# Patient Record
Sex: FEMALE | Race: WHITE | NOT HISPANIC OR LATINO | Employment: OTHER | URBAN - METROPOLITAN AREA
[De-identification: names, ages, dates, MRNs, and addresses within clinical notes are randomized per-mention and may not be internally consistent; named-entity substitution may affect disease eponyms.]

---

## 2017-02-07 ENCOUNTER — TRANSCRIBE ORDERS (OUTPATIENT)
Dept: GENERAL RADIOLOGY | Facility: HOSPITAL | Age: 82
End: 2017-02-07

## 2017-02-07 ENCOUNTER — HOSPITAL ENCOUNTER (OUTPATIENT)
Dept: GENERAL RADIOLOGY | Facility: HOSPITAL | Age: 82
Discharge: HOME | End: 2017-02-07
Admitting: FAMILY MEDICINE

## 2017-02-07 DIAGNOSIS — M25.561 RIGHT KNEE PAIN, UNSPECIFIED CHRONICITY: Primary | ICD-10-CM

## 2017-02-07 PROCEDURE — 73562 X-RAY EXAM OF KNEE 3: CPT

## 2017-02-14 ENCOUNTER — OFFICE VISIT (OUTPATIENT)
Dept: ORTHOPEDIC SURGERY | Facility: CLINIC | Age: 82
End: 2017-02-14

## 2017-02-14 VITALS — WEIGHT: 142.9 LBS | RESPIRATION RATE: 20 BRPM | HEIGHT: 63 IN | BODY MASS INDEX: 25.32 KG/M2

## 2017-02-14 DIAGNOSIS — M25.561 RIGHT KNEE PAIN, UNSPECIFIED CHRONICITY: Primary | ICD-10-CM

## 2017-02-14 DIAGNOSIS — M17.11 PRIMARY OSTEOARTHRITIS OF RIGHT KNEE: ICD-10-CM

## 2017-02-14 DIAGNOSIS — M17.11 PATELLOFEMORAL ARTHRITIS OF RIGHT KNEE: ICD-10-CM

## 2017-02-14 PROCEDURE — 99203 OFFICE O/P NEW LOW 30 MIN: CPT | Performed by: PHYSICIAN ASSISTANT

## 2017-02-14 PROCEDURE — 20610 DRAIN/INJ JOINT/BURSA W/O US: CPT | Performed by: PHYSICIAN ASSISTANT

## 2017-02-14 RX ORDER — LIDOCAINE HYDROCHLORIDE 10 MG/ML
2 INJECTION, SOLUTION INFILTRATION; PERINEURAL
Status: COMPLETED | OUTPATIENT
Start: 2017-02-14 | End: 2017-02-14

## 2017-02-14 RX ORDER — METOPROLOL SUCCINATE 25 MG/1
TABLET, EXTENDED RELEASE ORAL
Status: ON HOLD | COMMUNITY
Start: 2017-02-09 | End: 2018-02-15

## 2017-02-14 RX ORDER — METHYLPREDNISOLONE ACETATE 40 MG/ML
40 INJECTION, SUSPENSION INTRA-ARTICULAR; INTRALESIONAL; INTRAMUSCULAR; SOFT TISSUE
Status: COMPLETED | OUTPATIENT
Start: 2017-02-14 | End: 2017-02-14

## 2017-02-14 RX ADMIN — METHYLPREDNISOLONE ACETATE 40 MG: 40 INJECTION, SUSPENSION INTRA-ARTICULAR; INTRALESIONAL; INTRAMUSCULAR; SOFT TISSUE at 09:24

## 2017-02-14 RX ADMIN — LIDOCAINE HYDROCHLORIDE 2 ML: 10 INJECTION, SOLUTION INFILTRATION; PERINEURAL at 09:24

## 2017-02-14 NOTE — PROGRESS NOTES
Subjective   Patient ID: Silva Ruano is a 89 y.o.  female is here today for a treatment planning discussion.  Pain of the Right Knee         History of Present Illness  Patient presents as a new patient with her daughter for complaints of right knee pain.  Both the patient and her daughter assures me there has been no injury or trauma to the knee.  She states the pain has been ongoing for a couple of months.  She denies numbness or tingling.  She states the pain is worse going from a sitting to a standing position.  Her daughter states she has had a thorough workup for possible blood clots and/or peripheral vascular disease which all were reported negative.  He now report to further investigate why she is having knee pain patient denies low back pain.  She denies hip pain.   She cannot recall any swelling of note  Pain Score: worst possible pain  Pain Location: Knee  Pain Orientation: Right     Pain Descriptors: Aching, Stabbing  Pain Frequency: Constant/continuous     Date Pain First Started:  (two months)     Aggravating Factors: Walking, Standing, Squatting, Kneeling, Stairs, Bending, Exercise, Stretching, Straightening           Result of Injury: No  Work-Related Injury: No    Past Medical History   Diagnosis Date   • Arthritis    • Diverticulosis    • Hypertension    • Skin cancer      lymphoma   • Thyroid disease         Past Surgical History   Procedure Laterality Date   • Ovarian cyst removal     • Hysterectomy     • Cholecystectomy         Family History   Problem Relation Age of Onset   • Heart disease Other    • Hypertension Other    • Cancer Other         Social History     Social History   • Marital status:      Spouse name: N/A   • Number of children: N/A   • Years of education: N/A     Occupational History   • Not on file.     Social History Main Topics   • Smoking status: Never Smoker   • Smokeless tobacco: Not on file   • Alcohol use No   • Drug use: No   • Sexual activity: Defer  "    Other Topics Concern   • Not on file     Social History Narrative       No Known Allergies    Review of Systems   Constitutional: Negative for diaphoresis, fever and unexpected weight change.   HENT: Negative for dental problem and sore throat.    Eyes: Negative for visual disturbance.   Respiratory: Negative for shortness of breath.    Cardiovascular: Negative for chest pain.   Gastrointestinal: Negative for abdominal pain, constipation, diarrhea, nausea and vomiting.   Genitourinary: Negative for difficulty urinating and frequency.   Musculoskeletal: Positive for arthralgias.   Neurological: Negative for headaches.   Hematological: Does not bruise/bleed easily.   All other systems reviewed and are negative.      Objective   Visit Vitals   • Resp 20   • Ht 63\" (160 cm)   • Wt 142 lb 14.4 oz (64.8 kg)   • BMI 25.31 kg/m2      Physical Exam   Constitutional: She is oriented to person, place, and time. She appears well-developed.   Eyes: Conjunctivae are normal.   Neck: No tracheal deviation present.   Pulmonary/Chest: Effort normal.   Musculoskeletal:        Right knee: She exhibits normal range of motion, no swelling, no effusion, no ecchymosis and no erythema. Tenderness found. Medial joint line and lateral joint line tenderness noted.        Right ankle: No tenderness. Achilles tendon exhibits no pain.   Neurological: She is alert and oriented to person, place, and time.   Skin: No rash noted.   Psychiatric: She has a normal mood and affect. Her behavior is normal.   Vitals reviewed.    Right Knee Exam     Tenderness   The patient is experiencing tenderness in the medial joint line, patellar tendon and pes anserinus.    Range of Motion   Right knee extension: 1.   Right knee flexion: 115.     Tests   Drawer:       Anterior - negative    Posterior - negative    Other   Erythema: absent  Sensation: normal  Pulse: present  Other tests: no effusion present          Extremity DVT signs are Negative on physical exam " with negative Linh sign, with no calf pain, with no palpable cords, with no increased pain with passive stretch/extension and with no skin tone change   Neurologic Exam     Mental Status   Oriented to person, place, and time.      Right Knee Exam     Tenderness   The patient is experiencing tenderness in the medial joint line, lateral joint line.        Patient ambulates without difficulty.    Assessment/Plan   Independent Review of Radiographic Studies:    Indication to evaluate joint condition, no comparison views available, shows evident chronic advanced osteoarthritis.  Laboratory and Other Studies:  No new results reviewed today.       I discussed with the patient and her daughter at bedside the possible risks and complications associated with intra-articular cortisone injection.  However, a both elected to proceed.  Large Joint Arthrocentesis  Date/Time: 2/14/2017 9:24 AM  Consent given by: patient  Site marked: site marked  Timeout: Immediately prior to procedure a time out was called to verify the correct patient, procedure, equipment, support staff and site/side marked as required   Supporting Documentation  Indications: pain   Procedure Details  Location: knee - R knee  Preparation: Patient was prepped and draped in the usual sterile fashion  Needle size: 22 G  Approach: anterolateral  Medications administered: 2 mL lidocaine 1 %; 40 mg methylPREDNISolone acetate 40 MG/ML  Patient tolerance: patient tolerated the procedure well with no immediate complications          Phoebe was seen today for pain.    Diagnoses and all orders for this visit:    Right knee pain, unspecified chronicity  -     XR Knee 1 or 2 View Right  -     Large Joint Arthrocentesis  -     Ambulatory Referral to Physical Therapy Evaluate and treat, Ortho    Patellofemoral arthritis of right knee  -     Ambulatory Referral to Physical Therapy Evaluate and treat, Ortho    Primary osteoarthritis of right knee       Orthopedic activities  reviewed and patient expressed appreciation  Discussion of orthopedic goals  Risk, benefits, and merits of treatment alternatives reviewed with the patient and questions answered  Physical therapy referral given  Call or notify for any adverse effect from injection therapy  Ice, heat, and/or modalities as beneficial    I will write for the compound topical anti-inflammatory to be applied 3-4 times daily.  Patient is also advised to take over-the-counter Tylenol as needed.  May apply ice packs to the skin with a protective skin barrier 15 minutes onto hours off    Recommendations/Plan:  Exercise, medications, injections, other patient advice, and return appointment as noted.  Patient is encouraged to call or return for any issues or concerns.    FU in 3 months or as needed  Patient agreeable to call or return sooner for any concerns.

## 2017-03-07 ENCOUNTER — APPOINTMENT (OUTPATIENT)
Dept: LAB | Facility: HOSPITAL | Age: 82
End: 2017-03-07

## 2017-03-07 ENCOUNTER — OFFICE VISIT (OUTPATIENT)
Dept: ONCOLOGY | Facility: CLINIC | Age: 82
End: 2017-03-07

## 2017-03-07 VITALS
RESPIRATION RATE: 14 BRPM | BODY MASS INDEX: 24.45 KG/M2 | TEMPERATURE: 97 F | HEART RATE: 65 BPM | DIASTOLIC BLOOD PRESSURE: 60 MMHG | SYSTOLIC BLOOD PRESSURE: 141 MMHG | WEIGHT: 138 LBS

## 2017-03-07 DIAGNOSIS — C82.90 FOLLICULAR LYMPHOMA, UNSPECIFIED BODY REGION, UNSPECIFIED GRADE (HCC): Primary | ICD-10-CM

## 2017-03-07 LAB
ALBUMIN SERPL-MCNC: 3.8 G/DL (ref 3.5–5)
ALBUMIN/GLOB SERPL: 1.2 G/DL (ref 1–2)
ALP SERPL-CCNC: 90 U/L (ref 38–126)
ALT SERPL W P-5'-P-CCNC: 25 U/L (ref 13–69)
ANION GAP SERPL CALCULATED.3IONS-SCNC: 13.5 MMOL/L
AST SERPL-CCNC: 28 U/L (ref 15–46)
BASOPHILS # BLD AUTO: 0.04 10*3/MM3 (ref 0–0.2)
BASOPHILS NFR BLD AUTO: 0.6 % (ref 0–2.5)
BILIRUB SERPL-MCNC: 0.4 MG/DL (ref 0.2–1.3)
BUN BLD-MCNC: 14 MG/DL (ref 7–20)
BUN/CREAT SERPL: 12.7 (ref 7.1–23.5)
CALCIUM SPEC-SCNC: 9.7 MG/DL (ref 8.4–10.2)
CHLORIDE SERPL-SCNC: 100 MMOL/L (ref 98–107)
CO2 SERPL-SCNC: 31 MMOL/L (ref 26–30)
CREAT BLD-MCNC: 1.1 MG/DL (ref 0.6–1.3)
DEPRECATED RDW RBC AUTO: 41.2 FL (ref 37–54)
EOSINOPHIL # BLD AUTO: 0.4 10*3/MM3 (ref 0–0.7)
EOSINOPHIL NFR BLD AUTO: 6.4 % (ref 0–7)
ERYTHROCYTE [DISTWIDTH] IN BLOOD BY AUTOMATED COUNT: 12.6 % (ref 11.5–14.5)
GFR SERPL CREATININE-BSD FRML MDRD: 47 ML/MIN/1.73
GLOBULIN UR ELPH-MCNC: 3.2 GM/DL
GLUCOSE BLD-MCNC: 100 MG/DL (ref 74–98)
HCT VFR BLD AUTO: 35.5 % (ref 37–47)
HGB BLD-MCNC: 11.7 G/DL (ref 12–16)
IMM GRANULOCYTES # BLD: 0.03 10*3/MM3 (ref 0–0.06)
IMM GRANULOCYTES NFR BLD: 0.5 % (ref 0–0.6)
LDH SERPL-CCNC: 327 U/L (ref 313–618)
LYMPHOCYTES # BLD AUTO: 2.63 10*3/MM3 (ref 0.6–3.4)
LYMPHOCYTES NFR BLD AUTO: 41.8 % (ref 10–50)
MCH RBC QN AUTO: 29.5 PG (ref 27–31)
MCHC RBC AUTO-ENTMCNC: 33 G/DL (ref 30–37)
MCV RBC AUTO: 89.6 FL (ref 81–99)
MONOCYTES # BLD AUTO: 0.48 10*3/MM3 (ref 0–0.9)
MONOCYTES NFR BLD AUTO: 7.6 % (ref 0–12)
NEUTROPHILS # BLD AUTO: 2.71 10*3/MM3 (ref 2–6.9)
NEUTROPHILS NFR BLD AUTO: 43.1 % (ref 37–80)
NRBC BLD MANUAL-RTO: 0 /100 WBC (ref 0–0)
PLATELET # BLD AUTO: 204 10*3/MM3 (ref 130–400)
PMV BLD AUTO: 9.1 FL (ref 6–12)
POTASSIUM BLD-SCNC: 3.5 MMOL/L (ref 3.5–5.1)
PROT SERPL-MCNC: 7 G/DL (ref 6.3–8.2)
RBC # BLD AUTO: 3.96 10*6/MM3 (ref 4.2–5.4)
SODIUM BLD-SCNC: 141 MMOL/L (ref 137–145)
WBC NRBC COR # BLD: 6.29 10*3/MM3 (ref 4.8–10.8)

## 2017-03-07 PROCEDURE — 99213 OFFICE O/P EST LOW 20 MIN: CPT | Performed by: NURSE PRACTITIONER

## 2017-03-07 PROCEDURE — 36415 COLL VENOUS BLD VENIPUNCTURE: CPT | Performed by: NURSE PRACTITIONER

## 2017-03-07 PROCEDURE — 83615 LACTATE (LD) (LDH) ENZYME: CPT | Performed by: NURSE PRACTITIONER

## 2017-03-07 PROCEDURE — 80053 COMPREHEN METABOLIC PANEL: CPT | Performed by: NURSE PRACTITIONER

## 2017-03-07 PROCEDURE — 85025 COMPLETE CBC W/AUTO DIFF WBC: CPT | Performed by: NURSE PRACTITIONER

## 2017-03-07 RX ORDER — LOSARTAN POTASSIUM 50 MG/1
TABLET ORAL
Status: ON HOLD | COMMUNITY
Start: 2017-02-28 | End: 2018-02-15

## 2017-03-07 RX ORDER — OMEPRAZOLE 20 MG/1
20 CAPSULE, DELAYED RELEASE ORAL DAILY
COMMUNITY
Start: 2017-02-17 | End: 2020-01-01 | Stop reason: HOSPADM

## 2017-03-07 NOTE — PROGRESS NOTES
PROBLEM LIST:  1. 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   b) Stage III disease at presentation in 2008.   c) Responded to Rituxan as a single agent followed by maintenance Rituxan.      CHIEF COMPLAINT: Followup about lymphoma.      Subjective     HISTORY OF PRESENT ILLNESS:   Mrs. Ruano is here for follow up of lymphoma. She is doing well since her last visit. She keeps up with her usual activities. She required a steroid injection in her right knee for pain and got great pain relief. She also required dilatation for esophageal stricture and was treated for H pylori.  She denies any excessive fatigue, recurring fevers, drenching night sweats, lymphadenopathy or rashes.        Past Medical History, Past Surgical History, Social History, Family History have been reviewed and are without significant changes except as mentioned.    Review of Systems   A comprehensive 14 point review of systems was performed and was negative except as mentioned.    Medications:  The current medication list was reviewed in the EMR    ALLERGIES:  No Known Allergies    Objective      Visit Vitals   • /60   • Pulse 65   • Temp 97 °F (36.1 °C) (Temporal Artery )   • Resp 14   • Wt 138 lb (62.6 kg)   • BMI 24.45 kg/m2            General: well appearing, in no acute distress   HEENT: sclera anicteric, oropharynx clear  Lymphatics: no cervical, supraclavicular, or axillary adenopathy  Cardiovascular: regular rate and rhythm, no murmurs  Lungs: clear to auscultation bilaterally  Abdomen: soft, nontender, nondistended.  No palpable masses or organomegaly  Extremeties: no lower extremity edema, cords or calf tenderness  Skin: no rashes, lesions, bruising, or petechiae. Raised fleshy nodule on the left lower extremity close to the ankle that is  approximately 2 cm.     RECENT LABS:  Hematology WBC   Date Value Ref Range Status   09/06/2016 5.67 3.50 - 10.80 10*3/mm3 Final    03/08/2016 5.42 3.50 - 10.80 K/mcL Final     HEMOGLOBIN   Date Value Ref Range Status   09/06/2016 11.5 11.5 - 15.5 g/dL Final   03/08/2016 12.5 11.5 - 15.5 g/dL Final     HEMATOCRIT   Date Value Ref Range Status   09/06/2016 34.4 (L) 34.5 - 44.0 % Final   03/08/2016 37.5 34.5 - 44.0 % Final     MCV   Date Value Ref Range Status   09/06/2016 89.6 80.0 - 99.0 fL Final   03/08/2016 89.7 80.0 - 99.0 fL Final     RDW   Date Value Ref Range Status   09/06/2016 13.1 11.3 - 14.5 % Final     MPV   Date Value Ref Range Status   09/06/2016 9.0 6.0 - 12.0 fL Final     PLATELETS   Date Value Ref Range Status   09/06/2016 203 150 - 450 10*3/mm3 Final   03/08/2016 193 150 - 450 K/mcL Final     IMMATURE GRANS %   Date Value Ref Range Status   09/06/2016 0.4 0.0 - 0.6 % Final     NEUTROPHILS, ABSOLUTE   Date Value Ref Range Status   09/06/2016 3.10 1.50 - 8.30 10*3/mm3 Final     NEUTROPHILS ABSOLUTE   Date Value Ref Range Status   03/08/2016 2.02 1.50 - 8.30 K/mcL Final     LYMPHOCYTES, ABSOLUTE   Date Value Ref Range Status   09/06/2016 1.95 0.60 - 4.80 10*3/mm3 Final     LYMPHOCYTES ABSOLUTE   Date Value Ref Range Status   03/08/2016 2.58 0.60 - 4.80 K/mcL Final     MONOCYTES, ABSOLUTE   Date Value Ref Range Status   09/06/2016 0.23 0.00 - 1.00 10*3/mm3 Final     MONOCYTES ABSOLUTE   Date Value Ref Range Status   03/08/2016 0.51 0.00 - 1.00 K/mcL Final     EOSINOPHILS, ABSOLUTE   Date Value Ref Range Status   09/06/2016 0.35 (H) 0.10 - 0.30 10*3/mm3 Final     EOSINOPHILS ABSOLUTE   Date Value Ref Range Status   03/08/2016 0.27 0.10 - 0.30 K/mcL Final     BASOPHILS, ABSOLUTE   Date Value Ref Range Status   09/06/2016 0.02 0.00 - 0.20 10*3/mm3 Final     BASOPHILS ABSOLUTE   Date Value Ref Range Status   03/08/2016 0.02 0.00 - 0.20 K/mcL Final     IMMATURE GRANS, ABSOLUTE   Date Value Ref Range Status   09/06/2016 0.02 0.00 - 0.03 10*3/mm3 Final       GLUCOSE   Date Value Ref Range Status   09/06/2016 105 (H) 70 - 100 mg/dL Final    03/08/2016 93 70 - 100 mg/dL Final     SODIUM   Date Value Ref Range Status   12/13/2016 140 137 - 145 mmol/L Final   09/06/2016 136 132 - 146 mmol/L Final     POTASSIUM   Date Value Ref Range Status   12/13/2016 3.7 3.5 - 5.1 mmol/L Final   09/06/2016 3.6 3.5 - 5.5 mmol/L Final     CO2   Date Value Ref Range Status   12/13/2016 31 (H) 26 - 30 mmol/L Final   09/06/2016 31.0 20.0 - 31.0 mmol/L Final     CHLORIDE   Date Value Ref Range Status   12/13/2016 102 98 - 107 mmol/L Final   09/06/2016 99 99 - 109 mmol/L Final     ANION GAP   Date Value Ref Range Status   12/13/2016 11 10 - 20 mmol/L Final   09/06/2016 6.0 3.0 - 11.0 mmol/L Final     CREATININE   Date Value Ref Range Status   12/13/2016 0.9 0.6 - 1.3 mg/dL Final   09/06/2016 0.80 0.60 - 1.30 mg/dL Final     BUN   Date Value Ref Range Status   12/13/2016 14 7 - 20 mg/dL Final   09/06/2016 14 9 - 23 mg/dL Final     BUN/CREATININE RATIO   Date Value Ref Range Status   09/06/2016 17.5 7.0 - 25.0 Final   05/14/2014 23.3 7.1 - 23.5 Final     CALCIUM   Date Value Ref Range Status   12/13/2016 9.5 8.4 - 10.2 mg/dL Final   09/06/2016 9.2 8.7 - 10.4 mg/dL Final     EGFR NON  AMER   Date Value Ref Range Status   09/06/2016 67 >60 mL/min/1.73 Final     ALKALINE PHOSPHATASE   Date Value Ref Range Status   09/06/2016 78 25 - 100 U/L Final   03/08/2016 91 25 - 100 Units/L Final     TOTAL PROTEIN   Date Value Ref Range Status   09/06/2016 6.2 5.7 - 8.2 g/dL Final   03/08/2016 6.7 5.7 - 8.2 g/dL Final     ALT (SGPT)   Date Value Ref Range Status   09/06/2016 18 7 - 40 U/L Final   03/08/2016 27 7 - 40 Units/L Final     AST (SGOT)   Date Value Ref Range Status   09/06/2016 24 0 - 33 U/L Final   03/08/2016 34 (H) 0 - 33 Units/L Final     TOTAL BILIRUBIN   Date Value Ref Range Status   09/06/2016 0.3 0.3 - 1.2 mg/dL Final   03/08/2016 0.4 0.3 - 1.2 mg/dL Final     ALBUMIN   Date Value Ref Range Status   09/06/2016 3.80 3.20 - 4.80 g/dL Final   03/08/2016 4.0 3.2 - 4.8  g/dL Final     GLOBULIN   Date Value Ref Range Status   09/06/2016 2.4 gm/dL Final     A/G RATIO   Date Value Ref Range Status   09/06/2016 1.6 g/dL Final   05/14/2014 1.2 1.0 - 2.0 Final       LDH   Date Value Ref Range Status   09/06/2016 129 120 - 246 U/L Final   03/08/2016 127 120 - 246 Units/L Final          Assessment/Plan   IMPRESSION:   1. Low-grade lymphoma. The patient continues to do well with no evidence of recurrence.   2. Nodules on the left lower extremity of uncertain significance, unchanged.      PLAN:  1. We will obtain labs today including CBC, CMP, and LDH. We will contact her if there are adverse changes.   2. We will plan on seeing her back in 6 months for follow up evaluation. We have asked her to contact us in the interim, if any new symptoms arise.               ERINN Keen  [facility] Hematology and On[facility]    3/7/2017

## 2017-05-01 ENCOUNTER — DOCUMENTATION (OUTPATIENT)
Dept: NEUROLOGY | Facility: CLINIC | Age: 82
End: 2017-05-01

## 2017-05-01 ENCOUNTER — APPOINTMENT (OUTPATIENT)
Dept: LAB | Facility: HOSPITAL | Age: 82
End: 2017-05-01

## 2017-05-01 ENCOUNTER — OFFICE VISIT (OUTPATIENT)
Dept: NEUROLOGY | Facility: CLINIC | Age: 82
End: 2017-05-01

## 2017-05-01 VITALS
WEIGHT: 140 LBS | DIASTOLIC BLOOD PRESSURE: 60 MMHG | BODY MASS INDEX: 24.8 KG/M2 | SYSTOLIC BLOOD PRESSURE: 115 MMHG | HEIGHT: 63 IN

## 2017-05-01 DIAGNOSIS — G30.1 LATE ONSET ALZHEIMER'S DISEASE WITH BEHAVIORAL DISTURBANCE (HCC): Primary | ICD-10-CM

## 2017-05-01 DIAGNOSIS — F02.818 LATE ONSET ALZHEIMER'S DISEASE WITH BEHAVIORAL DISTURBANCE (HCC): Primary | ICD-10-CM

## 2017-05-01 LAB
FOLATE SERPL-MCNC: 13.97 NG/ML (ref 3.2–20)
TSH SERPL DL<=0.05 MIU/L-ACNC: 1.99 MIU/ML (ref 0.35–5.35)
VIT B12 BLD-MCNC: 743 PG/ML (ref 211–911)

## 2017-05-01 PROCEDURE — 36415 COLL VENOUS BLD VENIPUNCTURE: CPT | Performed by: PSYCHIATRY & NEUROLOGY

## 2017-05-01 PROCEDURE — 84443 ASSAY THYROID STIM HORMONE: CPT | Performed by: PSYCHIATRY & NEUROLOGY

## 2017-05-01 PROCEDURE — 82746 ASSAY OF FOLIC ACID SERUM: CPT | Performed by: PSYCHIATRY & NEUROLOGY

## 2017-05-01 PROCEDURE — 99205 OFFICE O/P NEW HI 60 MIN: CPT | Performed by: PSYCHIATRY & NEUROLOGY

## 2017-05-01 PROCEDURE — 82607 VITAMIN B-12: CPT | Performed by: PSYCHIATRY & NEUROLOGY

## 2017-05-01 RX ORDER — MEMANTINE HYDROCHLORIDE 5 MG-10 MG
KIT ORAL
Qty: 1 PACKAGE | Refills: 0 | Status: SHIPPED | OUTPATIENT
Start: 2017-05-01 | End: 2017-06-09

## 2017-05-01 RX ORDER — DONEPEZIL HYDROCHLORIDE 5 MG/1
5 TABLET, FILM COATED ORAL DAILY
Qty: 30 TABLET | Refills: 11 | Status: SHIPPED | OUTPATIENT
Start: 2017-05-01 | End: 2017-05-01

## 2017-05-05 ENCOUNTER — HOSPITAL ENCOUNTER (OUTPATIENT)
Dept: CT IMAGING | Facility: HOSPITAL | Age: 82
Discharge: HOME | End: 2017-05-05
Attending: PSYCHIATRY & NEUROLOGY | Admitting: PSYCHIATRY & NEUROLOGY

## 2017-05-05 PROCEDURE — 70450 CT HEAD/BRAIN W/O DYE: CPT

## 2017-05-11 ENCOUNTER — APPOINTMENT (OUTPATIENT)
Dept: CT IMAGING | Facility: HOSPITAL | Age: 82
End: 2017-05-11
Attending: PSYCHIATRY & NEUROLOGY

## 2017-05-15 ENCOUNTER — OFFICE VISIT (OUTPATIENT)
Dept: ORTHOPEDIC SURGERY | Facility: CLINIC | Age: 82
End: 2017-05-15

## 2017-05-15 VITALS — HEIGHT: 63 IN | WEIGHT: 143 LBS | RESPIRATION RATE: 18 BRPM | BODY MASS INDEX: 25.34 KG/M2

## 2017-05-15 DIAGNOSIS — M25.561 RIGHT KNEE PAIN, UNSPECIFIED CHRONICITY: Primary | ICD-10-CM

## 2017-05-15 DIAGNOSIS — M17.11 PRIMARY OSTEOARTHRITIS OF RIGHT KNEE: ICD-10-CM

## 2017-05-15 PROCEDURE — 20610 DRAIN/INJ JOINT/BURSA W/O US: CPT | Performed by: PHYSICIAN ASSISTANT

## 2017-05-15 PROCEDURE — 99213 OFFICE O/P EST LOW 20 MIN: CPT | Performed by: PHYSICIAN ASSISTANT

## 2017-05-15 RX ORDER — METHYLPREDNISOLONE ACETATE 40 MG/ML
40 INJECTION, SUSPENSION INTRA-ARTICULAR; INTRALESIONAL; INTRAMUSCULAR; SOFT TISSUE
Status: COMPLETED | OUTPATIENT
Start: 2017-05-15 | End: 2017-05-15

## 2017-05-15 RX ORDER — AMOXICILLIN 500 MG/1
500 CAPSULE ORAL 2 TIMES DAILY
COMMUNITY
Start: 2017-02-23 | End: 2017-08-18

## 2017-05-15 RX ORDER — LIDOCAINE AND PRILOCAINE 25; 25 MG/G; MG/G
CREAM TOPICAL
COMMUNITY
Start: 2017-02-21 | End: 2017-09-19 | Stop reason: HOSPADM

## 2017-05-15 RX ORDER — LIDOCAINE HYDROCHLORIDE 10 MG/ML
2 INJECTION, SOLUTION INFILTRATION; PERINEURAL
Status: COMPLETED | OUTPATIENT
Start: 2017-05-15 | End: 2017-05-15

## 2017-05-15 RX ORDER — CLARITHROMYCIN 500 MG/1
TABLET, COATED ORAL
COMMUNITY
Start: 2017-02-23 | End: 2017-08-18

## 2017-05-15 RX ADMIN — METHYLPREDNISOLONE ACETATE 40 MG: 40 INJECTION, SUSPENSION INTRA-ARTICULAR; INTRALESIONAL; INTRAMUSCULAR; SOFT TISSUE at 10:31

## 2017-05-15 RX ADMIN — LIDOCAINE HYDROCHLORIDE 2 ML: 10 INJECTION, SOLUTION INFILTRATION; PERINEURAL at 10:31

## 2017-05-17 ENCOUNTER — APPOINTMENT (OUTPATIENT)
Dept: GENERAL RADIOLOGY | Facility: HOSPITAL | Age: 82
End: 2017-05-17

## 2017-05-17 ENCOUNTER — HOSPITAL ENCOUNTER (EMERGENCY)
Facility: HOSPITAL | Age: 82
Discharge: HOME | End: 2017-05-18
Attending: EMERGENCY MEDICINE | Admitting: EMERGENCY MEDICINE

## 2017-05-17 ENCOUNTER — TELEPHONE (OUTPATIENT)
Dept: NEUROLOGY | Facility: CLINIC | Age: 82
End: 2017-05-17

## 2017-05-17 ENCOUNTER — APPOINTMENT (OUTPATIENT)
Dept: CT IMAGING | Facility: HOSPITAL | Age: 82
End: 2017-05-17

## 2017-05-17 DIAGNOSIS — S02.2XXB OPEN FRACTURE OF NASAL BONE, INITIAL ENCOUNTER: ICD-10-CM

## 2017-05-17 DIAGNOSIS — S80.00XA CONTUSION OF KNEE, UNSPECIFIED LATERALITY, INITIAL ENCOUNTER: ICD-10-CM

## 2017-05-17 DIAGNOSIS — S60.229A CONTUSION OF HAND, UNSPECIFIED LATERALITY, INITIAL ENCOUNTER: ICD-10-CM

## 2017-05-17 DIAGNOSIS — S01.81XA FACIAL LACERATION, INITIAL ENCOUNTER: Primary | ICD-10-CM

## 2017-05-17 PROCEDURE — 70486 CT MAXILLOFACIAL W/O DYE: CPT

## 2017-05-17 PROCEDURE — 73130 X-RAY EXAM OF HAND: CPT

## 2017-05-17 PROCEDURE — 73560 X-RAY EXAM OF KNEE 1 OR 2: CPT

## 2017-05-17 PROCEDURE — 99284 EMERGENCY DEPT VISIT MOD MDM: CPT

## 2017-05-17 PROCEDURE — 70450 CT HEAD/BRAIN W/O DYE: CPT

## 2017-05-17 RX ORDER — TRAMADOL HYDROCHLORIDE 50 MG/1
50 TABLET ORAL ONCE
Status: COMPLETED | OUTPATIENT
Start: 2017-05-17 | End: 2017-05-17

## 2017-05-17 RX ORDER — LIDOCAINE HYDROCHLORIDE AND EPINEPHRINE 10; 10 MG/ML; UG/ML
10 INJECTION, SOLUTION INFILTRATION; PERINEURAL ONCE
Status: COMPLETED | OUTPATIENT
Start: 2017-05-17 | End: 2017-05-17

## 2017-05-17 RX ADMIN — Medication 3 ML: at 22:46

## 2017-05-17 RX ADMIN — TRAMADOL HYDROCHLORIDE 50 MG: 50 TABLET, COATED ORAL at 22:47

## 2017-05-17 RX ADMIN — LIDOCAINE HYDROCHLORIDE,EPINEPHRINE BITARTRATE 10 ML: 10; .01 INJECTION, SOLUTION INFILTRATION; PERINEURAL at 22:47

## 2017-05-18 VITALS
HEART RATE: 91 BPM | HEIGHT: 67 IN | TEMPERATURE: 98.2 F | OXYGEN SATURATION: 96 % | RESPIRATION RATE: 20 BRPM | SYSTOLIC BLOOD PRESSURE: 152 MMHG | WEIGHT: 145 LBS | BODY MASS INDEX: 22.76 KG/M2 | DIASTOLIC BLOOD PRESSURE: 75 MMHG

## 2017-05-18 RX ORDER — TRAMADOL HYDROCHLORIDE 50 MG/1
50 TABLET ORAL EVERY 6 HOURS PRN
Qty: 20 TABLET | Refills: 0 | Status: SHIPPED | OUTPATIENT
Start: 2017-05-18 | End: 2017-09-19 | Stop reason: HOSPADM

## 2017-05-22 ENCOUNTER — HOSPITAL ENCOUNTER (EMERGENCY)
Facility: HOSPITAL | Age: 82
Discharge: HOME | End: 2017-05-22

## 2017-05-22 VITALS
SYSTOLIC BLOOD PRESSURE: 143 MMHG | BODY MASS INDEX: 24.8 KG/M2 | HEIGHT: 63 IN | RESPIRATION RATE: 18 BRPM | WEIGHT: 140 LBS | HEART RATE: 63 BPM | OXYGEN SATURATION: 98 % | DIASTOLIC BLOOD PRESSURE: 63 MMHG | TEMPERATURE: 97.6 F

## 2017-05-22 PROCEDURE — 99201: CPT

## 2017-06-09 ENCOUNTER — OFFICE VISIT (OUTPATIENT)
Dept: NEUROLOGY | Facility: CLINIC | Age: 82
End: 2017-06-09

## 2017-06-09 VITALS
BODY MASS INDEX: 23.04 KG/M2 | HEIGHT: 63 IN | WEIGHT: 130 LBS | DIASTOLIC BLOOD PRESSURE: 78 MMHG | SYSTOLIC BLOOD PRESSURE: 132 MMHG

## 2017-06-09 DIAGNOSIS — F02.818 LATE ONSET ALZHEIMER'S DISEASE WITH BEHAVIORAL DISTURBANCE (HCC): Primary | ICD-10-CM

## 2017-06-09 DIAGNOSIS — G30.1 LATE ONSET ALZHEIMER'S DISEASE WITH BEHAVIORAL DISTURBANCE (HCC): Primary | ICD-10-CM

## 2017-06-09 PROCEDURE — 99214 OFFICE O/P EST MOD 30 MIN: CPT | Performed by: PHYSICIAN ASSISTANT

## 2017-06-09 NOTE — PROGRESS NOTES
"Subjective     History of Present Illness   Silva Ruano is a 89 y.o. female who returns to clinic today for evaluation of cognitive impairment. Her family  has noted symptoms since at least 2015 marked initially by forgetfulness. This has gradually worsened  over time. Additional associated symptoms have included impairments in essentially all spheres of cognition. She has had associated symptoms of delusions and hallucinations. Her family manages her medications and finances. She is currently residing at her sister's home in [place]. No further associated symptoms or modifying factors have been noted.    Of note she is followed by Dr. Morocho for lymphoma.     Prior evaluation has included screening blood work  and a head CT  which were unremarkable . She previously took donepezil and Namenda, though these were discontinued due to side effects and lack of perceived benefit.     Since her last visit in 5/17, she feels essentially unchanged. Her family has noted a cognitive decline.       The following portions of the patient's history were reviewed and updated as appropriate: allergies, current medications, past family history, past medical history, past social history, past surgical history and problem list.    Review of Systems   Constitutional: Negative.    HENT: Negative.    Eyes: Negative.    Respiratory: Negative.    Cardiovascular: Negative.    Gastrointestinal: Negative.    Endocrine: Negative.    Genitourinary: Negative.    Musculoskeletal: Negative.    Skin: Negative.    Allergic/Immunologic: Negative.    Neurological:        As noted in HPI   Hematological: Negative.    Psychiatric/Behavioral:        As noted in HPI       Objective     /78  Ht 63\" (160 cm)  Wt 130 lb (59 kg)  BMI 23.03 kg/m2    General appearance today is normal.       Physical Exam   Neurological: She has normal strength. She has a normal Finger-Nose-Finger Test.   Psychiatric: Her speech is normal.        Neurologic Exam "     Mental Status   Oriented to person.   Disoriented to place.   Disoriented to time.   Registration: recalls 3 of 3 objects. Recall of objects at 5 minutes: 0/3 recall. Follows 3 step commands.   Attention: 0/5 sequencing.   Speech: speech is normal   Level of consciousness: alert  Able to name object. Able to read. Able to repeat. Able to write. Normal comprehension.     Cranial Nerves   Cranial nerves II through XII intact.     Motor Exam   Muscle bulk: normal  Overall muscle tone: normal    Strength   Strength 5/5 throughout.     Sensory Exam   Light touch normal.     Gait, Coordination, and Reflexes     Coordination   Finger to nose coordination: normal    Tremor   Resting tremor: absent        Results  MMSE=14 (18 in 4/17)       Assessment/Plan   Silva was seen today for alzheimer's disease.    Diagnoses and all orders for this visit:    Late onset Alzheimer's disease with behavioral disturbance          Discussion/Summary   Silva Ruano returns to clinic today for evaluation of Alzheimer's Disease . I again reviewed her current status and treatment options. We discussed potential treatment options including adding Exelon. I also discussed potentially adding Seroquel and mirtazapine for her agitation and hallucinations. However, it was ultimately elected to simply follow her course conservatively for now. I also recommended PT for balance impairment, though this was ultimately declined. She will then follow up in 3 months, or sooner if needed.       I spent 20 minutes out of 25 minutes face to face with the patient and family and discussing diagnosis, prognosis, diagnostic testing, evaluation, current status, treatment options and management.    As part of this visit I reviewed prior lab results, reviewed radiology results and obtained additional history from the family which is incorporated in the HPI.      Yasmine Barone PA-C

## 2017-08-18 ENCOUNTER — OFFICE VISIT (OUTPATIENT)
Dept: ORTHOPEDIC SURGERY | Facility: CLINIC | Age: 82
End: 2017-08-18

## 2017-08-18 VITALS — HEIGHT: 63 IN | RESPIRATION RATE: 18 BRPM | WEIGHT: 133.6 LBS | BODY MASS INDEX: 23.67 KG/M2

## 2017-08-18 DIAGNOSIS — M17.11 PRIMARY OSTEOARTHRITIS OF RIGHT KNEE: Primary | ICD-10-CM

## 2017-08-18 PROCEDURE — 20610 DRAIN/INJ JOINT/BURSA W/O US: CPT | Performed by: PHYSICIAN ASSISTANT

## 2017-08-18 PROCEDURE — 99213 OFFICE O/P EST LOW 20 MIN: CPT | Performed by: PHYSICIAN ASSISTANT

## 2017-08-18 RX ORDER — AMOXICILLIN AND CLAVULANATE POTASSIUM 875; 125 MG/1; MG/1
TABLET, FILM COATED ORAL
COMMUNITY
Start: 2017-05-23 | End: 2017-08-18

## 2017-08-18 NOTE — PROGRESS NOTES
"Maricarmen Ruano is a 89 y.o. female is here today for injection therapy.  Injections and Pain of the Right Knee      History of Present Illness         Patient presents for continued right knee pain.  She states the first cortisone injection provided relief for 3 months however her repeat cortisone injection lasted only a few weeks.  She has taken over-the-counter Tylenol very little relief.  Patient denies injury or trauma.  Denies redness or warmth to the knee.  Denies recent fever or illness.    Past Medical History:   Diagnosis Date   • Arthritis    • Diverticulosis    • Hypertension    • Skin cancer     lymphoma   • Thyroid disease         Past Surgical History:   Procedure Laterality Date   • CHOLECYSTECTOMY     • HYSTERECTOMY     • OVARIAN CYST REMOVAL         No Known Allergies    Review of Systems   Musculoskeletal: Positive for arthralgias (right knee pain).   All other systems reviewed and are negative.       Objective   Resp 18  Ht 63\" (160 cm)  Wt 133 lb 9.6 oz (60.6 kg)  BMI 23.67 kg/m2   Physical Exam   Constitutional: She is oriented to person, place, and time.   HENT:   Head: Normocephalic.   Pulmonary/Chest: Effort normal.   Musculoskeletal:        Right knee: She exhibits no swelling, no effusion, no deformity and no erythema. Tenderness found. Medial joint line tenderness noted.        Right ankle: She exhibits no swelling and no ecchymosis. No tenderness. Achilles tendon exhibits no pain.   Neurological: She is alert and oriented to person, place, and time.   Skin: No rash noted.   Psychiatric: She has a normal mood and affect.   Vitals reviewed.    Skin exam stable with no erythema, ecchymosis or rash.  No new swelling.  No motor or sensory changes.  Distal pulse intact.  Right Knee Exam     Range of Motion   Right knee extension: 4.   Flexion: 110     Other   Erythema: absent  Sensation: normal  Pulse: present  Swelling: none  Other tests: no effusion " present          Neurologic Exam     Mental Status   Oriented to person, place, and time.     Right Knee Exam     Tenderness   The patient is experiencing tenderness in the medial joint line.            Large Joint Arthrocentesis  Date/Time: 8/18/2017 12:59 PM  Consent given by: patient  Site marked: site marked  Timeout: Immediately prior to procedure a time out was called to verify the correct patient, procedure, equipment, support staff and site/side marked as required   Supporting Documentation  Indications: pain   Procedure Details  Location: knee - R knee  Preparation: Patient was prepped and draped in the usual sterile fashion  Needle size: 22 G  Approach: anterolateral  Medications administered: 25 mg sodium hyaluronate 25 MG/2.5ML  Patient tolerance: patient tolerated the procedure well with no immediate complications          Assessment/Plan   Independent Review of Radiographic Studies:    No new imaging done today.  Laboratory and Other Studies:  No new results reviewed today.       Silva was seen today for injections and pain.    Diagnoses and all orders for this visit:    Primary osteoarthritis of right knee  -     Large Joint Arthrocentesis    Other orders  -     Cancel: Splint Application    Orthopedic activities reviewed and patient expressed appreciation  Discussion of orthopedic goals  Risk, benefits, and merits of treatment alternatives reviewed with the patient and questions answered  Call or notify for any adverse effect from injection therapy  Ice, heat, and/or modalities as beneficial    Recommendations/Plan:  Exercise, medications, injections, other patient advice, and return appointment as noted.  Patient is encouraged to call or return for any issues or concerns.  I did discuss with patient possible Visco supplementation injection as she does have sufficient joint space to the medial lateral compartment.  The patient elects to proceed.    FU in 1 week for 2nd Supartz  Patient agreeable to  call or return sooner for any concerns.

## 2017-08-23 ENCOUNTER — OFFICE VISIT (OUTPATIENT)
Dept: ORTHOPEDIC SURGERY | Facility: CLINIC | Age: 82
End: 2017-08-23

## 2017-08-23 VITALS — BODY MASS INDEX: 23.57 KG/M2 | RESPIRATION RATE: 16 BRPM | WEIGHT: 133 LBS | HEIGHT: 63 IN

## 2017-08-23 DIAGNOSIS — M17.11 PRIMARY OSTEOARTHRITIS OF RIGHT KNEE: ICD-10-CM

## 2017-08-23 PROCEDURE — 20610 DRAIN/INJ JOINT/BURSA W/O US: CPT | Performed by: PHYSICIAN ASSISTANT

## 2017-08-23 NOTE — PROGRESS NOTES
"Maricarmen Ruano is a 89 y.o.  female is here today for injection therapy.  Pain of the Right Knee      History of Present Illness   Here for 2nd Supartz injection.   Past Medical History:   Diagnosis Date   • Arthritis    • Diverticulosis    • Hypertension    • Skin cancer     lymphoma   • Thyroid disease         Past Surgical History:   Procedure Laterality Date   • CHOLECYSTECTOMY     • HYSTERECTOMY     • OVARIAN CYST REMOVAL         No Known Allergies    Review of Systems   Constitutional: Negative for diaphoresis, fever and unexpected weight change.   HENT: Negative for dental problem and sore throat.    Eyes: Negative for visual disturbance.   Respiratory: Negative for shortness of breath.    Cardiovascular: Negative for chest pain.   Gastrointestinal: Negative for abdominal pain, constipation, diarrhea, nausea and vomiting.   Genitourinary: Negative for difficulty urinating and frequency.   Musculoskeletal: Positive for arthralgias.   Neurological: Negative for headaches.   Hematological: Does not bruise/bleed easily.   All other systems reviewed and are negative.      Objective   Resp 16  Ht 63\" (160 cm)  Wt 133 lb (60.3 kg)  BMI 23.56 kg/m2   Physical Exam  Since last injection, patient notes no relief of symptoms.  No adverse effects of prior injection.  Skin exam stable with no erythema, ecchymosis or rash.  No new swelling.  No motor or sensory changes.  Distal pulse intact.    Large Joint Arthrocentesis  Date/Time: 8/23/2017 3:26 PM  Consent given by: patient  Site marked: site marked  Timeout: Immediately prior to procedure a time out was called to verify the correct patient, procedure, equipment, support staff and site/side marked as required   Supporting Documentation  Indications: pain   Procedure Details  Location: knee - R knee  Preparation: Patient was prepped and draped in the usual sterile fashion  Needle size: 22 G  Medications administered: 25 mg sodium hyaluronate 25 " MG/2.5ML  Patient tolerance: patient tolerated the procedure well with no immediate complications          Assessment/Plan   Independent Review of Radiographic Studies:    Reviewed at a prior visit.  Laboratory and Other Studies:  No new results reviewed today.     Silva was seen today for pain.    Diagnoses and all orders for this visit:    Primary osteoarthritis of right knee  -     Large Joint Arthrocentesis    Orthopedic activities reviewed and patient expressed appreciation  Discussion of orthopedic goals  Risk, benefits, and merits of treatment alternatives reviewed with the patient and questions answered  Call or notify for any adverse effect from injection therapy    Recommendations/Plan:  Exercise, medications, injections, other patient advice, and return appointment as noted.  Patient is encouraged to call or return for any issues or concerns.    FU in 1 week  Patient agreeable to call or return sooner for any concerns.

## 2017-09-01 ENCOUNTER — OFFICE VISIT (OUTPATIENT)
Dept: ORTHOPEDIC SURGERY | Facility: CLINIC | Age: 82
End: 2017-09-01

## 2017-09-01 VITALS — BODY MASS INDEX: 23.57 KG/M2 | RESPIRATION RATE: 18 BRPM | HEIGHT: 63 IN | WEIGHT: 133 LBS

## 2017-09-01 DIAGNOSIS — M17.11 PRIMARY OSTEOARTHRITIS OF RIGHT KNEE: ICD-10-CM

## 2017-09-01 PROCEDURE — 20610 DRAIN/INJ JOINT/BURSA W/O US: CPT | Performed by: PHYSICIAN ASSISTANT

## 2017-09-01 NOTE — PROGRESS NOTES
"Subjective   Phoebedelmira Ruano is a 89 y.o.  female is here today for injection therapy.  Injections of the Right Knee (Supartz #3)      History of Present Illness   Patient is here for her third Supartz injection into the right knee secondary to OA pain.  She denies any unwanted side effects thus far  Past Medical History:   Diagnosis Date   • Arthritis    • Diverticulosis    • Hypertension    • Skin cancer     lymphoma   • Thyroid disease         Past Surgical History:   Procedure Laterality Date   • CHOLECYSTECTOMY     • HYSTERECTOMY     • OVARIAN CYST REMOVAL         No Known Allergies    Review of Systems    Objective   Resp 18  Ht 63\" (160 cm)  Wt 133 lb (60.3 kg)  BMI 23.56 kg/m2   Physical Exam  Since last injection, patient notes mild relief of symptoms.  No adverse effects of prior injection.  Skin exam stable with no erythema, ecchymosis or rash.  No new swelling.  No motor or sensory changes.  Distal pulse intact.    Large Joint Arthrocentesis  Date/Time: 9/1/2017 1:24 PM  Consent given by: patient  Site marked: site marked  Timeout: Immediately prior to procedure a time out was called to verify the correct patient, procedure, equipment, support staff and site/side marked as required   Supporting Documentation  Indications: pain   Procedure Details  Location: knee - R knee  Preparation: Patient was prepped and draped in the usual sterile fashion  Needle size: 22 G  Approach: anterolateral  Medications administered: 25 mg sodium hyaluronate 25 MG/2.5ML  Patient tolerance: patient tolerated the procedure well with no immediate complications          Assessment/Plan       Silva was seen today for injections.    Diagnoses and all orders for this visit:    Primary osteoarthritis of right knee  -     Large Joint Arthrocentesis    Orthopedic activities reviewed and patient expressed appreciation  Discussion of orthopedic goals  Risk, benefits, and merits of treatment alternatives reviewed with the patient and " questions answered  Take prescribed medications as instructed only as tolerated    Recommendations/Plan:  Exercise, medications, injections, other patient advice, and return appointment as noted.  Patient is encouraged to call or return for any issues or concerns.    FU in 1 week  Patient agreeable to call or return sooner for any concerns.

## 2017-09-08 ENCOUNTER — OFFICE VISIT (OUTPATIENT)
Dept: ORTHOPEDIC SURGERY | Facility: CLINIC | Age: 82
End: 2017-09-08

## 2017-09-08 VITALS — WEIGHT: 132 LBS | BODY MASS INDEX: 23.39 KG/M2 | RESPIRATION RATE: 18 BRPM | HEIGHT: 63 IN

## 2017-09-08 DIAGNOSIS — M17.11 PRIMARY OSTEOARTHRITIS OF RIGHT KNEE: Primary | ICD-10-CM

## 2017-09-08 PROCEDURE — 20610 DRAIN/INJ JOINT/BURSA W/O US: CPT | Performed by: PHYSICIAN ASSISTANT

## 2017-09-08 RX ORDER — NITROFURANTOIN MACROCRYSTALS 50 MG/1
CAPSULE ORAL
COMMUNITY
Start: 2017-09-02 | End: 2018-02-15 | Stop reason: HOSPADM

## 2017-09-08 NOTE — PROGRESS NOTES
"Maricarmen Ruano is a 89 y.o.  female is here today for injection therapy.  Follow-up, Pain, and Injections of the Right Knee      History of Present Illness     Patient is here for her fourth Visco supplementation injection.  Past Medical History:   Diagnosis Date   • Arthritis    • Diverticulosis    • Hypertension    • Skin cancer     lymphoma   • Thyroid disease         Past Surgical History:   Procedure Laterality Date   • CHOLECYSTECTOMY     • HYSTERECTOMY     • OVARIAN CYST REMOVAL         No Known Allergies    Review of Systems   Constitutional: Negative for fever.   HENT: Negative for voice change.    Eyes: Negative for visual disturbance.   Respiratory: Negative for shortness of breath.    Cardiovascular: Negative for chest pain.   Gastrointestinal: Negative for abdominal distention and abdominal pain.   Genitourinary: Negative for dysuria.   Musculoskeletal: Positive for arthralgias. Negative for gait problem and joint swelling.   Skin: Negative for rash.   Neurological: Negative for speech difficulty.   Hematological: Does not bruise/bleed easily.   Psychiatric/Behavioral: Negative for confusion.       Objective   Resp 18  Ht 63\" (160 cm)  Wt 132 lb (59.9 kg)  BMI 23.38 kg/m2   Physical Exam  Since last injection, patient notes mild relief of symptoms.  No adverse effects of prior injection.  Skin exam stable with no erythema, ecchymosis or rash.  No new swelling.  No motor or sensory changes.  Distal pulse intact.    Large Joint Arthrocentesis  Date/Time: 9/8/2017 1:49 PM  Consent given by: patient  Site marked: site marked  Timeout: Immediately prior to procedure a time out was called to verify the correct patient, procedure, equipment, support staff and site/side marked as required   Supporting Documentation  Indications: pain   Procedure Details  Location: knee - R knee  Preparation: Patient was prepped and draped in the usual sterile fashion  Needle size: 22 G  Approach: " anterolateral  Medications administered: 25 mg sodium hyaluronate 25 MG/2.5ML  Patient tolerance: patient tolerated the procedure well with no immediate complications          Assessment/Plan   Independent Review of Radiographic Studies:    Reviewed at a prior visit.  Laboratory and Other Studies:  No new results reviewed today.       Silva was seen today for follow-up, pain and injections.    Diagnoses and all orders for this visit:    Primary osteoarthritis of right knee    Other orders  -     Large Joint Arthrocentesis    Orthopedic activities reviewed and patient expressed appreciation  Discussion of orthopedic goals  Risk, benefits, and merits of treatment alternatives reviewed with the patient and questions answered  Call or notify for any adverse effect from injection therapy    Recommendations/Plan:  Exercise, medications, injections, other patient advice, and return appointment as noted.  Patient is encouraged to call or return for any issues or concerns.    FU in 1 week  Patient agreeable to call or return sooner for any concerns.

## 2017-09-15 ENCOUNTER — OFFICE VISIT (OUTPATIENT)
Dept: ORTHOPEDIC SURGERY | Facility: CLINIC | Age: 82
End: 2017-09-15

## 2017-09-15 VITALS — BODY MASS INDEX: 23.92 KG/M2 | WEIGHT: 135 LBS | HEIGHT: 63 IN | RESPIRATION RATE: 18 BRPM

## 2017-09-15 DIAGNOSIS — M17.11 PRIMARY OSTEOARTHRITIS OF RIGHT KNEE: Primary | ICD-10-CM

## 2017-09-15 PROCEDURE — 20610 DRAIN/INJ JOINT/BURSA W/O US: CPT | Performed by: PHYSICIAN ASSISTANT

## 2017-09-15 NOTE — PROGRESS NOTES
"Maricarmen Ruano is a 89 y.o.  female is here today for injection therapy.  Follow-up, Pain, and Injections of the Right Knee      History of Present Illness     Past Medical History:   Diagnosis Date   • Arthritis    • Diverticulosis    • Hypertension    • Skin cancer     lymphoma   • Thyroid disease         Past Surgical History:   Procedure Laterality Date   • CHOLECYSTECTOMY     • HYSTERECTOMY     • OVARIAN CYST REMOVAL         No Known Allergies    Review of Systems   Constitutional: Negative for fever.   HENT: Negative for voice change.    Eyes: Negative for visual disturbance.   Respiratory: Negative for shortness of breath.    Cardiovascular: Negative for chest pain.   Gastrointestinal: Negative for abdominal distention and abdominal pain.   Genitourinary: Negative for dysuria.   Musculoskeletal: Positive for arthralgias. Negative for gait problem and joint swelling.   Skin: Negative for rash.   Neurological: Negative for speech difficulty.   Hematological: Does not bruise/bleed easily.   Psychiatric/Behavioral: Negative for confusion.       Objective   Resp 18  Ht 63\" (160 cm)  Wt 135 lb (61.2 kg)  BMI 23.91 kg/m2   Physical Exam  Since last injection, patient notes substantial relief of symptoms.  No adverse effects of prior injection.  Skin exam stable with no erythema, ecchymosis or rash.  No new swelling.  No motor or sensory changes.  Distal pulse intact.    Large Joint Arthrocentesis  Date/Time: 9/15/2017 1:28 PM  Consent given by: patient  Site marked: site marked  Timeout: Immediately prior to procedure a time out was called to verify the correct patient, procedure, equipment, support staff and site/side marked as required   Supporting Documentation  Indications: pain   Procedure Details  Location: knee - R knee  Preparation: Patient was prepped and draped in the usual sterile fashion  Needle size: 22 G  Approach: anterolateral  Medications administered: 25 mg sodium hyaluronate 25 " MG/2.5ML  Patient tolerance: patient tolerated the procedure well with no immediate complications          Assessment/Plan   Independent Review of Radiographic Studies:    No new imaging done today.  Laboratory and Other Studies:  No new results reviewed today.       Silva was seen today for follow-up, pain and injections.    Diagnoses and all orders for this visit:    Primary osteoarthritis of right knee  -     Large Joint Arthrocentesis    Orthopedic activities reviewed and patient expressed appreciation  Discussion of orthopedic goals  Risk, benefits, and merits of treatment alternatives reviewed with the patient and questions answered  Call or notify for any adverse effect from injection therapy    Recommendations/Plan:  Exercise, medications, injections, other patient advice, and return appointment as noted.  Patient is encouraged to call or return for any issues or concerns.    FU in 6 months or as needed  Patient agreeable to call or return sooner for any concerns.

## 2017-09-19 ENCOUNTER — APPOINTMENT (OUTPATIENT)
Dept: LAB | Facility: HOSPITAL | Age: 82
End: 2017-09-19

## 2017-09-19 ENCOUNTER — OFFICE VISIT (OUTPATIENT)
Dept: ONCOLOGY | Facility: CLINIC | Age: 82
End: 2017-09-19

## 2017-09-19 VITALS
BODY MASS INDEX: 23.91 KG/M2 | TEMPERATURE: 97.5 F | SYSTOLIC BLOOD PRESSURE: 159 MMHG | RESPIRATION RATE: 16 BRPM | DIASTOLIC BLOOD PRESSURE: 68 MMHG | HEART RATE: 65 BPM | WEIGHT: 135 LBS

## 2017-09-19 DIAGNOSIS — C82.80 OTHER TYPE OF FOLLICULAR LYMPHOMA: Primary | ICD-10-CM

## 2017-09-19 LAB
ALBUMIN SERPL-MCNC: 4 G/DL (ref 3.5–5)
ALBUMIN/GLOB SERPL: 1.4 G/DL (ref 1–2)
ALP SERPL-CCNC: 91 U/L (ref 38–126)
ALT SERPL W P-5'-P-CCNC: 33 U/L (ref 13–69)
ANION GAP SERPL CALCULATED.3IONS-SCNC: 12.4 MMOL/L
AST SERPL-CCNC: 27 U/L (ref 15–46)
BASOPHILS # BLD AUTO: 0.04 10*3/MM3 (ref 0–0.2)
BASOPHILS NFR BLD AUTO: 0.5 % (ref 0–2.5)
BILIRUB SERPL-MCNC: 0.3 MG/DL (ref 0.2–1.3)
BUN BLD-MCNC: 14 MG/DL (ref 7–20)
BUN/CREAT SERPL: 15.6 (ref 7.1–23.5)
CALCIUM SPEC-SCNC: 9.8 MG/DL (ref 8.4–10.2)
CHLORIDE SERPL-SCNC: 95 MMOL/L (ref 98–107)
CO2 SERPL-SCNC: 30 MMOL/L (ref 26–30)
CREAT BLD-MCNC: 0.9 MG/DL (ref 0.6–1.3)
DEPRECATED RDW RBC AUTO: 41.7 FL (ref 37–54)
EOSINOPHIL # BLD AUTO: 0.58 10*3/MM3 (ref 0–0.7)
EOSINOPHIL NFR BLD AUTO: 7.5 % (ref 0–7)
ERYTHROCYTE [DISTWIDTH] IN BLOOD BY AUTOMATED COUNT: 12.9 % (ref 11.5–14.5)
GFR SERPL CREATININE-BSD FRML MDRD: 59 ML/MIN/1.73
GLOBULIN UR ELPH-MCNC: 2.9 GM/DL
GLUCOSE BLD-MCNC: 94 MG/DL (ref 74–98)
HCT VFR BLD AUTO: 34.7 % (ref 37–47)
HGB BLD-MCNC: 11.4 G/DL (ref 12–16)
IMM GRANULOCYTES # BLD: 0.04 10*3/MM3 (ref 0–0.06)
IMM GRANULOCYTES NFR BLD: 0.5 % (ref 0–0.6)
LDH SERPL-CCNC: 275 U/L (ref 313–618)
LYMPHOCYTES # BLD AUTO: 2.43 10*3/MM3 (ref 0.6–3.4)
LYMPHOCYTES NFR BLD AUTO: 31.5 % (ref 10–50)
MCH RBC QN AUTO: 28.9 PG (ref 27–31)
MCHC RBC AUTO-ENTMCNC: 32.9 G/DL (ref 30–37)
MCV RBC AUTO: 87.8 FL (ref 81–99)
MONOCYTES # BLD AUTO: 0.45 10*3/MM3 (ref 0–0.9)
MONOCYTES NFR BLD AUTO: 5.8 % (ref 0–12)
NEUTROPHILS # BLD AUTO: 4.18 10*3/MM3 (ref 2–6.9)
NEUTROPHILS NFR BLD AUTO: 54.2 % (ref 37–80)
NRBC BLD MANUAL-RTO: 0 /100 WBC (ref 0–0)
PLATELET # BLD AUTO: 208 10*3/MM3 (ref 130–400)
PMV BLD AUTO: 9 FL (ref 6–12)
POTASSIUM BLD-SCNC: 4.4 MMOL/L (ref 3.5–5.1)
PROT SERPL-MCNC: 6.9 G/DL (ref 6.3–8.2)
RBC # BLD AUTO: 3.95 10*6/MM3 (ref 4.2–5.4)
SODIUM BLD-SCNC: 133 MMOL/L (ref 137–145)
WBC NRBC COR # BLD: 7.72 10*3/MM3 (ref 4.8–10.8)

## 2017-09-19 PROCEDURE — 83615 LACTATE (LD) (LDH) ENZYME: CPT | Performed by: NURSE PRACTITIONER

## 2017-09-19 PROCEDURE — 36415 COLL VENOUS BLD VENIPUNCTURE: CPT | Performed by: NURSE PRACTITIONER

## 2017-09-19 PROCEDURE — 80053 COMPREHEN METABOLIC PANEL: CPT | Performed by: NURSE PRACTITIONER

## 2017-09-19 PROCEDURE — 85025 COMPLETE CBC W/AUTO DIFF WBC: CPT | Performed by: NURSE PRACTITIONER

## 2017-09-19 PROCEDURE — 99213 OFFICE O/P EST LOW 20 MIN: CPT | Performed by: NURSE PRACTITIONER

## 2017-09-19 NOTE — PROGRESS NOTES
PROBLEM LIST:  1. 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   b) Stage III disease at presentation in 2008.   c) Responded to Rituxan as a single agent followed by maintenance Rituxan.      CHIEF COMPLAINT: Followup about lymphoma    Subjective     HISTORY OF PRESENT ILLNESS:   Mrs. Ruano is here for follow up evaluation of lymphoma. She has had a slight functional decline since her last visit. She is now living with her sister. She denies any recurring fevers or infections, no drenching night sweats, rashes itching or lymphadenopathy.     Past Medical History, Past Surgical History, Social History, Family History have been reviewed and are without significant changes except as mentioned.    Review of Systems   A comprehensive 14 point review of systems was performed and was negative except as mentioned.    Medications:  The current medication list was reviewed in the EMR    ALLERGIES:  No Known Allergies    Objective      /68  Pulse 65  Temp 97.5 °F (36.4 °C) (Temporal Artery )   Resp 16  Wt 135 lb (61.2 kg)  BMI 23.91 kg/m2         General: well appearing, in no acute distress   HEENT: sclera anicteric, oropharynx clear  Lymphatics: no cervical, supraclavicular, or axillary adenopathy  Cardiovascular: regular rate and rhythm, no murmurs  Lungs: clear to auscultation bilaterally  Abdomen: soft, nontender, nondistended.  No palpable masses or organomegaly  Extremeties: no lower extremity edema, cords or calf tenderness  Skin: no rashes, lesions, bruising, or petechiae    RECENT LABS:  Hematology WBC   Date Value Ref Range Status   03/07/2017 6.29 4.80 - 10.80 10*3/mm3 Final     Hemoglobin   Date Value Ref Range Status   03/07/2017 11.7 (L) 12.0 - 16.0 g/dL Final     Hematocrit   Date Value Ref Range Status   03/07/2017 35.5 (L) 37.0 - 47.0 % Final     MCV   Date Value Ref Range Status   03/07/2017 89.6 81.0 - 99.0 fL Final     RDW   Date  Value Ref Range Status   03/07/2017 12.6 11.5 - 14.5 % Final     MPV   Date Value Ref Range Status   03/07/2017 9.1 6.0 - 12.0 fL Final     Platelets   Date Value Ref Range Status   03/07/2017 204 130 - 400 10*3/mm3 Final     Immature Grans %   Date Value Ref Range Status   03/07/2017 0.5 0.0 - 0.6 % Final     Neutrophils, Absolute   Date Value Ref Range Status   03/07/2017 2.71 2.00 - 6.90 10*3/mm3 Final     Lymphocytes, Absolute   Date Value Ref Range Status   03/07/2017 2.63 0.60 - 3.40 10*3/mm3 Final     Monocytes, Absolute   Date Value Ref Range Status   03/07/2017 0.48 0.00 - 0.90 10*3/mm3 Final     Eosinophils, Absolute   Date Value Ref Range Status   03/07/2017 0.40 0.00 - 0.70 10*3/mm3 Final     Basophils, Absolute   Date Value Ref Range Status   03/07/2017 0.04 0.00 - 0.20 10*3/mm3 Final     Immature Grans, Absolute   Date Value Ref Range Status   03/07/2017 0.03 0.00 - 0.06 10*3/mm3 Final     nRBC   Date Value Ref Range Status   03/07/2017 0.0 0.0 - 0.0 /100 WBC Final       Glucose   Date Value Ref Range Status   03/07/2017 100 (H) 74 - 98 mg/dL Final     Sodium   Date Value Ref Range Status   03/07/2017 141 137 - 145 mmol/L Final     Potassium   Date Value Ref Range Status   03/07/2017 3.5 3.5 - 5.1 mmol/L Final     CO2   Date Value Ref Range Status   03/07/2017 31.0 (H) 26.0 - 30.0 mmol/L Final     Chloride   Date Value Ref Range Status   03/07/2017 100 98 - 107 mmol/L Final     Anion Gap   Date Value Ref Range Status   03/07/2017 13.5 mmol/L Final     Creatinine   Date Value Ref Range Status   03/07/2017 1.10 0.60 - 1.30 mg/dL Final     BUN   Date Value Ref Range Status   03/07/2017 14 7 - 20 mg/dL Final     BUN/Creatinine Ratio   Date Value Ref Range Status   03/07/2017 12.7 7.1 - 23.5 Final     Calcium   Date Value Ref Range Status   03/07/2017 9.7 8.4 - 10.2 mg/dL Final     eGFR Non  Amer   Date Value Ref Range Status   03/07/2017 47 (L) >60 mL/min/1.73 Final     Alkaline Phosphatase   Date  Value Ref Range Status   03/07/2017 90 38 - 126 U/L Final     Total Protein   Date Value Ref Range Status   03/07/2017 7.0 6.3 - 8.2 g/dL Final     ALT (SGPT)   Date Value Ref Range Status   03/07/2017 25 13 - 69 U/L Final     AST (SGOT)   Date Value Ref Range Status   03/07/2017 28 15 - 46 U/L Final     Total Bilirubin   Date Value Ref Range Status   03/07/2017 0.4 0.2 - 1.3 mg/dL Final     Albumin   Date Value Ref Range Status   03/07/2017 3.80 3.50 - 5.00 g/dL Final     Globulin   Date Value Ref Range Status   03/07/2017 3.2 gm/dL Final     A/G Ratio   Date Value Ref Range Status   03/07/2017 1.2 1.0 - 2.0 g/dL Final       LDH   Date Value Ref Range Status   03/07/2017 327 313 - 618 U/L Final          Assessment/Plan   IMPRESSION:   1. Low-grade lymphoma. The patient continues to do well with no evidence of recurrence.   2. Nodules on the left lower extremity of uncertain significance, unchanged.       PLAN:  1. We will obtain labs today including CBC, CMP, and LDH. We will contact her if there are adverse changes.   2. We will plan on seeing her back in 6 months for follow up evaluation. We have asked her to contact us in the interim, if any new symptoms arise.               April Tellez APRKANDI  [facility]    9/19/2017          CC:

## 2017-10-19 ENCOUNTER — APPOINTMENT (OUTPATIENT)
Dept: GENERAL RADIOLOGY | Facility: HOSPITAL | Age: 82
End: 2017-10-19

## 2017-10-19 ENCOUNTER — APPOINTMENT (OUTPATIENT)
Dept: CT IMAGING | Facility: HOSPITAL | Age: 82
End: 2017-10-19

## 2017-10-19 ENCOUNTER — HOSPITAL ENCOUNTER (EMERGENCY)
Facility: HOSPITAL | Age: 82
Discharge: HOME | End: 2017-10-19
Attending: EMERGENCY MEDICINE | Admitting: EMERGENCY MEDICINE

## 2017-10-19 VITALS
WEIGHT: 150 LBS | OXYGEN SATURATION: 97 % | SYSTOLIC BLOOD PRESSURE: 172 MMHG | HEIGHT: 62 IN | TEMPERATURE: 97.6 F | RESPIRATION RATE: 16 BRPM | BODY MASS INDEX: 27.6 KG/M2 | DIASTOLIC BLOOD PRESSURE: 83 MMHG | HEART RATE: 88 BPM

## 2017-10-19 DIAGNOSIS — S42.032A TRAUMATIC CLOSED FRACTURE OF DISTAL CLAVICLE WITH MINIMAL DISPLACEMENT, LEFT, INITIAL ENCOUNTER: Primary | ICD-10-CM

## 2017-10-19 PROCEDURE — 73030 X-RAY EXAM OF SHOULDER: CPT

## 2017-10-19 PROCEDURE — 70450 CT HEAD/BRAIN W/O DYE: CPT

## 2017-10-19 PROCEDURE — 73562 X-RAY EXAM OF KNEE 3: CPT

## 2017-10-19 PROCEDURE — 99283 EMERGENCY DEPT VISIT LOW MDM: CPT

## 2017-10-19 NOTE — ED PROVIDER NOTES
Subjective   History of Present Illness   89-year-old female with Alzheimer's disease and difficulty ambulating, but does not use her prescribed walker, presenting after unwitnessed fall.  Per family she was found after calling out after fall.  No loss of consciousness.  She has had complaints of left shoulder pain and seemed more confused since this occurred.  Family denies any preceding symptoms or other concerns.    Review of Systems   Musculoskeletal: Positive for arthralgias.   Psychiatric/Behavioral: Positive for confusion.   All other systems reviewed and are negative.      Past Medical History:   Diagnosis Date   • Alzheimer disease    • Arthritis    • Diverticulosis    • Hypertension    • Skin cancer     lymphoma   • Thyroid disease        No Known Allergies    Past Surgical History:   Procedure Laterality Date   • CHOLECYSTECTOMY     • HYSTERECTOMY     • OVARIAN CYST REMOVAL         Family History   Problem Relation Age of Onset   • Heart disease Other    • Hypertension Other    • Cancer Other        Social History     Social History   • Marital status:      Spouse name: N/A   • Number of children: N/A   • Years of education: N/A     Social History Main Topics   • Smoking status: Never Smoker   • Smokeless tobacco: None   • Alcohol use No   • Drug use: No   • Sexual activity: Defer     Other Topics Concern   • None     Social History Narrative   • None           Objective   Physical Exam   Constitutional: She appears well-developed and well-nourished. No distress.   HENT:   Head: Normocephalic and atraumatic.   Mouth/Throat: Oropharynx is clear and moist. No oropharyngeal exudate.   Eyes: EOM are normal. No scleral icterus.   Neck: Neck supple. No tracheal deviation present.   No ttp over spinous processes.    Cardiovascular: Normal rate, regular rhythm, normal heart sounds and intact distal pulses.  Exam reveals no gallop and no friction rub.    No murmur heard.  Pulmonary/Chest: Effort normal and  breath sounds normal. No stridor. No respiratory distress. She has no wheezes. She has no rales.   Abdominal: Soft. She exhibits no distension and no mass. There is no tenderness. There is no rebound and no guarding.   Musculoskeletal: She exhibits tenderness (TTP L anterior shoulder, L patella). She exhibits no edema or deformity.   Neurological: She is alert.   Oriented to person. Strength and sensation intact to BUE / BLE   Skin: Skin is warm and dry. She is not diaphoretic. No erythema. No pallor.   Psychiatric: She has a normal mood and affect. Her behavior is normal.   Nursing note and vitals reviewed.      Procedures         ED Course  ED Course                  MDM   89F here after fall w/ slight confusion and pain in L knee and shoulder. Per family, pt w/ difficulty ambulating and refuses to use walker. They feel this was most likely a mechanical fall and deferred on offered lab assessment. Will send for xrays of shoulder and knee and CT brain for further evaluation of confusion.     4:04 PM Xrays show fracture of distal clavicle. Will place in sling and await CT brain.   4:39 PM CT brain shows no acute intracranial abnormality. Will d/c home w/ sling and f/u information for Dr. Ruano. Discussed return to care precautions.     Final diagnoses:   Traumatic closed fracture of distal clavicle with minimal displacement, left, initial encounter            Radames Kim MD  10/19/17 [id number]

## 2017-10-20 ENCOUNTER — OFFICE VISIT (OUTPATIENT)
Dept: ORTHOPEDIC SURGERY | Facility: CLINIC | Age: 82
End: 2017-10-20

## 2017-10-20 VITALS — HEIGHT: 62 IN | RESPIRATION RATE: 16 BRPM | WEIGHT: 150 LBS | BODY MASS INDEX: 27.6 KG/M2

## 2017-10-20 DIAGNOSIS — S42.035A CLOSED NONDISPLACED FRACTURE OF ACROMIAL END OF LEFT CLAVICLE, INITIAL ENCOUNTER: Primary | ICD-10-CM

## 2017-10-20 PROCEDURE — 99213 OFFICE O/P EST LOW 20 MIN: CPT | Performed by: PHYSICIAN ASSISTANT

## 2017-10-20 NOTE — PROGRESS NOTES
Subjective   Patient ID: Silva Ruano is a 89 y.o.  female   Pain of the Left Shoulder and Shoulder Pain (Pt had x rays at [facility])         History of Present Illness     Patient presents with complaints of left shoulder pain after falling her bedroom at home on 10/19/2017.  Patient was seen in the emergency room after her fall was diagnosed with a left clavicle fracture.  She was placed in a shoulder sling which her family states she has tried to take off.  Patient denies numbness or tingling.  Denies chest pain.       Pain Location: Shoulder  Pain Orientation: Left     Pain Descriptors: Aching  Pain Frequency: Constant/continuous     Date Pain First Started: 10/19/17  Clinical Progression: Not changed              Result of Injury: Yes (patient fell in bathroom)  Work-Related Injury: No    Past Medical History:   Diagnosis Date   • Alzheimer disease    • Arthritis    • Diverticulosis    • Hypertension    • Pathologic fracture of clavicle, left, initial encounter    • Skin cancer     lymphoma   • Thyroid disease         Past Surgical History:   Procedure Laterality Date   • CHOLECYSTECTOMY     • HYSTERECTOMY     • OVARIAN CYST REMOVAL         Family History   Problem Relation Age of Onset   • Heart disease Other    • Hypertension Other    • Cancer Other         Social History     Social History   • Marital status:      Spouse name: N/A   • Number of children: N/A   • Years of education: N/A     Occupational History   • Not on file.     Social History Main Topics   • Smoking status: Never Smoker   • Smokeless tobacco: Not on file   • Alcohol use No   • Drug use: No   • Sexual activity: Defer     Other Topics Concern   • Not on file     Social History Narrative       No Known Allergies    Review of Systems   Constitutional: Positive for activity change. Negative for appetite change, chills, diaphoresis, fatigue, fever and unexpected weight change.   HENT: Negative.    Eyes: Negative.   "  Respiratory: Negative.    Cardiovascular: Negative.    Gastrointestinal: Negative.    Endocrine: Negative.    Genitourinary: Negative.    Musculoskeletal: Positive for arthralgias. Negative for back pain, gait problem, joint swelling, myalgias, neck pain and neck stiffness.   Skin: Negative.    Allergic/Immunologic: Negative.    Neurological: Negative.    Hematological: Negative.    Psychiatric/Behavioral: Negative.        Objective   Resp 16  Ht 62\" (157.5 cm)  Wt 150 lb (68 kg)  BMI 27.44 kg/m2   Physical Exam   Constitutional: She is oriented to person, place, and time. She appears well-nourished.   Eyes: Conjunctivae are normal.   Neck: No tracheal deviation present.   Pulmonary/Chest: Effort normal. No respiratory distress. She exhibits no tenderness.   Musculoskeletal:        Left shoulder: She exhibits tenderness, bony tenderness and pain. She exhibits no swelling, no effusion, no crepitus, no deformity, no spasm, normal pulse and normal strength.        Left elbow: She exhibits normal range of motion. No tenderness found.        Left wrist: She exhibits normal range of motion and no tenderness.        Left hand: She exhibits normal range of motion, no tenderness, normal capillary refill and no deformity. Normal sensation noted. Decreased sensation is not present in the radial distribution. Normal strength noted. She exhibits no finger abduction, no thumb/finger opposition and no wrist extension trouble.   Neurological: She is alert and oriented to person, place, and time.   Skin: No rash noted.   Psychiatric: She has a normal mood and affect.   Vitals reviewed.    Ortho Exam    Neurologic Exam     Mental Status   Oriented to person, place, and time.      Ortho Exam  Patient does have some bruising to the anterior shoulder and superior aspect of the shoulder.   No crepitus.    Assessment/Plan   Independent Review of Radiographic Studies:    No new imaging done today.  Laboratory and Other Studies:  I did " review x-ray imaging of the left shoulder at [facility].  There is a nondisplaced distal clavicle fracture.    Procedures  [x] No procedures were performed in office today.    Silva was seen today for shoulder pain and pain.    Diagnoses and all orders for this visit:    Closed nondisplaced fracture of acromial end of left clavicle, initial encounter      Discussion of orthopedic goals  Risk, benefits, and merits of treatment alternatives reviewed with the patient and questions answered  Use brace as instructed  Reduced physical activity as appropriate  Weight bearing parameters reviewed  Avoid offending activity    Recommendations/Plan:  Patient is encouraged to call or return for any issues or concerns.  Patient advised to stay in shoulder sling    Fu in 3 weeks XOA    Patient agreeable to call or return sooner for any concerns.

## 2017-10-23 DIAGNOSIS — M79.671 RIGHT FOOT PAIN: Primary | ICD-10-CM

## 2017-10-24 ENCOUNTER — OFFICE VISIT (OUTPATIENT)
Dept: ORTHOPEDIC SURGERY | Facility: CLINIC | Age: 82
End: 2017-10-24

## 2017-10-24 VITALS — BODY MASS INDEX: 23.55 KG/M2 | RESPIRATION RATE: 18 BRPM | HEIGHT: 62 IN | WEIGHT: 128 LBS

## 2017-10-24 DIAGNOSIS — M20.11 HALLUX VALGUS OF RIGHT FOOT: Primary | ICD-10-CM

## 2017-10-24 PROCEDURE — 99204 OFFICE O/P NEW MOD 45 MIN: CPT | Performed by: PODIATRIST

## 2017-10-24 NOTE — PROGRESS NOTES
Subjective   Patient ID: Silva Ruano is a 89 y.o. female   Pain of the Right Foot  She comes in today with a chief complaint of right great toe pain.  She is with her daughter and she states that she constantly complains of pain of the right big toe joint.  She says she's tried multiple compound medications as well as oral medications and pads.  She's tried various shoes.  She's even had her shoes stretched and nothing seems to alleviate her pain.  She states even when she is off the pain is constant.  She says it's worse when she's walking.    History of Present Illness    Pain Score: 8  Pain Location: Foot  Pain Orientation: Right        Pain Frequency: Constant/continuous                    Pain Intervention(s): Medication (See MAR), Rest  Result of Injury: No       Review of Systems   Constitutional: Negative for diaphoresis, fever and unexpected weight change.   HENT: Negative for dental problem and sore throat.    Eyes: Negative for visual disturbance.   Respiratory: Negative for shortness of breath.    Cardiovascular: Negative for chest pain.   Gastrointestinal: Negative for abdominal pain, constipation, diarrhea, nausea and vomiting.   Genitourinary: Negative for difficulty urinating and frequency.   Neurological: Negative for headaches.   Hematological: Does not bruise/bleed easily.   All other systems reviewed and are negative.      Past Medical History:   Diagnosis Date   • Alzheimer disease    • Arthritis    • Diverticulosis    • Hypertension    • Pathologic fracture of clavicle, left, initial encounter    • Skin cancer     lymphoma   • Thyroid disease         Past Surgical History:   Procedure Laterality Date   • CHOLECYSTECTOMY     • HYSTERECTOMY     • OVARIAN CYST REMOVAL         No Known Allergies    Family History   Problem Relation Age of Onset   • Heart disease Other    • Hypertension Other    • Cancer Other        Social History     Social History   • Marital status:      Spouse name:  N/A   • Number of children: N/A   • Years of education: N/A     Occupational History   • Not on file.     Social History Main Topics   • Smoking status: Never Smoker   • Smokeless tobacco: Not on file   • Alcohol use No   • Drug use: No   • Sexual activity: Defer     Other Topics Concern   • Not on file     Social History Narrative       Counseling given: Not Answered       All the above social hx, family hx, surgical history,medications, allergies, ros & HPI reviewed.  Objective   Physical Exam   Constitutional: She is oriented to person, place, and time. She appears well-developed and well-nourished.   HENT:   Head: Normocephalic and atraumatic.   Nose: Nose normal.   Eyes: Conjunctivae and EOM are normal. Pupils are equal, round, and reactive to light.   Neck: Normal range of motion.   Cardiovascular: Normal rate.    Pulmonary/Chest: Effort normal.   Abdominal: Soft.   Musculoskeletal: Normal range of motion.   Neurological: She is alert and oriented to person, place, and time. She has normal reflexes.   Skin: Skin is warm.   Psychiatric: She has a normal mood and affect. Her behavior is normal. Judgment and thought content normal.   Nursing note and vitals reviewed.    Right Ankle Exam   Right ankle exam is normal.    Range of Motion   The patient has normal right ankle ROM.    Muscle Strength   The patient has normal right ankle strength.  Other   Erythema: present  Sensation: normal  Pulse: present     Comments:  Rt LE is NVIS  PULSES 2/4  There is lateral deviation of the hallux and the 1st mpj is prominent medially.  There is ttp at the medial mpj.  Mild pain with rom of the 1st mpj  The hallux tracks laterally but is not track bound.          Right Ankle Exam     Range of Motion   Normal right ankle ROM    Muscle Strength   Normal right ankle strengthComments  Rt LE is NVIS  PULSES 2/4  There is lateral deviation of the hallux and the 1st mpj is prominent medially.  There is ttp at the medial mpj.  Mild  pain with rom of the 1st mpj  The hallux tracks laterally but is not track bound.            Assessment/Plan hallux valgus right foot  Independent Review of Radiographic Studies:      Laboratory and Other Studies:     Medical Decision Making:        Procedures  [] No procedures were performed in office today.    Silva was seen today for pain.    Diagnoses and all orders for this visit:    Hallux valgus of right foot        Recommendations/Plan:I reviewed the x-rays with the patient and discussed conservative as well as surgical treatment options.  At this point they seem as if they have exhausted conservative options consisting of activity modifications, padding, anti-inflammatory medications, and shoe gear changes.  They're requesting and elected to proceed with surgical intervention.  We discussed all the risks first benefits and potential complications of the procedure including bleeding, nerve damage, infection, need for further surgery, malunion, nonunion, delayed union, DVT, and even death.  All there questions were answered, consent was obtained,. we'll plan on proceeding with bunionectomy.  Other than her age she's healthy for the most part and after long discussion with her daughter in regards to her fractured left collarbone I recommend they allow this to heal and her get out of her sling prior to surgery.  I can see her back in 3 or 4 weeks and we can monitor that and then consider scheduling surgery.      Return in about 4 weeks (around 11/21/2017).  Patient agreeable to call or return sooner for any concerns.

## 2017-11-03 ENCOUNTER — OFFICE VISIT (OUTPATIENT)
Dept: NEUROLOGY | Facility: CLINIC | Age: 82
End: 2017-11-03

## 2017-11-03 VITALS
SYSTOLIC BLOOD PRESSURE: 135 MMHG | HEIGHT: 62 IN | WEIGHT: 145 LBS | BODY MASS INDEX: 26.68 KG/M2 | DIASTOLIC BLOOD PRESSURE: 74 MMHG

## 2017-11-03 DIAGNOSIS — G30.1 LATE ONSET ALZHEIMER'S DISEASE WITH BEHAVIORAL DISTURBANCE (HCC): Primary | ICD-10-CM

## 2017-11-03 DIAGNOSIS — F02.818 LATE ONSET ALZHEIMER'S DISEASE WITH BEHAVIORAL DISTURBANCE (HCC): Primary | ICD-10-CM

## 2017-11-03 PROCEDURE — 99214 OFFICE O/P EST MOD 30 MIN: CPT | Performed by: PSYCHIATRY & NEUROLOGY

## 2017-11-03 NOTE — PROGRESS NOTES
"Subjective      CC: dementia    History of Present Illness   Silva Ruano is a 89 y.o. female who returns to clinic today for evaluation of cognitive impairment. Her family  has noted symptoms since at least 2015 marked initially by forgetfulness. This has gradually worsened  over time. Additional associated symptoms have included impairments in essentially all spheres of cognition. She has had associated symptoms of delusions and hallucinations. Her family manages her medications and finances. She is currently residing at her sister's home in [place]. No further associated symptoms or modifying factors have been noted.    Of note she is followed by Dr. Morocho for lymphoma.     Prior evaluation has included screening blood work  and a head CT  which were unremarkable . She previously took donepezil and Namenda, though these were discontinued due to side effects and lack of perceived benefit.     Since her last visit on 6/9/17, her family has noted a continued cognitive decline. She continues to hallucinate, but this has not been problematic. She has had multiple falls, most recently causing a clavicular fracture.      The following portions of the patient's history were reviewed and updated as appropriate: allergies, current medications, past family history, past medical history, past social history, past surgical history and problem list.    Review of Systems   Constitutional: Negative.        Objective     /74  Ht 62\" (157.5 cm)  Wt 145 lb (65.8 kg)  BMI 26.52 kg/m2    General appearance today is normal.       Physical Exam   Neurological: She has normal strength.   Psychiatric: Her speech is normal.        Neurologic Exam     Mental Status   Oriented to person.   Disoriented to place.   Disoriented to time.   Registration: recalls 3 of 3 objects. Recall of objects at 5 minutes: 0/3 recall. Follows 2 step commands.   Attention: normal.   Speech: speech is normal   Level of consciousness: " alert  Unable to name object. Abnormal comprehension.     Cranial Nerves   Cranial nerves II through XII intact.     Motor Exam   Muscle bulk: normal  Overall muscle tone: normal    Strength   Strength 5/5 throughout.     Gait, Coordination, and Reflexes     Gait  Gait: (unsteady)        Results  MMSE=9      Assessment/Plan   Silva was seen today for alzheimer's disease.    Diagnoses and all orders for this visit:    Late onset Alzheimer's disease with behavioral disturbance        Discussion/Summary   Silva Ruano returns to clinic today with a history of Alzheimer's Disease . I again reviewed her current status and treatment options. Ultimately it was elected to make no further changes in medication, instead continuing to follow a conservative course. I also recommended PT for balance impairment (through Home Health), which she will consider this as she recovers from her fractured clavicle. She will then follow up in 3 months, or sooner if needed.       I spent 15 minutes out of 25 minutes face to face with the patient and family and discussing diagnosis, evaluation, current status, treatment options and management.    As part of this visit I obtained additional history from the family which is incorporated in the HPI.      Edmond Martinez MD

## 2017-11-10 DIAGNOSIS — Z09 FOLLOW UP: Primary | ICD-10-CM

## 2017-11-13 ENCOUNTER — OFFICE VISIT (OUTPATIENT)
Dept: ORTHOPEDIC SURGERY | Facility: CLINIC | Age: 82
End: 2017-11-13

## 2017-11-13 VITALS — BODY MASS INDEX: 26.68 KG/M2 | HEIGHT: 62 IN | RESPIRATION RATE: 18 BRPM | WEIGHT: 145 LBS

## 2017-11-13 DIAGNOSIS — S42.035D CLOSED NONDISPLACED FRACTURE OF ACROMIAL END OF LEFT CLAVICLE WITH ROUTINE HEALING, SUBSEQUENT ENCOUNTER: Primary | ICD-10-CM

## 2017-11-13 PROCEDURE — 99213 OFFICE O/P EST LOW 20 MIN: CPT | Performed by: PHYSICIAN ASSISTANT

## 2017-11-13 NOTE — PROGRESS NOTES
Subjective   Patient ID: Silva Ruano is a 89 y.o. right hand dominant female is here today for follow-up for left clavicle fx Follow-up of the Left Clavicle         History of Present Illness    Patient is following up for routine follow-up visit.  Fell in her bedroom at home on 10/19/2017.  Patient has not had any pain per family members.  She has been wearing her sling but according to her family member she has tried to push up and get herself up from a sitting position using the left arm.      Pain Score: no pain      Past Medical History:   Diagnosis Date   • Alzheimer disease    • Arthritis    • Diverticulosis    • Hypertension    • Pathologic fracture of clavicle, left, initial encounter    • Skin cancer     lymphoma   • Thyroid disease         Past Surgical History:   Procedure Laterality Date   • CHOLECYSTECTOMY     • HYSTERECTOMY     • OVARIAN CYST REMOVAL         Family History   Problem Relation Age of Onset   • Heart disease Other    • Hypertension Other    • Cancer Other        Social History     Social History   • Marital status:      Spouse name: N/A   • Number of children: N/A   • Years of education: N/A     Occupational History   • Not on file.     Social History Main Topics   • Smoking status: Never Smoker   • Smokeless tobacco: Never Used   • Alcohol use No   • Drug use: No   • Sexual activity: Defer     Other Topics Concern   • Not on file     Social History Narrative       No Known Allergies    Review of Systems   Constitutional: Negative for fever.   HENT: Negative for voice change.    Eyes: Negative for visual disturbance.   Respiratory: Negative for shortness of breath.    Cardiovascular: Negative for chest pain.   Gastrointestinal: Negative for abdominal distention and abdominal pain.   Genitourinary: Negative for dysuria.   Musculoskeletal: Negative for arthralgias, gait problem and joint swelling.   Skin: Negative for rash.   Neurological: Negative for speech difficulty.  "  Hematological: Does not bruise/bleed easily.   Psychiatric/Behavioral: Negative for confusion.       Objective   Resp 18  Ht 62\" (157.5 cm)  Wt 145 lb (65.8 kg)  BMI 26.52 kg/m2   Physical Exam   Constitutional: She is oriented to person, place, and time. She appears well-nourished.   Pulmonary/Chest: Effort normal.   Musculoskeletal:        Left shoulder: She exhibits decreased range of motion. She exhibits no tenderness, no bony tenderness, no deformity, no laceration, no pain and no spasm.        Left hand: She exhibits no tenderness, normal capillary refill, no deformity and no swelling. Normal sensation noted. Normal strength noted. She exhibits no thumb/finger opposition.   Neurological: She is alert and oriented to person, place, and time.   Skin: No rash noted.   Psychiatric: She has a normal mood and affect.   Vitals reviewed.    Ortho Exam   Extremity DVT signs are Negative by clinical screen.   Neurologic Exam     Mental Status   Oriented to person, place, and time.      Ortho Exam         Assessment/Plan   Independent Review of Radiographic Studies:    Indication to evaluate fracture healing, and compared with prior imaging, shows interm fracture healing, callus formation and or periostitis in continued good position and alignment.  Laboratory and Other Studies:  No new results reviewed today.       Procedures  [x] No procedures were performed in office today.     Silva was seen today for follow-up.    Diagnoses and all orders for this visit:    Closed nondisplaced fracture of acromial end of left clavicle with routine healing, subsequent encounter       Discussion of orthopedic goals  Risk, benefits, and merits of treatment alternatives reviewed with the patient and questions answered  Weight bearing parameters reviewed    Recommendations/Plan:  Patient is encouraged to call or return for any issues or concerns.      FU in 2 weeks XOA  May DC the use of the sling in 6 more days.     Patient " agreeable to call or return sooner for any concerns.

## 2017-11-27 DIAGNOSIS — S42.035A CLOSED NONDISPLACED FRACTURE OF ACROMIAL END OF LEFT CLAVICLE, INITIAL ENCOUNTER: Primary | ICD-10-CM

## 2017-11-28 ENCOUNTER — OFFICE VISIT (OUTPATIENT)
Dept: ORTHOPEDIC SURGERY | Facility: CLINIC | Age: 82
End: 2017-11-28

## 2017-11-28 VITALS — BODY MASS INDEX: 26.68 KG/M2 | WEIGHT: 145 LBS | HEIGHT: 62 IN | RESPIRATION RATE: 18 BRPM

## 2017-11-28 DIAGNOSIS — S42.035D CLOSED NONDISPLACED FRACTURE OF ACROMIAL END OF LEFT CLAVICLE WITH ROUTINE HEALING, SUBSEQUENT ENCOUNTER: Primary | ICD-10-CM

## 2017-11-28 PROCEDURE — 99213 OFFICE O/P EST LOW 20 MIN: CPT | Performed by: PHYSICIAN ASSISTANT

## 2017-11-28 NOTE — PROGRESS NOTES
"Subjective   Patient ID: Silva Ruano is a 89 y.o.female is here today for follow-up for left clavicle fracture Follow-up of the Left Clavicle         History of Present Illness    Patient is following up for routine follow-up visit.  Fell in her bedroom at home on 10/19/2017.    Patient denies pain. Family states she is doing well      Pain Score: no pain       Past Medical History:   Diagnosis Date   • Alzheimer disease    • Arthritis    • Diverticulosis    • Hypertension    • Pathologic fracture of clavicle, left, initial encounter    • Skin cancer     lymphoma   • Thyroid disease         Past Surgical History:   Procedure Laterality Date   • CHOLECYSTECTOMY     • HYSTERECTOMY     • OVARIAN CYST REMOVAL         Family History   Problem Relation Age of Onset   • Heart disease Other    • Hypertension Other    • Cancer Other        Social History     Social History   • Marital status:      Spouse name: N/A   • Number of children: N/A   • Years of education: N/A     Occupational History   • Not on file.     Social History Main Topics   • Smoking status: Never Smoker   • Smokeless tobacco: Never Used   • Alcohol use No   • Drug use: No   • Sexual activity: Defer     Other Topics Concern   • Not on file     Social History Narrative       No Known Allergies    Review of Systems   Constitutional: Negative for fever.   HENT: Negative for voice change.    Eyes: Negative for visual disturbance.   Respiratory: Negative for shortness of breath.    Cardiovascular: Negative for chest pain.   Gastrointestinal: Negative for abdominal distention and abdominal pain.   Genitourinary: Negative for dysuria.   Musculoskeletal: Positive for arthralgias. Negative for gait problem and joint swelling.   Skin: Negative for rash.   Neurological: Negative for speech difficulty.   Hematological: Does not bruise/bleed easily.   Psychiatric/Behavioral: Negative for confusion.       Objective   Resp 18  Ht 62\" (157.5 cm)  Wt 145 lb " (65.8 kg)  BMI 26.52 kg/m2   Physical Exam   Constitutional: She is oriented to person, place, and time. She appears well-nourished.   Eyes: Conjunctivae are normal.   Neck: No tracheal deviation present.   Pulmonary/Chest: Effort normal.   Musculoskeletal:        Left shoulder: She exhibits normal range of motion, no tenderness, no bony tenderness, no swelling, no effusion, no crepitus, no deformity, no pain, no spasm and normal pulse.        Left hand: She exhibits normal range of motion, no tenderness, no bony tenderness, normal capillary refill and no deformity. Normal sensation noted. Normal strength noted. She exhibits no thumb/finger opposition.   Neurological: She is alert and oriented to person, place, and time.   Skin: No rash noted.   Psychiatric: She has a normal mood and affect.   Vitals reviewed.    Left Shoulder Exam     Range of Motion   Left shoulder active abduction: 108.   Left shoulder forward flexion: 125.     Muscle Strength   The patient has normal left shoulder strength.    Other   Erythema: absent              Neurologic Exam     Mental Status   Oriented to person, place, and time.      Left Shoulder Exam     Muscle Strength   Normal left shoulder strength               Assessment/Plan   Independent Review of Radiographic Studies:    Indication to evaluate fracture healing, and compared with prior imaging, shows interm fracture healing, callus formation and or periostitis in continued good position and alignment.      Procedures       Silva was seen today for follow-up.    Diagnoses and all orders for this visit:    Closed nondisplaced fracture of acromial end of left clavicle with routine healing, subsequent encounter     Discussion of orthopedic goals  Risk, benefits, and merits of treatment alternatives reviewed with the patient and questions answered  Ice, heat, and/or modalities as beneficial    Recommendations/Plan:  Exercise, medications, injections, other patient advice, and return  appointment as noted.  Patient is encouraged to call or return for any issues or concerns.  Because the patient has no pain and no painful range of motion I do not feel she requires home health physical therapy.  I did instruct her and her family on proper home exercises to strengthen the shoulder.  May perform these exercises twice per week for the next several weeks.  Return if symptoms worsen or fail to improve.  Patient agreeable to call or return sooner for any concerns.

## 2018-02-11 ENCOUNTER — APPOINTMENT (OUTPATIENT)
Dept: GENERAL RADIOLOGY | Facility: HOSPITAL | Age: 83
End: 2018-02-11

## 2018-02-11 ENCOUNTER — HOSPITAL ENCOUNTER (INPATIENT)
Facility: HOSPITAL | Age: 83
LOS: 3 days | Discharge: NURSING, REHAB OR LONG-TERM CARE | End: 2018-02-15
Attending: EMERGENCY MEDICINE | Admitting: INTERNAL MEDICINE

## 2018-02-11 DIAGNOSIS — Z74.09 IMPAIRED MOBILITY AND ADLS: ICD-10-CM

## 2018-02-11 DIAGNOSIS — Z78.9 IMPAIRED MOBILITY AND ADLS: ICD-10-CM

## 2018-02-11 DIAGNOSIS — I50.21 HEART FAILURE, ACUTE SYSTOLIC, FIRST EPISODE (HCC): ICD-10-CM

## 2018-02-11 DIAGNOSIS — S72.001A CLOSED FRACTURE OF NECK OF RIGHT FEMUR, INITIAL ENCOUNTER (HCC): Primary | ICD-10-CM

## 2018-02-11 DIAGNOSIS — Z74.09 IMPAIRED FUNCTIONAL MOBILITY, BALANCE, GAIT, AND ENDURANCE: ICD-10-CM

## 2018-02-11 LAB
ALBUMIN SERPL-MCNC: 3.6 G/DL (ref 3.2–4.8)
ALBUMIN/GLOB SERPL: 1.3 G/DL (ref 1.5–2.5)
ALP SERPL-CCNC: 76 U/L (ref 25–100)
ALT SERPL W P-5'-P-CCNC: 19 U/L (ref 7–40)
ANION GAP SERPL CALCULATED.3IONS-SCNC: 7 MMOL/L (ref 3–11)
AST SERPL-CCNC: 21 U/L (ref 0–33)
BASOPHILS # BLD AUTO: 0.01 10*3/MM3 (ref 0–0.2)
BASOPHILS NFR BLD AUTO: 0.2 % (ref 0–1)
BILIRUB SERPL-MCNC: 0.2 MG/DL (ref 0.3–1.2)
BUN BLD-MCNC: 14 MG/DL (ref 9–23)
BUN/CREAT SERPL: 20 (ref 7–25)
CALCIUM SPEC-SCNC: 9.2 MG/DL (ref 8.7–10.4)
CHLORIDE SERPL-SCNC: 96 MMOL/L (ref 99–109)
CO2 SERPL-SCNC: 28 MMOL/L (ref 20–31)
CREAT BLD-MCNC: 0.7 MG/DL (ref 0.6–1.3)
DEPRECATED RDW RBC AUTO: 40.5 FL (ref 37–54)
EOSINOPHIL # BLD AUTO: 0.42 10*3/MM3 (ref 0–0.3)
EOSINOPHIL NFR BLD AUTO: 6.8 % (ref 0–3)
ERYTHROCYTE [DISTWIDTH] IN BLOOD BY AUTOMATED COUNT: 12.6 % (ref 11.3–14.5)
GFR SERPL CREATININE-BSD FRML MDRD: 78 ML/MIN/1.73
GLOBULIN UR ELPH-MCNC: 2.7 GM/DL
GLUCOSE BLD-MCNC: 117 MG/DL (ref 70–100)
HCT VFR BLD AUTO: 33.3 % (ref 34.5–44)
HGB BLD-MCNC: 11.5 G/DL (ref 11.5–15.5)
IMM GRANULOCYTES # BLD: 0.04 10*3/MM3 (ref 0–0.03)
IMM GRANULOCYTES NFR BLD: 0.7 % (ref 0–0.6)
LYMPHOCYTES # BLD AUTO: 2.47 10*3/MM3 (ref 0.6–4.8)
LYMPHOCYTES NFR BLD AUTO: 40.2 % (ref 24–44)
MCH RBC QN AUTO: 30.1 PG (ref 27–31)
MCHC RBC AUTO-ENTMCNC: 34.5 G/DL (ref 32–36)
MCV RBC AUTO: 87.2 FL (ref 80–99)
MONOCYTES # BLD AUTO: 0.36 10*3/MM3 (ref 0–1)
MONOCYTES NFR BLD AUTO: 5.9 % (ref 0–12)
NEUTROPHILS # BLD AUTO: 2.85 10*3/MM3 (ref 1.5–8.3)
NEUTROPHILS NFR BLD AUTO: 46.2 % (ref 41–71)
PLATELET # BLD AUTO: 191 10*3/MM3 (ref 150–450)
PMV BLD AUTO: 8.3 FL (ref 6–12)
POTASSIUM BLD-SCNC: 3.6 MMOL/L (ref 3.5–5.5)
PROT SERPL-MCNC: 6.3 G/DL (ref 5.7–8.2)
RBC # BLD AUTO: 3.82 10*6/MM3 (ref 3.89–5.14)
SODIUM BLD-SCNC: 131 MMOL/L (ref 132–146)
TROPONIN I SERPL-MCNC: 0 NG/ML (ref 0–0.07)
WBC NRBC COR # BLD: 6.15 10*3/MM3 (ref 3.5–10.8)

## 2018-02-11 PROCEDURE — 84484 ASSAY OF TROPONIN QUANT: CPT

## 2018-02-11 PROCEDURE — 99284 EMERGENCY DEPT VISIT MOD MDM: CPT

## 2018-02-11 PROCEDURE — 71045 X-RAY EXAM CHEST 1 VIEW: CPT

## 2018-02-11 PROCEDURE — 85025 COMPLETE CBC W/AUTO DIFF WBC: CPT | Performed by: EMERGENCY MEDICINE

## 2018-02-11 PROCEDURE — 80053 COMPREHEN METABOLIC PANEL: CPT | Performed by: EMERGENCY MEDICINE

## 2018-02-11 PROCEDURE — 73502 X-RAY EXAM HIP UNI 2-3 VIEWS: CPT

## 2018-02-11 PROCEDURE — 36415 COLL VENOUS BLD VENIPUNCTURE: CPT

## 2018-02-11 PROCEDURE — 25010000002 FENTANYL CITRATE (PF) 100 MCG/2ML SOLUTION: Performed by: EMERGENCY MEDICINE

## 2018-02-11 PROCEDURE — 93005 ELECTROCARDIOGRAM TRACING: CPT | Performed by: EMERGENCY MEDICINE

## 2018-02-11 RX ORDER — FENTANYL CITRATE 50 UG/ML
50 INJECTION, SOLUTION INTRAMUSCULAR; INTRAVENOUS ONCE
Status: COMPLETED | OUTPATIENT
Start: 2018-02-11 | End: 2018-02-11

## 2018-02-11 RX ADMIN — FENTANYL CITRATE 50 MCG: 50 INJECTION INTRAMUSCULAR; INTRAVENOUS at 23:26

## 2018-02-12 ENCOUNTER — APPOINTMENT (OUTPATIENT)
Dept: GENERAL RADIOLOGY | Facility: HOSPITAL | Age: 83
End: 2018-02-12

## 2018-02-12 ENCOUNTER — ANESTHESIA (OUTPATIENT)
Dept: PERIOP | Facility: HOSPITAL | Age: 83
End: 2018-02-12

## 2018-02-12 ENCOUNTER — ANESTHESIA EVENT (OUTPATIENT)
Dept: PERIOP | Facility: HOSPITAL | Age: 83
End: 2018-02-12

## 2018-02-12 PROBLEM — I10 HYPERTENSION: Status: ACTIVE | Noted: 2018-02-12

## 2018-02-12 PROBLEM — R53.1 GENERALIZED WEAKNESS: Status: ACTIVE | Noted: 2018-02-12

## 2018-02-12 PROBLEM — S72.001A CLOSED FRACTURE OF NECK OF RIGHT FEMUR (HCC): Status: ACTIVE | Noted: 2018-02-12

## 2018-02-12 LAB
ABO GROUP BLD: NORMAL
ABO GROUP BLD: NORMAL
ANION GAP SERPL CALCULATED.3IONS-SCNC: 13 MMOL/L (ref 3–11)
BACTERIA UR QL AUTO: ABNORMAL /HPF
BILIRUB UR QL STRIP: NEGATIVE
BLD GP AB SCN SERPL QL: NEGATIVE
BUN BLD-MCNC: 12 MG/DL (ref 9–23)
BUN/CREAT SERPL: 17.1 (ref 7–25)
CALCIUM SPEC-SCNC: 9.5 MG/DL (ref 8.7–10.4)
CHLORIDE SERPL-SCNC: 92 MMOL/L (ref 99–109)
CLARITY UR: ABNORMAL
CO2 SERPL-SCNC: 24 MMOL/L (ref 20–31)
COLOR UR: YELLOW
CREAT BLD-MCNC: 0.7 MG/DL (ref 0.6–1.3)
DEPRECATED RDW RBC AUTO: 39.9 FL (ref 37–54)
ERYTHROCYTE [DISTWIDTH] IN BLOOD BY AUTOMATED COUNT: 12.5 % (ref 11.3–14.5)
GFR SERPL CREATININE-BSD FRML MDRD: 78 ML/MIN/1.73
GLUCOSE BLD-MCNC: 123 MG/DL (ref 70–100)
GLUCOSE UR STRIP-MCNC: NEGATIVE MG/DL
HCT VFR BLD AUTO: 34.4 % (ref 34.5–44)
HGB BLD-MCNC: 11.8 G/DL (ref 11.5–15.5)
HGB UR QL STRIP.AUTO: NEGATIVE
HYALINE CASTS UR QL AUTO: ABNORMAL /LPF
KETONES UR QL STRIP: NEGATIVE
LEUKOCYTE ESTERASE UR QL STRIP.AUTO: ABNORMAL
MCH RBC QN AUTO: 29.6 PG (ref 27–31)
MCHC RBC AUTO-ENTMCNC: 34.3 G/DL (ref 32–36)
MCV RBC AUTO: 86.4 FL (ref 80–99)
NITRITE UR QL STRIP: NEGATIVE
PH UR STRIP.AUTO: 7.5 [PH] (ref 5–8)
PLATELET # BLD AUTO: 208 10*3/MM3 (ref 150–450)
PMV BLD AUTO: 8.8 FL (ref 6–12)
POTASSIUM BLD-SCNC: 3.7 MMOL/L (ref 3.5–5.5)
PROCALCITONIN SERPL-MCNC: <0.05 NG/ML
PROT UR QL STRIP: NEGATIVE
RBC # BLD AUTO: 3.98 10*6/MM3 (ref 3.89–5.14)
RBC # UR: ABNORMAL /HPF
REF LAB TEST METHOD: ABNORMAL
RH BLD: POSITIVE
RH BLD: POSITIVE
SODIUM BLD-SCNC: 129 MMOL/L (ref 132–146)
SP GR UR STRIP: 1.01 (ref 1–1.03)
SQUAMOUS #/AREA URNS HPF: ABNORMAL /HPF
UROBILINOGEN UR QL STRIP: ABNORMAL
WBC NRBC COR # BLD: 12.93 10*3/MM3 (ref 3.5–10.8)
WBC UR QL AUTO: ABNORMAL /HPF

## 2018-02-12 PROCEDURE — 25010000002 NEOSTIGMINE 10 MG/10ML SOLUTION: Performed by: NURSE ANESTHETIST, CERTIFIED REGISTERED

## 2018-02-12 PROCEDURE — 86901 BLOOD TYPING SEROLOGIC RH(D): CPT

## 2018-02-12 PROCEDURE — 25010000003 CEFAZOLIN IN DEXTROSE 2-4 GM/100ML-% SOLUTION: Performed by: ORTHOPAEDIC SURGERY

## 2018-02-12 PROCEDURE — 80048 BASIC METABOLIC PNL TOTAL CA: CPT | Performed by: PHYSICIAN ASSISTANT

## 2018-02-12 PROCEDURE — 25010000002 HYDROMORPHONE PER 4 MG: Performed by: INTERNAL MEDICINE

## 2018-02-12 PROCEDURE — 73502 X-RAY EXAM HIP UNI 2-3 VIEWS: CPT

## 2018-02-12 PROCEDURE — 0SRR0JZ REPLACEMENT OF RIGHT HIP JOINT, FEMORAL SURFACE WITH SYNTHETIC SUBSTITUTE, OPEN APPROACH: ICD-10-PCS | Performed by: ORTHOPAEDIC SURGERY

## 2018-02-12 PROCEDURE — 86901 BLOOD TYPING SEROLOGIC RH(D): CPT | Performed by: ORTHOPAEDIC SURGERY

## 2018-02-12 PROCEDURE — 84145 PROCALCITONIN (PCT): CPT | Performed by: INTERNAL MEDICINE

## 2018-02-12 PROCEDURE — 25010000002 ROPIVACAINE PER 1 MG: Performed by: ANESTHESIOLOGY

## 2018-02-12 PROCEDURE — 25010000002 FENTANYL CITRATE (PF) 100 MCG/2ML SOLUTION: Performed by: NURSE ANESTHETIST, CERTIFIED REGISTERED

## 2018-02-12 PROCEDURE — 25010000002 PROPOFOL 10 MG/ML EMULSION: Performed by: NURSE ANESTHETIST, CERTIFIED REGISTERED

## 2018-02-12 PROCEDURE — 86900 BLOOD TYPING SEROLOGIC ABO: CPT | Performed by: ORTHOPAEDIC SURGERY

## 2018-02-12 PROCEDURE — C1776 JOINT DEVICE (IMPLANTABLE): HCPCS | Performed by: ORTHOPAEDIC SURGERY

## 2018-02-12 PROCEDURE — 81001 URINALYSIS AUTO W/SCOPE: CPT | Performed by: PHYSICIAN ASSISTANT

## 2018-02-12 PROCEDURE — 99223 1ST HOSP IP/OBS HIGH 75: CPT | Performed by: INTERNAL MEDICINE

## 2018-02-12 PROCEDURE — 25010000002 ONDANSETRON PER 1 MG: Performed by: NURSE ANESTHETIST, CERTIFIED REGISTERED

## 2018-02-12 PROCEDURE — 25010000002 HYDROMORPHONE PER 4 MG: Performed by: EMERGENCY MEDICINE

## 2018-02-12 PROCEDURE — 25010000002 DEXAMETHASONE PER 1 MG: Performed by: NURSE ANESTHETIST, CERTIFIED REGISTERED

## 2018-02-12 PROCEDURE — C1713 ANCHOR/SCREW BN/BN,TIS/BN: HCPCS | Performed by: ORTHOPAEDIC SURGERY

## 2018-02-12 PROCEDURE — 86850 RBC ANTIBODY SCREEN: CPT | Performed by: ORTHOPAEDIC SURGERY

## 2018-02-12 PROCEDURE — 85027 COMPLETE CBC AUTOMATED: CPT | Performed by: PHYSICIAN ASSISTANT

## 2018-02-12 PROCEDURE — 86900 BLOOD TYPING SEROLOGIC ABO: CPT

## 2018-02-12 DEVICE — PRT HIP BIPOL PRIM DEPUY 9525109/9525107: Type: IMPLANTABLE DEVICE | Site: HIP | Status: FUNCTIONAL

## 2018-02-12 DEVICE — SUMMIT FEMORAL STEM 12/14 TAPER TAPER ED W/POROCOAT SIZE 3 STD 135MM
Type: IMPLANTABLE DEVICE | Site: HIP | Status: FUNCTIONAL
Brand: SUMMIT POROCOAT

## 2018-02-12 DEVICE — SELF CENTERING BI-POLAR HEAD 28MM ID 44MM OD
Type: IMPLANTABLE DEVICE | Site: HIP | Status: FUNCTIONAL
Brand: SELF CENTERING

## 2018-02-12 DEVICE — ARTICUL/EZE FEMORAL HEAD DIAMETER 28MM +1.5 12/14 TAPER
Type: IMPLANTABLE DEVICE | Site: HIP | Status: FUNCTIONAL
Brand: ARTICUL/EZE

## 2018-02-12 RX ORDER — BISACODYL 5 MG/1
10 TABLET, DELAYED RELEASE ORAL DAILY PRN
Status: DISCONTINUED | OUTPATIENT
Start: 2018-02-12 | End: 2018-02-15 | Stop reason: HOSPADM

## 2018-02-12 RX ORDER — HYDROMORPHONE HYDROCHLORIDE 1 MG/ML
0.5 INJECTION, SOLUTION INTRAMUSCULAR; INTRAVENOUS; SUBCUTANEOUS EVERY 4 HOURS PRN
Status: DISCONTINUED | OUTPATIENT
Start: 2018-02-12 | End: 2018-02-13

## 2018-02-12 RX ORDER — FENTANYL CITRATE 50 UG/ML
50 INJECTION, SOLUTION INTRAMUSCULAR; INTRAVENOUS
Status: DISCONTINUED | OUTPATIENT
Start: 2018-02-12 | End: 2018-02-12 | Stop reason: HOSPADM

## 2018-02-12 RX ORDER — SODIUM CHLORIDE 9 MG/ML
50 INJECTION, SOLUTION INTRAVENOUS CONTINUOUS
Status: ACTIVE | OUTPATIENT
Start: 2018-02-12 | End: 2018-02-12

## 2018-02-12 RX ORDER — HYDROCODONE BITARTRATE AND ACETAMINOPHEN 5; 325 MG/1; MG/1
1 TABLET ORAL EVERY 4 HOURS PRN
Status: DISCONTINUED | OUTPATIENT
Start: 2018-02-12 | End: 2018-02-15 | Stop reason: HOSPADM

## 2018-02-12 RX ORDER — SODIUM CHLORIDE 0.9 % (FLUSH) 0.9 %
1-10 SYRINGE (ML) INJECTION AS NEEDED
Status: DISCONTINUED | OUTPATIENT
Start: 2018-02-12 | End: 2018-02-12 | Stop reason: HOSPADM

## 2018-02-12 RX ORDER — SODIUM CHLORIDE 9 MG/ML
100 INJECTION, SOLUTION INTRAVENOUS CONTINUOUS
Status: DISCONTINUED | OUTPATIENT
Start: 2018-02-12 | End: 2018-02-13

## 2018-02-12 RX ORDER — BISACODYL 10 MG
10 SUPPOSITORY, RECTAL RECTAL DAILY PRN
Status: DISCONTINUED | OUTPATIENT
Start: 2018-02-12 | End: 2018-02-15 | Stop reason: HOSPADM

## 2018-02-12 RX ORDER — LIDOCAINE HYDROCHLORIDE 10 MG/ML
INJECTION, SOLUTION EPIDURAL; INFILTRATION; INTRACAUDAL; PERINEURAL AS NEEDED
Status: DISCONTINUED | OUTPATIENT
Start: 2018-02-12 | End: 2018-02-12 | Stop reason: SURG

## 2018-02-12 RX ORDER — MAGNESIUM HYDROXIDE 1200 MG/15ML
LIQUID ORAL AS NEEDED
Status: DISCONTINUED | OUTPATIENT
Start: 2018-02-12 | End: 2018-02-12 | Stop reason: HOSPADM

## 2018-02-12 RX ORDER — ONDANSETRON 2 MG/ML
4 INJECTION INTRAMUSCULAR; INTRAVENOUS EVERY 6 HOURS PRN
Status: DISCONTINUED | OUTPATIENT
Start: 2018-02-12 | End: 2018-02-15 | Stop reason: HOSPADM

## 2018-02-12 RX ORDER — SODIUM CHLORIDE, SODIUM LACTATE, POTASSIUM CHLORIDE, CALCIUM CHLORIDE 600; 310; 30; 20 MG/100ML; MG/100ML; MG/100ML; MG/100ML
9 INJECTION, SOLUTION INTRAVENOUS CONTINUOUS
Status: DISCONTINUED | OUTPATIENT
Start: 2018-02-12 | End: 2018-02-13

## 2018-02-12 RX ORDER — ACETAMINOPHEN 325 MG/1
650 TABLET ORAL EVERY 4 HOURS PRN
Status: DISCONTINUED | OUTPATIENT
Start: 2018-02-12 | End: 2018-02-13

## 2018-02-12 RX ORDER — ONDANSETRON 4 MG/1
4 TABLET, FILM COATED ORAL EVERY 6 HOURS PRN
Status: DISCONTINUED | OUTPATIENT
Start: 2018-02-12 | End: 2018-02-15 | Stop reason: HOSPADM

## 2018-02-12 RX ORDER — SODIUM CHLORIDE 0.9 % (FLUSH) 0.9 %
1-10 SYRINGE (ML) INJECTION AS NEEDED
Status: DISCONTINUED | OUTPATIENT
Start: 2018-02-12 | End: 2018-02-13

## 2018-02-12 RX ORDER — FAMOTIDINE 20 MG/1
20 TABLET, FILM COATED ORAL ONCE
Status: DISCONTINUED | OUTPATIENT
Start: 2018-02-12 | End: 2018-02-12

## 2018-02-12 RX ORDER — FAMOTIDINE 10 MG/ML
20 INJECTION, SOLUTION INTRAVENOUS ONCE
Status: COMPLETED | OUTPATIENT
Start: 2018-02-12 | End: 2018-02-12

## 2018-02-12 RX ORDER — PROPOFOL 10 MG/ML
VIAL (ML) INTRAVENOUS AS NEEDED
Status: DISCONTINUED | OUTPATIENT
Start: 2018-02-12 | End: 2018-02-12 | Stop reason: SURG

## 2018-02-12 RX ORDER — HYDROMORPHONE HYDROCHLORIDE 1 MG/ML
0.5 INJECTION, SOLUTION INTRAMUSCULAR; INTRAVENOUS; SUBCUTANEOUS ONCE
Status: COMPLETED | OUTPATIENT
Start: 2018-02-12 | End: 2018-02-12

## 2018-02-12 RX ORDER — ROPIVACAINE HYDROCHLORIDE 2 MG/ML
INJECTION, SOLUTION EPIDURAL; INFILTRATION; PERINEURAL AS NEEDED
Status: DISCONTINUED | OUTPATIENT
Start: 2018-02-12 | End: 2018-02-12 | Stop reason: SURG

## 2018-02-12 RX ORDER — DIPHENHYDRAMINE HCL 25 MG
25 CAPSULE ORAL EVERY 6 HOURS PRN
Status: DISCONTINUED | OUTPATIENT
Start: 2018-02-12 | End: 2018-02-15 | Stop reason: HOSPADM

## 2018-02-12 RX ORDER — ACETAMINOPHEN 650 MG/1
650 SUPPOSITORY RECTAL EVERY 4 HOURS PRN
Status: DISCONTINUED | OUTPATIENT
Start: 2018-02-12 | End: 2018-02-15 | Stop reason: HOSPADM

## 2018-02-12 RX ORDER — SENNA AND DOCUSATE SODIUM 50; 8.6 MG/1; MG/1
2 TABLET, FILM COATED ORAL NIGHTLY
Status: DISCONTINUED | OUTPATIENT
Start: 2018-02-12 | End: 2018-02-15 | Stop reason: HOSPADM

## 2018-02-12 RX ORDER — ACETAMINOPHEN 325 MG/1
650 TABLET ORAL EVERY 4 HOURS PRN
Status: DISCONTINUED | OUTPATIENT
Start: 2018-02-12 | End: 2018-02-15 | Stop reason: HOSPADM

## 2018-02-12 RX ORDER — NEOSTIGMINE METHYLSULFATE 1 MG/ML
INJECTION, SOLUTION INTRAVENOUS AS NEEDED
Status: DISCONTINUED | OUTPATIENT
Start: 2018-02-12 | End: 2018-02-12 | Stop reason: SURG

## 2018-02-12 RX ORDER — LIDOCAINE HYDROCHLORIDE 10 MG/ML
0.5 INJECTION, SOLUTION EPIDURAL; INFILTRATION; INTRACAUDAL; PERINEURAL ONCE AS NEEDED
Status: DISCONTINUED | OUTPATIENT
Start: 2018-02-12 | End: 2018-02-12

## 2018-02-12 RX ORDER — CEFAZOLIN SODIUM 2 G/100ML
2 INJECTION, SOLUTION INTRAVENOUS EVERY 8 HOURS
Status: COMPLETED | OUTPATIENT
Start: 2018-02-13 | End: 2018-02-13

## 2018-02-12 RX ORDER — ONDANSETRON 2 MG/ML
INJECTION INTRAMUSCULAR; INTRAVENOUS AS NEEDED
Status: DISCONTINUED | OUTPATIENT
Start: 2018-02-12 | End: 2018-02-12 | Stop reason: SURG

## 2018-02-12 RX ORDER — HYDROCODONE BITARTRATE AND ACETAMINOPHEN 5; 325 MG/1; MG/1
2 TABLET ORAL EVERY 4 HOURS PRN
Status: DISCONTINUED | OUTPATIENT
Start: 2018-02-12 | End: 2018-02-15 | Stop reason: HOSPADM

## 2018-02-12 RX ORDER — DEXAMETHASONE SODIUM PHOSPHATE 10 MG/ML
INJECTION INTRAMUSCULAR; INTRAVENOUS AS NEEDED
Status: DISCONTINUED | OUTPATIENT
Start: 2018-02-12 | End: 2018-02-12 | Stop reason: SURG

## 2018-02-12 RX ORDER — HYDROMORPHONE HYDROCHLORIDE 1 MG/ML
0.5 INJECTION, SOLUTION INTRAMUSCULAR; INTRAVENOUS; SUBCUTANEOUS
Status: DISCONTINUED | OUTPATIENT
Start: 2018-02-12 | End: 2018-02-13

## 2018-02-12 RX ORDER — ROPIVACAINE HYDROCHLORIDE 2 MG/ML
8 INJECTION, SOLUTION EPIDURAL; INFILTRATION CONTINUOUS
Status: DISCONTINUED | OUTPATIENT
Start: 2018-02-12 | End: 2018-02-15 | Stop reason: HOSPADM

## 2018-02-12 RX ORDER — GLYCOPYRROLATE 0.2 MG/ML
INJECTION INTRAMUSCULAR; INTRAVENOUS AS NEEDED
Status: DISCONTINUED | OUTPATIENT
Start: 2018-02-12 | End: 2018-02-12 | Stop reason: SURG

## 2018-02-12 RX ORDER — ONDANSETRON 2 MG/ML
4 INJECTION INTRAMUSCULAR; INTRAVENOUS ONCE AS NEEDED
Status: DISCONTINUED | OUTPATIENT
Start: 2018-02-12 | End: 2018-02-12 | Stop reason: HOSPADM

## 2018-02-12 RX ORDER — CEFAZOLIN SODIUM 2 G/100ML
2 INJECTION, SOLUTION INTRAVENOUS ONCE
Status: COMPLETED | OUTPATIENT
Start: 2018-02-12 | End: 2018-02-12

## 2018-02-12 RX ORDER — SODIUM CHLORIDE, SODIUM LACTATE, POTASSIUM CHLORIDE, CALCIUM CHLORIDE 600; 310; 30; 20 MG/100ML; MG/100ML; MG/100ML; MG/100ML
INJECTION, SOLUTION INTRAVENOUS CONTINUOUS PRN
Status: DISCONTINUED | OUTPATIENT
Start: 2018-02-12 | End: 2018-02-12 | Stop reason: SURG

## 2018-02-12 RX ORDER — HYDROMORPHONE HYDROCHLORIDE 1 MG/ML
0.5 INJECTION, SOLUTION INTRAMUSCULAR; INTRAVENOUS; SUBCUTANEOUS
Status: DISCONTINUED | OUTPATIENT
Start: 2018-02-12 | End: 2018-02-14

## 2018-02-12 RX ORDER — NALOXONE HCL 0.4 MG/ML
0.1 VIAL (ML) INJECTION
Status: DISCONTINUED | OUTPATIENT
Start: 2018-02-12 | End: 2018-02-14

## 2018-02-12 RX ORDER — METOPROLOL SUCCINATE 25 MG/1
25 TABLET, EXTENDED RELEASE ORAL ONCE
Status: COMPLETED | OUTPATIENT
Start: 2018-02-12 | End: 2018-02-12

## 2018-02-12 RX ORDER — DOCUSATE SODIUM 100 MG/1
100 CAPSULE, LIQUID FILLED ORAL 2 TIMES DAILY PRN
Status: DISCONTINUED | OUTPATIENT
Start: 2018-02-12 | End: 2018-02-15 | Stop reason: HOSPADM

## 2018-02-12 RX ORDER — ATRACURIUM BESYLATE 10 MG/ML
INJECTION, SOLUTION INTRAVENOUS AS NEEDED
Status: DISCONTINUED | OUTPATIENT
Start: 2018-02-12 | End: 2018-02-12 | Stop reason: SURG

## 2018-02-12 RX ORDER — ACETAMINOPHEN 160 MG/5ML
650 SOLUTION ORAL EVERY 4 HOURS PRN
Status: DISCONTINUED | OUTPATIENT
Start: 2018-02-12 | End: 2018-02-15 | Stop reason: HOSPADM

## 2018-02-12 RX ORDER — DIPHENHYDRAMINE HYDROCHLORIDE 50 MG/ML
25 INJECTION INTRAMUSCULAR; INTRAVENOUS EVERY 6 HOURS PRN
Status: DISCONTINUED | OUTPATIENT
Start: 2018-02-12 | End: 2018-02-15 | Stop reason: HOSPADM

## 2018-02-12 RX ADMIN — FAMOTIDINE 20 MG: 10 INJECTION, SOLUTION INTRAVENOUS at 15:29

## 2018-02-12 RX ADMIN — TRANEXAMIC ACID 1000 MG: 100 INJECTION, SOLUTION INTRAVENOUS at 16:39

## 2018-02-12 RX ADMIN — HYDROMORPHONE HYDROCHLORIDE 0.5 MG: 10 INJECTION, SOLUTION INTRAMUSCULAR; INTRAVENOUS; SUBCUTANEOUS at 01:18

## 2018-02-12 RX ADMIN — DEXAMETHASONE SODIUM PHOSPHATE 4 MG: 10 INJECTION INTRAMUSCULAR; INTRAVENOUS at 16:44

## 2018-02-12 RX ADMIN — PROPOFOL 100 MG: 10 INJECTION, EMULSION INTRAVENOUS at 16:32

## 2018-02-12 RX ADMIN — SODIUM CHLORIDE 50 ML/HR: 9 INJECTION, SOLUTION INTRAVENOUS at 05:45

## 2018-02-12 RX ADMIN — ONDANSETRON 4 MG: 2 INJECTION INTRAMUSCULAR; INTRAVENOUS at 17:20

## 2018-02-12 RX ADMIN — ROPIVACAINE HYDROCHLORIDE 40 ML: 2 INJECTION, SOLUTION EPIDURAL; INFILTRATION at 15:14

## 2018-02-12 RX ADMIN — SODIUM CHLORIDE, POTASSIUM CHLORIDE, SODIUM LACTATE AND CALCIUM CHLORIDE 9 ML/HR: 600; 310; 30; 20 INJECTION, SOLUTION INTRAVENOUS at 15:30

## 2018-02-12 RX ADMIN — GLYCOPYRROLATE 0.4 MG: 0.2 INJECTION INTRAMUSCULAR; INTRAVENOUS at 17:20

## 2018-02-12 RX ADMIN — CEFAZOLIN SODIUM 2 G: 2 INJECTION, SOLUTION INTRAVENOUS at 16:25

## 2018-02-12 RX ADMIN — NEOSTIGMINE METHYLSULFATE 3 MG: 1 INJECTION, SOLUTION INTRAVENOUS at 17:20

## 2018-02-12 RX ADMIN — HYDROMORPHONE HYDROCHLORIDE 0.5 MG: 10 INJECTION, SOLUTION INTRAMUSCULAR; INTRAVENOUS; SUBCUTANEOUS at 08:44

## 2018-02-12 RX ADMIN — METOPROLOL SUCCINATE 25 MG: 25 TABLET, FILM COATED, EXTENDED RELEASE ORAL at 15:31

## 2018-02-12 RX ADMIN — HYDROMORPHONE HYDROCHLORIDE 0.5 MG: 10 INJECTION, SOLUTION INTRAMUSCULAR; INTRAVENOUS; SUBCUTANEOUS at 05:27

## 2018-02-12 RX ADMIN — LIDOCAINE HYDROCHLORIDE 5 ML: 10 INJECTION, SOLUTION EPIDURAL; INFILTRATION; INTRACAUDAL; PERINEURAL at 16:32

## 2018-02-12 RX ADMIN — SODIUM CHLORIDE 100 ML/HR: 9 INJECTION, SOLUTION INTRAVENOUS at 20:28

## 2018-02-12 RX ADMIN — TRANEXAMIC ACID 1000 MG: 100 INJECTION, SOLUTION INTRAVENOUS at 17:22

## 2018-02-12 RX ADMIN — ATRACURIUM BESYLATE 50 MG: 10 INJECTION, SOLUTION INTRAVENOUS at 16:32

## 2018-02-12 RX ADMIN — FENTANYL CITRATE 50 MCG: 50 INJECTION, SOLUTION INTRAMUSCULAR; INTRAVENOUS at 18:45

## 2018-02-12 RX ADMIN — SODIUM CHLORIDE, POTASSIUM CHLORIDE, SODIUM LACTATE AND CALCIUM CHLORIDE: 600; 310; 30; 20 INJECTION, SOLUTION INTRAVENOUS at 16:22

## 2018-02-12 RX ADMIN — ROPIVACAINE HYDROCHLORIDE 8 ML/HR: 2 INJECTION, SOLUTION EPIDURAL; INFILTRATION at 17:55

## 2018-02-12 RX ADMIN — Medication 2 TABLET: at 20:28

## 2018-02-12 NOTE — BRIEF OP NOTE
HIP HEMIARTHROPLASTY  Progress Note    Silva Ruano  2/11/2018 - 2/12/2018    Pre-op Diagnosis:   Right femoral neck fracture       Post-Op Diagnosis Codes:     * Fracture of femoral neck, right, closed [S72.001A]    Procedure/CPT® Codes:  RI FEMORAL FX, OPEN TX [09153]    Procedure(s):  Right HIP HEMIARTHROPLASTY    Surgeon(s):  Rohith Andersen MD     Assistant: SUZAN Tyson    Anesthesia: General and ilio fascial catheter    Staff:   Circulator: Ayaan Levin RN  Scrub Person: Teri Gillespie  Vendor Representative: Jonathan Núñez  Assistant: SUZAN Tyson    Estimated Blood Loss: 100ml    Urine Voided: * No values recorded between 2/12/2018  4:22 PM and 2/12/2018  5:43 PM *    Specimens:                None      Drains:   Urethral Catheter 02/12/18 1128 (Active)   Daily Indications Acute retention 2/12/2018 11:20 AM   Securement secured to upper leg with adhesive device 2/12/2018 11:20 AM   Irrigation Return clear;yellow 2/12/2018 11:20 AM   Irrigation Ease no resistance met 2/12/2018 11:20 AM   Catheter care done Yes 2/12/2018 11:20 AM   Tolerance no signs/symptoms of discomfort 2/12/2018  2:00 PM           Findings: Right hip displaced femoral neck fracture    Complications: None     Implants: Depuy Edwards bipolar hemiarthroplasty with a size 3 stem and a 44 head with standard taper          Rohith Andersen MD     Date: 2/12/2018  Time: 5:54 PM

## 2018-02-12 NOTE — ANESTHESIA POSTPROCEDURE EVALUATION
Patient: Silva Rosalesland    Procedure Summary     Date Anesthesia Start Anesthesia Stop Room / Location    02/12/18 1622  [facility] / [facility]       Procedure Diagnosis Surgeon Provider    HIP HEMIARTHROPLASTY (Right Hip) No diagnosis on file. MD Jack Zheng MD          Anesthesia Type: general  Last vitals  BP   178/78 (02/12/18 1507)   Temp   97.6 °F (36.4 °C) (02/12/18 1507)   Pulse   83 (02/12/18 1507)   Resp   22 (02/12/18 1507)     SpO2   94 % (02/12/18 1507)     Post Anesthesia Care and Evaluation    Patient location during evaluation: PACU  Patient participation: complete - patient participated  Level of consciousness: awake and alert  Pain score: 0  Pain management: adequate  Airway patency: patent  Anesthetic complications: No anesthetic complications  PONV Status: none  Cardiovascular status: hemodynamically stable and acceptable  Respiratory status: nonlabored ventilation, acceptable and nasal cannula  Hydration status: acceptable

## 2018-02-12 NOTE — CONSULTS
Patient: Silva Ruano    Date of Admission: 2/11/2018 10:55 PM    YOB: 1925    Medical Record Number: [id number]    Attending Physician: Marques Basilio MD     Primary Care: Yasmine Cortes MD    Consulting Physician: Rohith Andersen MD      Chief Complaints: Closed fracture of neck of right femur, initial encounter [S72.001A]      History of Present Illness: 89 y.o. female admitted to [facility] with Closed fracture of neck of right femur, initial encounter [S72.001A]. I was consulted for further evaluation and treatment.   She is a pleasant 89-year-old female with an isolated complaint of right hip pain.  She is a poor historian due to Alzheimer's dementia but her sister and daughter who she lives with are present at the bedside.  She fell last night and was unable to ambulate after this fall.  She normally is a minimal ambulator without assist.     No Known Allergies     Home Medications:  Prescriptions Prior to Admission   Medication Sig Dispense Refill Last Dose   • hydrochlorothiazide (HYDRODIURIL) 25 MG tablet    2/11/2018 at 0900   • levothyroxine (SYNTHROID, LEVOTHROID) 50 MCG tablet    2/11/2018 at 0900   • losartan (COZAAR) 50 MG tablet    2/11/2018 at 2100   • metoprolol succinate XL (TOPROL-XL) 25 MG 24 hr tablet    2/11/2018 at 2100   • nitrofurantoin (MACRODANTIN) 50 MG capsule    2/11/2018 at 2100   • omeprazole (priLOSEC) 20 MG capsule    2/11/2018 at 1200   • PREMARIN 0.625 MG tablet    2/11/2018 at 2100   • tolterodine LA (DETROL LA) 4 MG 24 hr capsule    2/11/2018 at 2100   • Diclofenac Sodium 1.5 % solution    Taking       Current Medications:  Scheduled Meds:   Continuous Infusions:  sodium chloride 50 mL/hr Last Rate: 50 mL/hr (02/12/18 0545)     PRN Meds:.•  acetaminophen  •  HYDROmorphone  •  HYDROmorphone  •  sodium chloride    Past Medical History:   Diagnosis Date   • Alzheimer disease    • Arthritis    • Closed fracture of neck of right femur, Acute  2/12/2018   • Diverticulosis    • Hypertension    • Pathologic fracture of clavicle, left, initial encounter    • Skin cancer     lymphoma   • Thyroid disease         Past Surgical History:   Procedure Laterality Date   • CHOLECYSTECTOMY     • HYSTERECTOMY     • OVARIAN CYST REMOVAL          Social History     Occupational History   • Not on file.     Social History Main Topics   • Smoking status: Never Smoker   • Smokeless tobacco: Never Used   • Alcohol use No   • Drug use: No   • Sexual activity: Defer    Social History     Social History Narrative        Family History   Problem Relation Age of Onset   • Heart disease Other    • Hypertension Other    • Cancer Other          Review of Systems:   R hip pain, unable to ambulate    Physical Exam: 89 y.o. female  General Appearance:    Alert, cooperative, in no acute distress                 Vitals:    02/12/18 0100 02/12/18 0107 02/12/18 0201 02/12/18 0429   BP: 157/74 157/74 176/81 142/88   BP Location:  Right arm Right arm Right arm   Patient Position:  Lying Lying Lying   Pulse: 97 98 101 101   Resp:  16 16 16   Temp:  98 °F (36.7 °C) 97.6 °F (36.4 °C) 96.1 °F (35.6 °C)   TempSrc:  Oral Oral Axillary   SpO2: 94% 94% 92% 96%   Weight:   71.7 kg (158 lb)    Height:            Extremities:  Right hip skin is intact with no significant swelling, ecchymosis or erythema  Right lower extremity shortened and externally rotated  Thigh and calf soft and nontender  Neurovascular intact distally      Diagnostic Tests:    Admission on 02/11/2018   Component Date Value Ref Range Status   • Glucose 02/11/2018 117* 70 - 100 mg/dL Final   • BUN 02/11/2018 14  9 - 23 mg/dL Final   • Creatinine 02/11/2018 0.70  0.60 - 1.30 mg/dL Final   • Sodium 02/11/2018 131* 132 - 146 mmol/L Final   • Potassium 02/11/2018 3.6  3.5 - 5.5 mmol/L Final   • Chloride 02/11/2018 96* 99 - 109 mmol/L Final   • CO2 02/11/2018 28.0  20.0 - 31.0 mmol/L Final   • Calcium 02/11/2018 9.2  8.7 - 10.4 mg/dL  Final   • Total Protein 02/11/2018 6.3  5.7 - 8.2 g/dL Final   • Albumin 02/11/2018 3.60  3.20 - 4.80 g/dL Final   • ALT (SGPT) 02/11/2018 19  7 - 40 U/L Final   • AST (SGOT) 02/11/2018 21  0 - 33 U/L Final   • Alkaline Phosphatase 02/11/2018 76  25 - 100 U/L Final   • Total Bilirubin 02/11/2018 0.2* 0.3 - 1.2 mg/dL Final   • eGFR Non African Amer 02/11/2018 78  >60 mL/min/1.73 Final   • Globulin 02/11/2018 2.7  gm/dL Final   • A/G Ratio 02/11/2018 1.3* 1.5 - 2.5 g/dL Final   • BUN/Creatinine Ratio 02/11/2018 20.0  7.0 - 25.0 Final   • Anion Gap 02/11/2018 7.0  3.0 - 11.0 mmol/L Final   • WBC 02/11/2018 6.15  3.50 - 10.80 10*3/mm3 Final   • RBC 02/11/2018 3.82* 3.89 - 5.14 10*6/mm3 Final   • Hemoglobin 02/11/2018 11.5  11.5 - 15.5 g/dL Final   • Hematocrit 02/11/2018 33.3* 34.5 - 44.0 % Final   • MCV 02/11/2018 87.2  80.0 - 99.0 fL Final   • MCH 02/11/2018 30.1  27.0 - 31.0 pg Final   • MCHC 02/11/2018 34.5  32.0 - 36.0 g/dL Final   • RDW 02/11/2018 12.6  11.3 - 14.5 % Final   • RDW-SD 02/11/2018 40.5  37.0 - 54.0 fl Final   • MPV 02/11/2018 8.3  6.0 - 12.0 fL Final   • Platelets 02/11/2018 191  150 - 450 10*3/mm3 Final   • Neutrophil % 02/11/2018 46.2  41.0 - 71.0 % Final   • Lymphocyte % 02/11/2018 40.2  24.0 - 44.0 % Final   • Monocyte % 02/11/2018 5.9  0.0 - 12.0 % Final   • Eosinophil % 02/11/2018 6.8* 0.0 - 3.0 % Final   • Basophil % 02/11/2018 0.2  0.0 - 1.0 % Final   • Immature Grans % 02/11/2018 0.7* 0.0 - 0.6 % Final   • Neutrophils, Absolute 02/11/2018 2.85  1.50 - 8.30 10*3/mm3 Final   • Lymphocytes, Absolute 02/11/2018 2.47  0.60 - 4.80 10*3/mm3 Final   • Monocytes, Absolute 02/11/2018 0.36  0.00 - 1.00 10*3/mm3 Final   • Eosinophils, Absolute 02/11/2018 0.42* 0.00 - 0.30 10*3/mm3 Final   • Basophils, Absolute 02/11/2018 0.01  0.00 - 0.20 10*3/mm3 Final   • Immature Grans, Absolute 02/11/2018 0.04* 0.00 - 0.03 10*3/mm3 Final   • Troponin I 02/11/2018 0.00  0.00 - 0.07 ng/mL Final    Serial Number:  [id number]:  [id number]       Xr Chest 1 View    Result Date: 2/12/2018  Narrative: EXAM: XR Chest, 1 View CLINICAL HISTORY: 89 years old, female; Screening exam; Pre-operative exam; Other: Rt hip; Additional info: Fall/right hip pain TECHNIQUE: Frontal view of the chest. COMPARISON: CT - RC CHEST W CONTRAST 2014-11-13 10:46 FINDINGS: Lungs:  Biapical pleural-parenchymal scarring. Pleural space:  Normal.  No pneumothorax. Heart:  Normal.  No cardiomegaly. Mediastinum:  Normal. Bones/joints:  Old distal left clavicle fracture. Vasculature:  Atherosclerotic ossification of the thoracic aorta.     Impression: 1. No acute findings. 2. Non-acute findings are described above. THIS DOCUMENT HAS BEEN ELECTRONICALLY SIGNED BY STEPHANIE ARREOLA MD    Xr Hip With Or Without Pelvis 2 - 3 View Right    Result Date: 2/12/2018  Narrative: EXAM: XR Right Hip With Pelvis When Performed, 2 or 3 Views CLINICAL HISTORY: 89 years old, female; Pain; Hip pain; Right hip; Additional info: Fall/right hip pain TECHNIQUE: Two or three views of the right hip, with pelvis when performed. COMPARISON: CT - RC ABD PELVIS W CONTRAST 2014-11-13 10:46 FINDINGS: Bones/joints:  Acute, displaced right mid cervical femoral neck fracture.  Lower lumbar spine degenerative changes.  Mild degenerative changes of the hips, manifest by joint space narrowing and minimal osteophyte formation.  No dislocation. Soft tissues:  Normal. Vasculature:  Phlebolith within the right pelvis. Other findings:  2.6 x 1.87 m calcification projecting over the right ilium.     Impression: 1.  Acute, displaced right mid cervical femoral neck fracture. 2.  Incidental/non-acute findings are described above. THIS DOCUMENT HAS BEEN ELECTRONICALLY SIGNED BY STEPHANIE ARREOLA MD        Assessment: 89-year-old female with right hip displaced femoral neck fracture    Patient Active Problem List   Diagnosis   • Non Hodgkin's lymphoma   • Subcutaneous nodules   • Late onset Alzheimer's disease  with behavioral disturbance   • Hypertension   • Closed fracture of neck of right femur, Acute   • Generalized weakness           Plan:  The patient voiced understanding of the risks, benefits, and alternative forms of treatment that were discussed and the patient consents to proceed with Right hip hemiarthroplasty.   -I reviewed the x-ray findings with the patient and her family.  She has a displaced femoral neck fracture for which I would recommend right hip hemiarthroplasty.  All of the risks, benefits and alternatives to surgery were discussed in detail and they agreed to proceed.  They understand risks to include but not limited to infection, pain, stiffness, functional decline, leg length inequality, periprosthetic fracture, neurovascular injury and medical risks associated with surgery.  They understand the long rehabilitation course involved.  They understand the significant morbidity and mortality risks associated with hip fractures.  -We will proceed with right hip hemiarthroplasty later today pending OR availability.  -Keep her on bedrest, nothing by mouth and plan on Ancef for prophylactic antibiotics.  - for rehabilitation planning.  -I answered all questions to their satisfaction and they're comfortable with the plan.  -Thank you very much for this consult, this patient was a pleasure to evaluate and treat.      Discharge Plan: to rehab in 3-4 days      Date: 2/12/2018    Rohith Andersen MD

## 2018-02-12 NOTE — OP NOTE
PREOPERATIVE DIAGNOSIS: Right hip displaced femoral neck fracture.     POSTOPERATIVE DIAGNOSIS: Right hip displaced femoral neck fracture.     PROCEDURE PERFORMED: Right hip hemiarthroplasty.     SURGEON: Rohith Andersen MD    ASSISTANT: SUZAN Tyson, necessary during the case for positioning the patient, exposure, implantation of components, and closure.     ANESTHESIA: General with iliofascial catheter.     ESTIMATED BLOOD LOSS: 100 mL.     COMPLICATIONS: None.    SPECIMENS: None.    IMPLANTS: DePuy [place] bipolar hemiarthroplasty with a size 3 fully porous-coated femoral stem and a 44 bipolar head with standard taper.     INDICATIONS FOR PROCEDURE: The patient is a very pleasant 89-year-old female who fell at home yesterday evening getting up from a chair. Normally is a minimal home ambulator without assist. She was unable to ambulate after this fall. She lives at home with her daughter. EMS was called and she was brought to [facility] ER where evaluation revealed the displaced right hip femoral neck fracture. X-rays revealed right hip displaced femoral neck fracture with minimal degenerative changes. Upon my evaluation of the patient this morning she had an isolated complaint of right hip pain. She is neurovascular intact distally. I discussed all the risks, benefits, and alternatives to right hip hemiarthroplasty, they agreed to proceed, and surgical consent form was signed.     DESCRIPTION OF PROCEDURE: She was seen by Anesthesia. They had performed an iliofascial catheter for perioperative pain control without difficulty. She received Ancef 2 g IV prophylactic antibiotics within 1 hour of incision time. She was brought back to the operating room. General anesthesia was induced without difficulty. She received her 1st dose of tranexamic acid just prior to the incision and the last dose within 5-10 minutes of closing to help minimize bleeding. Upon induction of general anesthesia a Parker catheter  had been placed. She was placed in the lateral decubitus position. An axillary roll was placed. All of her bony prominences were well-padded. The right lower extremity was then prepped and draped in the usual sterile fashion. A timeout was performed and I confirmed right hip hemiarthroplasty on the above-mentioned patient. I made a posterolateral hip incision with a #10 blade scalpel. This was carried down through subcutaneous tissue. Adequate hemostasis was maintained with Bovie electrocautery. I incised the fascia with Bovie electrocautery. A Charnley retractor was placed. The bursa was swept off the posterolateral aspect of the hip. The hip was flexed and internally rotated. Short external rotators were identified. I dissected the piriformis and the short external rotators off the piriformis fossa with the Bovie electrocautery. These were tagged with two #2 FiberWire sutures for later repair. The hip joint capsule was incised. Hematoma was evacuated. The femoral neck cut was freshened up with an oscillating saw at a 45° neck angle 1 cm proximal to the lesser trochanter. Cut bone was removed. We then removed the femoral head. This was sized to a 44 on the back table. A 44 trial fit the acetabulum nicely. The acetabulum was cleared out of soft tissue and debris. We then prepared the femoral canal. The canal finder was placed down the canal and then the lateralizer and then we hand-reamed starting with a 0-1 reamer up to 4-5 reamer which did not get very far down the canal. We then broached starting with a #1 broach up to a size #3 broach. We had excellent fit and purchase. This was placed in 15° of anteversion, taking care to avoid varus placement of the broach. We then trialed with the standard taper and the 44 bipolar head. The hip was reduced and taken through a full range of motion and seen to be stable throughout. We achieved full extension in external rotation and flexion at 90° and past 60° of internal  rotation prior to dislocating the hip. Leg lengths appeared equal. Hip joint was then reduced. The trial components were removed. The hip was copiously irrigated with Pulsavac lavage and final components were opened on the back table. The acetabulum and canal were thoroughly dried. We then placed the final DePuy [place] size 3 femoral stem down the femoral canal, impacted it in place, and it was seen to have excellent purchase. We then placed the 44 bipolar head. This was impacted in place onto the neck and seen to be fully engaged. The hip was then reduced and at the acetabulum again taken through a full range of motion and seen to be stable throughout. The hip was then copiously irrigated with Pulsavac lavage again. We then proceeded with closure. The short external rotators and the piriformis were repaired back to the greater trochanter using drill holes and the #2 FiberWire suture. The fascia was closed with #1 Vicryl, the skin was closed with 2-0 Vicryl, the dermis was closed with 2-0 Vicryl, and the skin was closed with running 2-0 subcuticular barbed Stratafix suture. We then applied Prineo mesh dressing and Dermabond. Once this had thoroughly dried the drapes were removed. She was placed in a hip abduction pillow, placed back in the supine position, and extubated without difficulty. All sponge and needle counts were correct x2. She was transferred to the recovery room in stable condition.     POSTOPERATIVE PLAN: She will be readmitted to the hospitalist service for medical management. We will order physical therapy and occupational therapy starting tomorrow to mobilize as tolerated. Start Lovenox tomorrow for DVT prophylaxis and have the  work on rehabilitation placement.

## 2018-02-12 NOTE — ANESTHESIA PROCEDURE NOTES
Airway  Urgency: elective    Airway not difficult    General Information and Staff    Patient location during procedure: OR  CRNA: FARRAH STANTON    Indications and Patient Condition  Indications for airway management: airway protection    Preoxygenated: yes  MILS not maintained throughout  Mask difficulty assessment: 1 - vent by mask    Final Airway Details  Final airway type: endotracheal airway      Successful airway: ETT  Cuffed: yes   Successful intubation technique: direct laryngoscopy  Endotracheal tube insertion site: oral  Blade: Rosario  Blade size: #2  ETT size: 7.0 mm  Cormack-Lehane Classification: grade I - full view of glottis  Placement verified by: chest auscultation and capnometry   Measured from: lips  ETT to lips (cm): 20  Number of attempts at approach: 1    Additional Comments  Negative epigastric sounds, Breath sound equal bilaterally with symmetric chest rise and fall.  No teeth, atraumatic

## 2018-02-12 NOTE — ANESTHESIA PREPROCEDURE EVALUATION
Anesthesia Evaluation     Patient summary reviewed and Nursing notes reviewed   NPO Solid Status: > 8 hours  NPO Liquid Status: > 8 hours           Airway   Mallampati: I  TM distance: >3 FB  Neck ROM: full  no difficulty expected  Dental      Pulmonary     breath sounds clear to auscultation  Cardiovascular     ECG reviewed  Rhythm: regular  Rate: normal    (+) hypertension,       Neuro/Psych  (+) dementia,     GI/Hepatic/Renal/Endo      Musculoskeletal     Abdominal    Substance History      OB/GYN          Other   (+) arthritis                     Anesthesia Plan    ASA 2     general   (+ FICB)  intravenous induction   Anesthetic plan and risks discussed with patient and child.    Plan discussed with CRNA.

## 2018-02-12 NOTE — ANESTHESIA PROCEDURE NOTES
Peripheral Block    Patient location during procedure: pre-op  Reason for block: procedure for pain and at surgeon's request  Preanesthetic Checklist  Completed: patient identified, site marked, surgical consent, pre-op evaluation, timeout performed, IV checked, risks and benefits discussed and monitors and equipment checked  Prep:  Pt Position: supine  Sterile barriers:cap, gloves and mask  Prep: ChloraPrep  Patient monitoring: blood pressure monitoring, continuous pulse oximetry and EKG  Procedure  Sedation:no  Performed under: local infiltration  Guidance:ultrasound guided  Images:still images obtained    Laterality:right  Block Type:fascia iliaca catheter  Injection Technique:catheter  Needle Type:echogenic  Needle Gauge:18 G    Catheter Size:20 G (20g)  Medications  Preservative Free Saline:10ml  Local Injected:ropivacaine 0.2% Local Amount Injected:40mL  Post Assessment  Injection Assessment: negative aspiration for heme, no paresthesia on injection and incremental injection  Patient Tolerance:comfortable throughout block  Complications:no  Additional Notes  Procedure:                 Pt placed in supine position.   The insertion site was prepped in sterile fashion with Chlorapreop and clear plastic drapes.  Analgesia was provided by skin infiltration at insertion site with Lidocaine 1% 3mls.  A B-Schmidt 18 g , 4 inch echogenic Touhy needle was advance In-plane under ultrasound guidance. The   Anterior superior Iliac crest was initially visualized and the probe was directed slightly medially and slightly towards the umbilicus.  The course of the needle was tracked over the sartorius muscle through the fascia Iliacus and into the anterior portion of the Iliacus muscle.  Major vessels where identified and avoided as where structures of the peritoneal cavity.  LA injection was made incrementally in 1-5ml amounts spread was visualized superiorly below fascia iliacus.  Injection was completed with negative  aspiration of blood and negative intravascular injection.  Injection pressures where normal or minimal resistance.  A 20 g B-Schmidt wire styleted catheter was then advance thru the needle and very easily placed in a superior or cephalad direction.  The catheter was secured at insertion site with skin afix , mastisol, steristreps.  A CHG tegaderm dressing was placed over the insertion site and the nerve catheter labeled and capped.  Thank You.

## 2018-02-12 NOTE — PLAN OF CARE
Problem: Pain, Acute (Adult)  Intervention: Support/Optimize Psychosocial Response to Acute Pain   02/12/18 0151   Coping Strategies   Trust Relationship/Rapport care explained   Supportive Measures active listening utilized

## 2018-02-12 NOTE — H&P
[facility] Medicine Services  HISTORY AND PHYSICAL    Patient Name: Silva Ruano  : 1925  MRN: [id number]  Primary Care Physician: Yasmine Cortes MD    Subjective   Subjective     Chief Complaint:  Right hip pain    HPI:  Silva Ruano is a 89 y.o. female with a past medical history significant for Alzheimer's dementia, hypertension, hypothyroidism, and Non-Hodgkin's lymphoma who presents with right hip pain s/p fall. HPI limited secondary to patient's disposition. Dementia is advanced. Sister at bedside states she was sitting in rocking chair and complained that she couldn't get up. This is not unusual for the patient, however when she tried to get up she lost her balance and fell, striking her head and right hip. Has complained of right hip pain and difficulty with ambulation since then. No other complaints of fever, cough, congestion, N/V/D, SOB, or chest pain. Records reviewed indicate patient recently sustained a pathologic fracture of left clavicle (). Was seen by ortho service here at [facility].    Emergency Department Evaluation: vital stable. Chemistry and hematology favorable. Troponin, CXR negative. EKG stable. X-ray right hip demonstrates acute fracture of right femoral neck.    Review of Systems (limited due to dementia)      Otherwise 10-system ROS reviewed and is negative except as mentioned in the HPI.    Personal History     Past Medical History:   Diagnosis Date   • Alzheimer disease    • Arthritis    • Closed fracture of neck of right femur, Acute 2018   • Diverticulosis    • Hypertension    • Pathologic fracture of clavicle, left, initial encounter    • Skin cancer     lymphoma   • Thyroid disease        Past Surgical History:   Procedure Laterality Date   • CHOLECYSTECTOMY     • HYSTERECTOMY     • OVARIAN CYST REMOVAL         Family History: family history includes Cancer in her other; Heart disease in her other; Hypertension in her other.      Social History:  reports that she has never smoked. She has never used smokeless tobacco. She reports that she does not drink alcohol or use illicit drugs.  Social History     Social History Narrative       Medications:  Prescriptions Prior to Admission   Medication Sig Dispense Refill Last Dose   • Diclofenac Sodium 1.5 % solution    Taking   • hydrochlorothiazide (HYDRODIURIL) 25 MG tablet    Taking   • levothyroxine (SYNTHROID, LEVOTHROID) 50 MCG tablet    Taking   • losartan (COZAAR) 50 MG tablet    Taking   • metoprolol succinate XL (TOPROL-XL) 25 MG 24 hr tablet    Taking   • nitrofurantoin (MACRODANTIN) 50 MG capsule    Taking   • omeprazole (priLOSEC) 20 MG capsule    Taking   • PREMARIN 0.625 MG tablet    Taking   • tolterodine LA (DETROL LA) 4 MG 24 hr capsule    Taking       No Known Allergies    Objective   Objective     Vital Signs:   Temp:  [96.1 °F (35.6 °C)-98 °F (36.7 °C)] 96.1 °F (35.6 °C)  Heart Rate:  [] 101  Resp:  [16-18] 16  BP: (142-176)/(74-88) 142/88        Physical Exam   Constitutional: No acute distress, awake, alert  Eyes: PERRLA, sclerae anicteric, no conjunctival injection  HENT: NCAT, mucous membranes dry  Neck: Supple, no thyromegaly, no lymphadenopathy, trachea midline  Respiratory: Clear to auscultation bilaterally, nonlabored respirations   Cardiovascular: RRR, no murmurs, rubs, or gallops, palpable pedal pulses bilaterally  Gastrointestinal: Positive bowel sounds, soft, nontender, nondistended  Musculoskeletal: No bilateral ankle edema, no clubbing or cyanosis to extremities  Right leg with decreased ROM secondary to pain. She is N/V intact distal to injury  Psychiatric: flat affect, deferred  Neurologic: disoriented, strength symmetric in all extremities, Cranial Nerves grossly intact to confrontation, speech clear  Skin: No rashes      Results Reviewed:  I have personally reviewed current lab, radiology, and data and agree.      Results from last 7 days  Lab Units  02/11/18  2324   WBC 10*3/mm3 6.15   HEMOGLOBIN g/dL 11.5   HEMATOCRIT % 33.3*   PLATELETS 10*3/mm3 191       Results from last 7 days  Lab Units 02/11/18  2324   SODIUM mmol/L 131*   POTASSIUM mmol/L 3.6   CHLORIDE mmol/L 96*   CO2 mmol/L 28.0   BUN mg/dL 14   CREATININE mg/dL 0.70   GLUCOSE mg/dL 117*   CALCIUM mg/dL 9.2   ALT (SGPT) U/L 19   AST (SGOT) U/L 21     Estimated Creatinine Clearance: 41.6 mL/min (by C-G formula based on Cr of 0.7).  Brief Urine Lab Results     None        No results found for: BNP  No results found for: PHART  Imaging Results (last 24 hours)     Procedure Component Value Units Date/Time    XR Chest 1 View [[id number]] Collected:  02/11/18 2303     Updated:  02/12/18 0031    Narrative:       EXAM:  XR Chest, 1 View    CLINICAL HISTORY:  89 years old, female; Screening exam; Pre-operative exam; Other: Rt hip;   Additional info: Fall/right hip pain    TECHNIQUE:  Frontal view of the chest.    COMPARISON:  CT - RC CHEST W CONTRAST 2014-11-13 10:46    FINDINGS:  Lungs:  Biapical pleural-parenchymal scarring.  Pleural space:  Normal.  No pneumothorax.  Heart:  Normal.  No cardiomegaly.  Mediastinum:  Normal.  Bones/joints:  Old distal left clavicle fracture.  Vasculature:  Atherosclerotic ossification of the thoracic aorta.      Impression:         1. No acute findings.    2. Non-acute findings are described above.    THIS DOCUMENT HAS BEEN ELECTRONICALLY SIGNED BY STEPHANIE ARREOLA MD    XR Hip With or Without Pelvis 2 - 3 View Right [[id number]] Collected:  02/11/18 2303     Updated:  02/12/18 0032    Narrative:       EXAM:  XR Right Hip With Pelvis When Performed, 2 or 3 Views    CLINICAL HISTORY:  89 years old, female; Pain; Hip pain; Right hip; Additional info: Fall/right   hip pain    TECHNIQUE:  Two or three views of the right hip, with pelvis when performed.    COMPARISON:  CT - RC ABD PELVIS W CONTRAST 2014-11-13 10:46    FINDINGS:  Bones/joints:  Acute, displaced right mid cervical  femoral neck fracture.    Lower lumbar spine degenerative changes.  Mild degenerative changes of the   hips, manifest by joint space narrowing and minimal osteophyte formation.  No   dislocation.  Soft tissues:  Normal.  Vasculature:  Phlebolith within the right pelvis.  Other findings:  2.6 x 1.87 m calcification projecting over the right ilium.      Impression:         1.  Acute, displaced right mid cervical femoral neck fracture.    2.  Incidental/non-acute findings are described above.      THIS DOCUMENT HAS BEEN ELECTRONICALLY SIGNED BY STEPHANIE ARREOLA MD             Assessment/Plan   Assessment / Plan     Hospital Problem List     * (Principal)Closed fracture of neck of right femur, Acute    Hypertension    Generalized weakness    Non Hodgkin's lymphoma    Overview Signed 8/30/2016 12:47 PM by Kyle Garcia              Late onset Alzheimer's disease with behavioral disturbance            Assessment & Plan:  1. Closed fracture of neck of right femur;  - secondary to mechanical fall. Vitals stable.  - patient discussed with Dr. Andersen per ortho in ED. Will see in am  - PT/OT treat and eval. Will likely need placement for rehab  - pain control as needed  - NPO, saline 50 cc/hr x 8 hours  - am labs    2. Hypertension:  - stable and controlled. Continue home meds:    3. Non-hodgkin's lymphoma:  - stable. In remission. Responded well to chemo.  - followed by April Tellez, [facility]    4. Dementia:  - continue home meds  - scan head pending acute change in mental status. Did sustain minor head trauma.   - neuro checks    DVT prophylaxis: mechanical    CODE STATUS:  Conditional, DNR    Admission Status:  I believe this patient meets INPATIENT status due to the need for care which can only be reasonably provided in an hospital setting such as aggressive/expedited ancillary services and/or consultation services, the necessity for IV medications, close physician monitoring and/or the possible need for procedures.  In such, I  feel patient’s risk for adverse outcomes and need for care warrant INPATIENT evaluation and predict the patient’s care encounter to likely last beyond 2 midnights.      Ema Bradford PA-C  02/12/18   5:07 AM      PHYSICIAN NOTE(ADDENDUM)           HPI         Vitals:     02/12/18 0429   BP: 142/88   Pulse: 101   Resp: 16   Temp: 96.1 °F (35.6 °C)   SpO2: 96%      89-year-old female presented to ER with the chief complaint of fall while trying to sit in a chair.  Fell sideways on her right side, started complaining of pain on the right side of the head and he since then.  X-ray revealed, which shows acute displaced right mid cervical femoral neck fracture.      On exam-clear to auscultation anteriorly, S1-S2 regular, no pedal edema, shortened right leg with external rotation.     Old records reviewed and summarized in PM hx.     PM hx  Alzheimer's dementia-follows up with Dr. Martinez.  History of non-Hodgkin's lymphoma-low-grade  Multiple falls-with left.  clavicular fracture last year.   Hypothyroid  Hypertension-on  HCTZ 25 daily, Cozaar 50 daily, Lopressor 25 daily,  Bladder dysfunction  GERD.   A/P  EKG review-sinus rhythm and 86 bpm with T-wave inversion in lateral and inferior leads.  No old EKG.  Troponin is negative     Hip fracture-consult Dr. zimmerman in a.m. IV Dilaudid for pain.     Patient hyponatremic-questionable secondary to HCTZ.    AND status discussed with the family currently she is conditional code     I have independently seen and examined the patient with ARNP and the note above reflects my changes and contributions. I have discussed with findings, diagnosis and plans with the patient and family.      Marques Basilio MD 02/12/18 4:57 AM

## 2018-02-12 NOTE — PROGRESS NOTES
Pt seen and evaluated earlier in the am, family at bedside sleeping.  Patient with significant issue with urinary retention overnight with greater than 1000 returned with catheterization.  Plan is to go to surgery today, upon evaluation patient seems mildly confused.  Doesn't remember falling.  Patient is seen and evaluated by orthopedics and plan for surgery today.  We'll monitor sodium and decreased overnight to 129 elevated CBC to 12 chest x-ray was found to be negative.  We'll continue Parker catheter through surgery to be pulled by surgery.    Sole Heath, DO  2:30 PM  02/12/18

## 2018-02-12 NOTE — ED PROVIDER NOTES
Subjective   HPI Comments: Ms. Silva Ruano is a 89 y.o. female who presents to the ED by EMS post fall at approximately 2220. Family reports pt was sitting in her rocking chair when she said she is having a hard time getting up and when attempting to stand she fell sideways to the right and hit her head on nearby wall. Pt's main point of impact was R side of head and R hip. Family adds it is baseline for pt to have loss of balance and for her to say she cannot get up. At onset she was not in distress. EMS gave pt 50 mcg Fentanyl PTA and reports no LOC. Hx is limited by pt's Alzheimer's Disease, however she denies CP, SoA, N/V/D, and fever.       Patient is a 89 y.o. female presenting with fall.   History provided by:  Relative and patient  History limited by:  Dementia  Fall   Mechanism of injury: fall    Injury location:  Head/neck and leg  Head/neck injury location:  Head  Leg injury location:  R hip  Incident location:  Home  Arrived directly from scene: yes    Fall:     Fall occurred:  Standing    Impact surface:  Wall (Also fell to floor)    Point of impact:  Head (R hip)    Entrapped after fall: no    Protective equipment: none    Suspicion of alcohol use: no    Suspicion of drug use: no    Prior to arrival data:     Patient ambulatory at scene: no      Responsiveness at scene:  Alert    Loss of consciousness: no      Medications administered:  Fentanyl (50 mcg)  Associated symptoms: no loss of consciousness, no nausea and no vomiting        Review of Systems   Unable to perform ROS: Dementia   Constitutional: Negative for fever.   HENT:        Head injury localized to R side   Respiratory: Negative for shortness of breath.    Gastrointestinal: Negative for diarrhea, nausea and vomiting.   Musculoskeletal:        R hip pain   Neurological: Negative for loss of consciousness.        Imbalance       Past Medical History:   Diagnosis Date   • Alzheimer disease    • Arthritis    • Diverticulosis    •  Hypertension    • Pathologic fracture of clavicle, left, initial encounter    • Skin cancer     lymphoma   • Thyroid disease        No Known Allergies    Past Surgical History:   Procedure Laterality Date   • CHOLECYSTECTOMY     • HYSTERECTOMY     • OVARIAN CYST REMOVAL         Family History   Problem Relation Age of Onset   • Heart disease Other    • Hypertension Other    • Cancer Other        Social History     Social History   • Marital status:      Spouse name: N/A   • Number of children: N/A   • Years of education: N/A     Social History Main Topics   • Smoking status: Never Smoker   • Smokeless tobacco: Never Used   • Alcohol use No   • Drug use: No   • Sexual activity: Defer     Other Topics Concern   • None     Social History Narrative         Objective   Physical Exam   Constitutional: She appears well-developed and well-nourished. No distress.   Pt is awake and alert, however she cannot answer orientation questions which is baseline with her dementia.    HENT:   Head: Normocephalic and atraumatic.   Eyes: Conjunctivae are normal.   Neck: Normal range of motion. Neck supple.   Cardiovascular: Normal rate, regular rhythm and normal heart sounds.  Exam reveals no gallop and no friction rub.    No murmur heard.  Pulmonary/Chest: Effort normal and breath sounds normal. She has no wheezes. She has no rales. She exhibits no tenderness.   Lungs are clear to bases bilaterally.    Abdominal: Soft. Bowel sounds are normal. There is no tenderness. There is no rebound and no guarding.   Musculoskeletal: Normal range of motion. She exhibits no tenderness or deformity.   No tenderness to BLE or LLE. Pain on lateral right hip of RLE. No other bony deformity or other area of pain.    Good pulses in BLE.    Neurological: She is alert.   Skin: Skin is warm and dry.   Psychiatric: She has a normal mood and affect. Her behavior is normal.   Nursing note and vitals reviewed.      Procedures         ED Course  ED Course                      MDM  Number of Diagnoses or Management Options  Closed fracture of neck of right femur, initial encounter: new and requires workup  Diagnosis management comments: X-rays of the right hip show a right femoral neck fracture, patient is neurovascularly intact.    I discussed the patient with the orthopedic surgeon, Dr. Andersen, who will evaluate for surgery.    I discussed the patient with the hospitalist, Dr. Basilio, who will admit.       Amount and/or Complexity of Data Reviewed  Clinical lab tests: ordered and reviewed  Tests in the radiology section of CPT®: ordered and reviewed  Obtain history from someone other than the patient: yes  Review and summarize past medical records: yes  Discuss the patient with other providers: yes  Independent visualization of images, tracings, or specimens: yes    Patient Progress  Patient progress: stable      Final diagnoses:   Closed fracture of neck of right femur, initial encounter       Documentation assistance provided by qian Hamilton.  Information recorded by the scribe was done at my direction and has been verified and validated by me.     Maco Hamilton  02/11/18 2335       Marvel Walter MD  02/12/18 0118

## 2018-02-12 NOTE — PLAN OF CARE
Problem: Perioperative Period (Adult)  Goal: Signs and Symptoms of Listed Potential Problems Will be Absent or Manageable (Perioperative Period)  Outcome: Ongoing (interventions implemented as appropriate)   02/12/18 1526   Perioperative Period   Problems Assessed (Perioperative Period) pain   Problems Present (Perioperative Period) none

## 2018-02-13 LAB
ANION GAP SERPL CALCULATED.3IONS-SCNC: 9 MMOL/L (ref 3–11)
BASOPHILS # BLD AUTO: 0 10*3/MM3 (ref 0–0.2)
BASOPHILS NFR BLD AUTO: 0 % (ref 0–1)
BUN BLD-MCNC: 10 MG/DL (ref 9–23)
BUN/CREAT SERPL: 12.5 (ref 7–25)
CALCIUM SPEC-SCNC: 8.3 MG/DL (ref 8.7–10.4)
CHLORIDE SERPL-SCNC: 95 MMOL/L (ref 99–109)
CO2 SERPL-SCNC: 25 MMOL/L (ref 20–31)
CREAT BLD-MCNC: 0.8 MG/DL (ref 0.6–1.3)
DEPRECATED RDW RBC AUTO: 41.2 FL (ref 37–54)
EOSINOPHIL # BLD AUTO: 0 10*3/MM3 (ref 0–0.3)
EOSINOPHIL NFR BLD AUTO: 0 % (ref 0–3)
ERYTHROCYTE [DISTWIDTH] IN BLOOD BY AUTOMATED COUNT: 12.7 % (ref 11.3–14.5)
GFR SERPL CREATININE-BSD FRML MDRD: 67 ML/MIN/1.73
GLUCOSE BLD-MCNC: 119 MG/DL (ref 70–100)
HCT VFR BLD AUTO: 31.4 % (ref 34.5–44)
HGB BLD-MCNC: 10.6 G/DL (ref 11.5–15.5)
IMM GRANULOCYTES # BLD: 0.03 10*3/MM3 (ref 0–0.03)
IMM GRANULOCYTES NFR BLD: 0.3 % (ref 0–0.6)
LYMPHOCYTES # BLD AUTO: 1.36 10*3/MM3 (ref 0.6–4.8)
LYMPHOCYTES NFR BLD AUTO: 11.7 % (ref 24–44)
MCH RBC QN AUTO: 29.6 PG (ref 27–31)
MCHC RBC AUTO-ENTMCNC: 33.8 G/DL (ref 32–36)
MCV RBC AUTO: 87.7 FL (ref 80–99)
MONOCYTES # BLD AUTO: 0.8 10*3/MM3 (ref 0–1)
MONOCYTES NFR BLD AUTO: 6.9 % (ref 0–12)
NEUTROPHILS # BLD AUTO: 9.46 10*3/MM3 (ref 1.5–8.3)
NEUTROPHILS NFR BLD AUTO: 81.1 % (ref 41–71)
OSMOLALITY SERPL: 278 MOSM/KG (ref 275–295)
PLATELET # BLD AUTO: 190 10*3/MM3 (ref 150–450)
PMV BLD AUTO: 9.1 FL (ref 6–12)
POTASSIUM BLD-SCNC: 4.1 MMOL/L (ref 3.5–5.5)
RBC # BLD AUTO: 3.58 10*6/MM3 (ref 3.89–5.14)
SODIUM BLD-SCNC: 129 MMOL/L (ref 132–146)
SODIUM UR-SCNC: 94 MMOL/L (ref 30–90)
WBC NRBC COR # BLD: 11.65 10*3/MM3 (ref 3.5–10.8)

## 2018-02-13 PROCEDURE — 83935 ASSAY OF URINE OSMOLALITY: CPT | Performed by: NURSE PRACTITIONER

## 2018-02-13 PROCEDURE — 85025 COMPLETE CBC W/AUTO DIFF WBC: CPT | Performed by: FAMILY MEDICINE

## 2018-02-13 PROCEDURE — 25010000002 ROPIVACAINE PER 1 MG: Performed by: ANESTHESIOLOGY

## 2018-02-13 PROCEDURE — 97116 GAIT TRAINING THERAPY: CPT

## 2018-02-13 PROCEDURE — 94799 UNLISTED PULMONARY SVC/PX: CPT

## 2018-02-13 PROCEDURE — 97166 OT EVAL MOD COMPLEX 45 MIN: CPT

## 2018-02-13 PROCEDURE — 25010000003 CEFAZOLIN IN DEXTROSE 2-4 GM/100ML-% SOLUTION: Performed by: ORTHOPAEDIC SURGERY

## 2018-02-13 PROCEDURE — 83930 ASSAY OF BLOOD OSMOLALITY: CPT | Performed by: FAMILY MEDICINE

## 2018-02-13 PROCEDURE — 25010000002 ENOXAPARIN PER 10 MG: Performed by: ORTHOPAEDIC SURGERY

## 2018-02-13 PROCEDURE — 84300 ASSAY OF URINE SODIUM: CPT | Performed by: FAMILY MEDICINE

## 2018-02-13 PROCEDURE — 25010000002 HYDROMORPHONE PER 4 MG: Performed by: ORTHOPAEDIC SURGERY

## 2018-02-13 PROCEDURE — 99232 SBSQ HOSP IP/OBS MODERATE 35: CPT | Performed by: FAMILY MEDICINE

## 2018-02-13 PROCEDURE — 80048 BASIC METABOLIC PNL TOTAL CA: CPT | Performed by: FAMILY MEDICINE

## 2018-02-13 PROCEDURE — 97161 PT EVAL LOW COMPLEX 20 MIN: CPT

## 2018-02-13 RX ADMIN — ENOXAPARIN SODIUM 40 MG: 40 INJECTION SUBCUTANEOUS at 15:14

## 2018-02-13 RX ADMIN — HYDROMORPHONE HYDROCHLORIDE 0.5 MG: 10 INJECTION, SOLUTION INTRAMUSCULAR; INTRAVENOUS; SUBCUTANEOUS at 15:17

## 2018-02-13 RX ADMIN — ACETAMINOPHEN 650 MG: 325 TABLET, FILM COATED ORAL at 03:55

## 2018-02-13 RX ADMIN — ROPIVACAINE HYDROCHLORIDE 8 ML/HR: 2 INJECTION, SOLUTION EPIDURAL; INFILTRATION at 18:41

## 2018-02-13 RX ADMIN — HYDROMORPHONE HYDROCHLORIDE 0.5 MG: 10 INJECTION, SOLUTION INTRAMUSCULAR; INTRAVENOUS; SUBCUTANEOUS at 23:33

## 2018-02-13 RX ADMIN — CEFAZOLIN SODIUM 2 G: 2 INJECTION, SOLUTION INTRAVENOUS at 11:56

## 2018-02-13 RX ADMIN — Medication 2 TABLET: at 19:51

## 2018-02-13 RX ADMIN — CEFAZOLIN SODIUM 2 G: 2 INJECTION, SOLUTION INTRAVENOUS at 03:51

## 2018-02-13 RX ADMIN — SODIUM CHLORIDE 100 ML/HR: 9 INJECTION, SOLUTION INTRAVENOUS at 08:52

## 2018-02-13 NOTE — THERAPY EVALUATION
Acute Care - Occupational Therapy Initial Evaluation  [facility]     Patient Name: Silva Ruano  : 1925  MRN: [id number]  Today's Date: 2018  Onset of Illness/Injury or Date of Surgery Date: 18  Date of Referral to OT: 18  Referring Physician: Dr. Andersen    Admit Date: 2018       ICD-10-CM ICD-9-CM   1. Closed fracture of neck of right femur, initial encounter S72.001A 820.8   2. Impaired functional mobility, balance, gait, and endurance Z74.09 V49.89   3. Heart failure, acute systolic, first episode I50.21 428.21   4. Impaired mobility and ADLs Z74.09 799.89     Patient Active Problem List   Diagnosis   • Non Hodgkin's lymphoma   • Subcutaneous nodules   • Late onset Alzheimer's disease with behavioral disturbance   • Hypertension   • Closed fracture of neck of right femur, Acute   • Generalized weakness     Past Medical History:   Diagnosis Date   • Alzheimer disease    • Arthritis    • Closed fracture of neck of right femur, Acute 2018   • Diverticulosis    • Hypertension    • Pathologic fracture of clavicle, left, initial encounter    • Skin cancer     lymphoma   • Thyroid disease      Past Surgical History:   Procedure Laterality Date   • CHOLECYSTECTOMY     • HIP HEMIARTHROPLASTY Right 2018    Procedure: HIP HEMIARTHROPLASTY;  Surgeon: Rohith Andersen MD;  Location: [facility];  Service:    • HYSTERECTOMY     • OVARIAN CYST REMOVAL            OT ASSESSMENT FLOWSHEET (last 72 hours)      OT Evaluation       18 1301 18 1154 18 1151 18 0825 18 0744    Rehab Evaluation    Document Type evaluation  -AC   evaluation  -SC     Subjective Information --   family agreed for pt to participate  -AC   agree to therapy;complains of;pain  -SC     Patient Effort, Rehab Treatment poor   agitated  -AC   good  -SC     Symptoms Noted During/After Treatment    increased pain;other (see comments)   r leg  -SC     General Information    Patient Profile  Review yes  -   yes  -SC     Onset of Illness/Injury or Date of Surgery Date 02/11/18  -   02/11/18  -SC     Referring Physician Dr. Andersen  -   Dr Andersen  -SC     General Observations Pt received up in chair.  Nerve cath intact.  Family present in room  -   In bed with  ABd pillow   -SC     Pertinent History Of Current Problem S/P R hemiarthroplasty.  H/O dementia  -   Hx of dementia. Fell resulting in R hip fx s/p R hemiarthroplasty  -SC     Precautions/Limitations fall precautions;hip precautions- right  -   fall precautions;hip precautions- right  -SC     Prior Level of Function independent:;all household mobility;min assist:;feeding;grooming;max assist:;dressing;bathing  -   independent:;gait   has a walker but does not know how to use it  -SC     Equipment Currently Used at Home cane, straight;walker, rolling   pt did not use, but carried around at times  - walker, rolling;cane, straight   was not using prior to admit  -SK  walker, rolling  -SC     Plans/Goals Discussed With family;agreed upon  -   patient and family;agreed upon  -SC     Risks Reviewed family:;LOB;increased discomfort  -   patient and family:;increased discomfort;change in vital signs  -SC     Benefits Reviewed family:;improve function;increase independence;increase strength;increase balance;increase knowledge  -   patient and family:;improve function;increase independence;increase strength  -SC     Barriers to Rehab previous functional deficit  -   previous functional deficit;cognitive status  -SC     Living Environment    Lives With sibling(s)   sister  - sibling(s)  -SK  sibling(s)  -SC     Living Arrangements [place]  - house  -SK  house  -SC     Home Accessibility tub/shower is not walk in   tub shower  - no concerns  -SK  no concerns  -SC     Number of Stairs to Enter Home 1  -AC 1  -SK       Stair Railings at Home  none  -SK       Type of Financial/Environmental Concern  none  -SK       Transportation Available   car;family or friend will provide  -SK       Clinical Impression    Date of Referral to OT 02/12/18  -        OT Diagnosis ADL decline  -        Patient/Family Goals Statement return to [facility] with self care and mobility  -        Criteria for Skilled Therapeutic Interventions Met yes;treatment indicated  -        Rehab Potential fair, will monitor progress closely   d/t to cognitive status  -        Therapy Frequency daily  -AC        Anticipated Discharge Disposition skilled nursing facility  -        Functional Level Prior    Ambulation   0-->independent  -SK      Transferring   0-->independent  -SK      Toileting   0-->independent  -SK      Bathing   2-->assistive person  -SK      Dressing   2-->assistive person  -SK      Eating   0-->independent  -SK      Communication   2-->difficulty understanding and speaking (not related to language barrier)  -SK      Swallowing   0-->swallows foods/liquids without difficulty  -SK      Pain Assessment    Pain Assessment Suarez-Yung FACES  -AC   Suarez-Yung FACES  -SC     Suarez-Yung FACES Pain Rating 4  -AC   0  -SC     Post Pain Score 4  -   3  -SC     Pain Type Acute pain  -AC   Acute pain  -SC     Pain Location Hip  -AC   Hip  -SC     Pain Orientation Right  -AC   Right  -SC     Pain Descriptors    Aching  -SC     Pain Intervention(s) Repositioned  -AC   Repositioned  -SC     Response to Interventions    improved  -SC     Vision Assessment/Intervention    Visual Impairment unable/difficult to assess  -        Cognitive Assessment/Intervention    Current Cognitive/Communication Assessment impaired  -   functional  -SC     Orientation Status --   responded to name  -AC   oriented to;person;other (see comments)   first name only  -SC     Follows Commands/Answers Questions needs cueing;needs repetition   [id number]  -   able to follow single-step instructions;needs cueing;needs increased time;needs repetition;25% of the time  -SC     Personal Safety severe  impairment  -AC   severe impairment;unaware of consequences of deficits;unaware of cognitive deficits;unaware of functional deficits  -SC     Personal Safety Interventions fall prevention program maintained;gait belt;nonskid shoes/slippers when out of bed  -   fall prevention program maintained;gait belt  -SC     ROM (Range of Motion)    General ROM --   PROM BUE WNL; pt not assisting  -   no range of motion deficits identified  -SC     MMT (Manual Muscle Testing)    General MMT Assessment upper extremity strength deficits identified   pt with good  and able to hold L arm up against gravity  -AC   lower extremity strength deficits identified  -SC     Muscle Tone Assessment    Bilateral Upper Extremities Muscle Tone Assessment     associated movements noted  -NT    Bilateral Lower Extremities Muscle Tone Assessment     moderately decreased tone  -NT    Bed Mobility, Assessment/Treatment    Bed Mobility, Assistive Device    bed rails;head of bed elevated  -SC     Bed Mobility, Roll Right, [place] verbal cues required;maximum assist (25% patient effort)  -        Bed Mobility, Scoot/Bridge, [place]    moderate assist (50% patient effort);2 person assist required  -SC     Bed Mob, Supine to Sit, [place]    maximum assist (25% patient effort);verbal cues required  -SC     Bed Mobility, Impairments pain;motor control impaired  -   pain;motor control impaired;impaired balance;strength decreased  -SC     Bed Mobility, Comment    up to edge of bed with assist at legs and cues for using bed rail. Difficulty scooting forwared to edge of  bed   -SC     Transfer Assessment/Treatment    Transfers, Bed-Chair [place] dependent (less than 25% patient effort)  -        Transfers, Bed-Chair-Bed, Assist Device mechanical lift  -AC        Transfers, Sit-Stand [place]    2 person assist required;moderate assist (50% patient effort);verbal cues required  -SC     Transfers, Stand-Sit [place]    2  person assist required;moderate assist (50% patient effort);verbal cues required  -SC     Transfers, Sit-Stand-Sit, Assist Device    rolling walker  -SC     Transfer, Safety Issues    sequencing ability decreased  -SC     Transfer, Impairments    impaired balance;strength decreased;motor control impaired  -SC     Transfer, Comment attempted to stand, but pt agitated and unsafe to attempt  -AC   stood from edg of bed with hand held assist  x2 . Had to place hands on walker as patient unable to sequence standing with walker/   -SC     Functional Mobility    Functional Mobility- Ind. Level unable to perform  -        Therapy Exercises    Exercise Protocols    total hip  -SC     Total Hip Exercises    10 reps;with assist;heel slides;hip abduction;LAQ;SLR;5 reps  -SC     General Therapy Interventions    Planned Therapy Interventions ADL retraining;adaptive equipment training;balance training;bed mobility training;strengthening;transfer training  -        Positioning and Restraints    Pre-Treatment Position sitting in chair/recliner  -   in bed  -SC     Post Treatment Position bed  -   chair  -SC     In Bed supine;call light within reach;encouraged to call for assist;exit alarm on;pillow between legs;with family/caregiver  -        In Chair    sitting;reclined;notified nsg;call light within reach;encouraged to call for assist;exit alarm on;with family/caregiver  -SC     Lower Extremity    Lower Ext Manual Muscle Testing    right hip strength deficit  -SC     Lower Ext Manual Muscle Testing Detail    hip flexion -3/5, R quads 3+/5, Tib ant 3/5  -SC       02/12/18 2030 02/12/18 0800 02/12/18 0722 02/12/18 0545 02/12/18 0408    Rehab Evaluation    Evaluation Not Performed   unable to evaluate, medical status change  -      Muscle Tone Assessment    Muscle Tone Assessment Bilateral Upper Extremities;Bilateral Lower Extremities  -JS   Bilateral Upper Extremities;Bilateral Lower Extremities  -KG Bilateral Upper  Extremities;Bilateral Lower Extremities  -KG    Bilateral Upper Extremities Muscle Tone Assessment mildly decreased tone  -JS mildly decreased tone  -NT       Bilateral Lower Extremities Muscle Tone Assessment moderately decreased tone  -JS moderately decreased tone  -NT         02/12/18 0154 02/12/18 0106             General Information    Equipment Currently Used at Home  none  -KG       Living Environment    Lives With  sibling(s)  -KG       Living Arrangements  house  -KG       Home Accessibility  no concerns  -KG       Stair Railings at Home  none  -KG       Type of Financial/Environmental Concern  none  -KG       Transportation Available  car;family or friend will provide  -KG       Functional Level Prior    Ambulation  0-->independent  -KG       Transferring  0-->independent  -KG       Toileting  0-->independent  -KG       Bathing  0-->independent  -KG       Dressing  0-->independent  -KG       Eating  0-->independent  -KG       Communication  2-->difficulty understanding (not related to language barrier)  -KG       Swallowing  0-->swallows foods/liquids without difficulty  -KG       Prior Functional Level Comment  na  -KG       Muscle Tone Assessment    Muscle Tone Assessment Bilateral Upper Extremities;Bilateral Lower Extremities  -KG        Bilateral Upper Extremities Muscle Tone Assessment mildly decreased tone  -KG        Bilateral Lower Extremities Muscle Tone Assessment mildly decreased tone  -KG          User Key  (r) = Recorded By, (t) = Taken By, (c) = Cosigned By    Initials Name Effective Dates    SC Glory Almaguer, PT 06/19/15 -     AC Lesley Mosher, OT 06/23/15 -     SK Sonja C Kellerman, CRUZITO 03/14/16 -     KG Damian Lunsford RN 06/16/16 -     YURI Gr, RN 08/09/16 -     NT Judie Cassidy, CRUZITO 03/31/17 -            Occupational Therapy Education     Title: PT OT SLP Therapies (Active)     Topic: Occupational Therapy (Active)     Point: ADL training (Done)    Description: Instruct  learner(s) on proper safety adaptation and remediation techniques during self care or transfers.   Instruct in proper use of assistive devices.    Learning Progress Summary    Learner Readiness Method Response Comment Documented by Status   Family Acceptance E VU Role of OT, benefits of activity, hip precautions  02/13/18 1350 Done                      User Key     Initials Effective Dates Name Provider Type Discipline     06/23/15 -  Lesley Mosher OT Occupational Therapist OT                  OT Recommendation and Plan  Anticipated Discharge Disposition: skilled nursing facility  Planned Therapy Interventions: ADL retraining, adaptive equipment training, balance training, bed mobility training, strengthening, transfer training  Therapy Frequency: daily  Plan of Care Review  Plan Of Care Reviewed With: family  Progress:  (Evaluation)  Outcome Summary/Follow up Plan: OT eval complete.  Attempted sit to stand, but agitated and unsafe to attempt transfer.  Pt dependent mechanical lift to bed, max A to roll right to remove sling.  Pt tolerated PROM BUE to assess UE range.  Pt following less than 10 % commands.  Pt limited by cognition and pain.  OT will follow to advance pt toward increased independence and  safety with self care and mobility.  Recommend SNF for rehab upon d/c.           OT Goals       02/13/18 1353          Bed Mobility OT LTG    Bed Mobility OT LTG, Date Established 02/13/18  -      Bed Mobility OT LTG, Time to Achieve by discharge  -AC      Bed Mobility OT LTG, Activity Type roll left/roll right;supine to sit/sit to supine  -AC      Bed Mobility OT LTG, [place] Level moderate assist (50% patient effort)  -      Bed Mobility OT LTG, Assist Device bed rails  -AC      Transfer Training OT LTG    Transfer Training OT LTG, Date Established 02/13/18  -      Transfer Training OT LTG, Time to Achieve by discharge  -AC      Transfer Training OT LTG, Activity Type sit to stand/stand to sit;bed  to chair /chair to bed  -AC      Transfer Training OT LTG, [place] Level moderate assist (50% patient effort)  -AC      Transfer Training OT LTG, Assist Device --   appropriate AD  -AC      Strength OT LTG    Strength Goal OT LTG, Date Established 02/13/18  -AC      Strength Goal OT LTG, Time to Achieve by discharge  -AC      Strength Goal OT LTG, Measure to Achieve Pt will complete BUE AROM/AAROM 10-15 reps daily as needed to increase strength to support ADLs  -AC      Dynamic Sitting Balance OT LTG    Dynamic Sitting Balance OT LTG, Date Established 02/13/18  -AC      Dynamic Sitting Balance OT LTG, Time to Achieve by discharge  -AC      Dynamic Sitting Balance OT LTG, [place] Level minimum assist (75% patient effort)  -AC      Dynamic Sitting Balance OT LTG, Assist Device UE Support  -AC      Dynamic Sitting Balance OT LTG, Additional Goal Pt will sit EOB with min A to complete grooming activities  -AC        User Key  (r) = Recorded By, (t) = Taken By, (c) = Cosigned By    Initials Name Provider Type    AC Lesley Mosher OT Occupational Therapist                Outcome Measures       02/13/18 1301 02/13/18 0825       How much help from another person do you currently need...    Turning from your back to your side while in flat bed without using bedrails?  2  -SC     Moving from lying on back to sitting on the side of a flat bed without bedrails?  2  -SC     Moving to and from a bed to a chair (including a wheelchair)?  2  -SC     Standing up from a chair using your arms (e.g., wheelchair, bedside chair)?  2  -SC     Climbing 3-5 steps with a railing?  1  -SC     To walk in hospital room?  2  -SC     AM-PAC 6 Clicks Score  11  -SC     How much help from another is currently needed...    Putting on and taking off regular lower body clothing? 1  -AC      Bathing (including washing, rinsing, and drying) 1  -AC      Toileting (which includes using toilet bed pan or urinal) 1  -AC      Putting on and taking  off regular upper body clothing 2  -AC      Taking care of personal grooming (such as brushing teeth) 2  -AC      Eating meals 2  -AC      Score 9  -AC      Functional Assessment    Outcome Measure Options AM-PAC 6 Clicks Daily Activity (OT)  -AC AM-PAC 6 Clicks Basic Mobility (PT)  -SC       User Key  (r) = Recorded By, (t) = Taken By, (c) = Cosigned By    Initials Name Provider Type    SC Glory Almaguer, PT Physical Therapist    AC Lesley Mosher, OT Occupational Therapist          Time Calculation:   OT Start Time: 1301    Therapy Charges for Today     Code Description Service Date Service Provider Modifiers Qty    [id number]  OT EVAL MOD COMPLEXITY 4 2/13/2018 Lesley Mosher OT GO 1               Lesley Mosher OT  2/13/2018

## 2018-02-13 NOTE — PROGRESS NOTES
Discharge Planning Assessment  [facility]     Patient Name: Silva Ruano  MRN: [id number]  Today's Date: 2/13/2018    Admit Date: 2/11/2018          Discharge Needs Assessment       02/13/18 1154    Living Environment    Lives With sibling(s)    Living Arrangements house    Home Accessibility no concerns    Number of Stairs to Enter Home 1    Stair Railings at Home none    Type of Financial/Environmental Concern none    Transportation Available car;family or friend will provide    Living Environment    Provides Primary Care For no one    Primary Care Provided By other (see comments)   sister is primary caregiver    Quality Of Family Relationships supportive;helpful;involved    Able to Return to Prior Living Arrangements no    Discharge Needs Assessment    Concerns To Be Addressed basic needs concerns;discharge planning concerns    Readmission Within The Last 30 Days no previous admission in last 30 days    Equipment Currently Used at Home walker, rolling;cane, straight   was not using prior to admit    Equipment Needed After Discharge none    Discharge Facility/Level Of Care Needs nursing facility, skilled    Discharge Disposition skilled nursing facility    Discharge Contact Information if Applicable Shana Ambriz ( daughter & POA) [phone]            Discharge Plan       02/13/18 1156    Case Management/Social Work Plan    Plan SNF    Patient/Family In Agreement With Plan yes    Additional Comments I met with ms Ruano and anaya at bedside ( Shana Ambriz [phone]). Ms Ruano live with her sister. Her sister is primary caregiver. Daughter states prior to admit she was independently ambulatory throughout her home, did not use a walker. Sister would assist with all other ADL's..PT evalation noted, she was able to participate with PT. D/C options presented to daughter. They would like short term rehab with plans to take her back home after. Referral made to [facility], I spoke with Makayla who  accepted referral. Case mgt will follow progress and assist with final d/c plan.        Discharge Placement     No information found        Expected Discharge Date and Time     Expected Discharge Date Expected Discharge Time    Feb 15, 2018               Demographic Summary       02/13/18 1151    Referral Information    Admission Type inpatient    Arrived From home or self-care    Referral Source physician    Reason For Consult discharge planning    Contact Information    Permission Granted to Share Information With ;family/designee    Primary Care Physician Information    Name Yasmine Cortes            Functional Status       02/13/18 1151    Functional Status Prior    Ambulation 0-->independent    Transferring 0-->independent    Toileting 0-->independent    Bathing 2-->assistive person    Dressing 2-->assistive person    Eating 0-->independent    Communication 2-->difficulty understanding and speaking (not related to language barrier)    Swallowing 0-->swallows foods/liquids without difficulty    IADL    Medications assistive person    Meal Preparation assistive person    Housekeeping assistive person    Laundry assistive person    Shopping assistive person    Oral Care assistive person    Activity Tolerance    Usual Activity Tolerance fair    Cognitive/Perceptual/Developmental    Current Mental Status/Cognitive Functioning other (see comments)   hx of Alzhiemers    Recent Changes in Mental Status/Cognitive Functioning unable to assess    Employment/Financial    Source Of Income social security    Employment/Finance Comments Medicare & [employer]            Psychosocial     None            Abuse/Neglect     None            Legal     None            Substance Abuse     None            Patient Forms     None          Sonja C Kellerman, RN

## 2018-02-13 NOTE — PLAN OF CARE
Problem: Pressure Ulcer Risk (Zay Scale) (Adult,Obstetrics,Pediatric)  Goal: Identify Related Risk Factors and Signs and Symptoms  Outcome: Ongoing (interventions implemented as appropriate)

## 2018-02-13 NOTE — THERAPY EVALUATION
Acute Care - Physical Therapy Initial Evaluation  [facility]     Patient Name: Silva Ruano  : 1925  MRN: [id number]  Today's Date: 2018   Onset of Illness/Injury or Date of Surgery Date: 18  Date of Referral to PT: 18  Referring Physician: Dr Andersen      Admit Date: 2018     Visit Dx:    ICD-10-CM ICD-9-CM   1. Closed fracture of neck of right femur, initial encounter S72.001A 820.8   2. Impaired functional mobility, balance, gait, and endurance Z74.09 V49.89     Patient Active Problem List   Diagnosis   • Non Hodgkin's lymphoma   • Subcutaneous nodules   • Late onset Alzheimer's disease with behavioral disturbance   • Hypertension   • Closed fracture of neck of right femur, Acute   • Generalized weakness     Past Medical History:   Diagnosis Date   • Alzheimer disease    • Arthritis    • Closed fracture of neck of right femur, Acute 2018   • Diverticulosis    • Hypertension    • Pathologic fracture of clavicle, left, initial encounter    • Skin cancer     lymphoma   • Thyroid disease      Past Surgical History:   Procedure Laterality Date   • CHOLECYSTECTOMY     • HIP HEMIARTHROPLASTY Right 2018    Procedure: HIP HEMIARTHROPLASTY;  Surgeon: Rohith Andersen MD;  Location: [facility];  Service:    • HYSTERECTOMY     • OVARIAN CYST REMOVAL            PT ASSESSMENT (last 72 hours)      PT Evaluation       18 0825 18 0744    Rehab Evaluation    Document Type evaluation  -SC     Subjective Information agree to therapy;complains of;pain  -SC     Patient Effort, Rehab Treatment good  -SC     Symptoms Noted During/After Treatment increased pain;other (see comments)   r leg  -SC     General Information    Patient Profile Review yes  -SC     Onset of Illness/Injury or Date of Surgery Date 18  -SC     Referring Physician Dr Andersen  -SC     General Observations In bed with  ABd pillow   -SC     Pertinent History Of Current Problem Hx of dementia. Fell resulting in  R hip fx s/p R hemiarthroplasty  -SC     Precautions/Limitations fall precautions;hip precautions- right  -SC     Prior Level of Function independent:;gait   has a walker but does not know how to use it  -SC     Equipment Currently Used at Home walker, rolling  -SC     Plans/Goals Discussed With patient and family;agreed upon  -SC     Risks Reviewed patient and family:;increased discomfort;change in vital signs  -SC     Benefits Reviewed patient and family:;improve function;increase independence;increase strength  -SC     Barriers to Rehab previous functional deficit;cognitive status  -SC     Living Environment    Lives With sibling(s)  -SC     Living Arrangements house  -SC     Home Accessibility no concerns  -SC     Clinical Impression    Date of Referral to PT 02/13/18  -SC     PT Diagnosis impaired mobility  -SC     Patient/Family Goals Statement to get out of bed  -SC     Criteria for Skilled Therapeutic Interventions Met yes;treatment indicated  -SC     Pathology/Pathophysiology Noted (Describe Specifically for Each System) musculoskeletal  -SC     Impairments Found (describe specific impairments) arousal, attention, and cognition;gait, locomotion, and balance;motor function;muscle performance  -SC     Rehab Potential good, to achieve stated therapy goals  -SC     Pain Assessment    Pain Assessment Suarez-Yung FACES  -SC     Suarez-Yung FACES Pain Rating 0  -SC     Post Pain Score 3  -SC     Pain Type Acute pain  -SC     Pain Location Hip  -SC     Pain Orientation Right  -SC     Pain Descriptors Aching  -SC     Pain Intervention(s) Repositioned  -SC     Response to Interventions improved  -SC     Cognitive Assessment/Intervention    Current Cognitive/Communication Assessment functional  -SC     Orientation Status oriented to;person;other (see comments)   first name only  -SC     Follows Commands/Answers Questions able to follow single-step instructions;needs cueing;needs increased time;needs repetition;25% of  the time  -SC     Personal Safety severe impairment;unaware of consequences of deficits;unaware of cognitive deficits;unaware of functional deficits  -SC     Personal Safety Interventions fall prevention program maintained;gait belt  -SC     ROM (Range of Motion)    General ROM no range of motion deficits identified  -SC     MMT (Manual Muscle Testing)    General MMT Assessment lower extremity strength deficits identified  -SC     Lower Extremity    Lower Ext Manual Muscle Testing right hip strength deficit  -SC     Lower Ext Manual Muscle Testing Detail hip flexion -3/5, R quads 3+/5, Tib ant 3/5  -SC     Muscle Tone Assessment    Bilateral Upper Extremities Muscle Tone Assessment  associated movements noted  -NT    Bilateral Lower Extremities Muscle Tone Assessment  moderately decreased tone  -NT    Bed Mobility, Assessment/Treatment    Bed Mobility, Assistive Device bed rails;head of bed elevated  -SC     Bed Mobility, Scoot/Bridge, [place] moderate assist (50% patient effort);2 person assist required  -SC     Bed Mob, Supine to Sit, [place] maximum assist (25% patient effort);verbal cues required  -SC     Bed Mobility, Impairments pain;motor control impaired;impaired balance;strength decreased  -SC     Bed Mobility, Comment up to edge of bed with assist at legs and cues for using bed rail. Difficulty scooting forwared to edge of  bed   -SC     Transfer Assessment/Treatment    Transfers, Sit-Stand [place] 2 person assist required;moderate assist (50% patient effort);verbal cues required  -SC     Transfers, Stand-Sit [place] 2 person assist required;moderate assist (50% patient effort);verbal cues required  -SC     Transfers, Sit-Stand-Sit, Assist Device rolling walker  -SC     Transfer, Safety Issues sequencing ability decreased  -SC     Transfer, Impairments impaired balance;strength decreased;motor control impaired  -SC     Transfer, Comment stood from edg of bed with hand held assist  x2 .  Had to place hands on walker as patient unable to sequence standing with walker/   -SC     Gait Assessment/Treatment    Gait, [place] Level moderate assist (50% patient effort);2 person assist required  -SC     Gait, Assistive Device rolling walker  -SC     Gait, Distance (Feet) 4  -SC     Gait, Gait Pattern Analysis swing-to gait  -SC     Gait, Gait Deviations antalgic;narrow base;weight-shifting ability decreased;step length decreased  -SC     Gait, Safety Issues balance decreased during turns;sequencing ability decreased;weight-shifting ability decreased  -SC     Gait, Impairments pain;motor control impaired  -SC     Gait, Comment took a few steps over to chair with PT controling walker. Patient unable to sequence using walker. No buckling noted dispite nerve block intact. (no brace used)  -SC     Therapy Exercises    Exercise Protocols total hip  -SC     Total Hip Exercises 10 reps;with assist;heel slides;hip abduction;LAQ;SLR;5 reps  -SC     Positioning and Restraints    Pre-Treatment Position in bed  -SC     Post Treatment Position chair  -SC     In Chair sitting;reclined;notified nsg;call light within reach;encouraged to call for assist;exit alarm on;with family/caregiver  -SC       02/12/18 2030 02/12/18 0800    Muscle Tone Assessment    Muscle Tone Assessment Bilateral Upper Extremities;Bilateral Lower Extremities  -JS     Bilateral Upper Extremities Muscle Tone Assessment mildly decreased tone  -JS mildly decreased tone  -NT    Bilateral Lower Extremities Muscle Tone Assessment moderately decreased tone  -JS moderately decreased tone  -NT      02/12/18 0722 02/12/18 0545    Rehab Evaluation    Evaluation Not Performed unable to evaluate, medical status change  -AC     Muscle Tone Assessment    Muscle Tone Assessment  Bilateral Upper Extremities;Bilateral Lower Extremities  -KG      02/12/18 0408 02/12/18 0154    Muscle Tone Assessment    Muscle Tone Assessment Bilateral Upper Extremities;Bilateral  Lower Extremities  -KG Bilateral Upper Extremities;Bilateral Lower Extremities  -KG    Bilateral Upper Extremities Muscle Tone Assessment  mildly decreased tone  -KG    Bilateral Lower Extremities Muscle Tone Assessment  mildly decreased tone  -KG      02/12/18 0106       General Information    Equipment Currently Used at Home none  -KG     Living Environment    Lives With sibling(s)  -KG     Living Arrangements house  -KG     Home Accessibility no concerns  -KG     Stair Railings at Home none  -KG     Type of Financial/Environmental Concern none  -KG     Transportation Available car;family or friend will provide  -KG       User Key  (r) = Recorded By, (t) = Taken By, (c) = Cosigned By    Initials Name Provider Type    SC Glory Almaguer, PT Physical Therapist    AC Lesley Mosher, OT Occupational Therapist    KG Damian Lunsford, RN Registered Nurse    YURI Gr, RN Registered Nurse    XIANG Cassidy, RN Registered Nurse          Physical Therapy Education     Title: PT OT SLP Therapies (Active)     Topic: Physical Therapy (Active)     Point: Mobility training (Active)    Learning Progress Summary    Learner Readiness Method Response Comment Documented by Status   Patient Acceptance E,D NR reviewed benefits of activity SC 02/13/18 1029 Active               Point: Home exercise program (Active)    Learning Progress Summary    Learner Readiness Method Response Comment Documented by Status   Patient Acceptance E,D NR reviewed benefits of activity SC 02/13/18 1029 Active               Point: Body mechanics (Active)    Learning Progress Summary    Learner Readiness Method Response Comment Documented by Status   Patient Acceptance E,D NR reviewed benefits of activity SC 02/13/18 1029 Active               Point: Precautions (Active)    Learning Progress Summary    Learner Readiness Method Response Comment Documented by Status   Patient Acceptance E,D NR reviewed benefits of activity SC 02/13/18 1029 Active                       User Key     Initials Effective Dates Name Provider Type Discipline    SC 06/19/15 -  Glory Almaguer, PT Physical Therapist PT                PT Recommendation and Plan  Anticipated Discharge Disposition: skilled nursing facility  Planned Therapy Interventions: bed mobility training, gait training, home exercise program, patient/family education, strengthening, transfer training  PT Frequency: daily  Plan of Care Review  Plan Of Care Reviewed With: patient  Progress: progress toward functional goals is gradual  Outcome Summary/Follow up Plan: Patient ablet to get out of bed and take a few steps with walker over to chair. Dispite her confusion she is partcipating well with PT.  Recommend skilled PT at  D/C           IP PT Goals       02/13/18 1030          Bed Mobility PT LTG    Bed Mobility PT LTG, Date Established 02/13/18  -SC      Bed Mobility PT LTG, Time to Achieve 5 - 7 days  -SC      Bed Mobility PT LTG, Activity Type supine to sit/sit to supine  -SC      Bed Mobility PT LTG, [place] Level minimum assist (75% patient effort)  -SC      Bed Mobility PT Goal  LTG, Assist Device bed rails  -SC      Bed Mobility PT LTG, Outcome goal ongoing  -SC      Transfer Training PT LTG    Transfer Training PT LTG, Date Established 02/13/18  -SC      Transfer Training PT LTG, Time to Achieve 5 - 7 days  -SC      Transfer Training PT LTG, Activity Type sit to stand/stand to sit  -SC      Transfer Training PT LTG, [place] Level contact guard assist;1 person + 1 person to manage equipment  -SC      Transfer Training PT LTG, Assist Device walker, rolling  -SC      Transfer Training PT LTG, Outcome goal ongoing  -SC      Transfer Training 2 PT LTG    Transfer Training PT 2 LTG, Date Established 02/13/18  -SC      Transfer Training PT 2 LTG, Time to Achieve 5 - 7 days  -SC      Transfer Training PT 2 LTG, Activity Type bed to chair /chair to bed  -SC      Transfer Training PT 2 LTG, [place] Level  moderate assist (50% patient effort)  -SC      Transfer Training PT 2 LTG, Assist Device walker, rolling  -SC      Transfer Training PT 2 LTG, Outcome goal ongoing  -SC      Gait Training PT LTG    Gait Training Goal PT LTG, Date Established 02/13/18  -SC      Gait Training Goal PT LTG, Time to Achieve 5 - 7 days  -SC      Gait Training Goal PT LTG, [place] Level moderate assist (50% patient effort);2 person assist required  -SC      Gait Training Goal PT LTG, Assist Device walker, rolling  -SC      Gait Training Goal PT LTG, Distance to Achieve 50  -SC      Gait Training Goal PT LTG, Outcome goal ongoing  -SC        User Key  (r) = Recorded By, (t) = Taken By, (c) = Cosigned By    Initials Name Provider Type    BRANDON Almaguer, PT Physical Therapist                Outcome Measures       02/13/18 0825          How much help from another person do you currently need...    Turning from your back to your side while in flat bed without using bedrails? 2  -SC      Moving from lying on back to sitting on the side of a flat bed without bedrails? 2  -SC      Moving to and from a bed to a chair (including a wheelchair)? 2  -SC      Standing up from a chair using your arms (e.g., wheelchair, bedside chair)? 2  -SC      Climbing 3-5 steps with a railing? 1  -SC      To walk in hospital room? 2  -SC      AM-PAC 6 Clicks Score 11  -SC      Functional Assessment    Outcome Measure Options AM-PAC 6 Clicks Basic Mobility (PT)  -SC        User Key  (r) = Recorded By, (t) = Taken By, (c) = Cosigned By    Initials Name Provider Type    BRANDON Almaguer PT Physical Therapist           Time Calculation:         PT Charges       02/13/18 1035          Time Calculation    Start Time 0825  -SC      PT Received On 02/13/18  -SC      PT Goal Re-Cert Due Date 02/23/18  -SC      Time Calculation- PT    Total Timed Code Minutes- PT 15 minute(s)  -SC        User Key  (r) = Recorded By, (t) = Taken By, (c) = Cosigned By    Initials Name  Provider Type    SC Glory Almaguer, PT Physical Therapist          Therapy Charges for Today     Code Description Service Date Service Provider Modifiers Qty    81440253039 HC PT EVAL LOW COMPLEXITY 4 2/13/2018 Glory Almaguer, PT GP 1    08458550293 HC GAIT TRAINING EA 15 MIN 2/13/2018 Glory Almaguer, PT GP 1    57101914866 HC PT THER SUPP EA 15 MIN 2/13/2018 Glory Almaguer, PT GP 2          PT G-Codes  Outcome Measure Options: AM-PAC 6 Clicks Basic Mobility (PT)      Glory Almaguer, PT  2/13/2018

## 2018-02-13 NOTE — PLAN OF CARE
Problem: Patient Care Overview (Adult)  Goal: Plan of Care Review  Outcome: Ongoing (interventions implemented as appropriate)   02/13/18 1030   Coping/Psychosocial Response Interventions   Plan Of Care Reviewed With patient   Patient Care Overview   Progress progress toward functional goals is gradual   Outcome Evaluation   Outcome Summary/Follow up Plan Patient able to get out of bed and take a few steps with walker over to chair. Despite her confusion she is participating well with PT. Recommend skilled PT at D/C        Problem: Perioperative Period (Adult)  Goal: Signs and Symptoms of Listed Potential Problems Will be Absent or Manageable (Perioperative Period)  Outcome: Ongoing (interventions implemented as appropriate)   02/13/18 1030   Perioperative Period   Problems Assessed (Perioperative Period) pain   Problems Present (Perioperative Period) pain       Problem: Inpatient Physical Therapy  Goal: Bed Mobility Goal LTG- PT  Outcome: Ongoing (interventions implemented as appropriate)   02/13/18 1030   Bed Mobility PT LTG   Bed Mobility PT LTG, Date Established 02/13/18   Bed Mobility PT LTG, Time to Achieve 5 - 7 days   Bed Mobility PT LTG, Activity Type supine to sit/sit to supine   Bed Mobility PT LTG, [place] Level minimum assist (75% patient effort)   Bed Mobility PT Goal LTG, Assist Device bed rails   Bed Mobility PT LTG, Outcome goal ongoing     Goal: Transfer Training Goal 1 LTG- PT  Outcome: Ongoing (interventions implemented as appropriate)   02/13/18 1030   Transfer Training PT LTG   Transfer Training PT LTG, Date Established 02/13/18   Transfer Training PT LTG, Time to Achieve 5 - 7 days   Transfer Training PT LTG, Activity Type sit to stand/stand to sit   Transfer Training PT LTG, [place] Level contact guard assist;1 person + 1 person to manage equipment   Transfer Training PT LTG, Assist Device walker, rolling   Transfer Training PT LTG, Outcome goal ongoing     Goal: Transfer Training  Goal 2 LTG- PT  Outcome: Ongoing (interventions implemented as appropriate)   02/13/18 1030   Transfer Training 2 PT LTG   Transfer Training PT 2 LTG, Date Established 02/13/18   Transfer Training PT 2 LTG, Time to Achieve 5 - 7 days   Transfer Training PT 2 LTG, Activity Type bed to chair /chair to bed   Transfer Training PT 2 LTG, [place] Level moderate assist (50% patient effort)   Transfer Training PT 2 LTG, Assist Device walker, rolling   Transfer Training PT 2 LTG, Outcome goal ongoing     Goal: Gait Training Goal LTG- PT  Outcome: Ongoing (interventions implemented as appropriate)   02/13/18 1030   Gait Training PT LTG   Gait Training Goal PT LTG, Date Established 02/13/18   Gait Training Goal PT LTG, Time to Achieve 5 - 7 days   Gait Training Goal PT LTG, [place] Level moderate assist (50% patient effort);2 person assist required   Gait Training Goal PT LTG, Assist Device walker, rolling   Gait Training Goal PT LTG, Distance to Achieve 50   Gait Training Goal PT LTG, Outcome goal ongoing

## 2018-02-13 NOTE — PROGRESS NOTES
[facility]    Acute pain service Inpatient Progress Note    Patient Name: Silva Ruano  :  1925  MRN:  [id number]        Acute Pain  Service Inpatient Progress Note:    Analgesia:Excellent  Pain Score:0/10  LOC: alert and awake  Cardiac: VS stable  Side Effects:None  Catheter Site:clean, dressing intact and dry  Cath type: peripheral nerve cath(MOOG pump)  Infusion rate: 8ml/hr  Catheter Plan:Catheter to remain Insitu and Continue catheter infusion rate unchanged

## 2018-02-13 NOTE — PROGRESS NOTES
"/62 (BP Location: Right arm, Patient Position: Lying)  Pulse 81  Temp 98.5 °F (36.9 °C) (Oral)   Resp 16  Ht 157.5 cm (62.01\")  Wt 71.7 kg (158 lb)  SpO2 96%  BMI 28.89 kg/m2    Lab Results (last 24 hours)     Procedure Component Value Units Date/Time    Basic Metabolic Panel [[id number]]  (Abnormal) Collected:  02/12/18 0535    Specimen:  Blood Updated:  02/12/18 0755     Glucose 123 (H) mg/dL      BUN 12 mg/dL      Creatinine 0.70 mg/dL      Sodium 129 (L) mmol/L      Potassium 3.7 mmol/L      Chloride 92 (L) mmol/L      CO2 24.0 mmol/L      Calcium 9.5 mg/dL      eGFR Non African Amer 78 mL/min/1.73      BUN/Creatinine Ratio 17.1     Anion Gap 13.0 (H) mmol/L     Narrative:       National Kidney Foundation Guidelines    Stage     Description        GFR  1         Normal or High     90+  2         Mild decrease      60-89  3         Moderate decrease  30-59  4         Severe decrease    15-29  5         Kidney failure     <15    Urinalysis With / Microscopic If Indicated - Urine, Clean Catch [[id number]]  (Abnormal) Collected:  02/12/18 0932    Specimen:  Urine from Urine, Clean Catch Updated:  02/12/18 0948     Color, UA Yellow     Appearance, UA Cloudy (A)     pH, UA 7.5     Specific Gravity, UA 1.011     Glucose, UA Negative     Ketones, UA Negative     Bilirubin, UA Negative     Blood, UA Negative     Protein, UA Negative     Leuk Esterase, UA Trace (A)     Nitrite, UA Negative     Urobilinogen, UA 0.2 E.U./dL    Urinalysis, Microscopic Only - Urine, Clean Catch [[id number]]  (Abnormal) Collected:  02/12/18 0932    Specimen:  Urine from Urine, Clean Catch Updated:  02/12/18 0948     RBC, UA 0-2 /HPF      WBC, UA 0-2 (A) /HPF      Bacteria, UA None Seen /HPF      Squamous Epithelial Cells, UA 3-6 (A) /HPF      Hyaline Casts, UA 0-6 /LPF      Methodology Automated Microscopy    CBC & Differential [[id number]] Collected:  02/13/18 0517    Specimen:  Blood Updated:  02/13/18 0620    Narrative:       The " following orders were created for panel order CBC & Differential.  Procedure                               Abnormality         Status                     ---------                               -----------         ------                     CBC Auto Differential[[id number]]        Abnormal            Final result                 Please view results for these tests on the individual orders.    CBC Auto Differential [[id number]]  (Abnormal) Collected:  02/13/18 0517    Specimen:  Blood Updated:  02/13/18 0620     WBC 11.65 (H) 10*3/mm3      RBC 3.58 (L) 10*6/mm3      Hemoglobin 10.6 (L) g/dL      Hematocrit 31.4 (L) %      MCV 87.7 fL      MCH 29.6 pg      MCHC 33.8 g/dL      RDW 12.7 %      RDW-SD 41.2 fl      MPV 9.1 fL      Platelets 190 10*3/mm3      Neutrophil % 81.1 (H) %      Lymphocyte % 11.7 (L) %      Monocyte % 6.9 %      Eosinophil % 0.0 %      Basophil % 0.0 %      Immature Grans % 0.3 %      Neutrophils, Absolute 9.46 (H) 10*3/mm3      Lymphocytes, Absolute 1.36 10*3/mm3      Monocytes, Absolute 0.80 10*3/mm3      Eosinophils, Absolute 0.00 10*3/mm3      Basophils, Absolute 0.00 10*3/mm3      Immature Grans, Absolute 0.03 10*3/mm3     Basic Metabolic Panel [[id number]]  (Abnormal) Collected:  02/13/18 0517    Specimen:  Blood Updated:  02/13/18 0655     Glucose 119 (H) mg/dL      BUN 10 mg/dL      Creatinine 0.80 mg/dL      Sodium 129 (L) mmol/L      Potassium 4.1 mmol/L      Chloride 95 (L) mmol/L      CO2 25.0 mmol/L      Calcium 8.3 (L) mg/dL      eGFR Non African Amer 67 mL/min/1.73      BUN/Creatinine Ratio 12.5     Anion Gap 9.0 mmol/L     Narrative:       National Kidney Foundation Guidelines    Stage     Description        GFR  1         Normal or High     90+  2         Mild decrease      60-89  3         Moderate decrease  30-59  4         Severe decrease    15-29  5         Kidney failure     <15          Imaging Results (last 24 hours)     Procedure Component Value Units Date/Time    XR Hip With  or Without Pelvis 2 - 3 View Right [[id number]] Updated:  02/12/18 1849          Patient Care Team:  Yasmine Cortes MD as PCP - General  Yasmine Cortes MD as PCP - Family Medicine    SUBJECTIVE: Her daughter reports she was restless overnight but had minimal to no complaints of pain.  She complains of a headache.  Denies hip pain.    PHYSICAL EXAM  Right hip incision is clean, dry and intact w mesh dressing in place  Thigh and calf are soft and nontender  Neurovascularly intact distally     Principal Problem:    Closed fracture of neck of right femur, Acute  Active Problems:    Non Hodgkin's lymphoma    Late onset Alzheimer's disease with behavioral disturbance    Hypertension    Generalized weakness      PLAN / DISPOSITION: 89-year-old female postop day #1 status post right hip hemiarthroplasty for femoral neck fracture  -Mobilize with PT/OT as tolerated  -Lovenox for DVT prophylaxis  - for rehabilitation planning  -Follow-up in 3 weeks    Rohith Andersen MD  02/13/18  7:11 AM

## 2018-02-13 NOTE — PLAN OF CARE
Problem: Patient Care Overview (Adult)  Goal: Plan of Care Review  Outcome: Ongoing (interventions implemented as appropriate)   02/13/18 1353   Coping/Psychosocial Response Interventions   Plan Of Care Reviewed With family   Patient Care Overview   Progress (Evaluation)   Outcome Evaluation   Outcome Summary/Follow up Plan OT eval complete. Attempted sit to stand, but agitated and unsafe to attempt transfer. Pt dependent mechanical lift to bed, max A to roll right to remove sling. Pt tolerated PROM BUE to assess UE range. Pt following less than 10 % commands. Pt limited by cognition and pain. OT will follow to advance pt toward increased independence and safety with self care and mobility. Recommend SNF for rehab upon d/c.        Problem: Inpatient Occupational Therapy  Goal: Bed Mobility Goal LTG- OT  Outcome: Ongoing (interventions implemented as appropriate)   02/13/18 1353   Bed Mobility OT LTG   Bed Mobility OT LTG, Date Established 02/13/18   Bed Mobility OT LTG, Time to Achieve by discharge   Bed Mobility OT LTG, Activity Type roll left/roll right;supine to sit/sit to supine   Bed Mobility OT LTG, [place] Level moderate assist (50% patient effort)   Bed Mobility OT LTG, Assist Device bed rails     Goal: Transfer Training Goal 1 LTG- OT  Outcome: Ongoing (interventions implemented as appropriate)   02/13/18 1353   Transfer Training OT LTG   Transfer Training OT LTG, Date Established 02/13/18   Transfer Training OT LTG, Time to Achieve by discharge   Transfer Training OT LTG, Activity Type sit to stand/stand to sit;bed to chair /chair to bed   Transfer Training OT LTG, [place] Level moderate assist (50% patient effort)   Transfer Training OT LTG, Assist Device (appropriate AD)     Goal: Strength Goal LTG- OT  Outcome: Ongoing (interventions implemented as appropriate)   02/13/18 1353   Strength OT LTG   Strength Goal OT LTG, Date Established 02/13/18   Strength Goal OT LTG, Time to Achieve by  discharge   Strength Goal OT LTG, Measure to Achieve Pt will complete BUE AROM/AAROM 10-15 reps daily as needed to increase strength to support ADLs     Goal: Dynamic Sitting Balance Goal LTG- OT  Outcome: Ongoing (interventions implemented as appropriate)   02/13/18 1353   Dynamic Sitting Balance OT LTG   Dynamic Sitting Balance OT LTG, Date Established 02/13/18   Dynamic Sitting Balance OT LTG, Time to Achieve by discharge   Dynamic Sitting Balance OT LTG, [place] Level minimum assist (75% patient effort)   Dynamic Sitting Balance OT LTG, Assist Device UE Support   Dynamic Sitting Balance OT LTG, Additional Goal Pt will sit EOB with min A to complete grooming activities

## 2018-02-13 NOTE — PLAN OF CARE
Problem: Patient Care Overview (Adult)  Goal: Plan of Care Review  Outcome: Ongoing (interventions implemented as appropriate)   02/13/18 0776   Coping/Psychosocial Response Interventions   Plan Of Care Reviewed With patient;family   Patient Care Overview   Progress progress toward functional goals as expected   Outcome Evaluation   Outcome Summary/Follow up Plan Pt has been restless most of the shift. Remains confused, only alert to self. Parker remains in place for less then 24 hrs post op/acute retention. Pt was able to take a few steps with therapy this am; but refused afternoon therapy and had to use mechanical lift to transfer back into bed. Right hip prineo CDI; nonverbal c/o pain present, Dilaudid given via IV because pt too confused to take PO meds. Ropivicaine infusing at 8ml/hr. Plan at d/c is [facility] rehab then home.

## 2018-02-14 LAB — OSMOLALITY UR: 560 MOSM/KG (ref 300–1100)

## 2018-02-14 PROCEDURE — 97110 THERAPEUTIC EXERCISES: CPT

## 2018-02-14 PROCEDURE — 25010000002 ENOXAPARIN PER 10 MG: Performed by: ORTHOPAEDIC SURGERY

## 2018-02-14 PROCEDURE — 99232 SBSQ HOSP IP/OBS MODERATE 35: CPT | Performed by: NURSE PRACTITIONER

## 2018-02-14 PROCEDURE — 25010000002 ROPIVACAINE PER 1 MG: Performed by: ANESTHESIOLOGY

## 2018-02-14 PROCEDURE — 97116 GAIT TRAINING THERAPY: CPT

## 2018-02-14 RX ADMIN — Medication 2 TABLET: at 20:20

## 2018-02-14 RX ADMIN — ENOXAPARIN SODIUM 40 MG: 40 INJECTION SUBCUTANEOUS at 15:38

## 2018-02-14 RX ADMIN — ROPIVACAINE HYDROCHLORIDE 8 ML/HR: 2 INJECTION, SOLUTION EPIDURAL; INFILTRATION at 18:32

## 2018-02-14 NOTE — DISCHARGE PLACEMENT REQUEST
"Silva Goodson (89 y.o. Female)   Sonja Kellerman RN,  [phone]        Date of Birth Social Security Number Address Home Phone MRN    06/01/1925  [address] [place] [address] [address] [place] [address] [phone] [id number]    Zoroastrian Marital Status          Orthodoxy        Admission Date Admission Type Admitting Provider Attending Provider Department, Room/Bed    2/11/18 Emergency Yessi Howell MD Brown, Hannah, MD [facility] 3H, S372/1    Discharge Date Discharge Disposition Discharge Destination                      Attending Provider: Yessi Howell MD     Allergies:  No Known Allergies    Isolation:  None   Infection:  None   Code Status:  Conditional    Ht:  157.5 cm (62.01\")   Wt:  71.7 kg (158 lb)    Admission Cmt:  None   Principal Problem:  Closed fracture of neck of right femur, Acute [S72.001A]                 Active Insurance as of 2/11/2018     Primary Coverage     Payor Plan Insurance Group Employer/Plan Group    MEDICARE MEDICARE A & B      Payor Plan Address Payor Plan Phone Number Effective From Effective To    [address] [phone] 5/1/1990     [place] [address]       Subscriber Name Subscriber Birth Date Member ID       SILVA GOODSON 6/1/1925 [id number]           Secondary Coverage     Payor Plan Insurance Group Employer/Plan Group    AARP MED SUPP AARP HEALTH CARE OPTIONS      Payor Plan Address Payor Plan Phone Number Effective From Effective To    [facility] [phone] 1/1/2017     [address]       [place] [address]       Subscriber Name Subscriber Birth Date Member ID       SILVA GOODSON 6/1/1925 [id number]                 Emergency Contacts      (Rel.) Home Phone Work Phone Mobile Phone    Shana Ambriz (Daughter) -- -- [phone]            Insurance Information                MEDICARE/MEDICARE A & B Phone: [phone]    Subscriber: AldenSilva Subscriber#: [id number]    Group#:  Precert#:         AARP MED " [facility] HEALTH CARE OPTIONS Phone: [phone]    Subscriber: Silva Ruano Subscriber#: [id number]    Group#:  Precert#:              History & Physical      Marques Basilio MD at 2018  5:07 AM              [facility] Medicine Services  HISTORY AND PHYSICAL    Patient Name: Silva Ruano  : 1925  MRN: [id number]  Primary Care Physician: Yasmine Cortes MD    Subjective   Subjective     Chief Complaint:  Right hip pain    HPI:  Silva Ruano is a 89 y.o. female with a past medical history significant for Alzheimer's dementia, hypertension, hypothyroidism, and Non-Hodgkin's lymphoma who presents with right hip pain s/p fall. HPI limited secondary to patient's disposition. Dementia is advanced. Sister at bedside states she was sitting in rocking chair and complained that she couldn't get up. This is not unusual for the patient, however when she tried to get up she lost her balance and fell, striking her head and right hip. Has complained of right hip pain and difficulty with ambulation since then. No other complaints of fever, cough, congestion, N/V/D, SOB, or chest pain. Records reviewed indicate patient recently sustained a pathologic fracture of left clavicle (). Was seen by ortho service here at [facility].    Emergency Department Evaluation: vital stable. Chemistry and hematology favorable. Troponin, CXR negative. EKG stable. X-ray right hip demonstrates acute fracture of right femoral neck.    Review of Systems (limited due to dementia)      Otherwise 10-system ROS reviewed and is negative except as mentioned in the HPI.    Personal History     Past Medical History:   Diagnosis Date   • Alzheimer disease    • Arthritis    • Closed fracture of neck of right femur, Acute 2018   • Diverticulosis    • Hypertension    • Pathologic fracture of clavicle, left, initial encounter    • Skin cancer     lymphoma   • Thyroid disease        Past Surgical History:    Procedure Laterality Date   • CHOLECYSTECTOMY     • HYSTERECTOMY     • OVARIAN CYST REMOVAL         Family History: family history includes Cancer in her other; Heart disease in her other; Hypertension in her other.     Social History:  reports that she has never smoked. She has never used smokeless tobacco. She reports that she does not drink alcohol or use illicit drugs.  Social History     Social History Narrative       Medications:  Prescriptions Prior to Admission   Medication Sig Dispense Refill Last Dose   • Diclofenac Sodium 1.5 % solution    Taking   • hydrochlorothiazide (HYDRODIURIL) 25 MG tablet    Taking   • levothyroxine (SYNTHROID, LEVOTHROID) 50 MCG tablet    Taking   • losartan (COZAAR) 50 MG tablet    Taking   • metoprolol succinate XL (TOPROL-XL) 25 MG 24 hr tablet    Taking   • nitrofurantoin (MACRODANTIN) 50 MG capsule    Taking   • omeprazole (priLOSEC) 20 MG capsule    Taking   • PREMARIN 0.625 MG tablet    Taking   • tolterodine LA (DETROL LA) 4 MG 24 hr capsule    Taking       No Known Allergies    Objective   Objective     Vital Signs:   Temp:  [96.1 °F (35.6 °C)-98 °F (36.7 °C)] 96.1 °F (35.6 °C)  Heart Rate:  [] 101  Resp:  [16-18] 16  BP: (142-176)/(74-88) 142/88        Physical Exam   Constitutional: No acute distress, awake, alert  Eyes: PERRLA, sclerae anicteric, no conjunctival injection  HENT: NCAT, mucous membranes dry  Neck: Supple, no thyromegaly, no lymphadenopathy, trachea midline  Respiratory: Clear to auscultation bilaterally, nonlabored respirations   Cardiovascular: RRR, no murmurs, rubs, or gallops, palpable pedal pulses bilaterally  Gastrointestinal: Positive bowel sounds, soft, nontender, nondistended  Musculoskeletal: No bilateral ankle edema, no clubbing or cyanosis to extremities  Right leg with decreased ROM secondary to pain. She is N/V intact distal to injury  Psychiatric: flat affect, deferred  Neurologic: disoriented, strength symmetric in all  extremities, Cranial Nerves grossly intact to confrontation, speech clear  Skin: No rashes      Results Reviewed:  I have personally reviewed current lab, radiology, and data and agree.      Results from last 7 days  Lab Units 02/11/18  2324   WBC 10*3/mm3 6.15   HEMOGLOBIN g/dL 11.5   HEMATOCRIT % 33.3*   PLATELETS 10*3/mm3 191       Results from last 7 days  Lab Units 02/11/18  2324   SODIUM mmol/L 131*   POTASSIUM mmol/L 3.6   CHLORIDE mmol/L 96*   CO2 mmol/L 28.0   BUN mg/dL 14   CREATININE mg/dL 0.70   GLUCOSE mg/dL 117*   CALCIUM mg/dL 9.2   ALT (SGPT) U/L 19   AST (SGOT) U/L 21     Estimated Creatinine Clearance: 41.6 mL/min (by C-G formula based on Cr of 0.7).  Brief Urine Lab Results     None        No results found for: BNP  No results found for: PHART  Imaging Results (last 24 hours)     Procedure Component Value Units Date/Time    XR Chest 1 View [[id number]] Collected:  02/11/18 2303     Updated:  02/12/18 0031    Narrative:       EXAM:  XR Chest, 1 View    CLINICAL HISTORY:  89 years old, female; Screening exam; Pre-operative exam; Other: Rt hip;   Additional info: Fall/right hip pain    TECHNIQUE:  Frontal view of the chest.    COMPARISON:  CT - RC CHEST W CONTRAST 2014-11-13 10:46    FINDINGS:  Lungs:  Biapical pleural-parenchymal scarring.  Pleural space:  Normal.  No pneumothorax.  Heart:  Normal.  No cardiomegaly.  Mediastinum:  Normal.  Bones/joints:  Old distal left clavicle fracture.  Vasculature:  Atherosclerotic ossification of the thoracic aorta.      Impression:         1. No acute findings.    2. Non-acute findings are described above.    THIS DOCUMENT HAS BEEN ELECTRONICALLY SIGNED BY STEPHANIE ARREOLA MD    XR Hip With or Without Pelvis 2 - 3 View Right [[id number]] Collected:  02/11/18 2303     Updated:  02/12/18 0032    Narrative:       EXAM:  XR Right Hip With Pelvis When Performed, 2 or 3 Views    CLINICAL HISTORY:  89 years old, female; Pain; Hip pain; Right hip; Additional info:  Fall/right   hip pain    TECHNIQUE:  Two or three views of the right hip, with pelvis when performed.    COMPARISON:  CT - RC ABD PELVIS W CONTRAST 2014-11-13 10:46    FINDINGS:  Bones/joints:  Acute, displaced right mid cervical femoral neck fracture.    Lower lumbar spine degenerative changes.  Mild degenerative changes of the   hips, manifest by joint space narrowing and minimal osteophyte formation.  No   dislocation.  Soft tissues:  Normal.  Vasculature:  Phlebolith within the right pelvis.  Other findings:  2.6 x 1.87 m calcification projecting over the right ilium.      Impression:         1.  Acute, displaced right mid cervical femoral neck fracture.    2.  Incidental/non-acute findings are described above.      THIS DOCUMENT HAS BEEN ELECTRONICALLY SIGNED BY STEPHANIE ARREOLA MD             Assessment/Plan   Assessment / Plan     Hospital Problem List     * (Principal)Closed fracture of neck of right femur, Acute    Hypertension    Generalized weakness    Non Hodgkin's lymphoma    Overview Signed 8/30/2016 12:47 PM by Kyle Garcia              Late onset Alzheimer's disease with behavioral disturbance            Assessment & Plan:  1. Closed fracture of neck of right femur;  - secondary to mechanical fall. Vitals stable.  - patient discussed with Dr. Andersen per ortho in ED. Will see in am  - PT/OT treat and eval. Will likely need placement for rehab  - pain control as needed  - NPO, saline 50 cc/hr x 8 hours  - am labs    2. Hypertension:  - stable and controlled. Continue home meds:    3. Non-hodgkin's lymphoma:  - stable. In remission. Responded well to chemo.  - followed by ERINN Farley    4. Dementia:  - continue home meds  - scan head pending acute change in mental status. Did sustain minor head trauma.   - neuro checks    DVT prophylaxis: mechanical    CODE STATUS:  Conditional, DNR    Admission Status:  I believe this patient meets INPATIENT status due to the need for care which can only be  reasonably provided in an hospital setting such as aggressive/expedited ancillary services and/or consultation services, the necessity for IV medications, close physician monitoring and/or the possible need for procedures.  In such, I feel patient’s risk for adverse outcomes and need for care warrant INPATIENT evaluation and predict the patient’s care encounter to likely last beyond 2 midnights.      Ema Bradford PA-C  02/12/18   5:07 AM      PHYSICIAN NOTE(ADDENDUM)           HPI         Vitals:     02/12/18 0429   BP: 142/88   Pulse: 101   Resp: 16   Temp: 96.1 °F (35.6 °C)   SpO2: 96%      89-year-old female presented to ER with the chief complaint of fall while trying to sit in a chair.  Fell sideways on her right side, started complaining of pain on the right side of the head and he since then.  X-ray revealed, which shows acute displaced right mid cervical femoral neck fracture.      On exam-clear to auscultation anteriorly, S1-S2 regular, no pedal edema, shortened right leg with external rotation.     Old records reviewed and summarized in PM hx.     PM hx  Alzheimer's dementia-follows up with Dr. Martinez.  History of non-Hodgkin's lymphoma-low-grade  Multiple falls-with left.  clavicular fracture last year.   Hypothyroid  Hypertension-on  HCTZ 25 daily, Cozaar 50 daily, Lopressor 25 daily,  Bladder dysfunction  GERD.   A/P  EKG review-sinus rhythm and 86 bpm with T-wave inversion in lateral and inferior leads.  No old EKG.  Troponin is negative     Hip fracture-consult Dr. zimmerman in a.m. IV Dilaudid for pain.     Patient hyponatremic-questionable secondary to HCTZ.    AND status discussed with the family currently she is conditional code     I have independently seen and examined the patient with ARNP and the note above reflects my changes and contributions. I have discussed with findings, diagnosis and plans with the patient and family.      Marques Basilio MD 02/12/18 4:57 AM                  Electronically signed by Marques Basilio MD at 2/12/2018  6:18 AM           Operative/Procedure Notes (last 7 days) (Notes from 2/7/2018  2:17 PM through 2/14/2018  2:17 PM)      Rohith Andersen MD at 2/12/2018  5:54 PM  Version 1 of 1         HIP HEMIARTHROPLASTY  Progress Note    Silva Ruano  2/11/2018 - 2/12/2018    Pre-op Diagnosis:   Right femoral neck fracture       Post-Op Diagnosis Codes:     * Fracture of femoral neck, right, closed [S72.001A]    Procedure/CPT® Codes:  UT FEMORAL FX, OPEN TX [14569]    Procedure(s):  Right HIP HEMIARTHROPLASTY    Surgeon(s):  Rohith Andersen MD     Assistant: SUZAN Tyson    Anesthesia: General and ilio fascial catheter    Staff:   Circulator: Ayaan Levin RN  Scrub Person: Teri Haque; Caleb Gillespie  Vendor Representative: Jonathan Núñez  Assistant: SUZAN Tyson    Estimated Blood Loss: 100ml    Urine Voided: * No values recorded between 2/12/2018  4:22 PM and 2/12/2018  5:43 PM *    Specimens:                None      Drains:   Urethral Catheter 02/12/18 1128 (Active)   Daily Indications Acute retention 2/12/2018 11:20 AM   Securement secured to upper leg with adhesive device 2/12/2018 11:20 AM   Irrigation Return clear;yellow 2/12/2018 11:20 AM   Irrigation Ease no resistance met 2/12/2018 11:20 AM   Catheter care done Yes 2/12/2018 11:20 AM   Tolerance no signs/symptoms of discomfort 2/12/2018  2:00 PM           Findings: Right hip displaced femoral neck fracture    Complications: None     Implants: Depuy [place] bipolar hemiarthroplasty with a size 3 stem and a 44 head with standard taper          Rohith Andersen MD     Date: 2/12/2018  Time: 5:54 PM         Electronically signed by Rohith Andersen MD at 2/12/2018  5:55 PM      Rohith Andersen MD at 2/12/2018  5:55 PM  Version 2 of 2         PREOPERATIVE DIAGNOSIS: Right hip displaced femoral neck fracture.     POSTOPERATIVE DIAGNOSIS: Right hip displaced  femoral neck fracture.     PROCEDURE PERFORMED: Right hip hemiarthroplasty.     SURGEON: Rohith Andersen MD    ASSISTANT: SUZAN Tyson, necessary during the case for positioning the patient, exposure, implantation of components, and closure.     ANESTHESIA: General with iliofascial catheter.     ESTIMATED BLOOD LOSS: 100 mL.     COMPLICATIONS: None.    SPECIMENS: None.    IMPLANTS: DePuy [place] bipolar hemiarthroplasty with a size 3 fully porous-coated femoral stem and a 44 bipolar head with standard taper.     INDICATIONS FOR PROCEDURE: The patient is a very pleasant 89-year-old female who fell at home yesterday evening getting up from a chair. Normally is a minimal home ambulator without assist. She was unable to ambulate after this fall. She lives at home with her daughter. EMS was called and she was brought to [facility] ER where evaluation revealed the displaced right hip femoral neck fracture. X-rays revealed right hip displaced femoral neck fracture with minimal degenerative changes. Upon my evaluation of the patient this morning she had an isolated complaint of right hip pain. She is neurovascular intact distally. I discussed all the risks, benefits, and alternatives to right hip hemiarthroplasty, they agreed to proceed, and surgical consent form was signed.     DESCRIPTION OF PROCEDURE: She was seen by Anesthesia. They had performed an iliofascial catheter for perioperative pain control without difficulty. She received Ancef 2 g IV prophylactic antibiotics within 1 hour of incision time. She was brought back to the operating room. General anesthesia was induced without difficulty. She received her 1st dose of tranexamic acid just prior to the incision and the last dose within 5-10 minutes of closing to help minimize bleeding. Upon induction of general anesthesia a Parker catheter had been placed. She was placed in the lateral decubitus position. An axillary roll was placed. All of her bony  prominences were well-padded. The right lower extremity was then prepped and draped in the usual sterile fashion. A timeout was performed and I confirmed right hip hemiarthroplasty on the above-mentioned patient. I made a posterolateral hip incision with a #10 blade scalpel. This was carried down through subcutaneous tissue. Adequate hemostasis was maintained with Bovie electrocautery. I incised the fascia with Bovie electrocautery. A Charnley retractor was placed. The bursa was swept off the posterolateral aspect of the hip. The hip was flexed and internally rotated. Short external rotators were identified. I dissected the piriformis and the short external rotators off the piriformis fossa with the Bovie electrocautery. These were tagged with two #2 FiberWire sutures for later repair. The hip joint capsule was incised. Hematoma was evacuated. The femoral neck cut was freshened up with an oscillating saw at a 45° neck angle 1 cm proximal to the lesser trochanter. Cut bone was removed. We then removed the femoral head. This was sized to a 44 on the back table. A 44 trial fit the acetabulum nicely. The acetabulum was cleared out of soft tissue and debris. We then prepared the femoral canal. The canal finder was placed down the canal and then the lateralizer and then we hand-reamed starting with a 0-1 reamer up to 4-5 reamer which did not get very far down the canal. We then broached starting with a #1 broach up to a size #3 broach. We had excellent fit and purchase. This was placed in 15° of anteversion, taking care to avoid varus placement of the broach. We then trialed with the standard taper and the 44 bipolar head. The hip was reduced and taken through a full range of motion and seen to be stable throughout. We achieved full extension in external rotation and flexion at 90° and past 60° of internal rotation prior to dislocating the hip. Leg lengths appeared equal. Hip joint was then reduced. The trial components  were removed. The hip was copiously irrigated with Pulsavac lavage and final components were opened on the back table. The acetabulum and canal were thoroughly dried. We then placed the final DePuy [place] size 3 femoral stem down the femoral canal, impacted it in place, and it was seen to have excellent purchase. We then placed the 44 bipolar head. This was impacted in place onto the neck and seen to be fully engaged. The hip was then reduced and at the acetabulum again taken through a full range of motion and seen to be stable throughout. The hip was then copiously irrigated with Pulsavac lavage again. We then proceeded with closure. The short external rotators and the piriformis were repaired back to the greater trochanter using drill holes and the #2 FiberWire suture. The fascia was closed with #1 Vicryl, the skin was closed with 2-0 Vicryl, the dermis was closed with 2-0 Vicryl, and the skin was closed with running 2-0 subcuticular barbed Stratafix suture. We then applied Prineo mesh dressing and Dermabond. Once this had thoroughly dried the drapes were removed. She was placed in a hip abduction pillow, placed back in the supine position, and extubated without difficulty. All sponge and needle counts were correct x2. She was transferred to the recovery room in stable condition.     POSTOPERATIVE PLAN: She will be readmitted to the hospitalist service for medical management. We will order physical therapy and occupational therapy starting tomorrow to mobilize as tolerated. Start Lovenox tomorrow for DVT prophylaxis and have the  work on rehabilitation placement.          Electronically signed by Erin Tilley MD at 2/12/2018  8:26 PM      Erin Tilley MD at 2/12/2018  5:55 PM  Version 1 of 2          Dictation on: 02/12/2018  6:00 PM by: ERIN TILLEY [[id number]]          Electronically signed by Erin Tilley MD at 2/12/2018  6:01 PM           Physical Therapy  Notes (last 24 hours) (Notes from 2/13/2018  2:17 PM through 2/14/2018  2:17 PM)      Glory PAALCIO Tripp, PT at 2/14/2018  2:11 PM  Version 1 of 1         Problem: Patient Care Overview (Adult)  Goal: Plan of Care Review  Outcome: Ongoing (interventions implemented as appropriate)   02/14/18 1410   Coping/Psychosocial Response Interventions   Plan Of Care Reviewed With patient   Patient Care Overview   Progress improving   Outcome Evaluation   Outcome Summary/Follow up Plan Patient participating with therapy. Able to increase her ambulation today to 10 feet.        Problem: Inpatient Physical Therapy  Goal: Transfer Training Goal 1 LTG- PT   02/13/18 1030 02/14/18 1410   Transfer Training PT LTG   Transfer Training PT LTG, Date Established 02/13/18 --    Transfer Training PT LTG, Time to Achieve 5 - 7 days --    Transfer Training PT LTG, Activity Type sit to stand/stand to sit --    Transfer Training PT LTG, [place] Level --  minimum assist (75% patient effort)   Transfer Training PT LTG, Assist Device walker, rolling --    Transfer Training PT LTG, Outcome --  goal ongoing     Goal: Transfer Training Goal 2 LTG- PT  Outcome: Ongoing (interventions implemented as appropriate)   02/13/18 1030   Transfer Training 2 PT LTG   Transfer Training PT 2 LTG, Date Established 02/13/18   Transfer Training PT 2 LTG, Time to Achieve 5 - 7 days   Transfer Training PT 2 LTG, Activity Type bed to chair /chair to bed   Transfer Training PT 2 LTG, [place] Level moderate assist (50% patient effort)   Transfer Training PT 2 LTG, Assist Device walker, rolling   Transfer Training PT 2 LTG, Outcome goal ongoing     Goal: Gait Training Goal LTG- PT  Outcome: Ongoing (interventions implemented as appropriate)   02/13/18 1030 02/14/18 1410   Gait Training PT LTG   Gait Training Goal PT LTG, Date Established 02/13/18 --    Gait Training Goal PT LTG, Time to Achieve 5 - 7 days --    Gait Training Goal PT LTG, [place] Level moderate  assist (50% patient effort);2 person assist required --    Gait Training Goal PT LTG, Assist Device walker, rolling --    Gait Training Goal PT LTG, Distance to Achieve 50 --    Gait Training Goal PT LTG, Outcome --  goal ongoing            Electronically signed by Glory Almaguer PT at 2018  2:12 PM      Glory Almaguer PT at 2018  2:16 PM  Version 1 of 1         Acute Care - Physical Therapy Treatment Note  [facility]     Patient Name: Silva Ruano  : 1925  MRN: [id number]  Today's Date: 2018  Onset of Illness/Injury or Date of Surgery Date: 18  Date of Referral to PT: 18  Referring Physician: Dr. Andersen    Admit Date: 2018    Visit Dx:    ICD-10-CM ICD-9-CM   1. Closed fracture of neck of right femur, initial encounter S72.001A 820.8   2. Impaired functional mobility, balance, gait, and endurance Z74.09 V49.89   3. Heart failure, acute systolic, first episode I50.21 428.21   4. Impaired mobility and ADLs Z74.09 799.89     Patient Active Problem List   Diagnosis   • Non Hodgkin's lymphoma   • Subcutaneous nodules   • Late onset Alzheimer's disease with behavioral disturbance   • Hypertension   • Closed fracture of neck of right femur, Acute   • Generalized weakness               Adult Rehabilitation Note       18 1330          Rehab Assessment/Intervention    Discipline physical therapist  -SC      Document Type therapy note (daily note)  -SC      Subjective Information complains of;pain   on standing  -SC      Patient Effort, Rehab Treatment good  -SC      Precautions/Limitations fall precautions;hip precautions- right;other (see comments)   confusion  -SC      Specific Treatment Considerations dementia  -SC      Patient Response to Treatment participated with PT  -SC      Recorded by [SC] Glory Almaguer PT      Pain Assessment    Pain Assessment Suarez-Yung FACES  -SC      Suarez-Yung FACES Pain Rating 0  -SC      Post Pain Score 4  -SC      Pain Type Acute pain  -SC       Pain Location Hip  -SC      Pain Orientation Right  -SC      Pain Intervention(s) Repositioned  -SC      Response to Interventions improved when sitting  -SC      Recorded by [SC] Glory Almaguer, PT      Cognitive Assessment/Intervention    Current Cognitive/Communication Assessment impaired  -SC      Orientation Status oriented to;person;other (see comments)   first name  -SC      Follows Commands/Answers Questions able to follow single-step instructions;needs cueing;needs increased time;needs repetition  -SC      Personal Safety severe impairment;decreased awareness, need for safety;decreased awareness, need for assist;decreased insight to deficits  -SC      Personal Safety Interventions gait belt;fall prevention program maintained  -SC      Recorded by [SC] Glory Almaguer, PT      Bed Mobility, Assessment/Treatment    Bed Mobility, Assistive Device bed rails;head of bed elevated;draw sheet  -SC      Bed Mob, Supine to Sit, [place] maximum assist (25% patient effort);2 person assist required  -SC      Bed Mobility, Safety Issues cognitive deficits limit understanding;decreased use of legs for bridging/pushing  -SC      Bed Mobility, Impairments pain;motor control impaired;strength decreased  -SC      Bed Mobility, Comment assisted up to edge of bed  -SC      Recorded by [SC] Glory Almaguer PT      Transfer Assessment/Treatment    Transfers, Sit-Stand [place] moderate assist (50% patient effort);2 person assist required  -SC      Transfers, Stand-Sit [place] minimum assist (75% patient effort);2 person assist required  -SC      Transfers, Sit-Stand-Sit, Assist Device rolling walker  -SC      Transfer, Safety Issues balance decreased during turns;sequencing ability decreased  -SC      Transfer, Impairments unable to maintain weight bearing status;motor control impaired  -SC      Transfer, Comment Patient encouraged to get into standing hen B hand held assist.   -SC      Recorded by [SC] Glory  PIA Almaguer, PT      Gait Assessment/Treatment    Gait, [place] Level 2 person assist required;moderate assist (50% patient effort)  -SC      Gait, Assistive Device rolling walker  -SC      Gait, Distance (Feet) 10  -SC      Gait, Gait Pattern Analysis swing-to gait  -SC      Gait, Gait Deviations weight-shifting ability decreased;step length decreased;right:;antalgic  -SC      Gait, Safety Issues sequencing ability decreased  -SC      Gait, Impairments motor control impaired;impaired balance;strength decreased  -SC      Gait, Comment Required assist to push walker. With cueing patient able to walk followed by chair . Cues given to stay closer to walker. Took one sit down rest.   -SC      Recorded by [SC] Glory Almaguer, PT      Therapy Exercises    Exercise Protocols total hip  -SC      Total Hip Exercises right:;hip abduction;LAQ;10 reps;with assist  -SC      Recorded by [SC] Glory Almaguer, PT      Positioning and Restraints    Pre-Treatment Position in bed  -SC      Post Treatment Position chair  -SC      In Bed sitting;call light within reach;encouraged to call for assist;exit alarm on;with family/caregiver;pillow between legs  -SC      Recorded by [SC] Glory Almaguer, PT        User Key  (r) = Recorded By, (t) = Taken By, (c) = Cosigned By    Initials Name Effective Dates    SC Glory Almaguer, PT 06/19/15 -                 IP PT Goals       02/14/18 1410 02/13/18 1030       Bed Mobility PT LTG    Bed Mobility PT LTG, Date Established  02/13/18  -SC     Bed Mobility PT LTG, Time to Achieve  5 - 7 days  -SC     Bed Mobility PT LTG, Activity Type  supine to sit/sit to supine  -SC     Bed Mobility PT LTG, [place] Level  minimum assist (75% patient effort)  -SC     Bed Mobility PT Goal  LTG, Assist Device  bed rails  -SC     Bed Mobility PT LTG, Outcome  goal ongoing  -SC     Transfer Training PT LTG    Transfer Training PT LTG, Date Established  02/13/18  -SC     Transfer Training PT LTG, Time to Achieve  5 -  7 days  -SC     Transfer Training PT LTG, Activity Type  sit to stand/stand to sit  -SC     Transfer Training PT LTG, [place] Level minimum assist (75% patient effort)  -SC contact guard assist;1 person + 1 person to manage equipment  -SC     Transfer Training PT LTG, Assist Device  walker, rolling  -SC     Transfer Training PT LTG, Outcome goal ongoing  -SC goal ongoing  -SC     Transfer Training 2 PT LTG    Transfer Training PT 2 LTG, Date Established  02/13/18  -SC     Transfer Training PT 2 LTG, Time to Achieve  5 - 7 days  -SC     Transfer Training PT 2 LTG, Activity Type  bed to chair /chair to bed  -SC     Transfer Training PT 2 LTG, [place] Level  moderate assist (50% patient effort)  -SC     Transfer Training PT 2 LTG, Assist Device  walker, rolling  -SC     Transfer Training PT 2 LTG, Outcome  goal ongoing  -SC     Gait Training PT LTG    Gait Training Goal PT LTG, Date Established  02/13/18  -SC     Gait Training Goal PT LTG, Time to Achieve  5 - 7 days  -SC     Gait Training Goal PT LTG, [place] Level  moderate assist (50% patient effort);2 person assist required  -SC     Gait Training Goal PT LTG, Assist Device  walker, rolling  -SC     Gait Training Goal PT LTG, Distance to Achieve  50  -SC     Gait Training Goal PT LTG, Outcome goal ongoing  -SC goal ongoing  -SC       User Key  (r) = Recorded By, (t) = Taken By, (c) = Cosigned By    Initials Name Provider Type    SC Glory Almaguer, PT Physical Therapist          Physical Therapy Education     Title: PT OT SLP Therapies (Active)     Topic: Physical Therapy (Done)     Point: Mobility training (Done)    Learning Progress Summary    Learner Readiness Method Response Comment Documented by Status   Patient Acceptance E FARIDEH,NR reviewed benefits of walking SC 02/14/18 1409 Done    Acceptance EBRADEN NR reviewed benefits of activity SC 02/13/18 1029 Active               Point: Home exercise program (Done)    Learning Progress Summary    Learner  Readiness Method Response Comment Documented by Status   Patient Acceptance E FARIDEH,NR reviewed benefits of walking SC 02/14/18 1409 Done    Acceptance E,D NR reviewed benefits of activity SC 02/13/18 1029 Active               Point: Body mechanics (Done)    Learning Progress Summary    Learner Readiness Method Response Comment Documented by Status   Patient Acceptance E FARIDEH,NR reviewed benefits of walking SC 02/14/18 1409 Done    Acceptance E,D NR reviewed benefits of activity SC 02/13/18 1029 Active               Point: Precautions (Done)    Learning Progress Summary    Learner Readiness Method Response Comment Documented by Status   Patient Acceptance E FARIDEH,NR reviewed benefits of walking SC 02/14/18 1409 Done    Acceptance E,D NR reviewed benefits of activity SC 02/13/18 1029 Active                      User Key     Initials Effective Dates Name Provider Type Discipline    SC 06/19/15 -  Glory Almaguer, PT Physical Therapist PT                    PT Recommendation and Plan  Anticipated Discharge Disposition: skilled nursing facility  Planned Therapy Interventions: bed mobility training, gait training, home exercise program, patient/family education, strengthening, transfer training  PT Frequency: daily  Plan of Care Review  Plan Of Care Reviewed With: patient  Progress: improving  Outcome Summary/Follow up Plan: Patienty participating with therapy. ABle to increase her ambulation today to 10 feet.           Outcome Measures       02/14/18 1330 02/13/18 1301 02/13/18 0825    How much help from another person do you currently need...    Turning from your back to your side while in flat bed without using bedrails? 2  -SC  2  -SC    Moving from lying on back to sitting on the side of a flat bed without bedrails? 2  -SC  2  -SC    Moving to and from a bed to a chair (including a wheelchair)? 2  -SC  2  -SC    Standing up from a chair using your arms (e.g., wheelchair, bedside chair)? 2  -SC  2  -SC    Climbing 3-5 steps  with a railing? 1  -SC  1  -SC    To walk in hospital room? 2  -SC  2  -SC    AM-PAC 6 Clicks Score 11  -SC  11  -SC    How much help from another is currently needed...    Putting on and taking off regular lower body clothing?  1  -AC     Bathing (including washing, rinsing, and drying)  1  -AC     Toileting (which includes using toilet bed pan or urinal)  1  -AC     Putting on and taking off regular upper body clothing  2  -AC     Taking care of personal grooming (such as brushing teeth)  2  -AC     Eating meals  2  -AC     Score  9  -     Functional Assessment    Outcome Measure Options AM-PAC 6 Clicks Basic Mobility (PT)  -SC AM-PAC 6 Clicks Daily Activity (OT)  -AC AM-PAC 6 Clicks Basic Mobility (PT)  -SC      User Key  (r) = Recorded By, (t) = Taken By, (c) = Cosigned By    Initials Name Provider Type    SC Shearon A Tripp, PT Physical Therapist    AC Lesley Mosher, OT Occupational Therapist           Time Calculation:         PT Charges       02/14/18 1416          Time Calculation    Start Time 1330  -SC      PT Received On 02/14/18  -SC      PT Goal Re-Cert Due Date 02/23/18  -SC      Time Calculation- PT    Total Timed Code Minutes- PT 25 minute(s)  -SC        User Key  (r) = Recorded By, (t) = Taken By, (c) = Cosigned By    Initials Name Provider Type    SC Shearon A Tripp, PT Physical Therapist          Therapy Charges for Today     Code Description Service Date Service Provider Modifiers Qty    34781752307 HC PT EVAL LOW COMPLEXITY 4 2/13/2018 Shearon A Tripp, PT GP 1    78439054961 HC GAIT TRAINING EA 15 MIN 2/13/2018 Shearon A Tripp, PT GP 1    88615798791 HC PT THER SUPP EA 15 MIN 2/13/2018 Shearon A Tripp, PT GP 2    75841883606 HC GAIT TRAINING EA 15 MIN 2/14/2018 Shearon A Tripp, PT GP 1    12732404521 HC PT THER PROC EA 15 MIN 2/14/2018 Shearon A Tripp, PT GP 1    79079941780 HC PT THER SUPP EA 15 MIN 2/14/2018 Shearon A Tripp, PT GP 2          PT G-Codes  Outcome Measure Options: AM-PAC 6 Clicks Basic  Mobility (PT)    Glory Almaguer, PT  2/14/2018          Electronically signed by Glory Almaguer, PT at 2/14/2018  2:16 PM

## 2018-02-14 NOTE — PROGRESS NOTES
Continued Stay Note  [facility]     Patient Name: Silva Ruano  MRN: [id number]  Today's Date: 2/14/2018    Admit Date: 2/11/2018          Discharge Plan       02/14/18 1518    Case Management/Social Work Plan    Plan SNF    Patient/Family In Agreement With Plan yes    Additional Comments I met with sister and one of her daughter's at bedside. Family has decided they would prefer placement at [facility] in [place]. I have spoken with Kassandra at [phone],ext [phone],  who has confirmed they can accept her into a skilled bed whenever medically ready. They do not accept patients with nerve catheters. Case mgt will f/u in am.              Discharge Codes     None        Expected Discharge Date and Time     Expected Discharge Date Expected Discharge Time    Feb 15, 2018             Sonja C Kellerman, RN

## 2018-02-14 NOTE — PROGRESS NOTES
"/68  Pulse 88  Temp 97 °F (36.1 °C) (Temporal Artery )   Resp 18  Ht 157.5 cm (62.01\")  Wt 71.7 kg (158 lb)  SpO2 96%  BMI 28.89 kg/m2    Lab Results (last 24 hours)     Procedure Component Value Units Date/Time    Osmolality, Serum [[id number]]  (Normal) Collected:  02/13/18 1051    Specimen:  Blood Updated:  02/13/18 1124     Osmolality 278 mOsm/kg     Sodium, Urine, Random - Urine, Clean Catch [[id number]]  (Abnormal) Collected:  02/13/18 1118    Specimen:  Urine from Urine, Clean Catch Updated:  02/13/18 1142     Sodium, Urine 94 (H) mmol/L           Imaging Results (last 24 hours)     ** No results found for the last 24 hours. **          Patient Care Team:  Yasmine Cortes MD as PCP - General  Yasmine Cortes MD as PCP - Family Medicine    SUBJECTIVE: She is confused at baseline.  Denies hip pain.  She got up to the chair with therapy yesterday.    PHYSICAL EXAM  Right hip incision is clean, dry and intact with mesh dressing in place  Thigh and calf soft nontender  Neurovascular intact distally     Principal Problem:    Closed fracture of neck of right femur, Acute  Active Problems:    Non Hodgkin's lymphoma    Late onset Alzheimer's disease with behavioral disturbance    Hypertension    Generalized weakness      PLAN / DISPOSITION:89-year-old female postop day #2 status post right hip hemiarthroplasty for femoral neck fracture  -Mobilize with PT/OT as tolerated  -Lovenox for DVT prophylaxis  - for rehabilitation planning  -Follow-up in 3 weeks    Rohith Andersen MD  02/14/18  7:07 AM      "

## 2018-02-14 NOTE — PLAN OF CARE
Problem: Patient Care Overview (Adult)  Goal: Plan of Care Review  Outcome: Ongoing (interventions implemented as appropriate)   02/14/18 1410   Coping/Psychosocial Response Interventions   Plan Of Care Reviewed With patient   Patient Care Overview   Progress improving   Outcome Evaluation   Outcome Summary/Follow up Plan Patient participating with therapy. Able to increase her ambulation today to 10 feet.        Problem: Inpatient Physical Therapy  Goal: Transfer Training Goal 1 LTG- PT   02/13/18 1030 02/14/18 1410   Transfer Training PT LTG   Transfer Training PT LTG, Date Established 02/13/18 --    Transfer Training PT LTG, Time to Achieve 5 - 7 days --    Transfer Training PT LTG, Activity Type sit to stand/stand to sit --    Transfer Training PT LTG, [place] Level --  minimum assist (75% patient effort)   Transfer Training PT LTG, Assist Device walker, rolling --    Transfer Training PT LTG, Outcome --  goal ongoing     Goal: Transfer Training Goal 2 LTG- PT  Outcome: Ongoing (interventions implemented as appropriate)   02/13/18 1030   Transfer Training 2 PT LTG   Transfer Training PT 2 LTG, Date Established 02/13/18   Transfer Training PT 2 LTG, Time to Achieve 5 - 7 days   Transfer Training PT 2 LTG, Activity Type bed to chair /chair to bed   Transfer Training PT 2 LTG, [place] Level moderate assist (50% patient effort)   Transfer Training PT 2 LTG, Assist Device walker, rolling   Transfer Training PT 2 LTG, Outcome goal ongoing     Goal: Gait Training Goal LTG- PT  Outcome: Ongoing (interventions implemented as appropriate)   02/13/18 1030 02/14/18 1410   Gait Training PT LTG   Gait Training Goal PT LTG, Date Established 02/13/18 --    Gait Training Goal PT LTG, Time to Achieve 5 - 7 days --    Gait Training Goal PT LTG, [place] Level moderate assist (50% patient effort);2 person assist required --    Gait Training Goal PT LTG, Assist Device walker, rolling --    Gait Training Goal PT LTG,  Distance to Achieve 50 --    Gait Training Goal PT LTG, Outcome --  goal ongoing

## 2018-02-14 NOTE — THERAPY TREATMENT NOTE
Acute Care - Physical Therapy Treatment Note  [facility]     Patient Name: Silva Ruano  : 1925  MRN: [id number]  Today's Date: 2018  Onset of Illness/Injury or Date of Surgery Date: 18  Date of Referral to PT: 18  Referring Physician: Dr. Andersen    Admit Date: 2018    Visit Dx:    ICD-10-CM ICD-9-CM   1. Closed fracture of neck of right femur, initial encounter S72.001A 820.8   2. Impaired functional mobility, balance, gait, and endurance Z74.09 V49.89   3. Heart failure, acute systolic, first episode I50.21 428.21   4. Impaired mobility and ADLs Z74.09 799.89     Patient Active Problem List   Diagnosis   • Non Hodgkin's lymphoma   • Subcutaneous nodules   • Late onset Alzheimer's disease with behavioral disturbance   • Hypertension   • Closed fracture of neck of right femur, Acute   • Generalized weakness               Adult Rehabilitation Note       18 1330          Rehab Assessment/Intervention    Discipline physical therapist  -SC      Document Type therapy note (daily note)  -SC      Subjective Information complains of;pain   on standing  -SC      Patient Effort, Rehab Treatment good  -SC      Precautions/Limitations fall precautions;hip precautions- right;other (see comments)   confusion  -SC      Specific Treatment Considerations dementia  -SC      Patient Response to Treatment participated with PT  -SC      Recorded by [SC] Glory Almaguer, PT      Pain Assessment    Pain Assessment Suarez-Yung FACES  -SC      Suarez-Yung FACES Pain Rating 0  -SC      Post Pain Score 4  -SC      Pain Type Acute pain  -SC      Pain Location Hip  -SC      Pain Orientation Right  -SC      Pain Intervention(s) Repositioned  -SC      Response to Interventions improved when sitting  -SC      Recorded by [SC] Glory Almaguer, PT      Cognitive Assessment/Intervention    Current Cognitive/Communication Assessment impaired  -SC      Orientation Status oriented to;person;other (see comments)   first  name  -SC      Follows Commands/Answers Questions able to follow single-step instructions;needs cueing;needs increased time;needs repetition  -SC      Personal Safety severe impairment;decreased awareness, need for safety;decreased awareness, need for assist;decreased insight to deficits  -SC      Personal Safety Interventions gait belt;fall prevention program maintained  -SC      Recorded by [SC] Glory Almaguer, PT      Bed Mobility, Assessment/Treatment    Bed Mobility, Assistive Device bed rails;head of bed elevated;draw sheet  -SC      Bed Mob, Supine to Sit, [place] maximum assist (25% patient effort);2 person assist required  -SC      Bed Mobility, Safety Issues cognitive deficits limit understanding;decreased use of legs for bridging/pushing  -SC      Bed Mobility, Impairments pain;motor control impaired;strength decreased  -SC      Bed Mobility, Comment assisted up to edge of bed  -SC      Recorded by [SC] Glory Almaguer, PT      Transfer Assessment/Treatment    Transfers, Sit-Stand [place] moderate assist (50% patient effort);2 person assist required  -SC      Transfers, Stand-Sit [place] minimum assist (75% patient effort);2 person assist required  -SC      Transfers, Sit-Stand-Sit, Assist Device rolling walker  -SC      Transfer, Safety Issues balance decreased during turns;sequencing ability decreased  -SC      Transfer, Impairments unable to maintain weight bearing status;motor control impaired  -SC      Transfer, Comment Patient encouraged to get into standing hen B hand held assist.   -SC      Recorded by [SC] Glory Almaguer, PT      Gait Assessment/Treatment    Gait, [place] Level 2 person assist required;moderate assist (50% patient effort)  -SC      Gait, Assistive Device rolling walker  -SC      Gait, Distance (Feet) 10  -SC      Gait, Gait Pattern Analysis swing-to gait  -SC      Gait, Gait Deviations weight-shifting ability decreased;step length decreased;right:;antalgic  -SC       Gait, Safety Issues sequencing ability decreased  -SC      Gait, Impairments motor control impaired;impaired balance;strength decreased  -SC      Gait, Comment Required assist to push walker. With cueing patient able to walk followed by chair . Cues given to stay closer to walker. Took one sit down rest.   -SC      Recorded by [SC] Glory Almaguer, PT      Therapy Exercises    Exercise Protocols total hip  -SC      Total Hip Exercises right:;hip abduction;LAQ;10 reps;with assist  -SC      Recorded by [SC] Glory Almaguer, PT      Positioning and Restraints    Pre-Treatment Position in bed  -SC      Post Treatment Position chair  -SC      In Bed sitting;call light within reach;encouraged to call for assist;exit alarm on;with family/caregiver;pillow between legs  -SC      Recorded by [SC] Glory Almageur, PT        User Key  (r) = Recorded By, (t) = Taken By, (c) = Cosigned By    Initials Name Effective Dates    SC Glory Almaguer, PT 06/19/15 -                 IP PT Goals       02/14/18 1410 02/13/18 1030       Bed Mobility PT LTG    Bed Mobility PT LTG, Date Established  02/13/18  -SC     Bed Mobility PT LTG, Time to Achieve  5 - 7 days  -SC     Bed Mobility PT LTG, Activity Type  supine to sit/sit to supine  -SC     Bed Mobility PT LTG, [place] Level  minimum assist (75% patient effort)  -SC     Bed Mobility PT Goal  LTG, Assist Device  bed rails  -SC     Bed Mobility PT LTG, Outcome  goal ongoing  -SC     Transfer Training PT LTG    Transfer Training PT LTG, Date Established  02/13/18  -SC     Transfer Training PT LTG, Time to Achieve  5 - 7 days  -SC     Transfer Training PT LTG, Activity Type  sit to stand/stand to sit  -SC     Transfer Training PT LTG, [place] Level minimum assist (75% patient effort)  -SC contact guard assist;1 person + 1 person to manage equipment  -SC     Transfer Training PT LTG, Assist Device  walker, rolling  -SC     Transfer Training PT LTG, Outcome goal ongoing  -SC goal  ongoing  -SC     Transfer Training 2 PT LTG    Transfer Training PT 2 LTG, Date Established  02/13/18  -SC     Transfer Training PT 2 LTG, Time to Achieve  5 - 7 days  -SC     Transfer Training PT 2 LTG, Activity Type  bed to chair /chair to bed  -SC     Transfer Training PT 2 LTG, [place] Level  moderate assist (50% patient effort)  -SC     Transfer Training PT 2 LTG, Assist Device  walker, rolling  -SC     Transfer Training PT 2 LTG, Outcome  goal ongoing  -SC     Gait Training PT LTG    Gait Training Goal PT LTG, Date Established  02/13/18  -SC     Gait Training Goal PT LTG, Time to Achieve  5 - 7 days  -SC     Gait Training Goal PT LTG, [place] Level  moderate assist (50% patient effort);2 person assist required  -SC     Gait Training Goal PT LTG, Assist Device  walker, rolling  -SC     Gait Training Goal PT LTG, Distance to Achieve  50  -SC     Gait Training Goal PT LTG, Outcome goal ongoing  -SC goal ongoing  -SC       User Key  (r) = Recorded By, (t) = Taken By, (c) = Cosigned By    Initials Name Provider Type    BRANDON Almaguer, PT Physical Therapist          Physical Therapy Education     Title: PT OT SLP Therapies (Active)     Topic: Physical Therapy (Done)     Point: Mobility training (Done)    Learning Progress Summary    Learner Readiness Method Response Comment Documented by Status   Patient Acceptance SOLOMON HIDALGO reviewed benefits of walking SC 02/14/18 1409 Done    Acceptance E,D NR reviewed benefits of activity SC 02/13/18 1029 Active               Point: Home exercise program (Done)    Learning Progress Summary    Learner Readiness Method Response Comment Documented by Status   Patient Acceptance SOLOMON HIDALGO reviewed benefits of walking SC 02/14/18 1409 Done    Acceptance E,D NR reviewed benefits of activity SC 02/13/18 1029 Active               Point: Body mechanics (Done)    Learning Progress Summary    Learner Readiness Method Response Comment Documented by Status   Patient Acceptance E  SOLOMON GONG reviewed benefits of walking SC 02/14/18 1409 Done    Acceptance E,D NR reviewed benefits of activity SC 02/13/18 1029 Active               Point: Precautions (Done)    Learning Progress Summary    Learner Readiness Method Response Comment Documented by Status   Patient Acceptance E SOLOMON GONG reviewed benefits of walking SC 02/14/18 1409 Done    Acceptance E,D NR reviewed benefits of activity SC 02/13/18 1029 Active                      User Key     Initials Effective Dates Name Provider Type Discipline    SC 06/19/15 -  Glory Almaguer, PT Physical Therapist PT                    PT Recommendation and Plan  Anticipated Discharge Disposition: skilled nursing facility  Planned Therapy Interventions: bed mobility training, gait training, home exercise program, patient/family education, strengthening, transfer training  PT Frequency: daily  Plan of Care Review  Plan Of Care Reviewed With: patient  Progress: improving  Outcome Summary/Follow up Plan: Patienty participating with therapy. ABle to increase her ambulation today to 10 feet.           Outcome Measures       02/14/18 1330 02/13/18 1301 02/13/18 0825    How much help from another person do you currently need...    Turning from your back to your side while in flat bed without using bedrails? 2  -SC  2  -SC    Moving from lying on back to sitting on the side of a flat bed without bedrails? 2  -SC  2  -SC    Moving to and from a bed to a chair (including a wheelchair)? 2  -SC  2  -SC    Standing up from a chair using your arms (e.g., wheelchair, bedside chair)? 2  -SC  2  -SC    Climbing 3-5 steps with a railing? 1  -SC  1  -SC    To walk in hospital room? 2  -SC  2  -SC    AM-PAC 6 Clicks Score 11  -SC  11  -SC    How much help from another is currently needed...    Putting on and taking off regular lower body clothing?  1  -AC     Bathing (including washing, rinsing, and drying)  1  -AC     Toileting (which includes using toilet bed pan or urinal)  1  -AC      Putting on and taking off regular upper body clothing  2  -AC     Taking care of personal grooming (such as brushing teeth)  2  -AC     Eating meals  2  -AC     Score  9  -AC     Functional Assessment    Outcome Measure Options AM-PAC 6 Clicks Basic Mobility (PT)  -SC AM-PAC 6 Clicks Daily Activity (OT)  -AC AM-PAC 6 Clicks Basic Mobility (PT)  -SC      User Key  (r) = Recorded By, (t) = Taken By, (c) = Cosigned By    Initials Name Provider Type    SC Shearon A Tripp, PT Physical Therapist    AC Lesley Mosher, OT Occupational Therapist           Time Calculation:         PT Charges       02/14/18 1416          Time Calculation    Start Time 1330  -SC      PT Received On 02/14/18  -SC      PT Goal Re-Cert Due Date 02/23/18  -SC      Time Calculation- PT    Total Timed Code Minutes- PT 25 minute(s)  -SC        User Key  (r) = Recorded By, (t) = Taken By, (c) = Cosigned By    Initials Name Provider Type    SC Shearon A Tripp, PT Physical Therapist          Therapy Charges for Today     Code Description Service Date Service Provider Modifiers Qty    16545828499 HC PT EVAL LOW COMPLEXITY 4 2/13/2018 Shearon A Tripp, PT GP 1    99468306113 HC GAIT TRAINING EA 15 MIN 2/13/2018 Shearon A Tripp, PT GP 1    20682621329 HC PT THER SUPP EA 15 MIN 2/13/2018 Shearon A Tripp, PT GP 2    50994438514 HC GAIT TRAINING EA 15 MIN 2/14/2018 Shearon A Tripp, PT GP 1    63902174017 HC PT THER PROC EA 15 MIN 2/14/2018 Shearon A Tripp, PT GP 1    15558767178 HC PT THER SUPP EA 15 MIN 2/14/2018 Shearon A Tripp, PT GP 2          PT G-Codes  Outcome Measure Options: AM-PAC 6 Clicks Basic Mobility (PT)    Glory AManoj Almaguer, PT  2/14/2018

## 2018-02-14 NOTE — PROGRESS NOTES
[facility] Medicine Services  PROGRESS NOTE    Patient Name: Silva Ruano  : 1925  MRN: [id number]    Date of Admission: 2018  Length of Stay: 2  Primary Care Physician: Yasmine Cortes MD    Subjective   Subjective     CC:  Hospital follow up for right hip pain    HPI:  Patient sleeping comfortably in bed with daughter and sister at bedside.  They state that she has been sleeping all day.  She ate no breakfast or lunch.  Per their report, she slept all night long.  Patient arouses to touch.  Answers yes or no questions and nods back off.  She denies any pain.      Review of Systems  Limited due to dementia    Otherwise ROS is negative except as mentioned in the HPI.    Objective   Objective     Vital Signs:   Temp:  [96.9 °F (36.1 °C)-99 °F (37.2 °C)] 99 °F (37.2 °C)  Heart Rate:  [73-88] 73  Resp:  [16-18] 16  BP: (133-161)/(57-92) 154/57        Physical Exam:  Constitutional: No acute distress, asleep, arouses to touch, in bed, family at bedside.   HENT: NCAT, mucous membranes moist, opens eyes when asked  Respiratory: Clear to auscultation bilaterally, respiratory effort normal   Cardiovascular: RRR, no murmurs, rubs, or gallops, palpable pedal pulses bilaterally  Gastrointestinal: Positive bowel sounds, soft, nontender, nondistended  Musculoskeletal: No bilateral ankle edema, moves all extremieties   Psychiatric: Drowsy but cooperative  Neurologic: advanced dementia, speech rambles and garbled at times.   Skin: No rashes      Results Reviewed:  I have personally reviewed current lab, radiology, and data and agree.      Results from last 7 days  Lab Units 18  0517 18  0535 18  2324   WBC 10*3/mm3 11.65* 12.93* 6.15   HEMOGLOBIN g/dL 10.6* 11.8 11.5   HEMATOCRIT % 31.4* 34.4* 33.3*   PLATELETS 10*3/mm3 190 208 191       Results from last 7 days  Lab Units 18  0517 18  0535 18  2324   SODIUM mmol/L 129* 129* 131*   POTASSIUM  mmol/L 4.1 3.7 3.6   CHLORIDE mmol/L 95* 92* 96*   CO2 mmol/L 25.0 24.0 28.0   BUN mg/dL 10 12 14   CREATININE mg/dL 0.80 0.70 0.70   GLUCOSE mg/dL 119* 123* 117*   CALCIUM mg/dL 8.3* 9.5 9.2   ALT (SGPT) U/L  --   --  19   AST (SGOT) U/L  --   --  21     Estimated Creatinine Clearance: 41.6 mL/min (by C-G formula based on Cr of 0.8).  No results found for: BNP  No results found for: PHART    Microbiology Results Abnormal     None          Imaging Results (last 24 hours)     Procedure Component Value Units Date/Time    XR Hip With or Without Pelvis 2 - 3 View Right [[id number]] Collected:  02/14/18 0850     Updated:  02/14/18 0850    Narrative:       EXAMINATION:  AP PELVIS, RIGHT HIP - 02/12/2018     HISTORY: Right hip arthroplasty postop     COMPARISON: 02/11/2018 right hip series     FINDINGS: Total right hip prosthesis is now seen in anatomical  alignment. No loosening of the prosthesis or fracture is identified.  Soft tissue drain is noted superolaterally.       Impression:       Right hip prosthesis in anatomic alignment.     D:  02/13/2018  E:  02/13/2018                I have reviewed the medications.    Assessment/Plan   Assessment / Plan     Hospital Problem List     * (Principal)Closed fracture of neck of right femur, Acute    Non Hodgkin's lymphoma    Overview Signed 8/30/2016 12:47 PM by Kyle Garcia              Late onset Alzheimer's disease with behavioral disturbance    Hypertension    Generalized weakness             Brief Hospital Course to date:  Silva Ruano is a 89 y.o. female postop day #1 status post right hip hemiarthroplasty for femoral neck fracture      Assessment & Plan:  --Taken the OR by Dr. Andersen on 2/12. Plan to mobilize with PT OT, continue Lovenox for DVT prophylaxis  --Low-grade B-cell lymphoma  --Alzheimer's disease with behavioral disturbances continue home medications  --Hypertension  --Weakness, we'll continue with physical therapy and occupational  therapy-rehabilitation  --Hyponatremia likely chronic as daughter states that they've been told that in the past.  Urine studies sent.  Appears stable.    --- Follow up with Dr. Andersen in 3 weeks.     DVT Prophylaxis:  Lovenox SQ    CODE STATUS: Conditional Code    Disposition: I expect the patient to be discharged to rehab.  Referral to Natalie in place.        Electronically signed by ERINN Erwin, 02/13/18, 11:35 AM.

## 2018-02-14 NOTE — PROGRESS NOTES
Continued Stay Note  [facility]     Patient Name: Silva Ruano  MRN: [id number]  Today's Date: 2/14/2018    Admit Date: 2/11/2018          Discharge Plan     Consent obtained for the participation in the [facility] Transitions Program. Kandice Bonilla RN                Discharge Codes     None        Expected Discharge Date and Time     Expected Discharge Date Expected Discharge Time    Feb 15, 2018             Kandice Bonilla RN

## 2018-02-14 NOTE — DISCHARGE PLACEMENT REQUEST
"Silva Goodson (89 y.o. Female)     Sonja Kellerman RN,  [phone]      Date of Birth Social Security Number Address Home Phone MRN    06/01/1925  [address] [place] [address] [address] [place] [address] [phone] [id number]    Catholic Marital Status          Presybeterian        Admission Date Admission Type Admitting Provider Attending Provider Department, Room/Bed    2/11/18 Emergency Yessi Howell MD Brown, Hannah, MD [facility] 3H, S372/1    Discharge Date Discharge Disposition Discharge Destination                      Attending Provider: Yessi Howell MD     Allergies:  No Known Allergies    Isolation:  None   Infection:  None   Code Status:  Conditional    Ht:  157.5 cm (62.01\")   Wt:  71.7 kg (158 lb)    Admission Cmt:  None   Principal Problem:  Closed fracture of neck of right femur, Acute [S72.001A]                 Active Insurance as of 2/11/2018     Primary Coverage     Payor Plan Insurance Group Employer/Plan Group    MEDICARE MEDICARE A & B      Payor Plan Address Payor Plan Phone Number Effective From Effective To    [address] [phone] 5/1/1990     [place] [address]       Subscriber Name Subscriber Birth Date Member ID       SILVA GOODSON 6/1/1925 [id number]           Secondary Coverage     Payor Plan Insurance Group Employer/Plan Group    AARP MED SUPP AARP HEALTH CARE OPTIONS      Payor Plan Address Payor Plan Phone Number Effective From Effective To    [facility] [phone] 1/1/2017     [address]       [place] [address]       Subscriber Name Subscriber Birth Date Member ID       SILVA GOODSON 6/1/1925 [id number]                 Emergency Contacts      (Rel.) Home Phone Work Phone Mobile Phone    Shana Ambriz (Daughter) -- -- [phone]            Insurance Information                MEDICARE/MEDICARE A & B Phone: [phone]    Subscriber: AldenSilva Subscriber#: [id number]    Group#:  Precert#:         AARP MED " [facility] HEALTH CARE OPTIONS Phone: [phone]    Subscriber: Silva Ruano Subscriber#: [id number]    Group#:  Precert#:              History & Physical      Marques Basilio MD at 2018  5:07 AM              [facility]  HISTORY AND PHYSICAL    Patient Name: Silva Ruano  : 1925  MRN: [id number]  Primary Care Physician: Yasmine Cortes MD    Subjective   Subjective     Chief Complaint:  Right hip pain    HPI:  Silva Ruano is a 89 y.o. female with a past medical history significant for Alzheimer's dementia, hypertension, hypothyroidism, and Non-Hodgkin's lymphoma who presents with right hip pain s/p fall. HPI limited secondary to patient's disposition. Dementia is advanced. Sister at bedside states she was sitting in rocking chair and complained that she couldn't get up. This is not unusual for the patient, however when she tried to get up she lost her balance and fell, striking her head and right hip. Has complained of right hip pain and difficulty with ambulation since then. No other complaints of fever, cough, congestion, N/V/D, SOB, or chest pain. Records reviewed indicate patient recently sustained a pathologic fracture of left clavicle (). Was seen by ortho service here at [facility].    Emergency Department Evaluation: vital stable. Chemistry and hematology favorable. Troponin, CXR negative. EKG stable. X-ray right hip demonstrates acute fracture of right femoral neck.    Review of Systems (limited due to dementia)      Otherwise 10-system ROS reviewed and is negative except as mentioned in the HPI.    Personal History     Past Medical History:   Diagnosis Date   • Alzheimer disease    • Arthritis    • Closed fracture of neck of right femur, Acute 2018   • Diverticulosis    • Hypertension    • Pathologic fracture of clavicle, left, initial encounter    • Skin cancer     lymphoma   • Thyroid disease        Past Surgical History:    Procedure Laterality Date   • CHOLECYSTECTOMY     • HYSTERECTOMY     • OVARIAN CYST REMOVAL         Family History: family history includes Cancer in her other; Heart disease in her other; Hypertension in her other.     Social History:  reports that she has never smoked. She has never used smokeless tobacco. She reports that she does not drink alcohol or use illicit drugs.  Social History     Social History Narrative       Medications:  Prescriptions Prior to Admission   Medication Sig Dispense Refill Last Dose   • Diclofenac Sodium 1.5 % solution    Taking   • hydrochlorothiazide (HYDRODIURIL) 25 MG tablet    Taking   • levothyroxine (SYNTHROID, LEVOTHROID) 50 MCG tablet    Taking   • losartan (COZAAR) 50 MG tablet    Taking   • metoprolol succinate XL (TOPROL-XL) 25 MG 24 hr tablet    Taking   • nitrofurantoin (MACRODANTIN) 50 MG capsule    Taking   • omeprazole (priLOSEC) 20 MG capsule    Taking   • PREMARIN 0.625 MG tablet    Taking   • tolterodine LA (DETROL LA) 4 MG 24 hr capsule    Taking       No Known Allergies    Objective   Objective     Vital Signs:   Temp:  [96.1 °F (35.6 °C)-98 °F (36.7 °C)] 96.1 °F (35.6 °C)  Heart Rate:  [] 101  Resp:  [16-18] 16  BP: (142-176)/(74-88) 142/88        Physical Exam   Constitutional: No acute distress, awake, alert  Eyes: PERRLA, sclerae anicteric, no conjunctival injection  HENT: NCAT, mucous membranes dry  Neck: Supple, no thyromegaly, no lymphadenopathy, trachea midline  Respiratory: Clear to auscultation bilaterally, nonlabored respirations   Cardiovascular: RRR, no murmurs, rubs, or gallops, palpable pedal pulses bilaterally  Gastrointestinal: Positive bowel sounds, soft, nontender, nondistended  Musculoskeletal: No bilateral ankle edema, no clubbing or cyanosis to extremities  Right leg with decreased ROM secondary to pain. She is N/V intact distal to injury  Psychiatric: flat affect, deferred  Neurologic: disoriented, strength symmetric in all  extremities, Cranial Nerves grossly intact to confrontation, speech clear  Skin: No rashes      Results Reviewed:  I have personally reviewed current lab, radiology, and data and agree.      Results from last 7 days  Lab Units 02/11/18  2324   WBC 10*3/mm3 6.15   HEMOGLOBIN g/dL 11.5   HEMATOCRIT % 33.3*   PLATELETS 10*3/mm3 191       Results from last 7 days  Lab Units 02/11/18  2324   SODIUM mmol/L 131*   POTASSIUM mmol/L 3.6   CHLORIDE mmol/L 96*   CO2 mmol/L 28.0   BUN mg/dL 14   CREATININE mg/dL 0.70   GLUCOSE mg/dL 117*   CALCIUM mg/dL 9.2   ALT (SGPT) U/L 19   AST (SGOT) U/L 21     Estimated Creatinine Clearance: 41.6 mL/min (by C-G formula based on Cr of 0.7).  Brief Urine Lab Results     None        No results found for: BNP  No results found for: PHART  Imaging Results (last 24 hours)     Procedure Component Value Units Date/Time    XR Chest 1 View [[id number]] Collected:  02/11/18 2303     Updated:  02/12/18 0031    Narrative:       EXAM:  XR Chest, 1 View    CLINICAL HISTORY:  89 years old, female; Screening exam; Pre-operative exam; Other: Rt hip;   Additional info: Fall/right hip pain    TECHNIQUE:  Frontal view of the chest.    COMPARISON:  CT - RC CHEST W CONTRAST 2014-11-13 10:46    FINDINGS:  Lungs:  Biapical pleural-parenchymal scarring.  Pleural space:  Normal.  No pneumothorax.  Heart:  Normal.  No cardiomegaly.  Mediastinum:  Normal.  Bones/joints:  Old distal left clavicle fracture.  Vasculature:  Atherosclerotic ossification of the thoracic aorta.      Impression:         1. No acute findings.    2. Non-acute findings are described above.    THIS DOCUMENT HAS BEEN ELECTRONICALLY SIGNED BY STEPHANIE ARREOLA MD    XR Hip With or Without Pelvis 2 - 3 View Right [[id number]] Collected:  02/11/18 2303     Updated:  02/12/18 0032    Narrative:       EXAM:  XR Right Hip With Pelvis When Performed, 2 or 3 Views    CLINICAL HISTORY:  89 years old, female; Pain; Hip pain; Right hip; Additional info:  Fall/right   hip pain    TECHNIQUE:  Two or three views of the right hip, with pelvis when performed.    COMPARISON:  CT - RC ABD PELVIS W CONTRAST 2014-11-13 10:46    FINDINGS:  Bones/joints:  Acute, displaced right mid cervical femoral neck fracture.    Lower lumbar spine degenerative changes.  Mild degenerative changes of the   hips, manifest by joint space narrowing and minimal osteophyte formation.  No   dislocation.  Soft tissues:  Normal.  Vasculature:  Phlebolith within the right pelvis.  Other findings:  2.6 x 1.87 m calcification projecting over the right ilium.      Impression:         1.  Acute, displaced right mid cervical femoral neck fracture.    2.  Incidental/non-acute findings are described above.      THIS DOCUMENT HAS BEEN ELECTRONICALLY SIGNED BY STEPHANIE ARREOLA MD             Assessment/Plan   Assessment / Plan     Hospital Problem List     * (Principal)Closed fracture of neck of right femur, Acute    Hypertension    Generalized weakness    Non Hodgkin's lymphoma    Overview Signed 8/30/2016 12:47 PM by Kyle Garcia              Late onset Alzheimer's disease with behavioral disturbance            Assessment & Plan:  1. Closed fracture of neck of right femur;  - secondary to mechanical fall. Vitals stable.  - patient discussed with Dr. Andersen per ortho in ED. Will see in am  - PT/OT treat and eval. Will likely need placement for rehab  - pain control as needed  - NPO, saline 50 cc/hr x 8 hours  - am labs    2. Hypertension:  - stable and controlled. Continue home meds:    3. Non-hodgkin's lymphoma:  - stable. In remission. Responded well to chemo.  - followed by ERINN Farley    4. Dementia:  - continue home meds  - scan head pending acute change in mental status. Did sustain minor head trauma.   - neuro checks    DVT prophylaxis: mechanical    CODE STATUS:  Conditional, DNR    Admission Status:  I believe this patient meets INPATIENT status due to the need for care which can only be  reasonably provided in an hospital setting such as aggressive/expedited ancillary services and/or consultation services, the necessity for IV medications, close physician monitoring and/or the possible need for procedures.  In such, I feel patient’s risk for adverse outcomes and need for care warrant INPATIENT evaluation and predict the patient’s care encounter to likely last beyond 2 midnights.      Ema Bradford PA-C  02/12/18   5:07 AM      PHYSICIAN NOTE(ADDENDUM)           HPI         Vitals:     02/12/18 0429   BP: 142/88   Pulse: 101   Resp: 16   Temp: 96.1 °F (35.6 °C)   SpO2: 96%      89-year-old female presented to ER with the chief complaint of fall while trying to sit in a chair.  Fell sideways on her right side, started complaining of pain on the right side of the head and he since then.  X-ray revealed, which shows acute displaced right mid cervical femoral neck fracture.      On exam-clear to auscultation anteriorly, S1-S2 regular, no pedal edema, shortened right leg with external rotation.     Old records reviewed and summarized in PM hx.     PM hx  Alzheimer's dementia-follows up with Dr. Martinez.  History of non-Hodgkin's lymphoma-low-grade  Multiple falls-with left.  clavicular fracture last year.   Hypothyroid  Hypertension-on  HCTZ 25 daily, Cozaar 50 daily, Lopressor 25 daily,  Bladder dysfunction  GERD.   A/P  EKG review-sinus rhythm and 86 bpm with T-wave inversion in lateral and inferior leads.  No old EKG.  Troponin is negative     Hip fracture-consult Dr. zimmerman in a.m. IV Dilaudid for pain.     Patient hyponatremic-questionable secondary to HCTZ.    AND status discussed with the family currently she is conditional code     I have independently seen and examined the patient with ARNP and the note above reflects my changes and contributions. I have discussed with findings, diagnosis and plans with the patient and family.      Marques Basilio MD 02/12/18 4:57 AM                  Electronically signed by Marquse Basilio MD at 2/12/2018  6:18 AM           Operative/Procedure Notes (last 7 days) (Notes from 2/7/2018  2:38 PM through 2/14/2018  2:38 PM)      Rohith Andersen MD at 2/12/2018  5:54 PM  Version 1 of 1         HIP HEMIARTHROPLASTY  Progress Note    Silva Ruano  2/11/2018 - 2/12/2018    Pre-op Diagnosis:   Right femoral neck fracture       Post-Op Diagnosis Codes:     * Fracture of femoral neck, right, closed [S72.001A]    Procedure/CPT® Codes:  SD FEMORAL FX, OPEN TX [27429]    Procedure(s):  Right HIP HEMIARTHROPLASTY    Surgeon(s):  Rohith Andersen MD     Assistant: SUZAN Tyson    Anesthesia: General and ilio fascial catheter    Staff:   Circulator: Ayaan Levin RN  Scrub Person: Teri Haque; Caleb Gillespie  Vendor Representative: Jonathan Núñez  Assistant: SUZNA Tyson    Estimated Blood Loss: 100ml    Urine Voided: * No values recorded between 2/12/2018  4:22 PM and 2/12/2018  5:43 PM *    Specimens:                None      Drains:   Urethral Catheter 02/12/18 1128 (Active)   Daily Indications Acute retention 2/12/2018 11:20 AM   Securement secured to upper leg with adhesive device 2/12/2018 11:20 AM   Irrigation Return clear;yellow 2/12/2018 11:20 AM   Irrigation Ease no resistance met 2/12/2018 11:20 AM   Catheter care done Yes 2/12/2018 11:20 AM   Tolerance no signs/symptoms of discomfort 2/12/2018  2:00 PM           Findings: Right hip displaced femoral neck fracture    Complications: None     Implants: Depuy [place] bipolar hemiarthroplasty with a size 3 stem and a 44 head with standard taper          Rohith Andersen MD     Date: 2/12/2018  Time: 5:54 PM         Electronically signed by Rohith Andersen MD at 2/12/2018  5:55 PM      Rohith Andersen MD at 2/12/2018  5:55 PM  Version 2 of 2         PREOPERATIVE DIAGNOSIS: Right hip displaced femoral neck fracture.     POSTOPERATIVE DIAGNOSIS: Right hip displaced  femoral neck fracture.     PROCEDURE PERFORMED: Right hip hemiarthroplasty.     SURGEON: Rohith Andersen MD    ASSISTANT: SUZAN Tyson, necessary during the case for positioning the patient, exposure, implantation of components, and closure.     ANESTHESIA: General with iliofascial catheter.     ESTIMATED BLOOD LOSS: 100 mL.     COMPLICATIONS: None.    SPECIMENS: None.    IMPLANTS: DePuy [place] bipolar hemiarthroplasty with a size 3 fully porous-coated femoral stem and a 44 bipolar head with standard taper.     INDICATIONS FOR PROCEDURE: The patient is a very pleasant 89-year-old female who fell at home yesterday evening getting up from a chair. Normally is a minimal home ambulator without assist. She was unable to ambulate after this fall. She lives at home with her daughter. EMS was called and she was brought to [facility] ER where evaluation revealed the displaced right hip femoral neck fracture. X-rays revealed right hip displaced femoral neck fracture with minimal degenerative changes. Upon my evaluation of the patient this morning she had an isolated complaint of right hip pain. She is neurovascular intact distally. I discussed all the risks, benefits, and alternatives to right hip hemiarthroplasty, they agreed to proceed, and surgical consent form was signed.     DESCRIPTION OF PROCEDURE: She was seen by Anesthesia. They had performed an iliofascial catheter for perioperative pain control without difficulty. She received Ancef 2 g IV prophylactic antibiotics within 1 hour of incision time. She was brought back to the operating room. General anesthesia was induced without difficulty. She received her 1st dose of tranexamic acid just prior to the incision and the last dose within 5-10 minutes of closing to help minimize bleeding. Upon induction of general anesthesia a Parker catheter had been placed. She was placed in the lateral decubitus position. An axillary roll was placed. All of her bony  prominences were well-padded. The right lower extremity was then prepped and draped in the usual sterile fashion. A timeout was performed and I confirmed right hip hemiarthroplasty on the above-mentioned patient. I made a posterolateral hip incision with a #10 blade scalpel. This was carried down through subcutaneous tissue. Adequate hemostasis was maintained with Bovie electrocautery. I incised the fascia with Bovie electrocautery. A Charnley retractor was placed. The bursa was swept off the posterolateral aspect of the hip. The hip was flexed and internally rotated. Short external rotators were identified. I dissected the piriformis and the short external rotators off the piriformis fossa with the Bovie electrocautery. These were tagged with two #2 FiberWire sutures for later repair. The hip joint capsule was incised. Hematoma was evacuated. The femoral neck cut was freshened up with an oscillating saw at a 45° neck angle 1 cm proximal to the lesser trochanter. Cut bone was removed. We then removed the femoral head. This was sized to a 44 on the back table. A 44 trial fit the acetabulum nicely. The acetabulum was cleared out of soft tissue and debris. We then prepared the femoral canal. The canal finder was placed down the canal and then the lateralizer and then we hand-reamed starting with a 0-1 reamer up to 4-5 reamer which did not get very far down the canal. We then broached starting with a #1 broach up to a size #3 broach. We had excellent fit and purchase. This was placed in 15° of anteversion, taking care to avoid varus placement of the broach. We then trialed with the standard taper and the 44 bipolar head. The hip was reduced and taken through a full range of motion and seen to be stable throughout. We achieved full extension in external rotation and flexion at 90° and past 60° of internal rotation prior to dislocating the hip. Leg lengths appeared equal. Hip joint was then reduced. The trial components  were removed. The hip was copiously irrigated with Pulsavac lavage and final components were opened on the back table. The acetabulum and canal were thoroughly dried. We then placed the final DePuy [place] size 3 femoral stem down the femoral canal, impacted it in place, and it was seen to have excellent purchase. We then placed the 44 bipolar head. This was impacted in place onto the neck and seen to be fully engaged. The hip was then reduced and at the acetabulum again taken through a full range of motion and seen to be stable throughout. The hip was then copiously irrigated with Pulsavac lavage again. We then proceeded with closure. The short external rotators and the piriformis were repaired back to the greater trochanter using drill holes and the #2 FiberWire suture. The fascia was closed with #1 Vicryl, the skin was closed with 2-0 Vicryl, the dermis was closed with 2-0 Vicryl, and the skin was closed with running 2-0 subcuticular barbed Stratafix suture. We then applied Prineo mesh dressing and Dermabond. Once this had thoroughly dried the drapes were removed. She was placed in a hip abduction pillow, placed back in the supine position, and extubated without difficulty. All sponge and needle counts were correct x2. She was transferred to the recovery room in stable condition.     POSTOPERATIVE PLAN: She will be readmitted to the hospitalist service for medical management. We will order physical therapy and occupational therapy starting tomorrow to mobilize as tolerated. Start Lovenox tomorrow for DVT prophylaxis and have the  work on rehabilitation placement.          Electronically signed by Erin Tilley MD at 2/12/2018  8:26 PM      Erin Tilley MD at 2/12/2018  5:55 PM  Version 1 of 2          Dictation on: 02/12/2018  6:00 PM by: ERIN TILLEY [[id number]]          Electronically signed by Erin Tilley MD at 2/12/2018  6:01 PM           Physical Therapy  Notes (last 24 hours) (Notes from 2/13/2018  2:38 PM through 2/14/2018  2:38 PM)      Glory PALACIO Tripp, PT at 2/14/2018  2:11 PM  Version 1 of 1         Problem: Patient Care Overview (Adult)  Goal: Plan of Care Review  Outcome: Ongoing (interventions implemented as appropriate)   02/14/18 1410   Coping/Psychosocial Response Interventions   Plan Of Care Reviewed With patient   Patient Care Overview   Progress improving   Outcome Evaluation   Outcome Summary/Follow up Plan Patient participating with therapy. Able to increase her ambulation today to 10 feet.        Problem: Inpatient Physical Therapy  Goal: Transfer Training Goal 1 LTG- PT   02/13/18 1030 02/14/18 1410   Transfer Training PT LTG   Transfer Training PT LTG, Date Established 02/13/18 --    Transfer Training PT LTG, Time to Achieve 5 - 7 days --    Transfer Training PT LTG, Activity Type sit to stand/stand to sit --    Transfer Training PT LTG, Dawson Level --  minimum assist (75% patient effort)   Transfer Training PT LTG, Assist Device walker, rolling --    Transfer Training PT LTG, Outcome --  goal ongoing     Goal: Transfer Training Goal 2 LTG- PT  Outcome: Ongoing (interventions implemented as appropriate)   02/13/18 1030   Transfer Training 2 PT LTG   Transfer Training PT 2 LTG, Date Established 02/13/18   Transfer Training PT 2 LTG, Time to Achieve 5 - 7 days   Transfer Training PT 2 LTG, Activity Type bed to chair /chair to bed   Transfer Training PT 2 LTG, Dawson Level moderate assist (50% patient effort)   Transfer Training PT 2 LTG, Assist Device walker, rolling   Transfer Training PT 2 LTG, Outcome goal ongoing     Goal: Gait Training Goal LTG- PT  Outcome: Ongoing (interventions implemented as appropriate)   02/13/18 1030 02/14/18 1410   Gait Training PT LTG   Gait Training Goal PT LTG, Date Established 02/13/18 --    Gait Training Goal PT LTG, Time to Achieve 5 - 7 days --    Gait Training Goal PT LTG, Dawson Level moderate  assist (50% patient effort);2 person assist required --    Gait Training Goal PT LTG, Assist Device walker, rolling --    Gait Training Goal PT LTG, Distance to Achieve 50 --    Gait Training Goal PT LTG, Outcome --  goal ongoing            Electronically signed by Glory Almaguer PT at 2018  2:12 PM      Glory Almaguer PT at 2018  2:16 PM  Version 1 of 1         Acute Care - Physical Therapy Treatment Note  [facility]     Patient Name: Silva Ruano  : 1925  MRN: [id number]  Today's Date: 2018  Onset of Illness/Injury or Date of Surgery Date: 18  Date of Referral to PT: 18  Referring Physician: Dr. Andersen    Admit Date: 2018    Visit Dx:    ICD-10-CM ICD-9-CM   1. Closed fracture of neck of right femur, initial encounter S72.001A 820.8   2. Impaired functional mobility, balance, gait, and endurance Z74.09 V49.89   3. Heart failure, acute systolic, first episode I50.21 428.21   4. Impaired mobility and ADLs Z74.09 799.89     Patient Active Problem List   Diagnosis   • Non Hodgkin's lymphoma   • Subcutaneous nodules   • Late onset Alzheimer's disease with behavioral disturbance   • Hypertension   • Closed fracture of neck of right femur, Acute   • Generalized weakness               Adult Rehabilitation Note       18 1330          Rehab Assessment/Intervention    Discipline physical therapist  -SC      Document Type therapy note (daily note)  -SC      Subjective Information complains of;pain   on standing  -SC      Patient Effort, Rehab Treatment good  -SC      Precautions/Limitations fall precautions;hip precautions- right;other (see comments)   confusion  -SC      Specific Treatment Considerations dementia  -SC      Patient Response to Treatment participated with PT  -SC      Recorded by [SC] Glory Almaguer PT      Pain Assessment    Pain Assessment Suarez-Yung FACES  -SC      Suarez-Yung FACES Pain Rating 0  -SC      Post Pain Score 4  -SC      Pain Type Acute pain  -SC       Pain Location Hip  -SC      Pain Orientation Right  -SC      Pain Intervention(s) Repositioned  -SC      Response to Interventions improved when sitting  -SC      Recorded by [SC] Glory Almaguer, PT      Cognitive Assessment/Intervention    Current Cognitive/Communication Assessment impaired  -SC      Orientation Status oriented to;person;other (see comments)   first name  -SC      Follows Commands/Answers Questions able to follow single-step instructions;needs cueing;needs increased time;needs repetition  -SC      Personal Safety severe impairment;decreased awareness, need for safety;decreased awareness, need for assist;decreased insight to deficits  -SC      Personal Safety Interventions gait belt;fall prevention program maintained  -SC      Recorded by [SC] Glory Almaguer, PT      Bed Mobility, Assessment/Treatment    Bed Mobility, Assistive Device bed rails;head of bed elevated;draw sheet  -SC      Bed Mob, Supine to Sit, [place] maximum assist (25% patient effort);2 person assist required  -SC      Bed Mobility, Safety Issues cognitive deficits limit understanding;decreased use of legs for bridging/pushing  -SC      Bed Mobility, Impairments pain;motor control impaired;strength decreased  -SC      Bed Mobility, Comment assisted up to edge of bed  -SC      Recorded by [SC] Glory Almaguer PT      Transfer Assessment/Treatment    Transfers, Sit-Stand [place] moderate assist (50% patient effort);2 person assist required  -SC      Transfers, Stand-Sit [place] minimum assist (75% patient effort);2 person assist required  -SC      Transfers, Sit-Stand-Sit, Assist Device rolling walker  -SC      Transfer, Safety Issues balance decreased during turns;sequencing ability decreased  -SC      Transfer, Impairments unable to maintain weight bearing status;motor control impaired  -SC      Transfer, Comment Patient encouraged to get into standing hen B hand held assist.   -SC      Recorded by [SC] Glory  PIA Almaguer, PT      Gait Assessment/Treatment    Gait, [place] Level 2 person assist required;moderate assist (50% patient effort)  -SC      Gait, Assistive Device rolling walker  -SC      Gait, Distance (Feet) 10  -SC      Gait, Gait Pattern Analysis swing-to gait  -SC      Gait, Gait Deviations weight-shifting ability decreased;step length decreased;right:;antalgic  -SC      Gait, Safety Issues sequencing ability decreased  -SC      Gait, Impairments motor control impaired;impaired balance;strength decreased  -SC      Gait, Comment Required assist to push walker. With cueing patient able to walk followed by chair . Cues given to stay closer to walker. Took one sit down rest.   -SC      Recorded by [SC] Glory Almaguer, PT      Therapy Exercises    Exercise Protocols total hip  -SC      Total Hip Exercises right:;hip abduction;LAQ;10 reps;with assist  -SC      Recorded by [SC] Glory Almaguer, PT      Positioning and Restraints    Pre-Treatment Position in bed  -SC      Post Treatment Position chair  -SC      In Bed sitting;call light within reach;encouraged to call for assist;exit alarm on;with family/caregiver;pillow between legs  -SC      Recorded by [SC] Glory Almaguer, PT        User Key  (r) = Recorded By, (t) = Taken By, (c) = Cosigned By    Initials Name Effective Dates    SC Glory Almaguer, PT 06/19/15 -                 IP PT Goals       02/14/18 1410 02/13/18 1030       Bed Mobility PT LTG    Bed Mobility PT LTG, Date Established  02/13/18  -SC     Bed Mobility PT LTG, Time to Achieve  5 - 7 days  -SC     Bed Mobility PT LTG, Activity Type  supine to sit/sit to supine  -SC     Bed Mobility PT LTG, [place] Level  minimum assist (75% patient effort)  -SC     Bed Mobility PT Goal  LTG, Assist Device  bed rails  -SC     Bed Mobility PT LTG, Outcome  goal ongoing  -SC     Transfer Training PT LTG    Transfer Training PT LTG, Date Established  02/13/18  -SC     Transfer Training PT LTG, Time to Achieve  5 -  7 days  -SC     Transfer Training PT LTG, Activity Type  sit to stand/stand to sit  -SC     Transfer Training PT LTG, [place] Level minimum assist (75% patient effort)  -SC contact guard assist;1 person + 1 person to manage equipment  -SC     Transfer Training PT LTG, Assist Device  walker, rolling  -SC     Transfer Training PT LTG, Outcome goal ongoing  -SC goal ongoing  -SC     Transfer Training 2 PT LTG    Transfer Training PT 2 LTG, Date Established  02/13/18  -SC     Transfer Training PT 2 LTG, Time to Achieve  5 - 7 days  -SC     Transfer Training PT 2 LTG, Activity Type  bed to chair /chair to bed  -SC     Transfer Training PT 2 LTG, [place] Level  moderate assist (50% patient effort)  -SC     Transfer Training PT 2 LTG, Assist Device  walker, rolling  -SC     Transfer Training PT 2 LTG, Outcome  goal ongoing  -SC     Gait Training PT LTG    Gait Training Goal PT LTG, Date Established  02/13/18  -SC     Gait Training Goal PT LTG, Time to Achieve  5 - 7 days  -SC     Gait Training Goal PT LTG, [place] Level  moderate assist (50% patient effort);2 person assist required  -SC     Gait Training Goal PT LTG, Assist Device  walker, rolling  -SC     Gait Training Goal PT LTG, Distance to Achieve  50  -SC     Gait Training Goal PT LTG, Outcome goal ongoing  -SC goal ongoing  -SC       User Key  (r) = Recorded By, (t) = Taken By, (c) = Cosigned By    Initials Name Provider Type    SC Glory Almaguer, PT Physical Therapist          Physical Therapy Education     Title: PT OT SLP Therapies (Active)     Topic: Physical Therapy (Done)     Point: Mobility training (Done)    Learning Progress Summary    Learner Readiness Method Response Comment Documented by Status   Patient Acceptance E FARIDEH,NR reviewed benefits of walking SC 02/14/18 1409 Done    Acceptance EBRADEN NR reviewed benefits of activity SC 02/13/18 1029 Active               Point: Home exercise program (Done)    Learning Progress Summary    Learner  Readiness Method Response Comment Documented by Status   Patient Acceptance E FARIDEH,NR reviewed benefits of walking SC 02/14/18 1409 Done    Acceptance E,D NR reviewed benefits of activity SC 02/13/18 1029 Active               Point: Body mechanics (Done)    Learning Progress Summary    Learner Readiness Method Response Comment Documented by Status   Patient Acceptance E FARIDEH,NR reviewed benefits of walking SC 02/14/18 1409 Done    Acceptance E,D NR reviewed benefits of activity SC 02/13/18 1029 Active               Point: Precautions (Done)    Learning Progress Summary    Learner Readiness Method Response Comment Documented by Status   Patient Acceptance E FARIDEH,NR reviewed benefits of walking SC 02/14/18 1409 Done    Acceptance E,D NR reviewed benefits of activity SC 02/13/18 1029 Active                      User Key     Initials Effective Dates Name Provider Type Discipline    SC 06/19/15 -  Glory Almaguer, PT Physical Therapist PT                    PT Recommendation and Plan  Anticipated Discharge Disposition: skilled nursing facility  Planned Therapy Interventions: bed mobility training, gait training, home exercise program, patient/family education, strengthening, transfer training  PT Frequency: daily  Plan of Care Review  Plan Of Care Reviewed With: patient  Progress: improving  Outcome Summary/Follow up Plan: Patienty participating with therapy. ABle to increase her ambulation today to 10 feet.           Outcome Measures       02/14/18 1330 02/13/18 1301 02/13/18 0825    How much help from another person do you currently need...    Turning from your back to your side while in flat bed without using bedrails? 2  -SC  2  -SC    Moving from lying on back to sitting on the side of a flat bed without bedrails? 2  -SC  2  -SC    Moving to and from a bed to a chair (including a wheelchair)? 2  -SC  2  -SC    Standing up from a chair using your arms (e.g., wheelchair, bedside chair)? 2  -SC  2  -SC    Climbing 3-5 steps  with a railing? 1  -SC  1  -SC    To walk in hospital room? 2  -SC  2  -SC    AM-PAC 6 Clicks Score 11  -SC  11  -SC    How much help from another is currently needed...    Putting on and taking off regular lower body clothing?  1  -AC     Bathing (including washing, rinsing, and drying)  1  -AC     Toileting (which includes using toilet bed pan or urinal)  1  -AC     Putting on and taking off regular upper body clothing  2  -AC     Taking care of personal grooming (such as brushing teeth)  2  -AC     Eating meals  2  -AC     Score  9  -     Functional Assessment    Outcome Measure Options AM-PAC 6 Clicks Basic Mobility (PT)  -SC AM-PAC 6 Clicks Daily Activity (OT)  -AC AM-PAC 6 Clicks Basic Mobility (PT)  -SC      User Key  (r) = Recorded By, (t) = Taken By, (c) = Cosigned By    Initials Name Provider Type    SC Shearon A Tripp, PT Physical Therapist    AC Lesley Mosher, OT Occupational Therapist           Time Calculation:         PT Charges       02/14/18 1416          Time Calculation    Start Time 1330  -SC      PT Received On 02/14/18  -SC      PT Goal Re-Cert Due Date 02/23/18  -SC      Time Calculation- PT    Total Timed Code Minutes- PT 25 minute(s)  -SC        User Key  (r) = Recorded By, (t) = Taken By, (c) = Cosigned By    Initials Name Provider Type    SC Shearon A Tripp, PT Physical Therapist          Therapy Charges for Today     Code Description Service Date Service Provider Modifiers Qty    62881527223 HC PT EVAL LOW COMPLEXITY 4 2/13/2018 Shearon A Tripp, PT GP 1    65847573227 HC GAIT TRAINING EA 15 MIN 2/13/2018 Shearon A Tripp, PT GP 1    46029853185 HC PT THER SUPP EA 15 MIN 2/13/2018 Shearon A Tripp, PT GP 2    46878504263 HC GAIT TRAINING EA 15 MIN 2/14/2018 Shearon A Tripp, PT GP 1    27190457623 HC PT THER PROC EA 15 MIN 2/14/2018 Shearon A Tripp, PT GP 1    23905246408 HC PT THER SUPP EA 15 MIN 2/14/2018 Shearon A Tripp, PT GP 2          PT G-Codes  Outcome Measure Options: AM-PAC 6 Clicks Basic  Mobility (PT)    Glory Almaguer, PT  2/14/2018          Electronically signed by Glory Almaguer, PT at 2/14/2018  2:16 PM

## 2018-02-15 VITALS
OXYGEN SATURATION: 95 % | HEIGHT: 62 IN | TEMPERATURE: 97.9 F | WEIGHT: 158 LBS | BODY MASS INDEX: 29.08 KG/M2 | RESPIRATION RATE: 18 BRPM | HEART RATE: 91 BPM | DIASTOLIC BLOOD PRESSURE: 75 MMHG | SYSTOLIC BLOOD PRESSURE: 179 MMHG

## 2018-02-15 PROCEDURE — 99239 HOSP IP/OBS DSCHRG MGMT >30: CPT | Performed by: NURSE PRACTITIONER

## 2018-02-15 PROCEDURE — 97530 THERAPEUTIC ACTIVITIES: CPT

## 2018-02-15 PROCEDURE — 97116 GAIT TRAINING THERAPY: CPT

## 2018-02-15 PROCEDURE — 94799 UNLISTED PULMONARY SVC/PX: CPT

## 2018-02-15 RX ORDER — HYDROCODONE BITARTRATE AND ACETAMINOPHEN 5; 325 MG/1; MG/1
1 TABLET ORAL EVERY 4 HOURS PRN
Qty: 18 TABLET | Refills: 0 | Status: SHIPPED | OUTPATIENT
Start: 2018-02-15 | End: 2018-02-18

## 2018-02-15 RX ORDER — TOLTERODINE 4 MG/1
4 CAPSULE, EXTENDED RELEASE ORAL DAILY
Qty: 30 CAPSULE | Refills: 0
Start: 2018-02-15 | End: 2018-10-02

## 2018-02-15 RX ORDER — SENNA AND DOCUSATE SODIUM 50; 8.6 MG/1; MG/1
2 TABLET, FILM COATED ORAL NIGHTLY
Qty: 60 TABLET | Refills: 0
Start: 2018-02-15 | End: 2018-05-29

## 2018-02-15 RX ORDER — METOPROLOL SUCCINATE 25 MG/1
25 TABLET, EXTENDED RELEASE ORAL DAILY
Qty: 30 TABLET | Refills: 0
Start: 2018-02-15 | End: 2018-06-01 | Stop reason: HOSPADM

## 2018-02-15 RX ORDER — ACETAMINOPHEN 325 MG/1
650 TABLET ORAL EVERY 4 HOURS PRN
Qty: 1 BOTTLE | Refills: 0
Start: 2018-02-15 | End: 2020-01-01 | Stop reason: HOSPADM

## 2018-02-15 RX ORDER — LOSARTAN POTASSIUM 50 MG/1
50 TABLET ORAL DAILY
Qty: 30 TABLET | Refills: 0
Start: 2018-02-15 | End: 2019-04-01 | Stop reason: HOSPADM

## 2018-02-15 NOTE — DISCHARGE SUMMARY
[facility] Medicine Services  DISCHARGE SUMMARY    Patient Name: Silva Ruano  : 1925  MRN: [id number]    Date of Admission: 2018  Date of Discharge:  2/15/2018  Primary Care Physician: Yasmine Cortes MD    Consults     Date and Time Order Name Status Description    2018 0506 Inpatient Consult to Orthopedic Surgery Completed         Hospital Course     Presenting Problem:   Closed fracture of neck of right femur, initial encounter [S72.001A]    Active Hospital Problems (** Indicates Principal Problem)    Diagnosis Date Noted   • **Closed fracture of neck of right femur, Acute [S72.001A] 2018   • Hypertension [I10] 2018   • Generalized weakness [R53.1] 2018   • Late onset Alzheimer's disease with behavioral disturbance [G30.1, F02.81] 2017   • Non Hodgkin's lymphoma [C85.90] 2016      Resolved Hospital Problems    Diagnosis Date Noted Date Resolved   No resolved problems to display.          Hospital Course:  Silva Ruano is a 89 y.o. female with a past medical history significant for Alzheimer's dementia, hypertension, hypothyroidism, and non-Hodgkin's lymphoma who presented with right hip pain status post fall.  The patient's dementia is advanced.  She has been living with her sister, who provided the majority of her history upon arrival to the emergency department.  The patient's sister reported that she was sitting in a rocking chair and complained that she couldn't get up.  This is not unusual for the patient.  However, when she tried to get up she lost her balance and fell.  She struck her head as well as her right hip.  The patient is complaining of right hip pain as well as difficulty with ambulation since her fall.  She recently sustained a pathologic left clavicular fracture in 2017 and was seen by the orthotist service here at [facility].  Emergency department evaluation revealed a right hip x-ray that  demonstrated an acute fracture of her right femoral neck.  She was admitted to Hospital medicine services for further evaluation and treatment.  The patient was seen in consultation by Dr. Andersen on February 11, 2018.  She was subsequently taken to the OR on 2/12/2018 for a right hip hemiarthroplasty.  The patient tolerated the procedure well.  The patient was initiated on Lovenox injections for DVT prophylaxis.  This will need to occur for approximately one month.  Patient was seen by physical therapy as well as occupational therapy and they did recommend inpatient rehabilitation.  The patient was also noted to have a hyponatremia which was likely chronic, as the daughter stated that they have been told she had it in the past.  Urine studies were sent and she does appear stable.  Patient does have some significant hypertension.  Her home medications were not continued on admission.  These will be restarted at discharge.  The patient has been accepted to a bed at [facility] in [place] today.  The family is agreeable to transfer today by ambulance at 2 PM.  She'll need to follow-up with  in 3 weeks.  She would also benefit from a follow-up appointment with her primary care provider within the next 1-2 weeks.    Procedure(s):  HIP HEMIARTHROPLASTY       Day of Discharge     HPI:   Resting comfortably in bed with eyes closed.  Son at bedside.  He states that she ate a few bites of her breakfast this morning, which was better than she has been doing.  No complaints of pain.  Nerve catheter was discontinued this morning by anesthesia.    Review of Systems  Gen- No fevers, chills  CV- No chest pain, palpitations  Resp- No cough, dyspnea  GI- No N/V/D, abd pain  Neuro- baseline confusion  MSK- right hip pain     Otherwise ROS is negative except as mentioned in the HPI.    Vital Signs:   Temp:  [97.9 °F (36.6 °C)-98.6 °F (37 °C)] 97.9 °F (36.6 °C)  Heart Rate:  [65-91] 91  Resp:  [17-18] 18  BP: (144-179)/(58-80)  179/75     Physical Exam:  Constitutional: No acute distress, asleep in bed with son at bedside  HENT: NCAT, mucous membranes moist  Respiratory: Clear to auscultation bilaterally, respiratory effort normal on room air   Cardiovascular: RRR, no murmurs, rubs, or gallops, palpable pedal pulses bilaterally  Gastrointestinal: Positive bowel sounds, soft, nontender, nondistended  Musculoskeletal: No bilateral ankle edema  Psychiatric: Appropriate affect, cooperative  Neurologic: Baseline confusion with no agitation  Skin: No rashes, right hip incision with intact dressing, abduction pillow in place between legs      Pertinent  and/or Most Recent Results       Results from last 7 days  Lab Units 02/13/18  0517 02/12/18  0535 02/11/18  2324   WBC 10*3/mm3 11.65* 12.93* 6.15   HEMOGLOBIN g/dL 10.6* 11.8 11.5   HEMATOCRIT % 31.4* 34.4* 33.3*   PLATELETS 10*3/mm3 190 208 191   SODIUM mmol/L 129* 129* 131*   POTASSIUM mmol/L 4.1 3.7 3.6   CHLORIDE mmol/L 95* 92* 96*   CO2 mmol/L 25.0 24.0 28.0   BUN mg/dL 10 12 14   CREATININE mg/dL 0.80 0.70 0.70   GLUCOSE mg/dL 119* 123* 117*   CALCIUM mg/dL 8.3* 9.5 9.2       Results from last 7 days  Lab Units 02/11/18  2324   BILIRUBIN mg/dL 0.2*   ALK PHOS U/L 76   ALT (SGPT) U/L 19   AST (SGOT) U/L 21           Invalid input(s): TG, LDLCALC, LDLREALC      Brief Urine Lab Results  (Last result in the past 365 days)      Color   Clarity   Blood   Leuk Est   Nitrite   Protein   CREAT   Urine HCG        02/12/18 0932 Yellow Cloudy(A) Negative Trace(A) Negative Negative               Microbiology Results Abnormal     None          Imaging Results (all)     Procedure Component Value Units Date/Time    XR Chest 1 View [[id number]] Collected:  02/11/18 2303     Updated:  02/12/18 0031    Narrative:       EXAM:  XR Chest, 1 View    CLINICAL HISTORY:  89 years old, female; Screening exam; Pre-operative exam; Other: Rt hip;   Additional info: Fall/right hip pain    TECHNIQUE:  Frontal view of the  chest.    COMPARISON:  CT - RC CHEST W CONTRAST 2014-11-13 10:46    FINDINGS:  Lungs:  Biapical pleural-parenchymal scarring.  Pleural space:  Normal.  No pneumothorax.  Heart:  Normal.  No cardiomegaly.  Mediastinum:  Normal.  Bones/joints:  Old distal left clavicle fracture.  Vasculature:  Atherosclerotic ossification of the thoracic aorta.      Impression:         1. No acute findings.    2. Non-acute findings are described above.    THIS DOCUMENT HAS BEEN ELECTRONICALLY SIGNED BY STEPHANIE ARREOLA MD    XR Hip With or Without Pelvis 2 - 3 View Right [[id number]] Collected:  02/11/18 2303     Updated:  02/12/18 0032    Narrative:       EXAM:  XR Right Hip With Pelvis When Performed, 2 or 3 Views    CLINICAL HISTORY:  89 years old, female; Pain; Hip pain; Right hip; Additional info: Fall/right   hip pain    TECHNIQUE:  Two or three views of the right hip, with pelvis when performed.    COMPARISON:  CT - RC ABD PELVIS W CONTRAST 2014-11-13 10:46    FINDINGS:  Bones/joints:  Acute, displaced right mid cervical femoral neck fracture.    Lower lumbar spine degenerative changes.  Mild degenerative changes of the   hips, manifest by joint space narrowing and minimal osteophyte formation.  No   dislocation.  Soft tissues:  Normal.  Vasculature:  Phlebolith within the right pelvis.  Other findings:  2.6 x 1.87 m calcification projecting over the right ilium.      Impression:         1.  Acute, displaced right mid cervical femoral neck fracture.    2.  Incidental/non-acute findings are described above.      THIS DOCUMENT HAS BEEN ELECTRONICALLY SIGNED BY STEPHANIE ARREOLA MD    XR Hip With or Without Pelvis 2 - 3 View Right [[id number]] Collected:  02/14/18 0850     Updated:  02/14/18 2152    Narrative:       EXAMINATION:  AP PELVIS, RIGHT HIP - 02/12/2018     HISTORY: Right hip arthroplasty postop     COMPARISON: 02/11/2018 right hip series     FINDINGS: Total right hip prosthesis is now seen in anatomical  alignment. No loosening of  the prosthesis or fracture is identified.  Soft tissue drain is noted superolaterally.       Impression:       Right hip prosthesis in anatomic alignment.     D:  02/13/2018  E:  02/13/2018     This report was finalized on 2/14/2018 9:50 PM by DR. Logan Dunaway MD.               Discharge Details      Silva Ruano   Home Medication Instructions ESTEPHANIA:[id number]    Printed on:02/15/18 0935   Medication Information                      acetaminophen (TYLENOL) 325 MG tablet  Take 2 tablets by mouth Every 4 (Four) Hours As Needed for Mild Pain .             enoxaparin (LOVENOX) 40 MG/0.4ML solution syringe  Inject 0.4 mL under the skin Daily for 30 days.             HYDROcodone-acetaminophen (NORCO) 5-325 MG per tablet  Take 1 tablet by mouth Every 4 (Four) Hours As Needed for Moderate Pain  for up to 3 days.             levothyroxine (SYNTHROID, LEVOTHROID) 50 MCG tablet               losartan (COZAAR) 50 MG tablet  Take 1 tablet by mouth Daily.             metoprolol succinate XL (TOPROL-XL) 25 MG 24 hr tablet  Take 1 tablet by mouth Daily.             omeprazole (priLOSEC) 20 MG capsule               sennosides-docusate sodium (SENOKOT-S) 8.6-50 MG tablet  Take 2 tablets by mouth Every Night.             tolterodine LA (DETROL LA) 4 MG 24 hr capsule  Take 1 capsule by mouth Daily.                   Discharge Disposition:  Skilled Nursing Facility (DC - External)    Discharge Diet:  Diet Instructions     Diet: Regular; Thin       Discharge Diet:  Regular   Fluid Consistency:  Thin   Fluid Restriction per day:  1500 mL Fluid                 Discharge Activity:   Activity Instructions     Activity as Tolerated                       Future Appointments  Date Time Provider Department Center   3/5/2018 3:00 PM Yasmine Barone PA-C [facility] None   3/27/2018 1:30 PM Inocente Morocho MD [facility]       Additional Instructions for the Follow-ups that You Need to Schedule     Discharge Follow-up with PCP    As  directed    Follow Up Details:  1-2 weeks           Discharge Follow-up with Specified Provider: Dr. Andersen; 3 Weeks    As directed    To:  Dr. Andersen    Follow Up:  3 Weeks                     Time Spent on Discharge:  45 minutes    Electronically signed by ERINN Erwin, 02/15/18, 9:44 AM.

## 2018-02-15 NOTE — PLAN OF CARE
Problem: Patient Care Overview (Adult)  Goal: Plan of Care Review  Outcome: Ongoing (interventions implemented as appropriate)   02/15/18 1145   Coping/Psychosocial Response Interventions   Plan Of Care Reviewed With patient   Patient Care Overview   Progress progress toward functional goals is gradual   Outcome Evaluation   Outcome Summary/Follow up Plan Participates with PT. Able to ambulate short distance with walker and assist. Expect this to improve with improved pain control       Problem: Inpatient Physical Therapy  Goal: Bed Mobility Goal LTG- PT  Outcome: Ongoing (interventions implemented as appropriate)   02/13/18 1030   Bed Mobility PT LTG   Bed Mobility PT LTG, Date Established 02/13/18   Bed Mobility PT LTG, Time to Achieve 5 - 7 days   Bed Mobility PT LTG, Activity Type supine to sit/sit to supine   Bed Mobility PT LTG, [place] Level minimum assist (75% patient effort)   Bed Mobility PT Goal LTG, Assist Device bed rails   Bed Mobility PT LTG, Outcome goal ongoing     Goal: Transfer Training Goal 1 LTG- PT  Outcome: Ongoing (interventions implemented as appropriate)   02/13/18 1030 02/14/18 1410   Transfer Training PT LTG   Transfer Training PT LTG, Date Established 02/13/18 --    Transfer Training PT LTG, Time to Achieve 5 - 7 days --    Transfer Training PT LTG, Activity Type sit to stand/stand to sit --    Transfer Training PT LTG, [place] Level --  minimum assist (75% patient effort)   Transfer Training PT LTG, Assist Device walker, rolling --    Transfer Training PT LTG, Outcome --  goal ongoing     Goal: Transfer Training Goal 2 LTG- PT  Outcome: Ongoing (interventions implemented as appropriate)   02/13/18 1030   Transfer Training 2 PT LTG   Transfer Training PT 2 LTG, Date Established 02/13/18   Transfer Training PT 2 LTG, Time to Achieve 5 - 7 days   Transfer Training PT 2 LTG, Activity Type bed to chair /chair to bed   Transfer Training PT 2 LTG, [place] Level moderate assist  (50% patient effort)   Transfer Training PT 2 LTG, Assist Device walker, rolling   Transfer Training PT 2 LTG, Outcome goal ongoing     Goal: Gait Training Goal LTG- PT  Outcome: Ongoing (interventions implemented as appropriate)   02/13/18 1030 02/14/18 1410   Gait Training PT LTG   Gait Training Goal PT LTG, Date Established 02/13/18 --    Gait Training Goal PT LTG, Time to Achieve 5 - 7 days --    Gait Training Goal PT LTG, [place] Level moderate assist (50% patient effort);2 person assist required --    Gait Training Goal PT LTG, Assist Device walker, rolling --    Gait Training Goal PT LTG, Distance to Achieve 50 --    Gait Training Goal PT LTG, Outcome --  goal ongoing

## 2018-02-15 NOTE — PROGRESS NOTES
Continued Stay Note  [facility]     Patient Name: Silva Ruano  MRN: [id number]  Today's Date: 2/15/2018    Admit Date: 2/11/2018          Discharge Plan       02/15/18 1146    Case Management/Social Work Plan    Plan SNF    Patient/Family In Agreement With Plan yes    Additional Comments Arrangments made for transfer to [facility] skilled bed.I confirmed with Kassandra at [facility]. [facility] ambulance scheduled for 2pm transport. Discussed with son at bedside, family in agreement with plan. Call report to [phone], fax dc summary to [phone]              Discharge Codes     None        Expected Discharge Date and Time     Expected Discharge Date Expected Discharge Time    Feb 15, 2018             Sonja C Kellerman, RN

## 2018-02-15 NOTE — THERAPY TREATMENT NOTE
Acute Care - Occupational Therapy Treatment Note  [facility]     Patient Name: Silva Ruano  : 1925  MRN: [id number]  Today's Date: 2/15/2018  Onset of Illness/Injury or Date of Surgery Date: 18  Date of Referral to OT: 18  Referring Physician: Dr. Andersen      Admit Date: 2018    Visit Dx:     ICD-10-CM ICD-9-CM   1. Closed fracture of neck of right femur, initial encounter S72.001A 820.8   2. Impaired functional mobility, balance, gait, and endurance Z74.09 V49.89   3. Heart failure, acute systolic, first episode I50.21 428.21   4. Impaired mobility and ADLs Z74.09 799.89     Patient Active Problem List   Diagnosis   • Non Hodgkin's lymphoma   • Subcutaneous nodules   • Late onset Alzheimer's disease with behavioral disturbance   • Hypertension   • Closed fracture of neck of right femur, Acute   • Generalized weakness             Adult Rehabilitation Note       02/15/18 1127 02/15/18 1011 18 1330    Rehab Assessment/Intervention    Discipline (P)  occupational therapist  -SS physical therapist  -SC physical therapist  -SC    Document Type (P)  therapy note (daily note)  -SS therapy note (daily note);discharge summary  -SC therapy note (daily note)  -SC    Subjective Information (P)  agree to therapy;complains of;pain   pt reports pain in legs. Treatment limited to chair.  - complains of;pain  -SC complains of;pain   on standing  -SC    Patient Effort, Rehab Treatment (P)  good  -SS good  -SC good  -SC    Symptoms Noted During/After Treatment (P)  none  -      Precautions/Limitations (P)  hip precautions- right;fall precautions  - fall precautions;hip precautions- right  -SC fall precautions;hip precautions- right;other (see comments)   confusion  -SC    Specific Treatment Considerations (P)  Dementia  -SS dementia  -SC dementia  -SC    Patient Response to Treatment (P)  Tolerated.  -SS participates [facility] PT  -SC participated with PT  -SC    Recorded by [SS] Saran Aldrich,  OT Student [SC] Shearon A Tripp, PT [SC] Shearon A Tripp, PT    Vital Signs    Pre Systolic BP Rehab (P)  --   Vitals not assessed. RN approved treatment. Vitals stable  -SS      Recorded by [SS] Saran Aldrich, OT Student      Pain Assessment    Pain Assessment (P)  Suarez-Baker FACES  -SS Suarez-Baker FACES  -SC Suarez-Baker FACES  -SC    Suarez-Yung FACES Pain Rating (P)  4  -SS 0  -SC 0  -SC    Post Pain Score  6  -SC 4  -SC    Pain Type (P)  Acute pain  -SS Acute pain  -SC Acute pain  -SC    Pain Location (P)  Hip  -SS Hip  -SC Hip  -SC    Pain Orientation (P)  Right  -SS Right  -SC Right  -SC    Pain Intervention(s) (P)  Repositioned  -SS Repositioned  -SC Repositioned  -SC    Response to Interventions  improved when sitting  -SC improved when sitting  -SC    Recorded by [SS] Saran Aldrich, OT Student [SC] Shearon A Tripp, PT [SC] Shearon A Tripp, PT    Cognitive Assessment/Intervention    Current Cognitive/Communication Assessment (P)  impaired  -SS impaired  -SC impaired  -SC    Orientation Status (P)  disoriented to;place;time;situation;person   Able to state her first and maiden name.  -SS oriented to;person  -SC oriented to;person;other (see comments)   first name  -SC    Follows Commands/Answers Questions (P)  50% of the time;able to follow single-step instructions;needs cueing;needs increased time  - able to follow single-step instructions;needs cueing;needs increased time;needs repetition  -SC able to follow single-step instructions;needs cueing;needs increased time;needs repetition  -SC    Personal Safety (P)  severe impairment;decreased awareness, need for assist;decreased awareness, need for safety;decreased insight to deficits;unaware of cognitive deficits  -SS severe impairment;decreased awareness, need for safety;decreased awareness, need for assist;decreased insight to deficits  -SC severe impairment;decreased awareness, need for safety;decreased awareness, need for assist;decreased insight  to deficits  -SC    Personal Safety Interventions (P)  fall prevention program maintained;gait belt;muscle strengthening facilitated;nonskid shoes/slippers when out of bed  - gait belt;fall prevention program maintained  -SC gait belt;fall prevention program maintained  -SC    Recorded by [SS] Saran Aldrich, OT Student [SC] Glory Almaguer, PT [SC] Glory Grossmanp, PT    Bed Mobility, Assessment/Treatment    Bed Mobility, Assistive Device  head of bed elevated  -SC bed rails;head of bed elevated;draw sheet  -SC    Bed Mobility, Scoot/Bridge, [place]  verbal cues required;minimum assist (75% patient effort);other (see comments)   patient attempting to scoot with B ue  -SC     Bed Mob, Supine to Sit, [place]  maximum assist (25% patient effort)  -SC maximum assist (25% patient effort);2 person assist required  -SC    Bed Mobility, Safety Issues  cognitive deficits limit understanding;decreased use of legs for bridging/pushing  -SC cognitive deficits limit understanding;decreased use of legs for bridging/pushing  -SC    Bed Mobility, Impairments  pain;motor control impaired;strength decreased  -SC pain;motor control impaired;strength decreased  -SC    Bed Mobility, Comment (P)  Not addressed, pt in chair on arrival  - assisted up to edg of bed   -SC assisted up to edge of bed  -SC    Recorded by [SS] Saran Aldrich, OT Student [SC] Glory Almaguer, PT [SC] Glory Grossmanp, PT    Transfer Assessment/Treatment    Transfers, Sit-Stand [place]  moderate assist (50% patient effort);2 person assist required;upper extremity support  -SC moderate assist (50% patient effort);2 person assist required  -SC    Transfers, Stand-Sit [place]  moderate assist (50% patient effort);verbal cues required  -SC minimum assist (75% patient effort);2 person assist required  -SC    Transfers, Sit-Stand-Sit, Assist Device  rolling walker  -SC rolling walker  -SC    Transfer, Safety Issues  sequencing ability  decreased;balance decreased during turns  -SC balance decreased during turns;sequencing ability decreased  -SC    Transfer, Impairments  motor control impaired;strength decreased;pain  -SC motor control impaired  -SC    Transfer, Comment (P)  Not addressed, pt declined due to pt pain  -SS stood with HHA from edge of bed then  hold to walker for stabilty. Worked on standing balance before walking with cues to stay close to walker  -SC Patient encouraged to get into standing hen B hand held assist.   -SC    Recorded by [SS] Saran Aldrich, OT Student [SC] Glory Almaguer, PT [SC] Glory Almaguer, PT    Gait Assessment/Treatment    Gait, [place] Level  2 person assist required  -SC 2 person assist required;moderate assist (50% patient effort)  -SC    Gait, Assistive Device  rolling walker  -SC rolling walker  -SC    Gait, Distance (Feet)  8   followed by chair  -SC 10  -SC    Gait, Gait Pattern Analysis  swing-to gait  -SC swing-to gait  -SC    Gait, Gait Deviations  right:;antalgic;weight-shifting ability decreased;step length decreased  -SC weight-shifting ability decreased;step length decreased;right:;antalgic  -SC    Gait, Safety Issues  sequencing ability decreased  -SC sequencing ability decreased  -SC    Gait, Impairments  pain;motor control impaired;strength decreased  -SC motor control impaired;impaired balance;strength decreased  -SC    Gait, Comment  Requires assist to push walker, cues to get closer to walker during ambulaltion.  Stoped for standing rest every 2-3 steps with cues for improving posture  -SC Required assist to push walker. With cueing patient able to walk followed by chair . Cues given to stay closer to walker. Took one sit down rest.   -SC    Recorded by  [SC] Glory Almaguer, PT [SC] Glory Almaguer, PT    Functional Mobility    Functional Mobility- Comment (P)  Not addressed, pt declined due to pain.  -SS      Recorded by [SS] Saran Aldrich, OT Student      ADL  Assessment/Intervention    Additional Documentation (P)  --   ADL's not addressed, pt declined to transfer due to pain  -      Recorded by [SS] Saran Aldrich, OT Student      Motor Skills/Interventions    Additional Documentation (P)  Balance Skills Training (Group)  -      Recorded by [SS] Saran Aldrich OT Student      Balance Skills Training    Sitting-Level of Assistance (P)  Minimum assistance  -      Sitting-Balance Support (P)  Right upper extremity supported;Left upper extremity supported;Feet supported  -      Sitting # of Minutes (P)  5  -SS      Recorded by [SS] Saran Aldrich, OT Student      Therapy Exercises    Bilateral Upper Extremity (P)  AAROM:;AROM:;5 reps;elbow flexion/extension;hand pumps;shoulder rolls/shrugs;shoulder extension/flexion;shoulder abduction/adduction   Pt required AAROM only to maintain focus on exercises.  -      Exercise Protocols  total hip  -SC total hip  -SC    Total Hip Exercises  5 reps;with assist;LAQ;heel slides;hip abduction  -SC right:;hip abduction;LAQ;10 reps;with assist  -SC    Recorded by [] Saran Aldrich, OT Student [SC] Shearon A Tripp, PT [SC] Glory A Tripp, PT    Positioning and Restraints    Pre-Treatment Position (P)  sitting in chair/recliner  - in bed  -SC in bed  -SC    Post Treatment Position (P)  chair  - chair  -SC chair  -SC    In Bed  sitting;call light within reach;encouraged to call for assist;exit alarm on;with family/caregiver;notified ns  -SC sitting;call light within reach;encouraged to call for assist;exit alarm on;with family/caregiver;pillow between legs  -SC    In Chair (P)  sitting;call light within reach;exit alarm on;encouraged to call for assist;with family/caregiver  -      Recorded by [] Saran Aldrich, OT Student [SC] Shearon A Tripp, PT [SC] Shearon A Tripp, PT      User Key  (r) = Recorded By, (t) = Taken By, (c) = Cosigned By    Initials Name Effective Dates    SC Shearon A Tripp,  PT 06/19/15 -     SS Saran Aldrich, OT Student 12/11/17 -                 OT Goals       02/15/18 1150 02/13/18 1353       Bed Mobility OT LTG    Bed Mobility OT LTG, Date Established  02/13/18  -AC     Bed Mobility OT LTG, Time to Achieve  by discharge  -AC     Bed Mobility OT LTG, Activity Type  roll left/roll right;supine to sit/sit to supine  -AC     Bed Mobility OT LTG, [place] Level  moderate assist (50% patient effort)  -AC     Bed Mobility OT LTG, Assist Device  bed rails  -AC     Bed Mobility OT LTG, Outcome (P)  goal ongoing  -SS      Transfer Training OT LTG    Transfer Training OT LTG, Date Established  02/13/18  -AC     Transfer Training OT LTG, Time to Achieve  by discharge  -AC     Transfer Training OT LTG, Activity Type  sit to stand/stand to sit;bed to chair /chair to bed  -AC     Transfer Training OT LTG, [place] Level  moderate assist (50% patient effort)  -AC     Transfer Training OT LTG, Assist Device  --   appropriate AD  -AC     Transfer Training OT LTG, Outcome (P)  goal ongoing  -SS      Strength OT LTG    Strength Goal OT LTG, Date Established  02/13/18  -AC     Strength Goal OT LTG, Time to Achieve  by discharge  -AC     Strength Goal OT LTG, Measure to Achieve  Pt will complete BUE AROM/AAROM 10-15 reps daily as needed to increase strength to support ADLs  -AC     Strength Goal OT LTG, Outcome (P)  goal ongoing  -      Dynamic Sitting Balance OT LTG    Dynamic Sitting Balance OT LTG, Date Established  02/13/18  -AC     Dynamic Sitting Balance OT LTG, Time to Achieve  by discharge  -AC     Dynamic Sitting Balance OT LTG, [place] Level  minimum assist (75% patient effort)  -AC     Dynamic Sitting Balance OT LTG, Assist Device  UE Support  -AC     Dynamic Sitting Balance OT LTG, Additional Goal  Pt will sit EOB with min A to complete grooming activities  -AC     Dynamic Sitting Balance OT LTG, Outcome (P)  goal ongoing  -SS        User Key  (r) = Recorded By, (t) =  Taken By, (c) = Cosigned By    Initials Name Provider Type     Lesley Mosehr, OT Occupational Therapist     Saran Aldrich, OT Student OT Student          Occupational Therapy Education     Title: PT OT SLP Therapies (Active)     Topic: Occupational Therapy (Active)     Point: ADL training (Done)    Description: Instruct learner(s) on proper safety adaptation and remediation techniques during self care or transfers.   Instruct in proper use of assistive devices.    Learning Progress Summary    Learner Readiness Method Response Comment Documented by Status   Patient Acceptance E VU Pt and family educated on importance of activity to maintain function and facilitate participation in ADL's.  02/15/18 1149 Done   Family Acceptance E VU Pt and family educated on importance of activity to maintain function and facilitate participation in ADL's.  02/15/18 1149 Done    Acceptance E VU Role of OT, benefits of activity, hip precautions  02/13/18 1350 Done                      User Key     Initials Effective Dates Name Provider Type Discipline     06/23/15 -  Lesley Mosher, OT Occupational Therapist OT     12/11/17 -  Saran Aldrich, OT Student OT Student OT                  OT Recommendation and Plan  Anticipated Discharge Disposition: skilled nursing facility  Planned Therapy Interventions: ADL retraining, adaptive equipment training, balance training, bed mobility training, strengthening, transfer training  Therapy Frequency: daily  Plan of Care Review  Outcome Summary/Follow up Plan: (P) Treatment session limited by pt pain. Pt declined transfer or functional mobility. Participated in AROM exercises. Recommend SNF at discharge.        Outcome Measures       02/15/18 1127 02/15/18 1011 02/14/18 1330    How much help from another person do you currently need...    Turning from your back to your side while in flat bed without using bedrails?  2  -SC 2  -SC    Moving from lying on back to sitting on  the side of a flat bed without bedrails?  2  -SC 2  -SC    Moving to and from a bed to a chair (including a wheelchair)?  2  -SC 2  -SC    Standing up from a chair using your arms (e.g., wheelchair, bedside chair)?  2  -SC 2  -SC    Climbing 3-5 steps with a railing?  1  -SC 1  -SC    To walk in hospital room?  2  -SC 2  -SC    AM-PAC 6 Clicks Score  11  -SC 11  -SC    How much help from another is currently needed...    Putting on and taking off regular lower body clothing? (P)  1  -SS      Bathing (including washing, rinsing, and drying) (P)  1  -SS      Toileting (which includes using toilet bed pan or urinal) (P)  1  -SS      Putting on and taking off regular upper body clothing (P)  2  -SS      Taking care of personal grooming (such as brushing teeth) (P)  2  -SS      Eating meals (P)  2  -SS      Score (P)  9  -SS      Functional Assessment    Outcome Measure Options  AM-PAC 6 Clicks Basic Mobility (PT)  -SC -PAC 6 Clicks Basic Mobility (PT)  -SC      02/13/18 1301 02/13/18 0825       How much help from another person do you currently need...    Turning from your back to your side while in flat bed without using bedrails?  2  -SC     Moving from lying on back to sitting on the side of a flat bed without bedrails?  2  -SC     Moving to and from a bed to a chair (including a wheelchair)?  2  -SC     Standing up from a chair using your arms (e.g., wheelchair, bedside chair)?  2  -SC     Climbing 3-5 steps with a railing?  1  -SC     To walk in hospital room?  2  -SC     AM-PAC 6 Clicks Score  11  -SC     How much help from another is currently needed...    Putting on and taking off regular lower body clothing? 1  -AC      Bathing (including washing, rinsing, and drying) 1  -AC      Toileting (which includes using toilet bed pan or urinal) 1  -AC      Putting on and taking off regular upper body clothing 2  -AC      Taking care of personal grooming (such as brushing teeth) 2  -AC      Eating meals 2  -AC       Score 9  -      Functional Assessment    Outcome Measure Options AM-PAC 6 Clicks Daily Activity (OT)  - AM-PAC 6 Clicks Basic Mobility (PT)  -SC       User Key  (r) = Recorded By, (t) = Taken By, (c) = Cosigned By    Initials Name Provider Type    SC Glory Almaguer, PT Physical Therapist    AC Lesley Mosher, OT Occupational Therapist    SS Saran Aldrich, OT Student OT Student           Time Calculation:         Time Calculation- OT       02/15/18 1153 02/15/18 1152       Time Calculation- OT    OT Start Time (P)  1127  -SS (P)  1127  -SS     Total Timed Code Minutes- OT (P)  12 minute(s)  -SS (P)  0 minute(s)  -     OT Received On (P)  02/15/18  - (P)  02/15/18  -     OT Goal Re-Cert Due Date (P)  02/23/18  - (P)  02/23/18  -       User Key  (r) = Recorded By, (t) = Taken By, (c) = Cosigned By    Initials Name Provider Type     Saran Aldrich, OT Student OT Student           Therapy Charges for Today     Code Description Service Date Service Provider Modifiers Qty    [id number]  OT THERAPEUTIC ACT EA 15 MIN 2/15/2018 Saran Aldrich OT Student GO 1               Saran Aldrich OT Student  2/15/2018

## 2018-02-15 NOTE — NURSING NOTE
Pt. D/c'd to [facility] via ambulance with family at bedside. Discharge instructions/packet given to EMS and education complete.  No questions/concerns at this time.  Brain Williamson RN

## 2018-02-15 NOTE — PLAN OF CARE
Problem: Patient Care Overview (Adult)  Goal: Plan of Care Review  Outcome: Ongoing (interventions implemented as appropriate)   02/15/18 1150   Outcome Evaluation   Outcome Summary/Follow up Plan Treatment session limited by pt pain. Pt declined transfer or functional mobility. Participated in AROM exercises. Recommend SNF at discharge.       Problem: Inpatient Occupational Therapy  Goal: Bed Mobility Goal LTG- OT  Outcome: Ongoing (interventions implemented as appropriate)   02/13/18 1353 02/15/18 1150   Bed Mobility OT LTG   Bed Mobility OT LTG, Date Established 02/13/18 --    Bed Mobility OT LTG, Time to Achieve by discharge --    Bed Mobility OT LTG, Activity Type roll left/roll right;supine to sit/sit to supine --    Bed Mobility OT LTG, [place] Level moderate assist (50% patient effort) --    Bed Mobility OT LTG, Assist Device bed rails --    Bed Mobility OT LTG, Outcome --  goal ongoing     Goal: Transfer Training Goal 1 LTG- OT  Outcome: Ongoing (interventions implemented as appropriate)   02/13/18 1353 02/15/18 1150   Transfer Training OT LTG   Transfer Training OT LTG, Date Established 02/13/18 --    Transfer Training OT LTG, Time to Achieve by discharge --    Transfer Training OT LTG, Activity Type sit to stand/stand to sit;bed to chair /chair to bed --    Transfer Training OT LTG, [place] Level moderate assist (50% patient effort) --    Transfer Training OT LTG, Assist Device (appropriate AD) --    Transfer Training OT LTG, Outcome --  goal ongoing     Goal: Strength Goal LTG- OT  Outcome: Ongoing (interventions implemented as appropriate)   02/13/18 1353 02/15/18 1150   Strength OT LTG   Strength Goal OT LTG, Date Established 02/13/18 --    Strength Goal OT LTG, Time to Achieve by discharge --    Strength Goal OT LTG, Measure to Achieve Pt will complete BUE AROM/AAROM 10-15 reps daily as needed to increase strength to support ADLs --    Strength Goal OT LTG, Outcome --  goal ongoing     Goal:  Dynamic Sitting Balance Goal LTG- OT  Outcome: Ongoing (interventions implemented as appropriate)   02/13/18 1353 02/15/18 1150   Dynamic Sitting Balance OT LTG   Dynamic Sitting Balance OT LTG, Date Established 02/13/18 --    Dynamic Sitting Balance OT LTG, Time to Achieve by discharge --    Dynamic Sitting Balance OT LTG, [place] Level minimum assist (75% patient effort) --    Dynamic Sitting Balance OT LTG, Assist Device UE Support --    Dynamic Sitting Balance OT LTG, Additional Goal Pt will sit EOB with min A to complete grooming activities --    Dynamic Sitting Balance OT LTG, Outcome --  goal ongoing

## 2018-02-15 NOTE — NURSING NOTE
Report called to Tawana @ [facility] in [place].   Ambulance scheduled for 1400.  No concerns at this time.  Brain Williamson RN

## 2018-02-15 NOTE — PROGRESS NOTES
[facility]    Acute pain service Inpatient Progress Note    Patient Name: Silva Ruano  :  1925  MRN:  [id number]        Acute Pain  Service Inpatient Progress Note:    Catheter Plan:Catheter removed and tip intact

## 2018-02-15 NOTE — PROGRESS NOTES
Continued Stay Note  [facility]     Patient Name: Silva Ruano  MRN: [id number]  Today's Date: 2/15/2018    Admit Date: 2/11/2018          Discharge Plan       02/15/18 1146    Case Management/Social Work Plan    Plan SNF    Patient/Family In Agreement With Plan yes    Additional Comments Arrangments made for transfer to [facility] skilled bed. [facility] ambulance scheduled for 2pm transport. Discussed with son at bedside, family in agreement with plan. Call report to [phone], fax dc summary to [phone]              Discharge Codes     None        Expected Discharge Date and Time     Expected Discharge Date Expected Discharge Time    Feb 15, 2018             Sonja C Kellerman, RN

## 2018-02-15 NOTE — THERAPY DISCHARGE NOTE
Acute Care - Physical Therapy Treatment Note/Discharge  [facility]     Patient Name: Silva Ruano  : 1925  MRN: [id number]  Today's Date: 2/15/2018  Onset of Illness/Injury or Date of Surgery Date: 18  Date of Referral to PT: 18  Referring Physician: Dr. Andersen    Admit Date: 2018    Visit Dx:    ICD-10-CM ICD-9-CM   1. Closed fracture of neck of right femur, initial encounter S72.001A 820.8   2. Impaired functional mobility, balance, gait, and endurance Z74.09 V49.89   3. Heart failure, acute systolic, first episode I50.21 428.21   4. Impaired mobility and ADLs Z74.09 799.89     Patient Active Problem List   Diagnosis   • Non Hodgkin's lymphoma   • Subcutaneous nodules   • Late onset Alzheimer's disease with behavioral disturbance   • Hypertension   • Closed fracture of neck of right femur, Acute   • Generalized weakness       Physical Therapy Education     Title: PT OT SLP Therapies (Active)     Topic: Physical Therapy (Active)     Point: Mobility training (Active)    Learning Progress Summary    Learner Readiness Method Response Comment Documented by Status   Patient Acceptance E NR reviewed safety with mobility SC 02/15/18 1143 Active    Acceptance E DU,NR reviewed benefits of walking SC 18 1409 Done    Acceptance E,D NR reviewed benefits of activity SC 18 1029 Active               Point: Home exercise program (Active)    Learning Progress Summary    Learner Readiness Method Response Comment Documented by Status   Patient Acceptance E NR reviewed safety with mobility SC 02/15/18 1143 Active    Acceptance E DU,NR reviewed benefits of walking SC 18 1409 Done    Acceptance E,D NR reviewed benefits of activity SC 18 1029 Active               Point: Body mechanics (Active)    Learning Progress Summary    Learner Readiness Method Response Comment Documented by Status   Patient Acceptance E NR reviewed safety with mobility SC 02/15/18 1143 Active    Acceptance E DU,NR  reviewed benefits of walking SC 02/14/18 1409 Done    Acceptance E,D NR reviewed benefits of activity SC 02/13/18 1029 Active               Point: Precautions (Active)    Learning Progress Summary    Learner Readiness Method Response Comment Documented by Status   Patient Acceptance E NR reviewed safety with mobility SC 02/15/18 1143 Active    Acceptance E FARIDEHNR reviewed benefits of walking SC 02/14/18 1409 Done    Acceptance E,D NR reviewed benefits of activity SC 02/13/18 1029 Active                      User Key     Initials Effective Dates Name Provider Type Discipline    SC 06/19/15 -  Glory Almaguer, PT Physical Therapist PT                    IP PT Goals       02/14/18 1410 02/13/18 1030       Bed Mobility PT LTG    Bed Mobility PT LTG, Date Established  02/13/18  -SC     Bed Mobility PT LTG, Time to Achieve  5 - 7 days  -SC     Bed Mobility PT LTG, Activity Type  supine to sit/sit to supine  -SC     Bed Mobility PT LTG, [place] Level  minimum assist (75% patient effort)  -SC     Bed Mobility PT Goal  LTG, Assist Device  bed rails  -SC     Bed Mobility PT LTG, Outcome  goal ongoing  -SC     Transfer Training PT LTG    Transfer Training PT LTG, Date Established  02/13/18  -SC     Transfer Training PT LTG, Time to Achieve  5 - 7 days  -SC     Transfer Training PT LTG, Activity Type  sit to stand/stand to sit  -SC     Transfer Training PT LTG, [place] Level minimum assist (75% patient effort)  -SC contact guard assist;1 person + 1 person to manage equipment  -SC     Transfer Training PT LTG, Assist Device  walker, rolling  -SC     Transfer Training PT LTG, Outcome goal ongoing  -SC goal ongoing  -SC     Transfer Training 2 PT LTG    Transfer Training PT 2 LTG, Date Established  02/13/18  -SC     Transfer Training PT 2 LTG, Time to Achieve  5 - 7 days  -SC     Transfer Training PT 2 LTG, Activity Type  bed to chair /chair to bed  -SC     Transfer Training PT 2 LTG, [place] Level  moderate assist  (50% patient effort)  -SC     Transfer Training PT 2 LTG, Assist Device  walker, rolling  -SC     Transfer Training PT 2 LTG, Outcome  goal ongoing  -SC     Gait Training PT LTG    Gait Training Goal PT LTG, Date Established  02/13/18  -SC     Gait Training Goal PT LTG, Time to Achieve  5 - 7 days  -SC     Gait Training Goal PT LTG, [place] Level  moderate assist (50% patient effort);2 person assist required  -SC     Gait Training Goal PT LTG, Assist Device  walker, rolling  -SC     Gait Training Goal PT LTG, Distance to Achieve  50  -SC     Gait Training Goal PT LTG, Outcome goal ongoing  -SC goal ongoing  -SC       User Key  (r) = Recorded By, (t) = Taken By, (c) = Cosigned By    Initials Name Provider Type    SC Glory Almaguer, PT Physical Therapist              Adult Rehabilitation Note       02/15/18 1127 02/15/18 1011 02/14/18 1330    Rehab Assessment/Intervention    Discipline (P)  occupational therapist  - physical therapist  -SC physical therapist  -SC    Document Type (P)  therapy note (daily note)  - therapy note (daily note);discharge summary  -SC therapy note (daily note)  -SC    Subjective Information (P)  agree to therapy;complains of;pain   pt reports pain in legs. Treatment limited to chair.  - complains of;pain  -SC complains of;pain   on standing  -SC    Patient Effort, Rehab Treatment (P)  good  -SS good  -SC good  -SC    Symptoms Noted During/After Treatment (P)  none  -      Precautions/Limitations (P)  hip precautions- right;fall precautions  - fall precautions;hip precautions- right  -SC fall precautions;hip precautions- right;other (see comments)   confusion  -SC    Specific Treatment Considerations (P)  Dementia  - dementia  -SC dementia  -SC    Patient Response to Treatment (P)  Tolerated.  - participates wtih PT  -SC participated with PT  -SC    Recorded by [] Saran Aldrich OT Student [SC] Glory Almaguer, PT [SC] Glory Almaguer, PT    Vital Signs    Pre  Systolic BP Rehab (P)  --   Vitals not assessed. RN approved treatment. Vitals stable  -SS      Recorded by [SS] Saran Aldrich, OT Student      Pain Assessment    Pain Assessment (P)  Suarez-Baker FACES  -SS Suarez-Baker FACES  -SC Suarez-Baker FACES  -SC    Suarez-Yung FACES Pain Rating (P)  4  -SS 0  -SC 0  -SC    Post Pain Score  6  -SC 4  -SC    Pain Type (P)  Acute pain  -SS Acute pain  -SC Acute pain  -SC    Pain Location (P)  Hip  -SS Hip  -SC Hip  -SC    Pain Orientation (P)  Right  -SS Right  -SC Right  -SC    Pain Intervention(s) (P)  Repositioned  -SS Repositioned  -SC Repositioned  -SC    Response to Interventions  improved when sitting  -SC improved when sitting  -SC    Recorded by [SS] Saran Aldrich OT Student [SC] Glory Almaguer, PT [SC] Glory Almaguer, PT    Cognitive Assessment/Intervention    Current Cognitive/Communication Assessment (P)  impaired  -SS impaired  -SC impaired  -SC    Orientation Status (P)  disoriented to;place;time;situation;person   Able to state her first and maiden name.  -SS oriented to;person  -SC oriented to;person;other (see comments)   first name  -SC    Follows Commands/Answers Questions (P)  50% of the time;able to follow single-step instructions;needs cueing;needs increased time  -SS able to follow single-step instructions;needs cueing;needs increased time;needs repetition  -SC able to follow single-step instructions;needs cueing;needs increased time;needs repetition  -SC    Personal Safety (P)  severe impairment;decreased awareness, need for assist;decreased awareness, need for safety;decreased insight to deficits;unaware of cognitive deficits  -SS severe impairment;decreased awareness, need for safety;decreased awareness, need for assist;decreased insight to deficits  -SC severe impairment;decreased awareness, need for safety;decreased awareness, need for assist;decreased insight to deficits  -SC    Personal Safety Interventions (P)  fall prevention program  maintained;gait belt;muscle strengthening facilitated;nonskid shoes/slippers when out of bed  - gait belt;fall prevention program maintained  -SC gait belt;fall prevention program maintained  -SC    Recorded by [SS] Saran Aldrich, OT Student [SC] Glory Almaguer, PT [SC] Glory Almaguer, PT    Bed Mobility, Assessment/Treatment    Bed Mobility, Assistive Device  head of bed elevated  -SC bed rails;head of bed elevated;draw sheet  -SC    Bed Mobility, Scoot/Bridge, Park  verbal cues required;minimum assist (75% patient effort);other (see comments)   patient attempting to scoot with B ue  -SC     Bed Mob, Supine to Sit, Park  maximum assist (25% patient effort)  -SC maximum assist (25% patient effort);2 person assist required  -SC    Bed Mobility, Safety Issues  cognitive deficits limit understanding;decreased use of legs for bridging/pushing  -SC cognitive deficits limit understanding;decreased use of legs for bridging/pushing  -SC    Bed Mobility, Impairments  pain;motor control impaired;strength decreased  -SC pain;motor control impaired;strength decreased  -SC    Bed Mobility, Comment (P)  Not addressed, pt in chair on arrival  - assisted up to edg of bed   -SC assisted up to edge of bed  -SC    Recorded by [SS] Saran Aldrich, TAIWO Student [SC] Glory Almaguer, PT [SC] Glory Almaguer, PT    Transfer Assessment/Treatment    Transfers, Sit-Stand Park  moderate assist (50% patient effort);2 person assist required;upper extremity support  -SC moderate assist (50% patient effort);2 person assist required  -SC    Transfers, Stand-Sit Park  moderate assist (50% patient effort);verbal cues required  -SC minimum assist (75% patient effort);2 person assist required  -SC    Transfers, Sit-Stand-Sit, Assist Device  rolling walker  -SC rolling walker  -SC    Transfer, Safety Issues  sequencing ability decreased;balance decreased during turns  -SC balance decreased during  turns;sequencing ability decreased  -SC    Transfer, Impairments  motor control impaired;strength decreased;pain  -SC motor control impaired  -SC    Transfer, Comment (P)  Not addressed, pt declined due to pt pain  -SS stood with HHA from edge of bed then  hold to walker for stabilty. Worked on standing balance before walking with cues to stay close to walker  -SC Patient encouraged to get into standing hen B hand held assist.   -SC    Recorded by [SS] Saran Aldrich, OT Student [SC] Glory Almaguer, PT [SC] Glory Almaguer, PT    Gait Assessment/Treatment    Gait, [place] Level  2 person assist required  -SC 2 person assist required;moderate assist (50% patient effort)  -SC    Gait, Assistive Device  rolling walker  -SC rolling walker  -SC    Gait, Distance (Feet)  8   followed by chair  -SC 10  -SC    Gait, Gait Pattern Analysis  swing-to gait  -SC swing-to gait  -SC    Gait, Gait Deviations  right:;antalgic;weight-shifting ability decreased;step length decreased  -SC weight-shifting ability decreased;step length decreased;right:;antalgic  -SC    Gait, Safety Issues  sequencing ability decreased  -SC sequencing ability decreased  -SC    Gait, Impairments  pain;motor control impaired;strength decreased  -SC motor control impaired;impaired balance;strength decreased  -SC    Gait, Comment  Requires assist to push walker, cues to get closer to walker during ambulaltion.  Stoped for standing rest every 2-3 steps with cues for improving posture  -SC Required assist to push walker. With cueing patient able to walk followed by chair . Cues given to stay closer to walker. Took one sit down rest.   -SC    Recorded by  [SC] Glory Almaguer, PT [SC] Glory Almaguer, PT    Functional Mobility    Functional Mobility- Comment (P)  Not addressed, pt declined due to pain.  -SS      Recorded by [SS] Saran Aldrich, TAIWO Student      ADL Assessment/Intervention    Additional Documentation (P)  --   ADL's not addressed, pt  declined to transfer due to pain  -      Recorded by [SS] Saran Aldrich, OT Student      Motor Skills/Interventions    Additional Documentation (P)  Balance Skills Training (Group)  -      Recorded by [SS] Saran Aldrich OT Student      Balance Skills Training    Sitting-Level of Assistance (P)  Minimum assistance  -      Sitting-Balance Support (P)  Right upper extremity supported;Left upper extremity supported;Feet supported  -      Sitting # of Minutes (P)  5  -SS      Recorded by [SS] Saran Aldrich, OT Student      Therapy Exercises    Bilateral Upper Extremity (P)  AAROM:;AROM:;5 reps;elbow flexion/extension;hand pumps;shoulder rolls/shrugs;shoulder extension/flexion;shoulder abduction/adduction   Pt required AAROM only to maintain focus on exercises.  -      Exercise Protocols  total hip  -SC total hip  -SC    Total Hip Exercises  5 reps;with assist;LAQ;heel slides;hip abduction  -SC right:;hip abduction;LAQ;10 reps;with assist  -SC    Recorded by [] Saran Aldrich, OT Student [SC] Glory PALACIO Tripp, PT [SC] Glory PALACIO Tripp, PT    Positioning and Restraints    Pre-Treatment Position (P)  sitting in chair/recliner  - in bed  -SC in bed  -SC    Post Treatment Position (P)  chair  - chair  -SC chair  -SC    In Bed  sitting;call light within reach;encouraged to call for assist;exit alarm on;with family/caregiver;notified nsg  -SC sitting;call light within reach;encouraged to call for assist;exit alarm on;with family/caregiver;pillow between legs  -SC    In Chair (P)  sitting;call light within reach;exit alarm on;encouraged to call for assist;with family/caregiver  -      Recorded by [SS] Saran Aldrich, OT Student [SC] Glory A Tripp, PT [SC] Glory A Tripp, PT      User Key  (r) = Recorded By, (t) = Taken By, (c) = Cosigned By    Initials Name Effective Dates    SC Glory Almaguer, PT 06/19/15 -     SS Saran Aldrich, OT Student 12/11/17 -           PT  Recommendation and Plan  Anticipated Discharge Disposition: home with home health  Planned Therapy Interventions: bed mobility training, gait training, home exercise program, patient/family education, strengthening, transfer training  PT Frequency: daily  Plan of Care Review  Plan Of Care Reviewed With: patient  Progress: progress toward functional goals is gradual  Outcome Summary/Follow up Plan: Participates with PT. Able to ambulate short distance with  walker and assist. Expect this to improve with improved pain control          Outcome Measures       02/15/18 1011 02/14/18 1330 02/13/18 1301    How much help from another person do you currently need...    Turning from your back to your side while in flat bed without using bedrails? 2  -SC 2  -SC     Moving from lying on back to sitting on the side of a flat bed without bedrails? 2  -SC 2  -SC     Moving to and from a bed to a chair (including a wheelchair)? 2  -SC 2  -SC     Standing up from a chair using your arms (e.g., wheelchair, bedside chair)? 2  -SC 2  -SC     Climbing 3-5 steps with a railing? 1  -SC 1  -SC     To walk in hospital room? 2  -SC 2  -SC     AM-PAC 6 Clicks Score 11  -SC 11  -SC     How much help from another is currently needed...    Putting on and taking off regular lower body clothing?   1  -AC    Bathing (including washing, rinsing, and drying)   1  -AC    Toileting (which includes using toilet bed pan or urinal)   1  -AC    Putting on and taking off regular upper body clothing   2  -AC    Taking care of personal grooming (such as brushing teeth)   2  -AC    Eating meals   2  -AC    Score   9  -AC    Functional Assessment    Outcome Measure Options AM-PAC 6 Clicks Basic Mobility (PT)  -SC AM-PAC 6 Clicks Basic Mobility (PT)  -SC AM-PAC 6 Clicks Daily Activity (OT)  -AC      02/13/18 0825          How much help from another person do you currently need...    Turning from your back to your side while in flat bed without using bedrails? 2   -SC      Moving from lying on back to sitting on the side of a flat bed without bedrails? 2  -SC      Moving to and from a bed to a chair (including a wheelchair)? 2  -SC      Standing up from a chair using your arms (e.g., wheelchair, bedside chair)? 2  -SC      Climbing 3-5 steps with a railing? 1  -SC      To walk in hospital room? 2  -SC      AM-PAC 6 Clicks Score 11  -SC      Functional Assessment    Outcome Measure Options AM-PAC 6 Clicks Basic Mobility (PT)  -SC        User Key  (r) = Recorded By, (t) = Taken By, (c) = Cosigned By    Initials Name Provider Type    SC Shearon A Tripp, PT Physical Therapist    AC Lesley Mosher, OT Occupational Therapist           Time Calculation:         PT Charges       02/15/18 1147          Time Calculation    Start Time 1011  -SC      PT Received On 02/15/18  -SC      PT Goal Re-Cert Due Date 02/23/18  -SC      Time Calculation- PT    Total Timed Code Minutes- PT 20 minute(s)  -SC        User Key  (r) = Recorded By, (t) = Taken By, (c) = Cosigned By    Initials Name Provider Type    SC Shearon A Tripp, PT Physical Therapist          Therapy Charges for Today     Code Description Service Date Service Provider Modifiers Qty    45579032550 HC GAIT TRAINING EA 15 MIN 2/14/2018 Shearon A Tripp, PT GP 1    21294613600 HC PT THER PROC EA 15 MIN 2/14/2018 Shearon A Tripp, PT GP 1    25774740638 HC PT THER SUPP EA 15 MIN 2/14/2018 Shearon A Tripp, PT GP 2    32666452813 HC GAIT TRAINING EA 15 MIN 2/15/2018 Shearon A Tripp, PT  1    46448276292 HC PT THER SUPP EA 15 MIN 2/15/2018 Shearon A Tripp, PT  2          PT G-Codes  Outcome Measure Options: AM-PAC 6 Clicks Basic Mobility (PT)    PT Discharge Summary  Anticipated Discharge Disposition: home with home health  Reason for Discharge: Discharge from facility  Outcomes Achieved: Unable to make functional progress toward goals at this time  Discharge Destination: SNF    Glory Almaguer, PT  2/15/2018

## 2018-03-27 ENCOUNTER — APPOINTMENT (OUTPATIENT)
Dept: LAB | Facility: HOSPITAL | Age: 83
End: 2018-03-27

## 2018-03-27 ENCOUNTER — OFFICE VISIT (OUTPATIENT)
Dept: ONCOLOGY | Facility: CLINIC | Age: 83
End: 2018-03-27

## 2018-03-27 VITALS
WEIGHT: 138 LBS | SYSTOLIC BLOOD PRESSURE: 116 MMHG | DIASTOLIC BLOOD PRESSURE: 68 MMHG | RESPIRATION RATE: 14 BRPM | HEIGHT: 62 IN | BODY MASS INDEX: 25.4 KG/M2 | HEART RATE: 77 BPM | TEMPERATURE: 97.9 F

## 2018-03-27 DIAGNOSIS — C82.80 OTHER TYPE OF FOLLICULAR LYMPHOMA, UNSPECIFIED BODY REGION (HCC): Primary | ICD-10-CM

## 2018-03-27 LAB
ALBUMIN SERPL-MCNC: 3.6 G/DL (ref 3.5–5)
ALBUMIN/GLOB SERPL: 1.1 G/DL (ref 1–2)
ALP SERPL-CCNC: 135 U/L (ref 38–126)
ALT SERPL W P-5'-P-CCNC: 41 U/L (ref 13–69)
ANION GAP SERPL CALCULATED.3IONS-SCNC: 17 MMOL/L (ref 10–20)
AST SERPL-CCNC: 42 U/L (ref 15–46)
BASOPHILS # BLD AUTO: 0.04 10*3/MM3 (ref 0–0.2)
BASOPHILS NFR BLD AUTO: 0.5 % (ref 0–2.5)
BILIRUB SERPL-MCNC: 0.4 MG/DL (ref 0.2–1.3)
BUN BLD-MCNC: 17 MG/DL (ref 7–20)
BUN/CREAT SERPL: 18.9 (ref 7.1–23.5)
CALCIUM SPEC-SCNC: 9.6 MG/DL (ref 8.4–10.2)
CHLORIDE SERPL-SCNC: 99 MMOL/L (ref 98–107)
CO2 SERPL-SCNC: 28 MMOL/L (ref 26–30)
CREAT BLD-MCNC: 0.9 MG/DL (ref 0.6–1.3)
DEPRECATED RDW RBC AUTO: 43.1 FL (ref 37–54)
EOSINOPHIL # BLD AUTO: 0.33 10*3/MM3 (ref 0–0.7)
EOSINOPHIL NFR BLD AUTO: 4.3 % (ref 0–7)
ERYTHROCYTE [DISTWIDTH] IN BLOOD BY AUTOMATED COUNT: 12.9 % (ref 11.5–14.5)
GFR SERPL CREATININE-BSD FRML MDRD: 59 ML/MIN/1.73
GLOBULIN UR ELPH-MCNC: 3.2 GM/DL
GLUCOSE BLD-MCNC: 104 MG/DL (ref 74–98)
HCT VFR BLD AUTO: 32.8 % (ref 37–47)
HGB BLD-MCNC: 10.7 G/DL (ref 12–16)
IMM GRANULOCYTES # BLD: 0.05 10*3/MM3 (ref 0–0.06)
IMM GRANULOCYTES NFR BLD: 0.6 % (ref 0–0.6)
LDH SERPL-CCNC: 273 U/L (ref 313–618)
LYMPHOCYTES # BLD AUTO: 2.03 10*3/MM3 (ref 0.6–3.4)
LYMPHOCYTES NFR BLD AUTO: 26.3 % (ref 10–50)
MCH RBC QN AUTO: 29.6 PG (ref 27–31)
MCHC RBC AUTO-ENTMCNC: 32.6 G/DL (ref 30–37)
MCV RBC AUTO: 90.9 FL (ref 81–99)
MONOCYTES # BLD AUTO: 0.49 10*3/MM3 (ref 0–0.9)
MONOCYTES NFR BLD AUTO: 6.4 % (ref 0–12)
NEUTROPHILS # BLD AUTO: 4.77 10*3/MM3 (ref 2–6.9)
NEUTROPHILS NFR BLD AUTO: 61.9 % (ref 37–80)
NRBC BLD MANUAL-RTO: 0 /100 WBC (ref 0–0)
PLATELET # BLD AUTO: 279 10*3/MM3 (ref 130–400)
PMV BLD AUTO: 9.1 FL (ref 6–12)
POTASSIUM BLD-SCNC: 4 MMOL/L (ref 3.5–5.1)
PROT SERPL-MCNC: 6.8 G/DL (ref 6.3–8.2)
RBC # BLD AUTO: 3.61 10*6/MM3 (ref 4.2–5.4)
SODIUM BLD-SCNC: 140 MMOL/L (ref 137–145)
WBC NRBC COR # BLD: 7.71 10*3/MM3 (ref 4.8–10.8)

## 2018-03-27 PROCEDURE — 83615 LACTATE (LD) (LDH) ENZYME: CPT | Performed by: NURSE PRACTITIONER

## 2018-03-27 PROCEDURE — 80053 COMPREHEN METABOLIC PANEL: CPT | Performed by: NURSE PRACTITIONER

## 2018-03-27 PROCEDURE — 99213 OFFICE O/P EST LOW 20 MIN: CPT | Performed by: NURSE PRACTITIONER

## 2018-03-27 PROCEDURE — 85025 COMPLETE CBC W/AUTO DIFF WBC: CPT | Performed by: NURSE PRACTITIONER

## 2018-03-27 PROCEDURE — 36415 COLL VENOUS BLD VENIPUNCTURE: CPT | Performed by: NURSE PRACTITIONER

## 2018-03-27 NOTE — PROGRESS NOTES
"      PROBLEM LIST:  1. 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   b) Stage III disease at presentation in 2008.   c) Responded to Rituxan as a single agent followed by maintenance Rituxan.      CHIEF COMPLAINT: Followup about lymphoma    Subjective     HISTORY OF PRESENT ILLNESS:   Mrs. Ruano is here for follow up evaluation of lymphoma. She fell and fractured her right femoral head and required surgery 2/12/2018. She just left rehab center last week to go back to live with her sister. She walks with assistance with a walker and requires assistance with dressing and showering.  She denies any recurring fevers or infections, no drenching night sweats, rashes, itching or lymphadenopathy.     Past Medical History, Past Surgical History, Social History, Family History have been reviewed and are without significant changes except as mentioned.    Review of Systems   A comprehensive 14 point review of systems was performed and was negative except as mentioned.    Medications:  The current medication list was reviewed in the EMR    ALLERGIES:  No Known Allergies    Objective      /68   Pulse 77   Temp 97.9 °F (36.6 °C) (Temporal Artery )   Resp 14   Ht 157.5 cm (62.01\")   Wt 62.6 kg (138 lb)   BMI 25.23 kg/m²          General: well appearing, in no acute distress, in a wheelchair   HEENT: sclera anicteric, oropharynx clear  Lymphatics: no cervical, supraclavicular, or axillary adenopathy  Cardiovascular: regular rate and rhythm, no murmurs  Lungs: clear to auscultation bilaterally  Abdomen: soft, nontender, nondistended.  No palpable masses or organomegaly  Extremeties: no lower extremity edema, cords or calf tenderness  Skin: no rashes, lesions, bruising, or petechiae    RECENT LABS:  Hematology WBC   Date Value Ref Range Status   02/13/2018 11.65 (H) 3.50 - 10.80 10*3/mm3 Final     Hemoglobin   Date Value Ref Range Status   02/13/2018 10.6 (L) 11.5 " - 15.5 g/dL Final     Hematocrit   Date Value Ref Range Status   02/13/2018 31.4 (L) 34.5 - 44.0 % Final     MCV   Date Value Ref Range Status   02/13/2018 87.7 80.0 - 99.0 fL Final     RDW   Date Value Ref Range Status   02/13/2018 12.7 11.3 - 14.5 % Final     MPV   Date Value Ref Range Status   02/13/2018 9.1 6.0 - 12.0 fL Final     Platelets   Date Value Ref Range Status   02/13/2018 190 150 - 450 10*3/mm3 Final     Immature Grans %   Date Value Ref Range Status   02/13/2018 0.3 0.0 - 0.6 % Final     Neutrophils, Absolute   Date Value Ref Range Status   02/13/2018 9.46 (H) 1.50 - 8.30 10*3/mm3 Final     Lymphocytes, Absolute   Date Value Ref Range Status   02/13/2018 1.36 0.60 - 4.80 10*3/mm3 Final     Monocytes, Absolute   Date Value Ref Range Status   02/13/2018 0.80 0.00 - 1.00 10*3/mm3 Final     Eosinophils, Absolute   Date Value Ref Range Status   02/13/2018 0.00 0.00 - 0.30 10*3/mm3 Final     Basophils, Absolute   Date Value Ref Range Status   02/13/2018 0.00 0.00 - 0.20 10*3/mm3 Final     Immature Grans, Absolute   Date Value Ref Range Status   02/13/2018 0.03 0.00 - 0.03 10*3/mm3 Final     nRBC   Date Value Ref Range Status   09/19/2017 0.0 0.0 - 0.0 /100 WBC Final       Glucose   Date Value Ref Range Status   02/13/2018 119 (H) 70 - 100 mg/dL Final     Sodium   Date Value Ref Range Status   02/13/2018 129 (L) 132 - 146 mmol/L Final     Potassium   Date Value Ref Range Status   02/13/2018 4.1 3.5 - 5.5 mmol/L Final     CO2   Date Value Ref Range Status   02/13/2018 25.0 20.0 - 31.0 mmol/L Final     Chloride   Date Value Ref Range Status   02/13/2018 95 (L) 99 - 109 mmol/L Final     Anion Gap   Date Value Ref Range Status   02/13/2018 9.0 3.0 - 11.0 mmol/L Final     Creatinine   Date Value Ref Range Status   02/13/2018 0.80 0.60 - 1.30 mg/dL Final     BUN   Date Value Ref Range Status   02/13/2018 10 9 - 23 mg/dL Final     BUN/Creatinine Ratio   Date Value Ref Range Status   02/13/2018 12.5 7.0 - 25.0  Final     Calcium   Date Value Ref Range Status   02/13/2018 8.3 (L) 8.7 - 10.4 mg/dL Final     eGFR Non  Amer   Date Value Ref Range Status   02/13/2018 67 >60 mL/min/1.73 Final     Alkaline Phosphatase   Date Value Ref Range Status   02/11/2018 76 25 - 100 U/L Final     Total Protein   Date Value Ref Range Status   02/11/2018 6.3 5.7 - 8.2 g/dL Final     ALT (SGPT)   Date Value Ref Range Status   02/11/2018 19 7 - 40 U/L Final     AST (SGOT)   Date Value Ref Range Status   02/11/2018 21 0 - 33 U/L Final     Total Bilirubin   Date Value Ref Range Status   02/11/2018 0.2 (L) 0.3 - 1.2 mg/dL Final     Albumin   Date Value Ref Range Status   02/11/2018 3.60 3.20 - 4.80 g/dL Final     Globulin   Date Value Ref Range Status   02/11/2018 2.7 gm/dL Final     A/G Ratio   Date Value Ref Range Status   02/11/2018 1.3 (L) 1.5 - 2.5 g/dL Final       LDH   Date Value Ref Range Status   09/19/2017 275 (L) 313 - 618 U/L Final          Assessment/Plan   IMPRESSION:   1. Low-grade lymphoma. The patient continues to do well with no evidence of recurrence.         PLAN:  1. We will obtain labs today including CBC, CMP, and LDH. We will contact her if there are adverse changes.   2. We will plan on seeing her back in 6 months for follow up evaluation. We have asked her to contact us in the interim, if any new symptoms arise.               April Tellez [facility]    3/27/2018          CC:

## 2018-04-19 ENCOUNTER — HOSPITAL ENCOUNTER (EMERGENCY)
Facility: HOSPITAL | Age: 83
Discharge: HOME | End: 2018-04-19
Attending: EMERGENCY MEDICINE | Admitting: EMERGENCY MEDICINE

## 2018-04-19 VITALS
SYSTOLIC BLOOD PRESSURE: 134 MMHG | OXYGEN SATURATION: 96 % | HEART RATE: 74 BPM | HEIGHT: 64 IN | RESPIRATION RATE: 18 BRPM | TEMPERATURE: 98.2 F | DIASTOLIC BLOOD PRESSURE: 64 MMHG | WEIGHT: 130 LBS | BODY MASS INDEX: 22.2 KG/M2

## 2018-04-19 DIAGNOSIS — S01.01XA LACERATION OF SCALP, INITIAL ENCOUNTER: Primary | ICD-10-CM

## 2018-04-19 DIAGNOSIS — S30.0XXA CONTUSION OF LOWER BACK, INITIAL ENCOUNTER: ICD-10-CM
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
## 2018-04-19 PROCEDURE — 25010000002 TDAP 5-2.5-18.5 LF-MCG/0.5 SUSPENSION: Performed by: EMERGENCY MEDICINE

## 2018-04-19 PROCEDURE — 90715 TDAP VACCINE 7 YRS/> IM: CPT | Performed by: EMERGENCY MEDICINE

## 2018-04-19 PROCEDURE — 90471 IMMUNIZATION ADMIN: CPT | Performed by: EMERGENCY MEDICINE

## 2018-04-19 PROCEDURE — 99284 EMERGENCY DEPT VISIT MOD MDM: CPT

## 2018-04-19 RX ORDER — BACITRACIN, NEOMYCIN, POLYMYXIN B 400; 3.5; 5 [USP'U]/G; MG/G; [USP'U]/G
1 OINTMENT TOPICAL ONCE
Status: COMPLETED | OUTPATIENT
Start: 2018-04-19 | End: 2018-04-19

## 2018-04-19 RX ADMIN — TETANUS TOXOID, REDUCED DIPHTHERIA TOXOID AND ACELLULAR PERTUSSIS VACCINE, ADSORBED 0.5 ML: 5; 2.5; 8; 8; 2.5 SUSPENSION INTRAMUSCULAR at 11:17

## 2018-04-19 RX ADMIN — BACITRACIN, NEOMYCIN, POLYMYXIN B 1 APPLICATION: 400; 3.5; 5 OINTMENT TOPICAL at 11:17

## 2018-04-19 RX ADMIN — Medication 3 ML: at 10:14

## 2018-04-19 NOTE — DISCHARGE INSTRUCTIONS
May shower head as early as this evening.  Bleeding will decrease then stop.  You may need to clean her head and hair to remove blood several times before the bleeding stops.  Apply antibiotic ointment to the wound twice daily.

## 2018-04-19 NOTE — ED PROVIDER NOTES
Subjective   Silva Ruano is a 89 y.o.female who presents to the emergency department with complaints of back pain and a headache localized to the occipital region that began after a fall this morning. The patient does not remember falling, however, her daughter is at the bedside and witnessed the event. The patient's daughter tells us that she is supposed to use a walker to ambulate but did not have it with her this morning. She was apparently standing with one foot on a step in her bathroom when she lost her balance and fell backwards, landing on her back. She did hit her head but did not lose consciousness. She is at her mental baseline according to her daughter. There are no other acute complaints at this time.        History provided by:  Patient and relative  Fall   Mechanism of injury: fall    Injury location:  Head/neck and torso  Head/neck injury location:  Head  Torso injury location:  Back  Incident location:  Bathroom  Arrived directly from scene: yes    Fall:     Fall occurred:  Standing and in the bathroom    Impact surface:  Unable to specify    Point of impact:  Head and back  Suspicion of alcohol use: no    Suspicion of drug use: no    Prior to arrival data:     Patient ambulatory at scene: yes      Loss of consciousness: no      Amnesic to event: yes    Associated symptoms: back pain and headaches    Associated symptoms: no abdominal pain and no loss of consciousness        Review of Systems   Gastrointestinal: Negative for abdominal pain.   Musculoskeletal: Positive for back pain.   Skin: Positive for wound (scalp laceration).   Neurological: Positive for headaches. Negative for loss of consciousness.   Psychiatric/Behavioral: Negative for confusion.       Past Medical History:   Diagnosis Date   • Alzheimer disease    • Arthritis    • Closed fracture of neck of right femur, Acute 2/12/2018   • Diverticulosis    • Hypertension    • Pathologic fracture of clavicle, left, initial encounter     • Skin cancer     lymphoma   • Thyroid disease        No Known Allergies    Past Surgical History:   Procedure Laterality Date   • CHOLECYSTECTOMY     • HIP HEMIARTHROPLASTY Right 2/12/2018    Procedure: HIP HEMIARTHROPLASTY;  Surgeon: Rohith Andersen MD;  Location: [facility];  Service:    • HYSTERECTOMY     • OVARIAN CYST REMOVAL         Family History   Problem Relation Age of Onset   • Heart disease Other    • Hypertension Other    • Cancer Other        Social History     Social History   • Marital status:      Social History Main Topics   • Smoking status: Never Smoker   • Smokeless tobacco: Never Used   • Alcohol use No   • Drug use: No   • Sexual activity: Defer     Other Topics Concern   • Not on file         Objective   Physical Exam   Constitutional: She is oriented to person, place, and time. She appears well-developed and well-nourished. No distress.   HENT:   Nose: Nose normal.   Airway patent. Pharynx is benign. 2 cm wound on top of her head.   Eyes: Conjunctivae are normal. No scleral icterus.   Neck: Normal range of motion and phonation normal. Neck supple.   Cervical spine is non-tender.   Cardiovascular: Normal rate, regular rhythm and normal heart sounds.    Pulmonary/Chest: Effort normal and breath sounds normal. No respiratory distress.   Abdominal: Soft. Bowel sounds are normal. There is no tenderness. There is no rebound and no guarding.   Musculoskeletal: She exhibits no tenderness.   No bruising. No tenderness to palpation over the lumbar or thoracic spine.   Neurological: She is alert and oriented to person, place, and time.   Skin: Skin is warm and dry.   Psychiatric: She has a normal mood and affect. Her behavior is normal.   Nursing note and vitals reviewed.      Laceration Repair  Date/Time: 4/19/2018 10:56 AM  Performed by: LARON AMADOR  Authorized by: LARON AMADOR     Consent:     Consent obtained:  Verbal    Consent given by:  Patient (sister)    Risks  discussed:  Infection and pain  Anesthesia (see MAR for exact dosages):     Anesthesia method:  Topical application  Laceration details:     Location:  Scalp    Scalp location:  Mid-scalp    Length (cm):  2  Repair type:     Repair type:  Simple  Treatment:     Area cleansed with:  Saline    Amount of cleaning:  Standard  Skin repair:     Repair method:  Staples  Approximation:     Approximation:  Close    Vermilion border: well-aligned    Post-procedure details:     Dressing:  Antibiotic ointment    Patient tolerance of procedure:  Tolerated well, no immediate complications             ED Course  ED Course     No results found for this or any previous visit (from the past 24 hour(s)).  Note: In addition to lab results from this visit, the labs listed above may include labs taken at another facility or during a different encounter within the last 24 hours. Please correlate lab times with ED admission and discharge times for further clarification of the services performed during this visit.    No orders to display     Vitals:    04/19/18 1000 04/19/18 1001 04/19/18 1030 04/19/18 1100   BP: 154/62  142/68 134/64   Pulse:  70  74   Resp:       Temp:       TempSrc:       SpO2: 100% 97%  96%   Weight:       Height:         Medications   lidocaine-epinephrine-tetracaine (LET) topical gel 3 mL (3 mL Topical Given 4/19/18 1014)   Tdap (BOOSTRIX) injection 0.5 mL (0.5 mL Intramuscular Given 4/19/18 1117)   neomycin-bacitracin-polymyxin (NEOSPORIN) ointment 1 application (1 application Topical Given 4/19/18 1117)     ECG/EMG Results (last 24 hours)     ** No results found for the last 24 hours. **                       MDM    Final diagnoses:   Laceration of scalp, initial encounter   Fall, initial encounter   Contusion of lower back, initial encounter       Documentation assistance provided by qian Hoover.  Information recorded by the qian was done at my direction and has been verified and validated by me.      Jessica Hoover  04/19/18 1027       Juan Perez MD  04/19/18 1942

## 2018-05-07 ENCOUNTER — TELEPHONE (OUTPATIENT)
Dept: ORTHOPEDIC SURGERY | Facility: CLINIC | Age: 83
End: 2018-05-07

## 2018-05-07 NOTE — TELEPHONE ENCOUNTER
Yes larry,   Please print a face sheet out and given to Leslye Hensley to order these.   Are they for bilateral knee or one specific knee?

## 2018-05-19 ENCOUNTER — APPOINTMENT (OUTPATIENT)
Dept: GENERAL RADIOLOGY | Facility: HOSPITAL | Age: 83
End: 2018-05-19

## 2018-05-19 ENCOUNTER — HOSPITAL ENCOUNTER (EMERGENCY)
Facility: HOSPITAL | Age: 83
Discharge: HOME | End: 2018-05-19
Attending: EMERGENCY MEDICINE | Admitting: EMERGENCY MEDICINE

## 2018-05-19 ENCOUNTER — APPOINTMENT (OUTPATIENT)
Dept: CT IMAGING | Facility: HOSPITAL | Age: 83
End: 2018-05-19

## 2018-05-19 VITALS
OXYGEN SATURATION: 97 % | HEART RATE: 87 BPM | RESPIRATION RATE: 17 BRPM | BODY MASS INDEX: 23.04 KG/M2 | WEIGHT: 130 LBS | HEIGHT: 63 IN | SYSTOLIC BLOOD PRESSURE: 126 MMHG | DIASTOLIC BLOOD PRESSURE: 44 MMHG | TEMPERATURE: 97.6 F

## 2018-05-19 DIAGNOSIS — R56.9 GENERALIZED SEIZURE (HCC): Primary | ICD-10-CM

## 2018-05-19 DIAGNOSIS — N39.0 BACTERIAL UTI: ICD-10-CM

## 2018-05-19 DIAGNOSIS — A49.9 BACTERIAL UTI: ICD-10-CM

## 2018-05-19 LAB
ALBUMIN SERPL-MCNC: 3.7 G/DL (ref 3.2–4.8)
ALBUMIN/GLOB SERPL: 1.2 G/DL (ref 1.5–2.5)
ALP SERPL-CCNC: 172 U/L (ref 25–100)
ALT SERPL W P-5'-P-CCNC: 17 U/L (ref 7–40)
ANION GAP SERPL CALCULATED.3IONS-SCNC: 11 MMOL/L (ref 3–11)
AST SERPL-CCNC: 30 U/L (ref 0–33)
BACTERIA UR QL AUTO: ABNORMAL /HPF
BASOPHILS # BLD AUTO: 0.04 10*3/MM3 (ref 0–0.2)
BASOPHILS NFR BLD AUTO: 0.4 % (ref 0–1)
BILIRUB SERPL-MCNC: 0.3 MG/DL (ref 0.3–1.2)
BILIRUB UR QL STRIP: NEGATIVE
BUN BLD-MCNC: 10 MG/DL (ref 9–23)
BUN/CREAT SERPL: 16.7 (ref 7–25)
CALCIUM SPEC-SCNC: 9.3 MG/DL (ref 8.7–10.4)
CHLORIDE SERPL-SCNC: 96 MMOL/L (ref 99–109)
CLARITY UR: ABNORMAL
CO2 SERPL-SCNC: 23 MMOL/L (ref 20–31)
COLOR UR: YELLOW
CREAT BLD-MCNC: 0.6 MG/DL (ref 0.6–1.3)
DEPRECATED RDW RBC AUTO: 39.9 FL (ref 37–54)
EOSINOPHIL # BLD AUTO: 0.25 10*3/MM3 (ref 0–0.3)
EOSINOPHIL NFR BLD AUTO: 2.4 % (ref 0–3)
ERYTHROCYTE [DISTWIDTH] IN BLOOD BY AUTOMATED COUNT: 13.2 % (ref 11.3–14.5)
GFR SERPL CREATININE-BSD FRML MDRD: 93 ML/MIN/1.73
GLOBULIN UR ELPH-MCNC: 3 GM/DL
GLUCOSE BLD-MCNC: 148 MG/DL (ref 70–100)
GLUCOSE UR STRIP-MCNC: NEGATIVE MG/DL
HCT VFR BLD AUTO: 33.4 % (ref 34.5–44)
HGB BLD-MCNC: 11.5 G/DL (ref 11.5–15.5)
HGB UR QL STRIP.AUTO: ABNORMAL
HOLD SPECIMEN: NORMAL
HOLD SPECIMEN: NORMAL
HYALINE CASTS UR QL AUTO: ABNORMAL /LPF
IMM GRANULOCYTES # BLD: 0.05 10*3/MM3 (ref 0–0.03)
IMM GRANULOCYTES NFR BLD: 0.5 % (ref 0–0.6)
KETONES UR QL STRIP: NEGATIVE
LEUKOCYTE ESTERASE UR QL STRIP.AUTO: ABNORMAL
LYMPHOCYTES # BLD AUTO: 3.6 10*3/MM3 (ref 0.6–4.8)
LYMPHOCYTES NFR BLD AUTO: 33.9 % (ref 24–44)
MAGNESIUM SERPL-MCNC: 1.6 MG/DL (ref 1.3–2.7)
MCH RBC QN AUTO: 28.7 PG (ref 27–31)
MCHC RBC AUTO-ENTMCNC: 34.4 G/DL (ref 32–36)
MCV RBC AUTO: 83.3 FL (ref 80–99)
MONOCYTES # BLD AUTO: 0.56 10*3/MM3 (ref 0–1)
MONOCYTES NFR BLD AUTO: 5.3 % (ref 0–12)
NEUTROPHILS # BLD AUTO: 6.18 10*3/MM3 (ref 1.5–8.3)
NEUTROPHILS NFR BLD AUTO: 58 % (ref 41–71)
NITRITE UR QL STRIP: POSITIVE
PH UR STRIP.AUTO: 5.5 [PH] (ref 5–8)
PLATELET # BLD AUTO: 310 10*3/MM3 (ref 150–450)
PMV BLD AUTO: 8.3 FL (ref 6–12)
POTASSIUM BLD-SCNC: 3.3 MMOL/L (ref 3.5–5.5)
PROT SERPL-MCNC: 6.7 G/DL (ref 5.7–8.2)
PROT UR QL STRIP: NEGATIVE
RBC # BLD AUTO: 4.01 10*6/MM3 (ref 3.89–5.14)
RBC # UR: ABNORMAL /HPF
REF LAB TEST METHOD: ABNORMAL
SODIUM BLD-SCNC: 130 MMOL/L (ref 132–146)
SP GR UR STRIP: 1.01 (ref 1–1.03)
SQUAMOUS #/AREA URNS HPF: ABNORMAL /HPF
TROPONIN I SERPL-MCNC: 0 NG/ML (ref 0–0.07)
TROPONIN I SERPL-MCNC: 0 NG/ML (ref 0–0.07)
UROBILINOGEN UR QL STRIP: ABNORMAL
WBC NRBC COR # BLD: 10.63 10*3/MM3 (ref 3.5–10.8)
WBC UR QL AUTO: ABNORMAL /HPF
WHOLE BLOOD HOLD SPECIMEN: NORMAL
WHOLE BLOOD HOLD SPECIMEN: NORMAL

## 2018-05-19 PROCEDURE — 71045 X-RAY EXAM CHEST 1 VIEW: CPT

## 2018-05-19 PROCEDURE — 87077 CULTURE AEROBIC IDENTIFY: CPT | Performed by: EMERGENCY MEDICINE

## 2018-05-19 PROCEDURE — 70450 CT HEAD/BRAIN W/O DYE: CPT

## 2018-05-19 PROCEDURE — 87186 SC STD MICRODIL/AGAR DIL: CPT | Performed by: EMERGENCY MEDICINE

## 2018-05-19 PROCEDURE — 96372 THER/PROPH/DIAG INJ SC/IM: CPT

## 2018-05-19 PROCEDURE — 25010000002 CEFTRIAXONE PER 250 MG: Performed by: EMERGENCY MEDICINE

## 2018-05-19 PROCEDURE — 93005 ELECTROCARDIOGRAM TRACING: CPT

## 2018-05-19 PROCEDURE — 87086 URINE CULTURE/COLONY COUNT: CPT | Performed by: EMERGENCY MEDICINE

## 2018-05-19 PROCEDURE — 80053 COMPREHEN METABOLIC PANEL: CPT | Performed by: EMERGENCY MEDICINE

## 2018-05-19 PROCEDURE — 99285 EMERGENCY DEPT VISIT HI MDM: CPT

## 2018-05-19 PROCEDURE — 93005 ELECTROCARDIOGRAM TRACING: CPT | Performed by: EMERGENCY MEDICINE

## 2018-05-19 PROCEDURE — 81001 URINALYSIS AUTO W/SCOPE: CPT | Performed by: EMERGENCY MEDICINE

## 2018-05-19 PROCEDURE — 83735 ASSAY OF MAGNESIUM: CPT | Performed by: EMERGENCY MEDICINE

## 2018-05-19 PROCEDURE — 84484 ASSAY OF TROPONIN QUANT: CPT

## 2018-05-19 PROCEDURE — 85025 COMPLETE CBC W/AUTO DIFF WBC: CPT | Performed by: EMERGENCY MEDICINE

## 2018-05-19 PROCEDURE — P9612 CATHETERIZE FOR URINE SPEC: HCPCS

## 2018-05-19 RX ORDER — SODIUM CHLORIDE 0.9 % (FLUSH) 0.9 %
10 SYRINGE (ML) INJECTION AS NEEDED
Status: DISCONTINUED | OUTPATIENT
Start: 2018-05-19 | End: 2018-05-19 | Stop reason: HOSPADM

## 2018-05-19 RX ORDER — CEFTRIAXONE SODIUM 1 G/50ML
1 INJECTION, SOLUTION INTRAVENOUS ONCE
Status: DISCONTINUED | OUTPATIENT
Start: 2018-05-19 | End: 2018-05-19

## 2018-05-19 RX ORDER — LIDOCAINE HYDROCHLORIDE 10 MG/ML
1 INJECTION, SOLUTION EPIDURAL; INFILTRATION; INTRACAUDAL; PERINEURAL ONCE
Status: COMPLETED | OUTPATIENT
Start: 2018-05-19 | End: 2018-05-19

## 2018-05-19 RX ORDER — CEFTRIAXONE 500 MG/1
500 INJECTION, POWDER, FOR SOLUTION INTRAMUSCULAR; INTRAVENOUS ONCE
Status: COMPLETED | OUTPATIENT
Start: 2018-05-19 | End: 2018-05-19

## 2018-05-19 RX ORDER — CEFUROXIME AXETIL 500 MG/1
500 TABLET ORAL 2 TIMES DAILY
Qty: 14 TABLET | Refills: 0 | Status: SHIPPED | OUTPATIENT
Start: 2018-05-19 | End: 2018-05-29

## 2018-05-19 RX ADMIN — CEFTRIAXONE SODIUM 500 MG: 500 INJECTION, POWDER, FOR SOLUTION INTRAMUSCULAR; INTRAVENOUS at 16:43

## 2018-05-19 RX ADMIN — LIDOCAINE HYDROCHLORIDE 1 ML: 10 INJECTION, SOLUTION EPIDURAL; INFILTRATION; INTRACAUDAL; PERINEURAL at 16:44

## 2018-05-19 NOTE — ED PROVIDER NOTES
Subjective   Silva Ruano is a 89 y.o.female who presents to the ED with her daughter by EMS with c/o seizure-like activity that occurred PTA. Per her daughter, they were eating when she suddenly started staring off into space then had generalized tremulous activity that caused her dentures to fall out. She slumped forwards and regained consciousness after several minutes. Her daughter helped her stand up to go to the commode but was bowel incontinent having a large diarrheal movement on herself then experienced a vomiting episode. She denies any recent sickness, fever, chills, headaches or vomiting. The patient reports she now feels at her baseline and has no complaints herself. She has a h/o Alzheimer's disease, HTN and thyroid disease.              History provided by:  Patient and relative  Seizures   Seizure activity on arrival: no    Seizure type:  Unable to specify  Preceding symptoms: vision change    Initial focality:  None  Episode characteristics: disorientation, generalized shaking, incontinence and unresponsiveness    Postictal symptoms: no somnolence    Return to baseline: yes    Severity:  Moderate  Duration:  5 minutes  Timing:  Once  Number of seizures this episode:  1  Progression:  Resolved  Context: not change in medication and medical compliance    Recent head injury:  No recent head injuries  PTA treatment:  None  History of seizures: no        Review of Systems   Constitutional: Negative for chills and fever.   Respiratory: Negative for shortness of breath.    Gastrointestinal: Positive for diarrhea and vomiting.   Neurological: Positive for seizures. Negative for headaches.   All other systems reviewed and are negative.      Past Medical History:   Diagnosis Date   • Alzheimer disease    • Arthritis    • Closed fracture of neck of right femur, Acute 2/12/2018   • Diverticulosis    • Hypertension    • Pathologic fracture of clavicle, left, initial encounter    • Skin cancer     lymphoma    • Thyroid disease        No Known Allergies    Past Surgical History:   Procedure Laterality Date   • CHOLECYSTECTOMY     • HIP HEMIARTHROPLASTY Right 2/12/2018    Procedure: HIP HEMIARTHROPLASTY;  Surgeon: Rohith Andersen MD;  Location: [facility];  Service:    • HYSTERECTOMY     • OVARIAN CYST REMOVAL         Family History   Problem Relation Age of Onset   • Heart disease Other    • Hypertension Other    • Cancer Other        Social History     Social History   • Marital status:      Social History Main Topics   • Smoking status: Never Smoker   • Smokeless tobacco: Never Used   • Alcohol use No   • Drug use: No   • Sexual activity: Defer     Other Topics Concern   • Not on file         Objective   Physical Exam   Constitutional: She appears well-developed and well-nourished. No distress.   HENT:   Head: Normocephalic and atraumatic.   Right Ear: External ear normal.   Left Ear: External ear normal.   Nose: Nose normal.   Eyes: Conjunctivae are normal. No scleral icterus.   Neck: Normal range of motion. Neck supple.   Cardiovascular: Normal rate, regular rhythm and normal heart sounds.    No murmur heard.  Pulmonary/Chest: Effort normal and breath sounds normal. No respiratory distress. She has no wheezes. She has no rales.   Abdominal: Soft. Bowel sounds are normal. She exhibits no distension. There is no tenderness.   Musculoskeletal: Normal range of motion. She exhibits no edema or tenderness.   Neurological: She is alert.    are equal. Difficulty following commands.    Skin: Skin is warm. She is not diaphoretic.   Psychiatric: She has a normal mood and affect. Her behavior is normal.   Nursing note and vitals reviewed.      Procedures         ED Course  ED Course as of May 20 0606   Sat May 19, 2018   1539 Mrs. Ruano is awake and alert.  I spoke with her family about findings thus far.  Her daughter tells me she feels like she is back to her baseline mental status.  I talked to them about  the diagnosis of new onset seizure.  I don't think and teeth epileptics are indicated at this point.  [DT]   1548 I discussed with Dr. Martinez who agrees that this is her first seizure possibly from syncope related to her large bowel movement but also possibly from her Alzheimer's dementia.  Either way it's best not to start antiepileptics she has further seizures.  Her urine has come back positive and will treat her for that.  Dr. Martinez has offered to follow her up for her dementia  [DT]   1623 I spoke with Mrs. Ruano and her family.  Will discharge on Ceftin  [DT]      ED Course User Index  [DT] Ricci Zarate MD      Recent Results (from the past 24 hour(s))   Comprehensive Metabolic Panel    Collection Time: 05/19/18  1:03 PM   Result Value Ref Range    Glucose 148 (H) 70 - 100 mg/dL    BUN 10 9 - 23 mg/dL    Creatinine 0.60 0.60 - 1.30 mg/dL    Sodium 130 (L) 132 - 146 mmol/L    Potassium 3.3 (L) 3.5 - 5.5 mmol/L    Chloride 96 (L) 99 - 109 mmol/L    CO2 23.0 20.0 - 31.0 mmol/L    Calcium 9.3 8.7 - 10.4 mg/dL    Total Protein 6.7 5.7 - 8.2 g/dL    Albumin 3.70 3.20 - 4.80 g/dL    ALT (SGPT) 17 7 - 40 U/L    AST (SGOT) 30 0 - 33 U/L    Alkaline Phosphatase 172 (H) 25 - 100 U/L    Total Bilirubin 0.3 0.3 - 1.2 mg/dL    eGFR Non African Amer 93 >60 mL/min/1.73    Globulin 3.0 gm/dL    A/G Ratio 1.2 (L) 1.5 - 2.5 g/dL    BUN/Creatinine Ratio 16.7 7.0 - 25.0    Anion Gap 11.0 3.0 - 11.0 mmol/L   Magnesium    Collection Time: 05/19/18  1:03 PM   Result Value Ref Range    Magnesium 1.6 1.3 - 2.7 mg/dL   Light Blue Top    Collection Time: 05/19/18  1:03 PM   Result Value Ref Range    Extra Tube hold for add-on    Green Top (Gel)    Collection Time: 05/19/18  1:03 PM   Result Value Ref Range    Extra Tube Hold for add-ons.    Lavender Top    Collection Time: 05/19/18  1:03 PM   Result Value Ref Range    Extra Tube hold for add-on    Gold Top - SST    Collection Time: 05/19/18  1:03 PM   Result Value Ref Range    Extra  Tube Hold for add-ons.    CBC Auto Differential    Collection Time: 05/19/18  1:03 PM   Result Value Ref Range    WBC 10.63 3.50 - 10.80 10*3/mm3    RBC 4.01 3.89 - 5.14 10*6/mm3    Hemoglobin 11.5 11.5 - 15.5 g/dL    Hematocrit 33.4 (L) 34.5 - 44.0 %    MCV 83.3 80.0 - 99.0 fL    MCH 28.7 27.0 - 31.0 pg    MCHC 34.4 32.0 - 36.0 g/dL    RDW 13.2 11.3 - 14.5 %    RDW-SD 39.9 37.0 - 54.0 fl    MPV 8.3 6.0 - 12.0 fL    Platelets 310 150 - 450 10*3/mm3    Neutrophil % 58.0 41.0 - 71.0 %    Lymphocyte % 33.9 24.0 - 44.0 %    Monocyte % 5.3 0.0 - 12.0 %    Eosinophil % 2.4 0.0 - 3.0 %    Basophil % 0.4 0.0 - 1.0 %    Immature Grans % 0.5 0.0 - 0.6 %    Neutrophils, Absolute 6.18 1.50 - 8.30 10*3/mm3    Lymphocytes, Absolute 3.60 0.60 - 4.80 10*3/mm3    Monocytes, Absolute 0.56 0.00 - 1.00 10*3/mm3    Eosinophils, Absolute 0.25 0.00 - 0.30 10*3/mm3    Basophils, Absolute 0.04 0.00 - 0.20 10*3/mm3    Immature Grans, Absolute 0.05 (H) 0.00 - 0.03 10*3/mm3   POC Troponin, Rapid    Collection Time: 05/19/18  1:16 PM   Result Value Ref Range    Troponin I 0.00 0.00 - 0.07 ng/mL   Urinalysis With / Culture If Indicated - Urine, Catheter    Collection Time: 05/19/18  3:18 PM   Result Value Ref Range    Color, UA Yellow Yellow, Straw    Appearance, UA Turbid (A) Clear    pH, UA 5.5 5.0 - 8.0    Specific Gravity, UA 1.011 1.001 - 1.030    Glucose, UA Negative Negative    Ketones, UA Negative Negative    Bilirubin, UA Negative Negative    Blood, UA Small (1+) (A) Negative    Protein, UA Negative Negative    Leuk Esterase, UA Large (3+) (A) Negative    Nitrite, UA Positive (A) Negative    Urobilinogen, UA 1.0 E.U./dL 0.2 - 1.0 E.U./dL   Urinalysis, Microscopic Only - Urine, Clean Catch    Collection Time: 05/19/18  3:18 PM   Result Value Ref Range    RBC, UA 0-2 None Seen, 0-2 /HPF    WBC, UA Too Numerous to Count (A) None Seen, 0-2 /HPF    Bacteria, UA 4+ (A) None Seen, Trace /HPF    Squamous Epithelial Cells, UA 3-6 (A) None Seen,  0-2 /HPF    Hyaline Casts, UA None Seen 0 - 6 /LPF    Methodology Manual Light Microscopy    POC Troponin, Rapid    Collection Time: 05/19/18  3:20 PM   Result Value Ref Range    Troponin I 0.00 0.00 - 0.07 ng/mL     Note: In addition to lab results from this visit, the labs listed above may include labs taken at another facility or during a different encounter within the last 24 hours. Please correlate lab times with ED admission and discharge times for further clarification of the services performed during this visit.    CT Head Without Contrast   Final Result     No acute findings.      THIS DOCUMENT HAS BEEN ELECTRONICALLY SIGNED BY SOBEIDA CÁRDENAS MD      XR Chest 1 View   Final Result     No acute findings.      THIS DOCUMENT HAS BEEN ELECTRONICALLY SIGNED BY SOBEIDA CÁRDENAS MD        Vitals:    05/19/18 1500 05/19/18 1516 05/19/18 1518 05/19/18 1641   BP: 120/57 129/62  126/44   BP Location:       Patient Position:       Pulse: 54  66 87   Resp:    17   Temp:    97.6 °F (36.4 °C)   TempSrc:       SpO2: 96% 96% 96% 97%   Weight:       Height:         Medications   cefTRIAXone (ROCEPHIN) injection 500 mg (500 mg Intramuscular Given 5/19/18 1643)   lidocaine PF 1% (XYLOCAINE) injection 1 mL (1 mL Injection Given 5/19/18 1644)     ECG/EMG Results (last 24 hours)     Procedure Component Value Units Date/Time    ECG 12 Lead [[id number]] Collected:  05/19/18 1307     Updated:  05/19/18 1306                          MDM  Number of Diagnoses or Management Options  Bacterial UTI: new and does not require workup  Generalized seizure: new and requires workup     Amount and/or Complexity of Data Reviewed  Clinical lab tests: ordered and reviewed  Tests in the radiology section of CPT®: ordered and reviewed  Obtain history from someone other than the patient: yes  Discuss the patient with other providers: yes  Independent visualization of images, tracings, or specimens: yes    Patient Progress  Patient progress:  improved      Final diagnoses:   Generalized seizure   Bacterial UTI       Documentation assistance provided by qian Franks.  Information recorded by the scribe was done at my direction and has been verified and validated by me.     Gerhard Franks  05/19/18 1354       Ricci Zarate MD  05/20/18 0689

## 2018-05-21 LAB — BACTERIA SPEC AEROBE CULT: ABNORMAL

## 2018-05-24 ENCOUNTER — OFFICE VISIT (OUTPATIENT)
Dept: ORTHOPEDIC SURGERY | Facility: CLINIC | Age: 83
End: 2018-05-24

## 2018-05-24 VITALS — RESPIRATION RATE: 16 BRPM | BODY MASS INDEX: 23.05 KG/M2 | WEIGHT: 130.07 LBS | HEIGHT: 63 IN

## 2018-05-24 DIAGNOSIS — M17.11 PRIMARY OSTEOARTHRITIS OF RIGHT KNEE: Primary | ICD-10-CM

## 2018-05-24 PROCEDURE — 20610 DRAIN/INJ JOINT/BURSA W/O US: CPT | Performed by: PHYSICIAN ASSISTANT

## 2018-05-24 NOTE — PROGRESS NOTES
Subjective   Patient ID: Silva Ruano is a 89 y.o.  female  Follow-up of the Right Knee         History of Present Illness    Patient presents for her first Visco supplementation injection to her right knee.  She reports good relief from prior injections.    Pain Score: 5  Pain Location: Knee  Pain Orientation: Right     Pain Descriptors: Aching              Aggravating Factors: Bending           Result of Injury: No  Work-Related Injury: No    Past Medical History:   Diagnosis Date   • Alzheimer disease    • Arthritis    • Closed fracture of neck of right femur, Acute 2/12/2018   • Diverticulosis    • Hypertension    • Pathologic fracture of clavicle, left, initial encounter    • Skin cancer     lymphoma   • Thyroid disease         Past Surgical History:   Procedure Laterality Date   • CHOLECYSTECTOMY     • HIP HEMIARTHROPLASTY Right 2/12/2018    Procedure: HIP HEMIARTHROPLASTY;  Surgeon: Rohith Andersen MD;  Location: [facility];  Service:    • HYSTERECTOMY     • OVARIAN CYST REMOVAL         Family History   Problem Relation Age of Onset   • Heart disease Other    • Hypertension Other    • Cancer Other        Social History     Social History   • Marital status:      Spouse name: N/A   • Number of children: N/A   • Years of education: N/A     Occupational History   • Not on file.     Social History Main Topics   • Smoking status: Never Smoker   • Smokeless tobacco: Never Used   • Alcohol use No   • Drug use: No   • Sexual activity: Defer     Other Topics Concern   • Not on file     Social History Narrative   • No narrative on file         Current Outpatient Prescriptions:   •  acetaminophen (TYLENOL) 325 MG tablet, Take 2 tablets by mouth Every 4 (Four) Hours As Needed for Mild Pain ., Disp: 1 bottle, Rfl: 0  •  cefuroxime (CEFTIN) 500 MG tablet, Take 1 tablet by mouth 2 (Two) Times a Day., Disp: 14 tablet, Rfl: 0  •  levothyroxine (SYNTHROID, LEVOTHROID) 50 MCG tablet, , Disp: , Rfl:   •   "losartan (COZAAR) 50 MG tablet, Take 1 tablet by mouth Daily., Disp: 30 tablet, Rfl: 0  •  metoprolol succinate XL (TOPROL-XL) 25 MG 24 hr tablet, Take 1 tablet by mouth Daily., Disp: 30 tablet, Rfl: 0  •  omeprazole (priLOSEC) 20 MG capsule, , Disp: , Rfl:   •  sennosides-docusate sodium (SENOKOT-S) 8.6-50 MG tablet, Take 2 tablets by mouth Every Night., Disp: 60 tablet, Rfl: 0  •  tolterodine LA (DETROL LA) 4 MG 24 hr capsule, Take 1 capsule by mouth Daily., Disp: 30 capsule, Rfl: 0    No Known Allergies    Review of Systems   Constitutional: Negative for fever.   HENT: Negative for voice change.    Eyes: Negative for visual disturbance.   Respiratory: Negative for shortness of breath.    Cardiovascular: Negative for chest pain.   Gastrointestinal: Negative for abdominal distention and abdominal pain.   Genitourinary: Negative for dysuria.   Musculoskeletal: Positive for arthralgias. Negative for gait problem and joint swelling.   Skin: Negative for rash.   Neurological: Negative for speech difficulty.   Hematological: Does not bruise/bleed easily.   Psychiatric/Behavioral: Negative for confusion.   All other systems reviewed and are negative.      Objective   Resp 16   Ht 160 cm (62.99\")   Wt 59 kg (130 lb 1.1 oz)   BMI 23.05 kg/m²    Physical Exam   Constitutional: She appears well-nourished.   Musculoskeletal:        Right knee: She exhibits no swelling, no effusion, no ecchymosis and no erythema. Tenderness found. Medial joint line and lateral joint line tenderness noted.   Skin: No rash noted.   Vitals reviewed.    Right Knee Exam     Other   Other tests: no effusion present           Extremity DVT signs are Negative by clinical screen. and Negative on physical exam with negative Linh sign, with no calf pain, with no palpable cords, with no increased pain with passive stretch/extension and with no skin tone change   Neurologic Exam   Right Knee Exam     Tenderness   The patient is experiencing tenderness " in the medial joint line, lateral joint line.               Assessment/Plan   Independent Review of Radiographic Studies:    No new imaging done today.      Large Joint Arthrocentesis  Date/Time: 5/24/2018 1:16 PM  Consent given by: patient  Site marked: site marked  Timeout: Immediately prior to procedure a time out was called to verify the correct patient, procedure, equipment, support staff and site/side marked as required   Supporting Documentation  Indications: pain   Procedure Details  Location: knee - R knee  Preparation: Patient was prepped and draped in the usual sterile fashion  Needle size: 22 G  Approach: anterolateral  Medications administered: 25 mg sodium hyaluronate 25 MG/2.5ML  Patient tolerance: patient tolerated the procedure well with no immediate complications             There are no diagnoses linked to this encounter.   Discussion of orthopedic goals  Risk, benefits, and merits of treatment alternatives reviewed with the patient and questions answered  Call or notify for any adverse effect from injection therapy    Recommendations/Plan:  Exercise, medications, injections, other patient advice, and return appointment as noted.  Patient is encouraged to call or return for any issues or concerns.  FU in 1 week    Patient agreeable to call or return sooner for any concerns.

## 2018-05-29 ENCOUNTER — HOSPITAL ENCOUNTER (INPATIENT)
Facility: HOSPITAL | Age: 83
LOS: 3 days | Discharge: HOME HEALTH | End: 2018-06-01
Attending: EMERGENCY MEDICINE | Admitting: HOSPITALIST

## 2018-05-29 ENCOUNTER — APPOINTMENT (OUTPATIENT)
Dept: GENERAL RADIOLOGY | Facility: HOSPITAL | Age: 83
End: 2018-05-29

## 2018-05-29 ENCOUNTER — APPOINTMENT (OUTPATIENT)
Dept: MRI IMAGING | Facility: HOSPITAL | Age: 83
End: 2018-05-29
Attending: EMERGENCY MEDICINE

## 2018-05-29 DIAGNOSIS — R56.9 GENERALIZED SEIZURE (HCC): ICD-10-CM

## 2018-05-29 DIAGNOSIS — R53.83 LETHARGY: ICD-10-CM

## 2018-05-29 DIAGNOSIS — F02.818 LATE ONSET ALZHEIMER'S DISEASE WITH BEHAVIORAL DISTURBANCE (HCC): ICD-10-CM

## 2018-05-29 DIAGNOSIS — Z74.09 IMPAIRED FUNCTIONAL MOBILITY, BALANCE, GAIT, AND ENDURANCE: ICD-10-CM

## 2018-05-29 DIAGNOSIS — N39.0 BACTERIAL UTI: ICD-10-CM

## 2018-05-29 DIAGNOSIS — A49.9 BACTERIAL UTI: ICD-10-CM

## 2018-05-29 DIAGNOSIS — R55 SYNCOPE AND COLLAPSE: Primary | ICD-10-CM

## 2018-05-29 DIAGNOSIS — R53.1 GENERALIZED WEAKNESS: ICD-10-CM

## 2018-05-29 DIAGNOSIS — Z74.09 IMPAIRED MOBILITY AND ADLS: ICD-10-CM

## 2018-05-29 DIAGNOSIS — Z78.9 IMPAIRED MOBILITY AND ADLS: ICD-10-CM

## 2018-05-29 DIAGNOSIS — G30.1 LATE ONSET ALZHEIMER'S DISEASE WITH BEHAVIORAL DISTURBANCE (HCC): ICD-10-CM

## 2018-05-29 PROBLEM — N30.00 ACUTE CYSTITIS WITHOUT HEMATURIA: Status: ACTIVE | Noted: 2018-05-29

## 2018-05-29 PROBLEM — E87.1 HYPONATREMIA: Status: ACTIVE | Noted: 2018-05-29

## 2018-05-29 PROBLEM — E87.1 HYPO-OSMOLALITY AND HYPONATREMIA (CODE): Status: ACTIVE | Noted: 2018-05-29

## 2018-05-29 PROBLEM — G93.40 ENCEPHALOPATHY ACUTE: Status: ACTIVE | Noted: 2018-05-29

## 2018-05-29 LAB
ALBUMIN SERPL-MCNC: 3.6 G/DL (ref 3.2–4.8)
ALBUMIN/GLOB SERPL: 1.4 G/DL (ref 1.5–2.5)
ALP SERPL-CCNC: 108 U/L (ref 25–100)
ALT SERPL W P-5'-P-CCNC: 14 U/L (ref 7–40)
ANION GAP SERPL CALCULATED.3IONS-SCNC: 6 MMOL/L (ref 3–11)
AST SERPL-CCNC: 20 U/L (ref 0–33)
BACTERIA UR QL AUTO: ABNORMAL /HPF
BASOPHILS # BLD AUTO: 0.02 10*3/MM3 (ref 0–0.2)
BASOPHILS NFR BLD AUTO: 0.2 % (ref 0–1)
BILIRUB SERPL-MCNC: 0.2 MG/DL (ref 0.3–1.2)
BILIRUB UR QL STRIP: NEGATIVE
BUN BLD-MCNC: 9 MG/DL (ref 9–23)
BUN/CREAT SERPL: 12.9 (ref 7–25)
CALCIUM SPEC-SCNC: 9.3 MG/DL (ref 8.7–10.4)
CHLORIDE SERPL-SCNC: 93 MMOL/L (ref 99–109)
CK SERPL-CCNC: 13 U/L (ref 26–174)
CLARITY UR: CLEAR
CO2 SERPL-SCNC: 27 MMOL/L (ref 20–31)
COLOR UR: YELLOW
CREAT BLD-MCNC: 0.7 MG/DL (ref 0.6–1.3)
DEPRECATED RDW RBC AUTO: 40.6 FL (ref 37–54)
EOSINOPHIL # BLD AUTO: 0.19 10*3/MM3 (ref 0–0.3)
EOSINOPHIL NFR BLD AUTO: 2.4 % (ref 0–3)
ERYTHROCYTE [DISTWIDTH] IN BLOOD BY AUTOMATED COUNT: 13.3 % (ref 11.3–14.5)
GFR SERPL CREATININE-BSD FRML MDRD: 78 ML/MIN/1.73
GLOBULIN UR ELPH-MCNC: 2.6 GM/DL
GLUCOSE BLD-MCNC: 124 MG/DL (ref 70–100)
GLUCOSE UR STRIP-MCNC: NEGATIVE MG/DL
HCT VFR BLD AUTO: 31.4 % (ref 34.5–44)
HGB BLD-MCNC: 10.9 G/DL (ref 11.5–15.5)
HGB UR QL STRIP.AUTO: NEGATIVE
HYALINE CASTS UR QL AUTO: ABNORMAL /LPF
IMM GRANULOCYTES # BLD: 0.05 10*3/MM3 (ref 0–0.03)
IMM GRANULOCYTES NFR BLD: 0.6 % (ref 0–0.6)
KETONES UR QL STRIP: NEGATIVE
LEUKOCYTE ESTERASE UR QL STRIP.AUTO: ABNORMAL
LYMPHOCYTES # BLD AUTO: 1.27 10*3/MM3 (ref 0.6–4.8)
LYMPHOCYTES NFR BLD AUTO: 15.8 % (ref 24–44)
MCH RBC QN AUTO: 28.7 PG (ref 27–31)
MCHC RBC AUTO-ENTMCNC: 34.7 G/DL (ref 32–36)
MCV RBC AUTO: 82.6 FL (ref 80–99)
MONOCYTES # BLD AUTO: 0.39 10*3/MM3 (ref 0–1)
MONOCYTES NFR BLD AUTO: 4.9 % (ref 0–12)
NEUTROPHILS # BLD AUTO: 6.12 10*3/MM3 (ref 1.5–8.3)
NEUTROPHILS NFR BLD AUTO: 76.1 % (ref 41–71)
NITRITE UR QL STRIP: NEGATIVE
OSMOLALITY UR: 204 MOSM/KG (ref 300–1100)
PH UR STRIP.AUTO: 6.5 [PH] (ref 5–8)
PLATELET # BLD AUTO: 198 10*3/MM3 (ref 150–450)
PMV BLD AUTO: 7.9 FL (ref 6–12)
POTASSIUM BLD-SCNC: 3.6 MMOL/L (ref 3.5–5.5)
PROT SERPL-MCNC: 6.2 G/DL (ref 5.7–8.2)
PROT UR QL STRIP: NEGATIVE
RBC # BLD AUTO: 3.8 10*6/MM3 (ref 3.89–5.14)
RBC # UR: ABNORMAL /HPF
REF LAB TEST METHOD: ABNORMAL
SODIUM BLD-SCNC: 126 MMOL/L (ref 132–146)
SODIUM UR-SCNC: 25 MMOL/L (ref 30–90)
SP GR UR STRIP: 1.01 (ref 1–1.03)
SQUAMOUS #/AREA URNS HPF: ABNORMAL /HPF
TROPONIN I SERPL-MCNC: 0 NG/ML (ref 0–0.07)
TROPONIN I SERPL-MCNC: 0 NG/ML (ref 0–0.07)
UROBILINOGEN UR QL STRIP: ABNORMAL
WBC NRBC COR # BLD: 8.04 10*3/MM3 (ref 3.5–10.8)
WBC UR QL AUTO: ABNORMAL /HPF
YEAST URNS QL MICRO: ABNORMAL /HPF

## 2018-05-29 PROCEDURE — 25010000003 LEVETIRACETAM IN NACL 0.75% 1000 MG/100ML SOLUTION: Performed by: EMERGENCY MEDICINE

## 2018-05-29 PROCEDURE — 87086 URINE CULTURE/COLONY COUNT: CPT | Performed by: EMERGENCY MEDICINE

## 2018-05-29 PROCEDURE — 25810000003 SODIUM CHLORIDE 0.9 % WITH KCL 20 MEQ 20-0.9 MEQ/L-% SOLUTION: Performed by: NURSE PRACTITIONER

## 2018-05-29 PROCEDURE — 84300 ASSAY OF URINE SODIUM: CPT | Performed by: NURSE PRACTITIONER

## 2018-05-29 PROCEDURE — 99285 EMERGENCY DEPT VISIT HI MDM: CPT

## 2018-05-29 PROCEDURE — P9612 CATHETERIZE FOR URINE SPEC: HCPCS

## 2018-05-29 PROCEDURE — 84484 ASSAY OF TROPONIN QUANT: CPT

## 2018-05-29 PROCEDURE — 87106 FUNGI IDENTIFICATION YEAST: CPT | Performed by: EMERGENCY MEDICINE

## 2018-05-29 PROCEDURE — 25010000002 CEFTRIAXONE PER 250 MG: Performed by: EMERGENCY MEDICINE

## 2018-05-29 PROCEDURE — 82550 ASSAY OF CK (CPK): CPT | Performed by: NURSE PRACTITIONER

## 2018-05-29 PROCEDURE — 70551 MRI BRAIN STEM W/O DYE: CPT

## 2018-05-29 PROCEDURE — 85025 COMPLETE CBC W/AUTO DIFF WBC: CPT | Performed by: EMERGENCY MEDICINE

## 2018-05-29 PROCEDURE — 83935 ASSAY OF URINE OSMOLALITY: CPT | Performed by: NURSE PRACTITIONER

## 2018-05-29 PROCEDURE — 71045 X-RAY EXAM CHEST 1 VIEW: CPT

## 2018-05-29 PROCEDURE — 80053 COMPREHEN METABOLIC PANEL: CPT | Performed by: EMERGENCY MEDICINE

## 2018-05-29 PROCEDURE — 81001 URINALYSIS AUTO W/SCOPE: CPT | Performed by: EMERGENCY MEDICINE

## 2018-05-29 PROCEDURE — 93005 ELECTROCARDIOGRAM TRACING: CPT | Performed by: EMERGENCY MEDICINE

## 2018-05-29 PROCEDURE — 99223 1ST HOSP IP/OBS HIGH 75: CPT | Performed by: FAMILY MEDICINE

## 2018-05-29 RX ORDER — OXYBUTYNIN CHLORIDE 5 MG/1
5 TABLET, EXTENDED RELEASE ORAL DAILY
Status: DISCONTINUED | OUTPATIENT
Start: 2018-05-29 | End: 2018-06-01 | Stop reason: HOSPADM

## 2018-05-29 RX ORDER — LEVETIRACETAM 10 MG/ML
1000 INJECTION INTRAVASCULAR ONCE
Status: COMPLETED | OUTPATIENT
Start: 2018-05-29 | End: 2018-05-29

## 2018-05-29 RX ORDER — CEFTRIAXONE SODIUM 1 G/50ML
1 INJECTION, SOLUTION INTRAVENOUS EVERY 24 HOURS
Status: DISCONTINUED | OUTPATIENT
Start: 2018-05-30 | End: 2018-06-01 | Stop reason: HOSPADM

## 2018-05-29 RX ORDER — SENNA AND DOCUSATE SODIUM 50; 8.6 MG/1; MG/1
2 TABLET, FILM COATED ORAL NIGHTLY
Status: DISCONTINUED | OUTPATIENT
Start: 2018-05-29 | End: 2018-06-01 | Stop reason: HOSPADM

## 2018-05-29 RX ORDER — SODIUM CHLORIDE 0.9 % (FLUSH) 0.9 %
1-10 SYRINGE (ML) INJECTION AS NEEDED
Status: DISCONTINUED | OUTPATIENT
Start: 2018-05-29 | End: 2018-06-01 | Stop reason: HOSPADM

## 2018-05-29 RX ORDER — NITROFURANTOIN MACROCRYSTALS 50 MG/1
50 CAPSULE ORAL NIGHTLY
COMMUNITY
End: 2018-06-01 | Stop reason: HOSPADM

## 2018-05-29 RX ORDER — CEFTRIAXONE SODIUM 1 G/50ML
1 INJECTION, SOLUTION INTRAVENOUS ONCE
Status: COMPLETED | OUTPATIENT
Start: 2018-05-29 | End: 2018-05-29

## 2018-05-29 RX ORDER — HYDROCHLOROTHIAZIDE 25 MG/1
25 TABLET ORAL DAILY
COMMUNITY
End: 2018-06-01 | Stop reason: HOSPADM

## 2018-05-29 RX ORDER — ONDANSETRON 2 MG/ML
4 INJECTION INTRAMUSCULAR; INTRAVENOUS EVERY 6 HOURS PRN
Status: DISCONTINUED | OUTPATIENT
Start: 2018-05-29 | End: 2018-06-01 | Stop reason: HOSPADM

## 2018-05-29 RX ORDER — SODIUM CHLORIDE AND POTASSIUM CHLORIDE 150; 900 MG/100ML; MG/100ML
75 INJECTION, SOLUTION INTRAVENOUS CONTINUOUS
Status: DISCONTINUED | OUTPATIENT
Start: 2018-05-29 | End: 2018-05-31

## 2018-05-29 RX ORDER — ONDANSETRON 4 MG/1
4 TABLET, FILM COATED ORAL EVERY 6 HOURS PRN
Status: DISCONTINUED | OUTPATIENT
Start: 2018-05-29 | End: 2018-06-01 | Stop reason: HOSPADM

## 2018-05-29 RX ORDER — LEVOTHYROXINE SODIUM 0.05 MG/1
50 TABLET ORAL
Status: DISCONTINUED | OUTPATIENT
Start: 2018-05-30 | End: 2018-06-01 | Stop reason: HOSPADM

## 2018-05-29 RX ORDER — DOCUSATE SODIUM 100 MG/1
100 CAPSULE, LIQUID FILLED ORAL 2 TIMES DAILY
Status: DISCONTINUED | OUTPATIENT
Start: 2018-05-29 | End: 2018-06-01 | Stop reason: HOSPADM

## 2018-05-29 RX ORDER — LEVETIRACETAM 5 MG/ML
500 INJECTION INTRAVASCULAR EVERY 12 HOURS SCHEDULED
Status: DISCONTINUED | OUTPATIENT
Start: 2018-05-30 | End: 2018-05-30

## 2018-05-29 RX ORDER — ACETAMINOPHEN 325 MG/1
650 TABLET ORAL EVERY 4 HOURS PRN
Status: DISCONTINUED | OUTPATIENT
Start: 2018-05-29 | End: 2018-06-01 | Stop reason: HOSPADM

## 2018-05-29 RX ADMIN — POTASSIUM CHLORIDE AND SODIUM CHLORIDE 100 ML/HR: 900; 150 INJECTION, SOLUTION INTRAVENOUS at 21:43

## 2018-05-29 RX ADMIN — LEVETIRACETAM 1000 MG: 10 INJECTION INTRAVENOUS at 14:35

## 2018-05-29 RX ADMIN — CEFTRIAXONE SODIUM 1 G: 1 INJECTION, SOLUTION INTRAVENOUS at 16:30

## 2018-05-29 RX ADMIN — SODIUM CHLORIDE 1000 ML: 9 INJECTION, SOLUTION INTRAVENOUS at 14:23

## 2018-05-30 ENCOUNTER — APPOINTMENT (OUTPATIENT)
Dept: GENERAL RADIOLOGY | Facility: HOSPITAL | Age: 83
End: 2018-05-30

## 2018-05-30 ENCOUNTER — APPOINTMENT (OUTPATIENT)
Dept: NEUROLOGY | Facility: HOSPITAL | Age: 83
End: 2018-05-30

## 2018-05-30 LAB
ANION GAP SERPL CALCULATED.3IONS-SCNC: 7 MMOL/L (ref 3–11)
BASOPHILS # BLD AUTO: 0.03 10*3/MM3 (ref 0–0.2)
BASOPHILS NFR BLD AUTO: 0.4 % (ref 0–1)
BUN BLD-MCNC: 6 MG/DL (ref 9–23)
BUN/CREAT SERPL: 12 (ref 7–25)
CALCIUM SPEC-SCNC: 9 MG/DL (ref 8.7–10.4)
CHLORIDE SERPL-SCNC: 100 MMOL/L (ref 99–109)
CO2 SERPL-SCNC: 26 MMOL/L (ref 20–31)
CREAT BLD-MCNC: 0.5 MG/DL (ref 0.6–1.3)
DEPRECATED RDW RBC AUTO: 41.6 FL (ref 37–54)
EOSINOPHIL # BLD AUTO: 0.32 10*3/MM3 (ref 0–0.3)
EOSINOPHIL NFR BLD AUTO: 4.8 % (ref 0–3)
ERYTHROCYTE [DISTWIDTH] IN BLOOD BY AUTOMATED COUNT: 13.6 % (ref 11.3–14.5)
GFR SERPL CREATININE-BSD FRML MDRD: 115 ML/MIN/1.73
GLUCOSE BLD-MCNC: 82 MG/DL (ref 70–100)
HCT VFR BLD AUTO: 31 % (ref 34.5–44)
HGB BLD-MCNC: 10.6 G/DL (ref 11.5–15.5)
IMM GRANULOCYTES # BLD: 0.03 10*3/MM3 (ref 0–0.03)
IMM GRANULOCYTES NFR BLD: 0.4 % (ref 0–0.6)
LYMPHOCYTES # BLD AUTO: 2.21 10*3/MM3 (ref 0.6–4.8)
LYMPHOCYTES NFR BLD AUTO: 33.1 % (ref 24–44)
MCH RBC QN AUTO: 28.4 PG (ref 27–31)
MCHC RBC AUTO-ENTMCNC: 34.2 G/DL (ref 32–36)
MCV RBC AUTO: 83.1 FL (ref 80–99)
MONOCYTES # BLD AUTO: 0.5 10*3/MM3 (ref 0–1)
MONOCYTES NFR BLD AUTO: 7.5 % (ref 0–12)
NEUTROPHILS # BLD AUTO: 3.61 10*3/MM3 (ref 1.5–8.3)
NEUTROPHILS NFR BLD AUTO: 54.2 % (ref 41–71)
PLATELET # BLD AUTO: 235 10*3/MM3 (ref 150–450)
PMV BLD AUTO: 8.7 FL (ref 6–12)
POTASSIUM BLD-SCNC: 3.9 MMOL/L (ref 3.5–5.5)
RBC # BLD AUTO: 3.73 10*6/MM3 (ref 3.89–5.14)
SODIUM BLD-SCNC: 133 MMOL/L (ref 132–146)
TSH SERPL DL<=0.05 MIU/L-ACNC: 1.88 MIU/ML (ref 0.35–5.35)
WBC NRBC COR # BLD: 6.67 10*3/MM3 (ref 3.5–10.8)

## 2018-05-30 PROCEDURE — 25810000003 SODIUM CHLORIDE 0.9 % WITH KCL 20 MEQ 20-0.9 MEQ/L-% SOLUTION: Performed by: HOSPITALIST

## 2018-05-30 PROCEDURE — 25010000002 FLUCONAZOLE PER 200 MG: Performed by: HOSPITALIST

## 2018-05-30 PROCEDURE — 25010000002 LEVETIRACETAM IN NACL 0.82% 500 MG/100ML SOLUTION: Performed by: NURSE PRACTITIONER

## 2018-05-30 PROCEDURE — 25010000002 LEVETRIRACETAM PER 10 MG: Performed by: PSYCHIATRY & NEUROLOGY

## 2018-05-30 PROCEDURE — 71046 X-RAY EXAM CHEST 2 VIEWS: CPT

## 2018-05-30 PROCEDURE — 85025 COMPLETE CBC W/AUTO DIFF WBC: CPT | Performed by: NURSE PRACTITIONER

## 2018-05-30 PROCEDURE — 92610 EVALUATE SWALLOWING FUNCTION: CPT

## 2018-05-30 PROCEDURE — 84443 ASSAY THYROID STIM HORMONE: CPT | Performed by: NURSE PRACTITIONER

## 2018-05-30 PROCEDURE — 25810000003 SODIUM CHLORIDE 0.9 % WITH KCL 20 MEQ 20-0.9 MEQ/L-% SOLUTION: Performed by: NURSE PRACTITIONER

## 2018-05-30 PROCEDURE — 25010000002 ENOXAPARIN PER 10 MG

## 2018-05-30 PROCEDURE — 25010000002 CEFTRIAXONE PER 250 MG: Performed by: NURSE PRACTITIONER

## 2018-05-30 PROCEDURE — 99233 SBSQ HOSP IP/OBS HIGH 50: CPT | Performed by: HOSPITALIST

## 2018-05-30 PROCEDURE — 80048 BASIC METABOLIC PNL TOTAL CA: CPT | Performed by: NURSE PRACTITIONER

## 2018-05-30 PROCEDURE — 95816 EEG AWAKE AND DROWSY: CPT

## 2018-05-30 PROCEDURE — 99222 1ST HOSP IP/OBS MODERATE 55: CPT | Performed by: PSYCHIATRY & NEUROLOGY

## 2018-05-30 RX ORDER — FLUCONAZOLE 2 MG/ML
200 INJECTION, SOLUTION INTRAVENOUS ONCE
Status: COMPLETED | OUTPATIENT
Start: 2018-05-30 | End: 2018-05-30

## 2018-05-30 RX ORDER — CEFTRIAXONE SODIUM 1 G/50ML
1 INJECTION, SOLUTION INTRAVENOUS EVERY 24 HOURS
Status: DISCONTINUED | OUTPATIENT
Start: 2018-05-30 | End: 2018-05-30 | Stop reason: SDUPTHER

## 2018-05-30 RX ADMIN — LEVETIRACETAM 250 MG: 100 INJECTION, SOLUTION INTRAVENOUS at 18:14

## 2018-05-30 RX ADMIN — FLUCONAZOLE 200 MG: 2 INJECTION, SOLUTION INTRAVENOUS at 12:34

## 2018-05-30 RX ADMIN — LEVETIRACETAM 500 MG: 5 INJECTION INTRAVENOUS at 05:31

## 2018-05-30 RX ADMIN — LEVOTHYROXINE SODIUM 50 MCG: 50 TABLET ORAL at 05:31

## 2018-05-30 RX ADMIN — DOCUSATE SODIUM 100 MG: 100 CAPSULE, LIQUID FILLED ORAL at 09:24

## 2018-05-30 RX ADMIN — CEFTRIAXONE SODIUM 1 G: 1 INJECTION, SOLUTION INTRAVENOUS at 09:40

## 2018-05-30 RX ADMIN — ENOXAPARIN SODIUM 40 MG: 40 INJECTION SUBCUTANEOUS at 05:31

## 2018-05-30 RX ADMIN — Medication 2 TABLET: at 20:50

## 2018-05-30 RX ADMIN — POTASSIUM CHLORIDE AND SODIUM CHLORIDE 100 ML/HR: 900; 150 INJECTION, SOLUTION INTRAVENOUS at 07:37

## 2018-05-30 RX ADMIN — OXYBUTYNIN CHLORIDE 5 MG: 5 TABLET, EXTENDED RELEASE ORAL at 20:50

## 2018-05-30 RX ADMIN — POTASSIUM CHLORIDE AND SODIUM CHLORIDE 75 ML/HR: 900; 150 INJECTION, SOLUTION INTRAVENOUS at 21:09

## 2018-05-31 ENCOUNTER — APPOINTMENT (OUTPATIENT)
Dept: GENERAL RADIOLOGY | Facility: HOSPITAL | Age: 83
End: 2018-05-31
Attending: HOSPITALIST

## 2018-05-31 LAB
ANION GAP SERPL CALCULATED.3IONS-SCNC: 7 MMOL/L (ref 3–11)
BASOPHILS # BLD AUTO: 0.03 10*3/MM3 (ref 0–0.2)
BASOPHILS NFR BLD AUTO: 0.4 % (ref 0–1)
BUN BLD-MCNC: <5 MG/DL (ref 9–23)
BUN/CREAT SERPL: ABNORMAL (ref 7–25)
CALCIUM SPEC-SCNC: 8.8 MG/DL (ref 8.7–10.4)
CHLORIDE SERPL-SCNC: 104 MMOL/L (ref 99–109)
CO2 SERPL-SCNC: 22 MMOL/L (ref 20–31)
CREAT BLD-MCNC: 0.5 MG/DL (ref 0.6–1.3)
DEPRECATED RDW RBC AUTO: 42 FL (ref 37–54)
EOSINOPHIL # BLD AUTO: 0.32 10*3/MM3 (ref 0–0.3)
EOSINOPHIL NFR BLD AUTO: 4.7 % (ref 0–3)
ERYTHROCYTE [DISTWIDTH] IN BLOOD BY AUTOMATED COUNT: 13.8 % (ref 11.3–14.5)
GFR SERPL CREATININE-BSD FRML MDRD: 115 ML/MIN/1.73
GLUCOSE BLD-MCNC: 84 MG/DL (ref 70–100)
HCT VFR BLD AUTO: 32.7 % (ref 34.5–44)
HGB BLD-MCNC: 11.1 G/DL (ref 11.5–15.5)
IMM GRANULOCYTES # BLD: 0.02 10*3/MM3 (ref 0–0.03)
IMM GRANULOCYTES NFR BLD: 0.3 % (ref 0–0.6)
LYMPHOCYTES # BLD AUTO: 2.03 10*3/MM3 (ref 0.6–4.8)
LYMPHOCYTES NFR BLD AUTO: 29.5 % (ref 24–44)
MCH RBC QN AUTO: 28.5 PG (ref 27–31)
MCHC RBC AUTO-ENTMCNC: 33.9 G/DL (ref 32–36)
MCV RBC AUTO: 84.1 FL (ref 80–99)
MONOCYTES # BLD AUTO: 0.41 10*3/MM3 (ref 0–1)
MONOCYTES NFR BLD AUTO: 6 % (ref 0–12)
NEUTROPHILS # BLD AUTO: 4.08 10*3/MM3 (ref 1.5–8.3)
NEUTROPHILS NFR BLD AUTO: 59.4 % (ref 41–71)
PLATELET # BLD AUTO: 212 10*3/MM3 (ref 150–450)
PMV BLD AUTO: 8.5 FL (ref 6–12)
POTASSIUM BLD-SCNC: 3.9 MMOL/L (ref 3.5–5.5)
RBC # BLD AUTO: 3.89 10*6/MM3 (ref 3.89–5.14)
SODIUM BLD-SCNC: 133 MMOL/L (ref 132–146)
WBC NRBC COR # BLD: 6.87 10*3/MM3 (ref 3.5–10.8)

## 2018-05-31 PROCEDURE — 25010000002 ENOXAPARIN PER 10 MG

## 2018-05-31 PROCEDURE — 99239 HOSP IP/OBS DSCHRG MGMT >30: CPT | Performed by: HOSPITALIST

## 2018-05-31 PROCEDURE — 25010000002 LEVETRIRACETAM PER 10 MG: Performed by: PSYCHIATRY & NEUROLOGY

## 2018-05-31 PROCEDURE — 99232 SBSQ HOSP IP/OBS MODERATE 35: CPT | Performed by: PSYCHIATRY & NEUROLOGY

## 2018-05-31 PROCEDURE — 80048 BASIC METABOLIC PNL TOTAL CA: CPT | Performed by: HOSPITALIST

## 2018-05-31 PROCEDURE — 92611 MOTION FLUOROSCOPY/SWALLOW: CPT

## 2018-05-31 PROCEDURE — 85025 COMPLETE CBC W/AUTO DIFF WBC: CPT | Performed by: HOSPITALIST

## 2018-05-31 PROCEDURE — 25010000002 CEFTRIAXONE PER 250 MG: Performed by: HOSPITALIST

## 2018-05-31 PROCEDURE — 74230 X-RAY XM SWLNG FUNCJ C+: CPT

## 2018-05-31 RX ORDER — LEVETIRACETAM 100 MG/ML
250 SOLUTION ORAL EVERY 12 HOURS SCHEDULED
Status: DISCONTINUED | OUTPATIENT
Start: 2018-05-31 | End: 2018-06-01 | Stop reason: HOSPADM

## 2018-05-31 RX ORDER — CEFDINIR 300 MG/1
300 CAPSULE ORAL DAILY
Qty: 2 CAPSULE | Refills: 0 | Status: SHIPPED | OUTPATIENT
Start: 2018-05-31 | End: 2018-10-02

## 2018-05-31 RX ORDER — QUETIAPINE FUMARATE 25 MG/1
12.5 TABLET, FILM COATED ORAL NIGHTLY
Status: DISCONTINUED | OUTPATIENT
Start: 2018-05-31 | End: 2018-05-31

## 2018-05-31 RX ORDER — LEVETIRACETAM 100 MG/ML
250 SOLUTION ORAL EVERY 12 HOURS SCHEDULED
Qty: 473 ML | Refills: 0 | Status: SHIPPED | OUTPATIENT
Start: 2018-05-31 | End: 2018-07-09

## 2018-05-31 RX ORDER — QUETIAPINE FUMARATE 25 MG/1
12.5 TABLET, FILM COATED ORAL NIGHTLY PRN
Status: DISCONTINUED | OUTPATIENT
Start: 2018-05-31 | End: 2018-06-01 | Stop reason: HOSPADM

## 2018-05-31 RX ORDER — QUETIAPINE FUMARATE 25 MG/1
25 TABLET, FILM COATED ORAL NIGHTLY PRN
Qty: 30 TABLET | Refills: 0 | Status: SHIPPED | OUTPATIENT
Start: 2018-05-31 | End: 2019-01-01

## 2018-05-31 RX ADMIN — BARIUM SULFATE 10 ML: 400 SUSPENSION ORAL at 11:09

## 2018-05-31 RX ADMIN — BARIUM SULFATE 20 ML: 400 PASTE ORAL at 11:10

## 2018-05-31 RX ADMIN — DOCUSATE SODIUM 100 MG: 100 CAPSULE, LIQUID FILLED ORAL at 21:00

## 2018-05-31 RX ADMIN — CEFTRIAXONE SODIUM 1 G: 1 INJECTION, SOLUTION INTRAVENOUS at 09:51

## 2018-05-31 RX ADMIN — LEVETIRACETAM 250 MG: 100 INJECTION, SOLUTION INTRAVENOUS at 03:54

## 2018-05-31 RX ADMIN — LEVETIRACETAM 250 MG: 100 SOLUTION ORAL at 20:57

## 2018-05-31 RX ADMIN — ENOXAPARIN SODIUM 40 MG: 40 INJECTION SUBCUTANEOUS at 05:25

## 2018-05-31 RX ADMIN — LEVOTHYROXINE SODIUM 50 MCG: 50 TABLET ORAL at 08:47

## 2018-05-31 RX ADMIN — BARIUM SULFATE 50 ML: 400 SUSPENSION ORAL at 11:10

## 2018-05-31 RX ADMIN — Medication 5 MG: at 03:54

## 2018-05-31 RX ADMIN — LEVETIRACETAM 250 MG: 100 INJECTION, SOLUTION INTRAVENOUS at 08:48

## 2018-05-31 RX ADMIN — QUETIAPINE FUMARATE 12.5 MG: 25 TABLET ORAL at 20:57

## 2018-05-31 RX ADMIN — Medication 2 TABLET: at 21:02

## 2018-05-31 RX ADMIN — BARIUM SULFATE 100 ML: 0.81 POWDER, FOR SUSPENSION ORAL at 11:10

## 2018-05-31 RX ADMIN — OXYBUTYNIN CHLORIDE 5 MG: 5 TABLET, EXTENDED RELEASE ORAL at 21:02

## 2018-06-01 VITALS
TEMPERATURE: 98 F | BODY MASS INDEX: 22.95 KG/M2 | OXYGEN SATURATION: 97 % | SYSTOLIC BLOOD PRESSURE: 142 MMHG | HEIGHT: 62 IN | RESPIRATION RATE: 16 BRPM | DIASTOLIC BLOOD PRESSURE: 74 MMHG | WEIGHT: 124.7 LBS | HEART RATE: 86 BPM

## 2018-06-01 PROCEDURE — 97162 PT EVAL MOD COMPLEX 30 MIN: CPT

## 2018-06-01 PROCEDURE — G8979 MOBILITY GOAL STATUS: HCPCS

## 2018-06-01 PROCEDURE — G8978 MOBILITY CURRENT STATUS: HCPCS

## 2018-06-01 PROCEDURE — 25010000002 ENOXAPARIN PER 10 MG

## 2018-06-01 PROCEDURE — 97165 OT EVAL LOW COMPLEX 30 MIN: CPT

## 2018-06-01 RX ADMIN — LEVOTHYROXINE SODIUM 50 MCG: 50 TABLET ORAL at 05:40

## 2018-06-01 RX ADMIN — ENOXAPARIN SODIUM 40 MG: 40 INJECTION SUBCUTANEOUS at 05:40

## 2018-06-01 RX ADMIN — LEVETIRACETAM 250 MG: 100 SOLUTION ORAL at 09:43

## 2018-06-01 NOTE — PROGRESS NOTES
Continued Stay Note  [facility]     Patient Name: Silva Ruano  MRN: [id number]  Today's Date: 6/1/2018    Admit Date: 5/29/2018          Discharge Plan     Row Name 06/01/18 1019       Plan    Plan update    Patient/Family in Agreement with Plan yes    Plan Comments Spoke with patients family at bedside regarding change in plans from desiring to take patient home to requesting rehab.  Patient has been living with one family memeber who verbalizes that she does not feel safe/comfortable taking care of her but will do it if that is what the daughter wants.  The daughter verbalizes feeling guilt about having the family member take care of her but does not want to put her mother in a nursing home.  Discussed rehab options as temporary and other options after rehab.  Family requests to discuss and for CM to return.  CM following.      Final Discharge Disposition Code 03 - skilled nursing facility (SNF)              Discharge Codes    No documentation.       Expected Discharge Date and Time     Expected Discharge Date Expected Discharge Time    Jun 4, 2018             Narda White RN

## 2018-06-01 NOTE — PROGRESS NOTES
Case Management Discharge Note    Final Note: Received a call from Cresencio with [facility] who advises that they can offer the patient a bed on Monday.  Spoke with patient family at bedside who has decided that they just want to take her home and hire a private sitter.  ERINN notified.  Called [facility] and spoke to Cristina to advise of patient discharge.  [facility] plan [facility] via car with family to transport.      Destination     Service Request Status Selected Specialties Address Phone Number Fax Number    [facility] Pending - No Request Sent N/A [address] [facility] [place] [facility] [id number] [phone]      Durable Medical Equipment     No service coordination in this encounter.      Dialysis/Infusion     No service coordination in this encounter.      Home Medical Care     Service Request Status Selected Specialties Address Phone Number Fax Number    [facility] Pending - No Request Sent N/A [address] [address] [phone]      Social Care     No service coordination in this encounter.             Final Discharge Disposition Code: 06 - home with home health care

## 2018-06-01 NOTE — PLAN OF CARE
Problem: Fall Risk (Adult)  Goal: Identify Related Risk Factors and Signs and Symptoms  Outcome: Ongoing (interventions implemented as appropriate)   06/01/18 0247   Fall Risk (Adult)   Related Risk Factors (Fall Risk) age-related changes   Signs and Symptoms (Fall Risk) presence of risk factors     Goal: Absence of Fall  Outcome: Ongoing (interventions implemented as appropriate)   06/01/18 0247   Fall Risk (Adult)   Absence of Fall making progress toward outcome       Problem: Skin Injury Risk (Adult)  Goal: Identify Related Risk Factors and Signs and Symptoms  Outcome: Ongoing (interventions implemented as appropriate)   06/01/18 0247   Skin Injury Risk (Adult)   Related Risk Factors (Skin Injury Risk) advanced age     Goal: Skin Health and Integrity  Outcome: Ongoing (interventions implemented as appropriate)   06/01/18 0247   Skin Injury Risk (Adult)   Skin Health and Integrity making progress toward outcome       Problem: Patient Care Overview  Goal: Plan of Care Review  Outcome: Ongoing (interventions implemented as appropriate)   06/01/18 0247   Coping/Psychosocial   Plan of Care Reviewed With family   Plan of Care Review   Progress no change     Goal: Individualization and Mutuality  Outcome: Ongoing (interventions implemented as appropriate)    Goal: Discharge Needs Assessment  Outcome: Ongoing (interventions implemented as appropriate)    Goal: Interprofessional Rounds/Family Conf  Outcome: Ongoing (interventions implemented as appropriate)   06/01/18 0247   Interdisciplinary Rounds/Family Conf   Participants

## 2018-06-01 NOTE — DISCHARGE PLACEMENT REQUEST
"SKYE Venegas, RN  Case Management  [phone]    Silva Goodson (89 y.o. Female)     Date of Birth Social Security Number Address Home Phone MRN    06/01/1925  [address] [place] [address] [address] [place] [address] [phone] [id number]    [employer] Marital Status          [place]        Admission Date Admission Type Admitting Provider Attending Provider Department, Room/Bed    5/29/18 Emergency Josse Hewitt MD Kalantar, Masoud, MD [facility] 6B, N643/1    Discharge Date Discharge Disposition Discharge Destination                       Attending Provider:  Josse Hewitt MD    Allergies:  No Known Allergies    Isolation:  None   Infection:  None   Code Status:  Comfort Zina    Ht:  157.5 cm (62\")   Wt:  56.6 kg (124 lb 11.2 oz)    Admission Cmt:  None   Principal Problem:  None                Active Insurance as of 5/29/2018     Primary Coverage     Payor Plan Insurance Group Employer/Plan Group    MEDICARE MEDICARE A & B      Payor Plan Address Payor Plan Phone Number Effective From Effective To    [address] [phone] 5/1/1990     [facility] [id number]       Subscriber Name Subscriber Birth Date Member ID       SILVA GOODSON 6/1/1925 [id number]           Secondary Coverage     Payor Plan Insurance Group Employer/Plan Group    AARP MED SUPP AARP HEALTH CARE OPTIONS      Payor Plan Address Payor Plan Phone Number Effective From Effective To    [facility] [phone] 1/1/2017     [address]       [facility] [id number]       Subscriber Name Subscriber Birth Date Member ID       SILVA GOODSON 6/1/1925 [id number]                 Emergency Contacts      (Rel.) Home Phone Work Phone Mobile Phone    Shana Ambriz (Power of ) -- -- [phone]    AlineVandana (Sister) [phone] -- --            Insurance Information                MEDICARE/MEDICARE A & B Phone: [phone]    Subscriber: Silva Goodson Subscriber#: [id number]    " Group#:  Precert#:         ALLI MED SUPP/[facility] HEALTH CARE OPTIONS Phone: [phone]    Subscriber: Silva Ruano Subscriber#: [id number]    Group#:  Precert#:

## 2018-06-01 NOTE — PLAN OF CARE
Problem: Patient Care Overview  Goal: Plan of Care Review  Outcome: Ongoing (interventions implemented as appropriate)   06/01/18 1051   Coping/Psychosocial   Plan of Care Reviewed With patient;sibling;caregiver   Plan of Care Review   Progress no change   OTHER   Outcome Summary PT eval completed. Pt. presents w/ confusion, dec safety, dec strength, balance deficits, dec postural control, and gait instability. Pt. required min A x 2 for transfers and household gait. Pt. ambulated 50 ft w/ RW and min A x 2, w/ max VCs/tactile cues for safety. Pt. will benefit from skilled IPPT to address impairments and promte return to PLOF, pending progress/participation. Recommend SNF rehab at D/C.       06/01/18 1051   Coping/Psychosocial   Plan of Care Reviewed With patient;sibling;caregiver   Plan of Care Review   Progress no change   OTHER   Outcome Summary PT eval completed. Pt. presents w/ confusion, dec safety, dec strength, balance deficits, dec postural control, and gait instability. Pt. required min A x 2 for transfers and household gait. Pt. ambulated 50 ft w/ RW and min A x 2, w/ max VCs/tactile cues for safety. Pt. will benefit from skilled IPPT to address impairments and promote return to PLOF, pending progress/participation. Recommend SNF rehab at D/C.

## 2018-06-01 NOTE — THERAPY EVALUATION
Acute Care - Physical Therapy Initial Evaluation  [facility]     Patient Name: iSlva Ruano  : 1925  MRN: [id number]  Today's Date: 2018   Onset of Illness/Injury or Date of Surgery: 18  Date of Referral to PT: 18  Referring Physician: DUNCAN Kimbrough MD      Admit Date: 2018    Visit Dx:     ICD-10-CM ICD-9-CM   1. Syncope and collapse R55 780.2   2. Generalized seizure R56.9 780.39   3. Lethargy R53.83 780.79   4. Bacterial UTI N39.0 599.0    A49.9 041.9   5. Late onset Alzheimer's disease with behavioral disturbance G30.1 331.0    F02.81 294.11   6. Generalized weakness R53.1 780.79   7. Impaired mobility and ADLs Z74.09 799.89   8. Impaired functional mobility, balance, gait, and endurance Z74.09 V49.89     Patient Active Problem List   Diagnosis   • Non Hodgkin's lymphoma   • Subcutaneous nodules   • Late onset Alzheimer's disease with behavioral disturbance   • Hypertension   • Closed fracture of neck of right femur, Acute   • Generalized weakness   • Syncope and collapse   • Hyponatremia   • Seizure   • Encephalopathy acute   • Acute cystitis without hematuria     Past Medical History:   Diagnosis Date   • Alzheimer disease    • Arthritis    • Closed fracture of neck of right femur, Acute 2018   • Diverticulosis    • Hypertension    • Pathologic fracture of clavicle, left, initial encounter    • Skin cancer     lymphoma   • Thyroid disease      Past Surgical History:   Procedure Laterality Date   • CHOLECYSTECTOMY     • HIP HEMIARTHROPLASTY Right 2018    Procedure: HIP HEMIARTHROPLASTY;  Surgeon: Rohith Andersen MD;  Location: [facility];  Service:    • HYSTERECTOMY     • OVARIAN CYST REMOVAL          PT ASSESSMENT (last 12 hours)      Physical Therapy Evaluation     Row Name 18 0930          PT Evaluation Time/Intention    Subjective Information no complaints  -MB     Document Type evaluation  -MB     Mode of Treatment physical therapy  -MB      Patient Effort adequate  -MB     Comment confusion, difficulty following directions  -MB     Row Name 06/01/18 0986          General Information    Patient Profile Reviewed? yes  -MB     Onset of Illness/Injury or Date of Surgery 05/29/18  -MB     Referring Physician DUNCAN Kimbrough MD  -MB     Patient Observations cooperative;agree to therapy  -MB     Patient/Family Observations Pt.'s family present at bedside.  -MB     General Observations of Patient Pt. received supine, on RA, IV heplocked, telemetry.  RN consent for PT.  -MB     Prior Level of Function independent:;bed mobility;min assist:;transfer;gait;max assist:;ADL's  -MB     Equipment Currently Used at Home grab bar;power chair, (recliner lift);raised toilet;shower chair;walker, standard;wheelchair   w/c for community mobility  -MB     Pertinent History of Current Functional Problem Pt. presented to ED s/p unresponsive episode, tremulous LEs, and difficulty walking.  Pt. w/ 2 other recent similar events, to ER 5/19 w/ noted cystitis.  Pt. adm w/ AMS, r/o seizure vs. metabolic encephalopathy.      -MB     Existing Precautions/Restrictions fall;seizures  -MB     Limitations/Impairments safety/cognitive  -MB     Risks Reviewed patient and family:;LOB;dizziness;increased discomfort;change in vital signs  -MB     Benefits Reviewed patient and family:;improve function;increase independence;increase strength;increase balance;decrease pain;decrease risk of DVT;improve skin integrity;increase knowledge  -MB     Barriers to Rehab previous functional deficit;cognitive status  -MB     Row Name 06/01/18 0950          Relationship/Environment    Primary Source of Support/Comfort sibling(s)  -MB     Lives With sibling(s)   sister, another CG 3x/week for 2 hrs.  -MB     Row Name 06/01/18 1254          Resource/Environmental Concerns    Current Living Arrangements home/apartment/condo  -MB     Row Name 06/01/18 0930          Home Main Entrance    Number of Stairs, Main  Entrance one  -MB     Row Name 06/01/18 0930          Cognitive Assessment/Intervention- PT/OT    Orientation Status (Cognition) oriented to;person;disoriented to;place;situation;time  -MB     Follows Commands (Cognition) follows one step commands;50-74% accuracy;delayed response/completion;physical/tactile prompts required;repetition of directions required;verbal cues/prompting required  -MB     Safety Deficit (Cognitive) moderate deficit;ability to follow commands;awareness of need for assistance;insight into deficits/self awareness;problem solving;safety precautions awareness;safety precautions follow-through/compliance  -MB     Cognitive Assessment/Intervention Comment Alz. dementia at baseline.  -MB     Row Name 06/01/18 0930          Safety Issues, Functional Mobility    Impairments Affecting Function (Mobility) balance;coordination;endurance/activity tolerance;postural/trunk control;strength  -MB     Row Name 06/01/18 0930          Bed Mobility Assessment/Treatment    Bed Mobility Assessment/Treatment supine-sit;sit-supine  -MB     Supine-Sit [place] (Bed Mobility) moderate assist (50% patient effort);verbal cues;nonverbal cues (demo/gesture)  -MB     Bed Mobility, Safety Issues cognitive deficits limit understanding;decreased use of arms for pushing/pulling;decreased use of legs for bridging/pushing;impaired trunk control for bed mobility  -MB     Assistive Device (Bed Mobility) draw sheet;head of bed elevated  -MB     Comment (Bed Mobility) Pt. required increased time to complete and assist to move LEs towards EOB and raise trunk/shoulders.  -MB     Row Name 06/01/18 0930          Transfer Assessment/Treatment    Transfer Assessment/Treatment sit-stand transfer;stand-sit transfer  -MB     Comment (Transfers) Max VCs for sequencing and safe hand placement.  Min improvement noted despite cues.  -MB     Sit-Stand [place] (Transfers) minimum assist (75% patient effort);2 person assist;verbal  cues;nonverbal cues (demo/gesture)  -MB     Stand-Sit [place] (Transfers) minimum assist (75% patient effort);2 person assist;verbal cues;nonverbal cues (demo/gesture)  -MB     Row Name 06/01/18 0930          Sit-Stand Transfer    Assistive Device (Sit-Stand Transfers) walker, front-wheeled  -MB     Row Name 06/01/18 0930          Stand-Sit Transfer    Assistive Device (Stand-Sit Transfers) walker, front-wheeled  -MB     Row Name 06/01/18 0930          Gait/Stairs Assessment/Training    [place] Level (Gait) minimum assist (75% patient effort);2 person assist;verbal cues;nonverbal cues (demo/gesture)  -MB     Assistive Device (Gait) walker, front-wheeled  -MB     Distance in Feet (Gait) 50  -MB     Pattern (Gait) step-to  -MB     Deviations/Abnormal Patterns (Gait) laurence decreased;stride length decreased;gait speed decreased;festinating/shuffling  -MB     Bilateral Gait Deviations forward flexed posture;heel strike decreased  -MB     Comment (Gait/Stairs) Pt. demo step-to gait pattern w/ very slow laurence.  Pt. required constant VCs for safe use of RW, upright posture and forward gaze, and inc B step length.  Pt. tends to push RW too far anteriorly and abandons RW during direction changes.  Min improvement noted despite max cues.  -MB     Row Name 06/01/18 0930          General ROM    GENERAL ROM COMMENTS BLE ROM WFL.  -MB     Row Name 06/01/18 0930          General Assessment (Manual Muscle Testing)    Comment, General Manual Muscle Testing (MMT) Assessment Difficulty to formally test d/t cognition/difficulty following commands.  BLEs appear 4-/5 w/ mobility.  -MB     Row Name 06/01/18 0930          Static Standing Balance    Level of [place] (Supported Standing, Static Balance) minimal assist, 75% patient effort  -MB     Assistive Device Utilized (Supported Standing, Static Balance) rolling walker  -MB     Row Name 06/01/18 0930          Sensory Assessment/Intervention    Sensory General Assessment  no sensation deficits identified  -MB     Row Name 06/01/18 0930          Pain Assessment    Additional Documentation Pain Scale: FACES Pre/Post-Treatment (Group)  -MB     Row Name 06/01/18 0930          Pain Scale: FACES Pre/Post-Treatment    Pain: FACES Scale, Pretreatment 0-->no hurt  -MB     Pain: FACES Scale, Post-Treatment 0-->no hurt  -MB     Row Name             Wound 04/19/18 0949 head laceration    Wound - Properties Group Date first assessed: 04/19/18  -AK Time first assessed: 0949  -AK Present On Admission : yes  -AK Location: head  -AK, top of scalp  Type: laceration  -AK    Row Name 06/01/18 0930          Plan of Care Review    Plan of Care Reviewed With family  -MB     Row Name 06/01/18 0930          Physical Therapy Clinical Impression    Date of Referral to PT 05/31/18  -MB     PT Diagnosis (PT Clinical Impression) Functional decline  -MB     Functional Level at Time of Evaluation (PT Clinical Impression) Pt. requires min A x 2 for safe mobility.  -MB     Patient/Family Goals Statement (PT Clinical Impression) Improved safety w/ gait.  -MB     Criteria for Skilled Interventions Met (PT Clinical Impression) yes;treatment indicated  -MB     Rehab Potential (PT Clinical Summary) fair, will monitor progress closely  -MB     Care Plan Review (PT) evaluation/treatment results reviewed;care plan/treatment goals reviewed;risks/benefits reviewed;current/potential barriers reviewed;patient/other agree to care plan  -MB     Care Plan Review, Other Participant (PT Clinical Impression) caregiver;family  -MB     Row Name 06/01/18 0930          Vital Signs    Pre Systolic BP Rehab --   VSS.  RN cleared for PT.  -MB     Pre Patient Position Supine  -MB     Intra Patient Position Standing  -MB     Post Patient Position Sitting  -MB     Row Name 06/01/18 0930          Physical Therapy Goals    Bed Mobility Goal Selection (PT) bed mobility, PT goal 1  -MB     Transfer Goal Selection (PT) transfer, PT goal 1  -MB      Gait Training Goal Selection (PT) gait training, PT goal 1  -MB     Row Name 06/01/18 0930          Bed Mobility Goal 1 (PT)    Activity/Assistive Device (Bed Mobility Goal 1, PT) bed mobility activities, all  -MB     [place] Level/Cues Needed (Bed Mobility Goal 1, PT) contact guard assist;tactile cues required;verbal cues required  -MB     Time Frame (Bed Mobility Goal 1, PT) 1 week  -MB     Progress/Outcomes (Bed Mobility Goal 1, PT) goal ongoing  -MB     Row Name 06/01/18 0930          Transfer Goal 1 (PT)    Activity/Assistive Device (Transfer Goal 1, PT) sit-to-stand/stand-to-sit;bed-to-chair/chair-to-bed;walker, rolling  -MB     [place] Level/Cues Needed (Transfer Goal 1, PT) minimum assist (75% or more patient effort);tactile cues required;verbal cues required  -MB     Time Frame (Transfer Goal 1, PT) 1 week  -MB     Progress/Outcome (Transfer Goal 1, PT) goal ongoing  -MB     Row Name 06/01/18 0930          Gait Training Goal 1 (PT)    Activity/Assistive Device (Gait Training Goal 1, PT) gait (walking locomotion);assistive device use;decrease fall risk;diminish gait deviation;improve balance and speed;walker, rolling  -MB     [place] Level (Gait Training Goal 1, PT) minimum assist (75% or more patient effort);tactile cues required;verbal cues required  -MB     Distance (Gait Goal 1, PT) 100  -MB     Time Frame (Gait Training Goal 1, PT) 1 week  -MB     Progress/Outcome (Gait Training Goal 1, PT) goal ongoing  -MB     Row Name 06/01/18 0930          Positioning and Restraints    Pre-Treatment Position in bed  -MB     Post Treatment Position chair  -MB     In Chair notified nsg;reclined;call light within reach;encouraged to call for assist;exit alarm on;with family/caregiver;legs elevated;on mechanical lift sling;waffle cushion  -MB     Row Name 06/01/18 0930          Living Environment    Home Accessibility stairs to enter home;tub/shower is not walk in  -MB       User Key  (r) = Recorded By,  (t) = Taken By, (c) = Cosigned By    Initials Name Provider Type    CATY Mcgraw, PT Physical Therapist    JITENDRA German, RN Registered Nurse    ARIAN Hough, RN Registered Nurse          Physical Therapy Education     Title: PT OT SLP Therapies (Done)     Topic: Physical Therapy (Resolved)     Point: Mobility training (Resolved)    Learning Progress Summary     Learner Status Readiness Method Response Comment Documented by    Family Done Acceptance E,TB,H VU  DP 05/31/18 1510          Point: Home exercise program (Resolved)    Learning Progress Summary     Learner Status Readiness Method Response Comment Documented by    Family Done Acceptance E,TB,H VU  DP 05/31/18 1510          Point: Body mechanics (Resolved)    Learning Progress Summary     Learner Status Readiness Method Response Comment Documented by    Family Done Acceptance E,TB,H VU  DP 05/31/18 1510          Point: Precautions (Resolved)    Learning Progress Summary     Learner Status Readiness Method Response Comment Documented by    Family Done Acceptance E,TB,H VU  DP 05/31/18 1510                      User Key     Initials Effective Dates Name Provider Type Discipline    DP 06/16/16 -  Cristóbal Hough, RN Registered Nurse Nurse                PT Recommendation and Plan  Anticipated Discharge Disposition (PT): skilled nursing facility  Planned Therapy Interventions (PT Eval): balance training, bed mobility training, gait training, home exercise program, patient/family education, strengthening, transfer training  Therapy Frequency (PT Clinical Impression): daily  Outcome Summary/Treatment Plan (PT)  Anticipated Equipment Needs at Discharge (PT): front wheeled walker  Anticipated Discharge Disposition (PT): skilled nursing facility  Plan of Care Reviewed With: patient, sibling, caregiver  Progress: no change  Outcome Summary: PT eval completed.  Pt. presents w/ confusion, dec safety, dec strength, balance deficits, dec postural  control, and gait instability.  Pt. required min A x 2 for transfers and household gait.  Pt. ambulated 50 ft w/ RW and min A x 2, w/ max VCs/tactile cues for safety.  Pt. will benefit from skilled IPPT to address impairments and promote return to PLOF, pending progress/participation.  Recommend SNF rehab at D/C.           Outcome Measures     Row Name 06/01/18 0930 06/01/18 0856          How much help from another person do you currently need...    Turning from your back to your side while in flat bed without using bedrails? 3  -MB  --     Moving from lying on back to sitting on the side of a flat bed without bedrails? 2  -MB  --     Moving to and from a bed to a chair (including a wheelchair)? 2  -MB  --     Standing up from a chair using your arms (e.g., wheelchair, bedside chair)? 2  -MB  --     Climbing 3-5 steps with a railing? 2  -MB  --     To walk in hospital room? 2  -MB  --     AM-PAC 6 Clicks Score 13  -MB  --        How much help from another is currently needed...    Putting on and taking off regular lower body clothing?  -- 1  -ERIN     Bathing (including washing, rinsing, and drying)  -- 1  -ERIN     Toileting (which includes using toilet bed pan or urinal)  -- 1  -ERIN     Putting on and taking off regular upper body clothing  -- 1  -ERIN     Taking care of personal grooming (such as brushing teeth)  -- 2  -ERIN     Eating meals  -- 2  -ERIN     Score  -- 8  -ERIN        Functional Assessment    Outcome Measure Options AM-PAC 6 Clicks Basic Mobility (PT)  -MB AM-PAC 6 Clicks Daily Activity (OT)  -ERIN       User Key  (r) = Recorded By, (t) = Taken By, (c) = Cosigned By    Initials Name Provider Type    ERIN Gonzalez, OT Occupational Therapist    CATY Mcgraw, PT Physical Therapist           Time Calculation:         PT Charges     Row Name 06/01/18 1055             Time Calculation    Start Time 0930  -MB      PT Received On 06/01/18  -MB      PT Goal Re-Cert Due Date 06/11/18  -MB        User Key  (r)  = Recorded By, (t) = Taken By, (c) = Cosigned By    Initials Name Provider Type    CATY Mcgraw, PT Physical Therapist          Therapy Charges for Today     Code Description Service Date Service Provider Modifiers Qty    12615667616 HC PT MOBILITY CURRENT 6/1/2018 Briseyda Mcgraw, PT GP, CK 1    66685900823 HC PT MOBILITY PROJECTED 6/1/2018 Briseyda Mcgraw, PT GP, CJ 1    12892525890 HC PT EVAL MOD COMPLEXITY 4 6/1/2018 Briseyda Mcgraw, PT GP 1          PT G-Codes  PT Professional Judgement Used?: Yes  Outcome Measure Options: AM-PAC 6 Clicks Basic Mobility (PT)  Score: 13  Functional Limitation: Mobility: Walking and moving around  Mobility: Walking and Moving Around Current Status (): At least 40 percent but less than 60 percent impaired, limited or restricted  Mobility: Walking and Moving Around Goal Status (): At least 20 percent but less than 40 percent impaired, limited or restricted      Briseyda Mcrgaw, PT  6/1/2018

## 2018-06-01 NOTE — THERAPY EVALUATION
Acute Care - Occupational Therapy Initial Evaluation  [facility]     Patient Name: Silva Ruano  : 1925  MRN: [id number]  Today's Date: 2018  Onset of Illness/Injury or Date of Surgery: 18  Date of Referral to OT: 18  Referring Physician: MD Garcia    Admit Date: 2018       ICD-10-CM ICD-9-CM   1. Syncope and collapse R55 780.2   2. Generalized seizure R56.9 780.39   3. Lethargy R53.83 780.79   4. Bacterial UTI N39.0 599.0    A49.9 041.9   5. Late onset Alzheimer's disease with behavioral disturbance G30.1 331.0    F02.81 294.11   6. Generalized weakness R53.1 780.79   7. Impaired mobility and ADLs Z74.09 799.89     Patient Active Problem List   Diagnosis   • Non Hodgkin's lymphoma   • Subcutaneous nodules   • Late onset Alzheimer's disease with behavioral disturbance   • Hypertension   • Closed fracture of neck of right femur, Acute   • Generalized weakness   • Syncope and collapse   • Hyponatremia   • Seizure   • Encephalopathy acute   • Acute cystitis without hematuria     Past Medical History:   Diagnosis Date   • Alzheimer disease    • Arthritis    • Closed fracture of neck of right femur, Acute 2018   • Diverticulosis    • Hypertension    • Pathologic fracture of clavicle, left, initial encounter    • Skin cancer     lymphoma   • Thyroid disease      Past Surgical History:   Procedure Laterality Date   • CHOLECYSTECTOMY     • HIP HEMIARTHROPLASTY Right 2018    Procedure: HIP HEMIARTHROPLASTY;  Surgeon: Rohith Andersen MD;  Location: [facility];  Service:    • HYSTERECTOMY     • OVARIAN CYST REMOVAL            OT ASSESSMENT FLOWSHEET (last 72 hours)      Occupational Therapy Evaluation     Row Name 18 0856                   OT Evaluation Time/Intention    Subjective Information no complaints  -ERIN        Document Type evaluation  -ERIN        Mode of Treatment occupational therapy   PT. overlap 11 minutes for transfer due to pt. cognitive lev  -ERIN         Patient Effort adequate  -ERIN        Comment Confusion and difficulty following instructions, but pleasant.  -ERIN           General Information    Patient Profile Reviewed? yes  -ERIN        Onset of Illness/Injury or Date of Surgery 05/29/18  -ERIN        Referring Physician MD Garcia  -ERIN        Patient Observations cooperative;agree to therapy   intially sleeping, but aroused easily  -ERIN        Patient/Family Observations family present.  -ERIN        General Observations of Patient Pt. dozing in bed.  Heplocked IV RUE on telemetry.  -ERIN        Prior Level of Function min assist:;feeding;mod assist:;grooming;max assist:;dressing;dependent:;bathing;driving;shopping   sister assist pt. around home with std walker and with trans  -ERIN        Equipment Currently Used at Home grab bar;power chair, (recliner lift);shower chair;walker, standard;wheelchair   w/c in community, bar in tub and around high toilet  -ERIN        Pertinent History of Current Functional Problem Pt. sitting up in chair and became limp, dentures falling out of mouth and staring off.  Pt. unresponsive.  Pt. with 2 other simular events recently and to ER 5/19 with noted cystitis and started on sz meds.  Pt. admitted for AMS, r/o sz vs metabolic encephalopathy.  Syncope episode.  -ERIN        Existing Precautions/Restrictions fall;seizures  -ERIN        Limitations/Impairments safety/cognitive  -ERIN        Risks Reviewed patient and family:;LOB;increased discomfort;change in vital signs  -ERIN        Benefits Reviewed patient and family:;improve function;increase independence;increase strength;increase balance;increase knowledge  -ERIN        Barriers to Rehab cognitive status;previous functional deficit  -ERIN           Relationship/Environment    Lives With sibling(s)   sister, other CG comes 3 x week for 2 hrs  -ERIN        Name(s) of Who Lives With Patient Vandana Barrett  -ERIN           Resource/Environmental Concerns    Current Living Arrangements  home/apartment/condo  -ERIN           Home Main Entrance    Number of Stairs, Main Entrance one  -ERIN           Cognitive Assessment/Intervention- PT/OT    Orientation Status (Cognition) oriented to;person;disoriented to;place;situation;time  -ERIN        Follows Commands (Cognition) 50-74% accuracy;follows one step commands;physical/tactile prompts required;repetition of directions required;verbal cues/prompting required  -ERIN        Safety Deficit (Cognitive) moderate deficit;ability to follow commands;insight into deficits/self awareness;problem solving;safety precautions awareness;safety precautions follow-through/compliance  -ERIN        Cognitive Assessment/Intervention Comment Pt. with Alz.  -ERIN           Safety Issues, Functional Mobility    Safety Issues Affecting Function (Mobility) ability to follow commands;awareness of need for assistance;insight into deficits/self awareness;problem solving;safety precaution awareness;safety precautions follow-through/compliance  -ERIN        Impairments Affecting Function (Mobility) balance;cognition;endurance/activity tolerance;strength;postural/trunk control  -ERIN        Comment, Safety Issues/Impairments (Mobility) Pt. with flexed posture keeping wx to far in front of her.  Small steps.  -ERIN           Bed Mobility Assessment/Treatment    Bed Mobility Assessment/Treatment supine-sit;scooting/bridging  -ERIN        Scooting/Bridging [place] (Bed Mobility) moderate assist (50% patient effort);nonverbal cues (demo/gesture);verbal cues  -ERIN        Supine-Sit [place] (Bed Mobility) moderate assist (50% patient effort);verbal cues;nonverbal cues (demo/gesture)  -ERIN        Bed Mobility, Safety Issues decreased use of arms for pushing/pulling;decreased use of legs for bridging/pushing;impaired trunk control for bed mobility;cognitive deficits limit understanding  -ERIN        Assistive Device (Bed Mobility) draw sheet;head of bed elevated  -ERIN        Comment (Bed Mobility) Pt.  difficulty following cues to intiate moving to EOB.  -           Functional Mobility    Functional Mobility- Ind. Level minimum assist (75% patient effort);2 person assist required;verbal cues required;nonverbal cues required (demo/gesture)  -        Functional Mobility- Device rolling walker  -        Functional Mobility-Distance (Feet) 50  -        Functional Mobility- Safety Issues step length decreased;weight-shifting ability decreased;sequencing ability decreased   wx way in front of her.  -           Transfer Assessment/Treatment    Transfer Assessment/Treatment sit-stand transfer;stand-sit transfer  -        Sit-Stand [place] (Transfers) minimum assist (75% patient effort);2 person assist  -        Stand-Sit [place] (Transfers) minimum assist (75% patient effort);2 person assist;verbal cues;nonverbal cues (demo/gesture)  -           Sit-Stand Transfer    Assistive Device (Sit-Stand Transfers) walker, front-wheeled  -           Stand-Sit Transfer    Assistive Device (Stand-Sit Transfers) walker, front-wheeled   pt. wanting to leave wx behind  -           ADL Assessment/Intervention    BADL Assessment/Intervention lower body dressing;feeding;upper body dressing  -           Upper Body Dressing Assessment/Training    Upper Body Dressing [place] Level pajama/robe;maximum assist (25% patient effort);verbal cues;nonverbal cues (demo/gesture)  -        Upper Body Dressing Position edge of bed sitting  -ERIN        Comment (Upper Body Dressing) Pt. when asked to put arm in sleeve stating she did not want to.  Limited by confusion.  -           Lower Body Dressing Assessment/Training    Lower Body Dressing [place] Level don;socks;dependent (less than 25% patient effort)  -        Lower Body Dressing Position supine  -        Comment (Lower Body Dressing) Pt. dependent at home priorly.  -           Self-Feeding Assessment/Training    [place] Level (Feeding) feeding  skills;minimum assist (75% patient effort);verbal cues;nonverbal cues (demo/gesture)  -ERIN        Position (Self-Feeding) supported sitting  -ERIN        Comment (Feeding) Pt. needed assist loading first bite on fork and then was able to bring to mouth and indep. scooped second bite.  Left with family to assist.  -ERIN           BADL Safety/Performance    Impairments, BADL Safety/Performance cognition   limited intiation and sequencing tasks  -ERIN        Cognitive Impairments, BADL Safety/Performance insight into deficits/self awareness;safety precaution awareness;problem solving/reasoning;sequencing abilities  -ERIN           General ROM    GENERAL ROM COMMENTS WFL AROM BUe  -ERIN           General Assessment (Manual Muscle Testing)    Comment, General Manual Muscle Testing (MMT) Assessment Pt. at least 4/5 BUe's.  -ERIN           Motor Assessment/Interventions    Additional Documentation Balance (Group)  -ERIN           Balance    Balance static sitting balance;static standing balance  -ERIN           Static Sitting Balance    Level of [place] (Unsupported Sitting, Static Balance) contact guard assist  -ERIN        Sitting Position (Unsupported Sitting, Static Balance) sitting on edge of bed  -ERIN           Static Standing Balance    Level of [place] (Supported Standing, Static Balance) contact guard assist  -ERIN        Assistive Device Utilized (Supported Standing, Static Balance) rolling walker  -ERIN           Sensory Assessment/Intervention    Sensory General Assessment no sensation deficits identified   unable do formal assess, with observation intact  -ERIN        Additional Documentation Vision Assessment/Intervention (Group)  -ERIN           Vision Assessment/Intervention    Visual Impairment/Limitations --   per family pt. has glasses does not wear, did well with food  -ERIN           Positioning and Restraints    Pre-Treatment Position in bed  -ERIN        Post Treatment Position chair  -ERIN        In Chair reclined;call  light within reach;encouraged to call for assist;exit alarm on;with family/caregiver;waffle cushion;on mechanical lift sling   left with family to assist with BF  -ERIN           Pain Scale: Numbers Pre/Post-Treatment    Pain Scale: Numbers, Pretreatment 0/10 - no pain  -ERIN        Pain Scale: Numbers, Post-Treatment 0/10 - no pain  -ERIN           Wound 04/19/18 0949 head laceration    Wound - Properties Group Date first assessed: 04/19/18  -AK Time first assessed: 0949  -AK Present On Admission : yes  -AK Location: head  -AK, top of scalp  Type: laceration  -AK       Coping    Observed Emotional State calm;cooperative  -ERIN        Verbalized Emotional State acceptance  -ERIN           Plan of Care Review    Plan of Care Reviewed With family  -ERIN           Clinical Impression (OT)    Date of Referral to OT 05/31/18  -ERIN        OT Diagnosis Impaired ADL and transfer independence due to sz.  -ERIN        Patient/Family Goals Statement (OT Eval) Family wants to be able to return home with pt., but feel will need rehab first or some assist at home.  -ERIN        Criteria for Skilled Therapeutic Interventions Met (OT Eval) yes;treatment indicated  -ERIN        Rehab Potential (OT Eval) good, to achieve stated therapy goals  -ERIN        Therapy Frequency (OT Eval) daily  -ERIN        Care Plan Review (OT) evaluation/treatment results reviewed;care plan/treatment goals reviewed;risks/benefits reviewed;current/potential barriers reviewed;patient/other agree to care plan  -ERIN        Care Plan Review, Other Participant (OT Eval) family  -ERIN        Anticipated Discharge Disposition (OT) skilled nursing facility   to help return to OF so family can handle at home  -ERIN           Vital Signs    Post Systolic BP Rehab 142  -ERIN        Post Treatment Diastolic BP 74  -ERIN        Pretreatment Heart Rate (beats/min) 89  -ERIN        Intratreatment Heart Rate (beats/min) 115  -ERIN        Posttreatment Heart Rate (beats/min) 94  -ERIN        Pre SpO2 (%)  97  -ERIN        O2 Delivery Pre Treatment room air  -ERIN        Post SpO2 (%) 96  -ERIN        O2 Delivery Post Treatment room air  -ERIN        Pre Patient Position Supine  -ERIN        Intra Patient Position Standing  -ERIN        Post Patient Position Sitting  -ERIN           Planned OT Interventions    Planned Therapy Interventions (OT Eval) activity tolerance training;BADL retraining;cognitive/visual perception retraining;functional balance retraining;occupation/activity based interventions;strengthening exercise;ROM/therapeutic exercise;transfer/mobility retraining  -ERIN           OT Goals    Bed Mobility Goal Selection (OT) bed mobility, OT goal 1  -ERIN        Transfer Goal Selection (OT) transfer, OT goal 1  -ERIN        Grooming Goal Selection (OT) grooming, OT goal 1  -ERIN        Self-Feeding Goal Selection (OT) self feeding, OT goal 1  -ERIN        Activity Tolerance Goal Selection (OT) activity tolerance, OT goal 1  -ERIN        Additional Documentation Grooming Goal Selection (OT) (Row);Self-Feeding Goal Selection (OT) (Row);Activity Tolerance Goal Selection (OT) (Row)  -ERIN           Bed Mobility Goal 1 (OT)    Activity/Assistive Device (Bed Mobility Goal 1, OT) supine to sit;scooting;bed rails  -ERIN        [place] Level/Cues Needed (Bed Mobility Goal 1, OT) tactile cues required;verbal cues required;minimum assist (75% or more patient effort)  -ERIN        Time Frame (Bed Mobility Goal 1, OT) long term goal (LTG);1 week  -ERIN        Progress/Outcomes (Bed Mobility Goal 1, OT) goal ongoing  -ERIN           Transfer Goal 1 (OT)    Activity/Assistive Device (Transfer Goal 1, OT) sit-to-stand/stand-to-sit;toilet;commode, bedside without drop arms;walker, rolling  -ERIN        [place] Level/Cues Needed (Transfer Goal 1, OT) contact guard assist;tactile cues required;verbal cues required  -ERIN        Time Frame (Transfer Goal 1, OT) long term goal (LTG);1 week  -ERIN        Progress/Outcome (Transfer Goal 1, OT) goal ongoing  -ERIN            Grooming Goal 1 (OT)    Activity/Device (Grooming Goal 1, OT) wash face, hands;hair care  -ERIN        [place] (Grooming Goal 1, OT) tactile cues required;verbal cues required;minimum assist (75% or more patient effort);set-up required  -ERIN        Time Frame (Grooming Goal 1, OT) long term goal (LTG);1 week  -ERIN        Progress/Outcome (Grooming Goal 1, OT) goal ongoing  -ERIN           Self-Feeding Goal 1 (OT)    Activity/Assistive Device (Self-Feeding Goal 1, OT) self-feeding skills, all;liquids to mouth;scoop food and bring to mouth  -ERIN        [place] Level/Cues Needed (Self-Feeding Goal 1, OT) verbal cues required;tactile cues required;set-up required;supervision required  -ERIN        Time Frame (Self-Feeding Goal 1, OT) long term goal (LTG);1 week  -ERIN        Progress/Outcomes (Self-Feeding Goal 1, OT) goal ongoing  -ERIN            Activity Tolerance Goal 1 (OT)    Activity Tolerance Goal 1 (OT) ADL task, mobility, ther. exer.  -ERIN        Activity Level (Endurance Goal 1, OT) 15 min activity;O2 sat >/ equal to 88%;20 min activity  -ERIN        Time Frame (Activity Tolerance Goal 1, OT) long term goal (LTG);1 week  -ERIN        Progress/Outcome (Activity Tolerance Goal 1, OT) goal ongoing  -ERIN           Living Environment    Home Accessibility stairs to enter home;tub/shower is not walk in  -ERIN          User Key  (r) = Recorded By, (t) = Taken By, (c) = Cosigned By    Initials Name Effective Dates    ERIN Laura Gonzalez OT 06/22/15 -     JITENDRA German RN 06/16/16 -     ARIAN Hough, RN 06/16/16 -            Occupational Therapy Education     Title: PT OT SLP Therapies (Done)     Topic: Occupational Therapy (Done)     Point: ADL training (Done)     Description: Instruct learner(s) on proper safety adaptation and remediation techniques during self care or transfers.   Instruct in proper use of assistive devices.   Learning Progress Summary     Learner Status Readiness Method Response Comment  Documented by    Patient Done Acceptance E NR,VU role OT, reason for consult, bed mobility, transfer technique, posture standing and wx safety. Pt. needs further education. JB 06/01/18 0955    Family Done Acceptance E NR,VU role OT, reason for consult, bed mobility, transfer technique, posture standing and wx safety. Pt. needs further education. JB 06/01/18 0955     Done Acceptance E,TB,H VU  DP 05/31/18 1510          Point: Home exercise program (Done)     Description: Instruct learner(s) on appropriate technique for monitoring, assisting and/or progressing therapeutic exercises/activities.   Learning Progress Summary     Learner Status Readiness Method Response Comment Documented by    Family Done Acceptance E,TB,H VU  DP 05/31/18 1510          Point: Precautions (Done)     Description: Instruct learner(s) on prescribed precautions during self-care and functional transfers.   Learning Progress Summary     Learner Status Readiness Method Response Comment Documented by    Patient Done Acceptance E NR,VU role OT, reason for consult, bed mobility, transfer technique, posture standing and wx safety. Pt. needs further education. ERIN 06/01/18 0955    Family Done Acceptance E NR,VU role OT, reason for consult, bed mobility, transfer technique, posture standing and wx safety. Pt. needs further education. JB 06/01/18 0955     Done Acceptance E,TB,H VU  DP 05/31/18 1510          Point: Body mechanics (Done)     Description: Instruct learner(s) on proper positioning and spine alignment during self-care, functional mobility activities and/or exercises.   Learning Progress Summary     Learner Status Readiness Method Response Comment Documented by    Patient Done Acceptance E NR,VU role OT, reason for consult, bed mobility, transfer technique, posture standing and wx safety. Pt. needs further education. JB 06/01/18 0955    Family Done Acceptance E NR,VU role OT, reason for consult, bed mobility, transfer technique, posture  standing and wx safety. Pt. needs further education. ERIN 06/01/18 0955     Done Acceptance E,TB,H VU  DP 05/31/18 1510                      User Key     Initials Effective Dates Name Provider Type Discipline    ERIN 06/22/15 -  Laura Gonzalez, OT Occupational Therapist OT    DP 06/16/16 -  Cristóbal Hough RN Registered Nurse Nurse                  OT Recommendation and Plan  Outcome Summary/Treatment Plan (OT)  Anticipated Discharge Disposition (OT): skilled nursing facility (to help return to PLOF so family can handle at home)  Planned Therapy Interventions (OT Eval): activity tolerance training, BADL retraining, cognitive/visual perception retraining, functional balance retraining, occupation/activity based interventions, strengthening exercise, ROM/therapeutic exercise, transfer/mobility retraining  Therapy Frequency (OT Eval): daily  Plan of Care Review  Plan of Care Reviewed With: patient  Plan of Care Reviewed With: patient  Outcome Summary: OT evaluation completed.  Pt. sleeping, but easily arounsed.  Pt. disoriented.  Pt. to EOB mod and stood and mobilized approximately 50 ft with min of 2, but flexed posture and unsafe.  Skilled OT services appropriate to help pt. return to PLOF.  Pt. would benefit from short stay at SNF to help return to PLOF so can return home with family assist.          Outcome Measures     Row Name 06/01/18 0856             How much help from another is currently needed...    Putting on and taking off regular lower body clothing? 1  -ERIN      Bathing (including washing, rinsing, and drying) 1  -ERIN      Toileting (which includes using toilet bed pan or urinal) 1  -ERIN      Putting on and taking off regular upper body clothing 1  -ERIN      Taking care of personal grooming (such as brushing teeth) 2  -ERIN      Eating meals 2  -ERIN      Score 8  -ERIN         Functional Assessment    Outcome Measure Options AM-PAC 6 Clicks Daily Activity (OT)  -ERIN        User Key  (r) = Recorded By, (t) = Taken By,  (c) = Cosigned By    Initials Name Provider Type    ERIN Laura Gonzalez, OT Occupational Therapist          Time Calculation:   OT Start Time: 0856    Therapy Charges for Today     Code Description Service Date Service Provider Modifiers Qty    88977678448 HC OT EVAL LOW COMPLEXITY 4 6/1/2018 Laura Gonzalez OT GO 1               Laura Gonzalez OT  6/1/2018

## 2018-06-01 NOTE — PROGRESS NOTES
Continued Stay Note  [facility]     Patient Name: Silva Ruano  MRN: [id number]  Today's Date: 6/1/2018    Admit Date: 5/29/2018          Discharge Plan     Row Name 06/01/18 1050       Plan    Plan update    Patient/Family in Agreement with Plan yes    Plan Comments Spoke with patients family to reports that the patient has maintained a condo at [facility] and would agree to rehab at this facility only.  Called [facility] and spoke to Cresencio [phone] who will check with their legal department regarding whether they would be able to accept patient since patient has not been staying in her condo but rather has been staying with her sister in [place] and they are only certified to take patients that live on campus.  Per request faxed facesheet to [phone].  Provided Sitter List to family per their request if rehab does not work out.  CM waiting for reply from [facility].      Final Discharge Disposition Code 03 - skilled nursing facility (SNF)    Row Name 06/01/18 1019       Plan    Plan update    Patient/Family in Agreement with Plan yes    Plan Comments Spoke with patients family at bedside regarding change in plans from desiring to take patient home to requesting rehab.  Patient has been living with one family memeber who verbalizes that she does not feel safe/comfortable taking care of her but will do it if that is what the daughter wants.  The daughter verbalizes feeling guilt about having the family member take care of her but does not want to put her mother in a nursing home.  Discussed rehab options as temporary and other options after rehab.  Family requests to discuss and for CM to return.  CM following.      Final Discharge Disposition Code 03 - skilled nursing facility (SNF)              Discharge Codes    No documentation.       Expected Discharge Date and Time     Expected Discharge Date Expected Discharge Time    Jun 4, 2018             Narda White RN

## 2018-06-01 NOTE — PLAN OF CARE
Problem: Patient Care Overview  Goal: Plan of Care Review  Outcome: Ongoing (interventions implemented as appropriate)   06/01/18 3263   Coping/Psychosocial   Plan of Care Reviewed With patient   Plan of Care Review   Progress no change   OTHER   Outcome Summary OT evaluation completed. Pt. sleeping, but easily arounsed. Pt. disoriented. Pt. to EOB mod and stood and mobilized approximately 50 ft with min of 2, but flexed posture and unsafe. Skilled OT services appropriate to help pt. return to PLOF. Pt. would benefit from short stay at SNF to help return to PLOF so can return home with family assist.

## 2018-06-03 LAB — BACTERIA SPEC AEROBE CULT: ABNORMAL

## 2018-06-06 ENCOUNTER — OFFICE VISIT (OUTPATIENT)
Dept: ORTHOPEDIC SURGERY | Facility: CLINIC | Age: 83
End: 2018-06-06

## 2018-06-06 VITALS — HEIGHT: 62 IN | WEIGHT: 124 LBS | RESPIRATION RATE: 18 BRPM | BODY MASS INDEX: 22.82 KG/M2

## 2018-06-06 DIAGNOSIS — M17.11 PRIMARY OSTEOARTHRITIS OF RIGHT KNEE: Primary | ICD-10-CM

## 2018-06-06 PROCEDURE — 20610 DRAIN/INJ JOINT/BURSA W/O US: CPT | Performed by: PHYSICIAN ASSISTANT

## 2018-06-06 NOTE — PROGRESS NOTES
"Subjective   Silva Ruano is a 89 y.o.  female   Follow-up, Pain, and Injections of the Right Knee       History of Present Illness     Patient is here for visco supplementation injection into right knee.     Past Medical History:   Diagnosis Date   • Alzheimer disease    • Arthritis    • Closed fracture of neck of right femur, Acute 2/12/2018   • Diverticulosis    • Hypertension    • Pathologic fracture of clavicle, left, initial encounter    • Skin cancer     lymphoma   • Thyroid disease         Past Surgical History:   Procedure Laterality Date   • CHOLECYSTECTOMY     • HIP HEMIARTHROPLASTY Right 2/12/2018    Procedure: HIP HEMIARTHROPLASTY;  Surgeon: Rohith Andersen MD;  Location: [facility];  Service:    • HYSTERECTOMY     • OVARIAN CYST REMOVAL         No Known Allergies    Review of Systems   Constitutional: Negative for fever.   HENT: Negative for voice change.    Eyes: Negative for visual disturbance.   Respiratory: Negative for shortness of breath.    Cardiovascular: Negative for chest pain.   Gastrointestinal: Negative for abdominal distention and abdominal pain.   Genitourinary: Negative for dysuria.   Musculoskeletal: Positive for arthralgias. Negative for gait problem and joint swelling.   Skin: Negative for rash.   Neurological: Negative for speech difficulty.   Hematological: Does not bruise/bleed easily.   Psychiatric/Behavioral: Negative for confusion.       Objective   Resp 18   Ht 157.5 cm (62\")   Wt 56.2 kg (124 lb)   BMI 22.68 kg/m²    Physical Exam  Skin exam stable with no erythema, ecchymosis or rash.  No new swelling.  No motor or sensory changes.  Distal pulse intact.    Large Joint Arthrocentesis  Date/Time: 6/6/2018 9:52 AM  Consent given by: patient  Site marked: site marked  Timeout: Immediately prior to procedure a time out was called to verify the correct patient, procedure, equipment, support staff and site/side marked as required   Supporting " Documentation  Indications: pain   Procedure Details  Location: knee - R knee  Preparation: Patient was prepped and draped in the usual sterile fashion  Needle size: 22 G  Medications administered: 25 mg sodium hyaluronate 25 MG/2.5ML  Patient tolerance: patient tolerated the procedure well with no immediate complications          Assessment/Plan   Independent Review of Radiographic Studies:    No new imaging done today.  Reviewed with patient at a prior visit.  Laboratory and Other Studies:  No new results reviewed today.       Silva was seen today for follow-up, pain and injections.    Diagnoses and all orders for this visit:    Primary osteoarthritis of right knee  -     Large Joint Arthrocentesis      Orthopedic activities reviewed and patient expressed appreciation  Discussion of orthopedic goals  Risk, benefits, and merits of treatment alternatives reviewed with the patient and questions answered  Call or notify for any adverse effect from injection therapy    Recommendations/Plan:  Exercise, medications, injections, other patient advice, and return appointment as noted.    Patient is encouraged to call or return for any issues or concerns.    FU in 1 week     Patient agreeable to call or return sooner for any concerns.

## 2018-06-13 ENCOUNTER — OFFICE VISIT (OUTPATIENT)
Dept: ORTHOPEDIC SURGERY | Facility: CLINIC | Age: 83
End: 2018-06-13

## 2018-06-13 VITALS — HEIGHT: 62 IN | RESPIRATION RATE: 12 BRPM | WEIGHT: 124 LBS | BODY MASS INDEX: 22.82 KG/M2

## 2018-06-13 DIAGNOSIS — M17.11 PRIMARY OSTEOARTHRITIS OF RIGHT KNEE: Primary | ICD-10-CM

## 2018-06-13 DIAGNOSIS — M17.11 PATELLOFEMORAL ARTHRITIS OF RIGHT KNEE: ICD-10-CM

## 2018-06-13 DIAGNOSIS — M25.561 ARTHRALGIA OF RIGHT KNEE: ICD-10-CM

## 2018-06-13 PROCEDURE — 20610 DRAIN/INJ JOINT/BURSA W/O US: CPT | Performed by: ORTHOPAEDIC SURGERY

## 2018-06-13 RX ORDER — METOPROLOL SUCCINATE 25 MG/1
25 TABLET, EXTENDED RELEASE ORAL DAILY
Refills: 1 | COMMUNITY
Start: 2018-06-05 | End: 2020-01-01 | Stop reason: HOSPADM

## 2018-06-22 ENCOUNTER — OFFICE VISIT (OUTPATIENT)
Dept: ORTHOPEDIC SURGERY | Facility: CLINIC | Age: 83
End: 2018-06-22

## 2018-06-22 VITALS — WEIGHT: 123 LBS | BODY MASS INDEX: 22.63 KG/M2 | HEIGHT: 62 IN | RESPIRATION RATE: 18 BRPM

## 2018-06-22 DIAGNOSIS — M17.11 PRIMARY OSTEOARTHRITIS OF RIGHT KNEE: Primary | ICD-10-CM

## 2018-06-22 PROCEDURE — 20610 DRAIN/INJ JOINT/BURSA W/O US: CPT | Performed by: PHYSICIAN ASSISTANT

## 2018-06-22 RX ORDER — HYDROCHLOROTHIAZIDE 25 MG/1
25 TABLET ORAL DAILY
Refills: 3 | COMMUNITY
Start: 2018-06-18 | End: 2018-10-02

## 2018-06-22 RX ORDER — LEVETIRACETAM 250 MG/1
250 TABLET ORAL 2 TIMES DAILY
Refills: 3 | COMMUNITY
Start: 2018-06-13 | End: 2020-01-01 | Stop reason: HOSPADM

## 2018-06-22 NOTE — PROGRESS NOTES
Subjective   Patient ID: Silva Ruano is a 89 y.o. right hand dominant female  Follow-up, Pain, and Injections of the Right Knee (Patient is here today to get her 4th supartz injection. She states the pain is getting better.)         History of Present Illness  Patient presents for her fourth Visco supplementation injection           Past Medical History:   Diagnosis Date   • Alzheimer disease    • Arthritis    • Closed fracture of neck of right femur, Acute 2/12/2018   • Diverticulosis    • Hypertension    • Pathologic fracture of clavicle, left, initial encounter    • Skin cancer     lymphoma   • Thyroid disease         Past Surgical History:   Procedure Laterality Date   • CHOLECYSTECTOMY     • HIP HEMIARTHROPLASTY Right 2/12/2018    Procedure: HIP HEMIARTHROPLASTY;  Surgeon: Rohith Andersen MD;  Location: [facility];  Service:    • HYSTERECTOMY     • OVARIAN CYST REMOVAL         Family History   Problem Relation Age of Onset   • Heart disease Other    • Hypertension Other    • Cancer Other        Social History     Social History   • Marital status:      Spouse name: N/A   • Number of children: N/A   • Years of education: N/A     Occupational History   • Not on file.     Social History Main Topics   • Smoking status: Never Smoker   • Smokeless tobacco: Never Used   • Alcohol use No   • Drug use: No   • Sexual activity: Defer     Other Topics Concern   • Not on file     Social History Narrative   • No narrative on file         Current Outpatient Prescriptions:   •  acetaminophen (TYLENOL) 325 MG tablet, Take 2 tablets by mouth Every 4 (Four) Hours As Needed for Mild Pain ., Disp: 1 bottle, Rfl: 0  •  cefdinir (OMNICEF) 300 MG capsule, Take 1 capsule by mouth Daily., Disp: 2 capsule, Rfl: 0  •  estrogens, conjugated, (PREMARIN) 0.625 MG tablet, Take 0.625 mg by mouth Daily., Disp: , Rfl:   •  hydrochlorothiazide (HYDRODIURIL) 25 MG tablet, Take 25 mg by mouth Daily., Disp: , Rfl: 3  •   "levETIRAcetam (KEPPRA) 100 MG/ML solution, Take 2.5 mL by mouth Every 12 (Twelve) Hours., Disp: 473 mL, Rfl: 0  •  levETIRAcetam (KEPPRA) 250 MG tablet, Take 250 mg by mouth 2 (Two) Times a Day., Disp: , Rfl: 3  •  levothyroxine (SYNTHROID, LEVOTHROID) 50 MCG tablet, Take 50 mcg by mouth Daily., Disp: , Rfl:   •  losartan (COZAAR) 50 MG tablet, Take 1 tablet by mouth Daily., Disp: 30 tablet, Rfl: 0  •  metoprolol succinate XL (TOPROL-XL) 25 MG 24 hr tablet, Take 25 mg by mouth Daily., Disp: , Rfl: 1  •  omeprazole (priLOSEC) 20 MG capsule, Take 20 mg by mouth Daily., Disp: , Rfl:   •  QUEtiapine (SEROquel) 25 MG tablet, Take 1 tablet by mouth At Night As Needed (sleep)., Disp: 30 tablet, Rfl: 0  •  tolterodine LA (DETROL LA) 4 MG 24 hr capsule, Take 1 capsule by mouth Daily., Disp: 30 capsule, Rfl: 0    No Known Allergies    Review of Systems   Constitutional: Negative for fever.   HENT: Negative for voice change.    Eyes: Negative for visual disturbance.   Respiratory: Negative for shortness of breath.    Cardiovascular: Negative for chest pain.   Gastrointestinal: Negative for abdominal distention and abdominal pain.   Genitourinary: Negative for dysuria.   Musculoskeletal: Positive for arthralgias. Negative for gait problem and joint swelling.   Skin: Negative for rash.   Neurological: Negative for speech difficulty.   Hematological: Does not bruise/bleed easily.   Psychiatric/Behavioral: Negative for confusion.       Objective   Resp 18   Ht 157.5 cm (62\")   Wt 55.8 kg (123 lb)   BMI 22.50 kg/m²    Physical Exam  Ortho Exam   Extremity DVT signs are Negative by clinical screen.   Neurologic Exam   Ortho Exam         Assessment/Plan   Independent Review of Radiographic Studies:    No new imaging done today.  Reviewed with patient at a prior visit.      Large Joint Arthrocentesis  Date/Time: 6/22/2018 10:18 AM  Consent given by: patient  Site marked: site marked  Timeout: Immediately prior to procedure a time " out was called to verify the correct patient, procedure, equipment, support staff and site/side marked as required   Supporting Documentation  Indications: pain   Procedure Details  Location: knee - R knee  Preparation: Patient was prepped and draped in the usual sterile fashion  Needle size: 22 G  Approach: anterolateral  Medications administered: 25 mg sodium hyaluronate 25 MG/2.5ML  Patient tolerance: patient tolerated the procedure well with no immediate complications             Phoebe was seen today for follow-up, pain and injections.    Diagnoses and all orders for this visit:    Primary osteoarthritis of right knee    Other orders  -     Large Joint Arthrocentesis       Regular exercise as tolerated  Orthopedic activities reviewed and patient expressed appreciation  Discussion of orthopedic goals  Risk, benefits, and merits of treatment alternatives reviewed with the patient and questions answered  Call or notify for any adverse effect from injection therapy    Recommendations/Plan:  Exercise, medications, injections, other patient advice, and return appointment as noted.  Patient is encouraged to call or return for any issues or concerns.    Return in about 1 week (around 6/29/2018) for supartz #5 right knee.  Patient agreeable to call or return sooner for any concerns.

## 2018-06-29 ENCOUNTER — OFFICE VISIT (OUTPATIENT)
Dept: ORTHOPEDIC SURGERY | Facility: CLINIC | Age: 83
End: 2018-06-29

## 2018-06-29 VITALS — BODY MASS INDEX: 22.63 KG/M2 | RESPIRATION RATE: 18 BRPM | HEIGHT: 62 IN | WEIGHT: 123 LBS

## 2018-06-29 DIAGNOSIS — M17.11 PRIMARY OSTEOARTHRITIS OF RIGHT KNEE: Primary | ICD-10-CM

## 2018-06-29 PROCEDURE — 20610 DRAIN/INJ JOINT/BURSA W/O US: CPT | Performed by: PHYSICIAN ASSISTANT

## 2018-06-29 RX ORDER — NITROFURANTOIN MACROCRYSTALS 50 MG/1
50 CAPSULE ORAL DAILY
Refills: 12 | COMMUNITY
Start: 2018-06-27 | End: 2018-10-02

## 2018-06-29 NOTE — PROGRESS NOTES
"Maricarmen Ruano is a 89 y.o.  female is here today for injection therapy.  Injections of the Right Knee (Supartz #5 (Office Provided))       History of Present Illness   Patient presents for 5th Supartz injection into right knee.     Past Medical History:   Diagnosis Date   • Alzheimer disease    • Arthritis    • Closed fracture of neck of right femur, Acute 2/12/2018   • Diverticulosis    • Hypertension    • Pathologic fracture of clavicle, left, initial encounter    • Skin cancer     lymphoma   • Thyroid disease         Past Surgical History:   Procedure Laterality Date   • CHOLECYSTECTOMY     • HIP HEMIARTHROPLASTY Right 2/12/2018    Procedure: HIP HEMIARTHROPLASTY;  Surgeon: Rohith Andersen MD;  Location: [facility];  Service:    • HYSTERECTOMY     • OVARIAN CYST REMOVAL         No Known Allergies    Review of Systems   Constitutional: Negative for fever.   HENT: Negative for voice change.    Eyes: Negative for visual disturbance.   Respiratory: Negative for shortness of breath.    Cardiovascular: Negative for chest pain.   Gastrointestinal: Negative for abdominal distention and abdominal pain.   Genitourinary: Negative for dysuria.   Musculoskeletal: Positive for arthralgias. Negative for gait problem and joint swelling.   Skin: Negative for rash.   Neurological: Negative for speech difficulty.   Hematological: Does not bruise/bleed easily.   Psychiatric/Behavioral: Negative for confusion.   All other systems reviewed and are negative.      Objective   Resp 18   Ht 157.5 cm (62\")   Wt 55.8 kg (123 lb)   BMI 22.50 kg/m²    Physical Exam  Skin exam stable with no erythema, ecchymosis or rash.  No new swelling.  No motor or sensory changes.  Distal pulse intact.    Large Joint Arthrocentesis  Date/Time: 6/29/2018 10:36 AM  Consent given by: patient  Site marked: site marked  Timeout: Immediately prior to procedure a time out was called to verify the correct patient, procedure, equipment, " support staff and site/side marked as required   Supporting Documentation  Indications: pain   Procedure Details  Location: knee - R knee  Preparation: Patient was prepped and draped in the usual sterile fashion  Needle gauge: 21G.  Medications administered: 25 mg sodium hyaluronate 25 MG/2.5ML  Patient tolerance: patient tolerated the procedure well with no immediate complications          Assessment/Plan   Independent Review of Radiographic Studies:    No new imaging done today.  Laboratory and Other Studies:  No new results reviewed today.   Medical Decision Making:    No complications of procedure and treatments.    There are no diagnoses linked to this encounter.  Regular exercise as tolerated  Orthopedic activities reviewed and patient expressed appreciation  Discussion of orthopedic goals  Risk, benefits, and merits of treatment alternatives reviewed with the patient and questions answered    Recommendations/Plan:  Exercise, medications, injections, other patient advice, and return appointment as noted.    Patient is encouraged to call or return for any issues or concerns.    Patient agreeable to call or return sooner for any concerns.

## 2018-07-09 ENCOUNTER — OFFICE VISIT (OUTPATIENT)
Dept: NEUROLOGY | Facility: CLINIC | Age: 83
End: 2018-07-09

## 2018-07-09 VITALS
SYSTOLIC BLOOD PRESSURE: 124 MMHG | BODY MASS INDEX: 23 KG/M2 | WEIGHT: 125 LBS | HEIGHT: 62 IN | DIASTOLIC BLOOD PRESSURE: 60 MMHG

## 2018-07-09 DIAGNOSIS — G30.1 LATE ONSET ALZHEIMER'S DISEASE WITH BEHAVIORAL DISTURBANCE (HCC): Primary | ICD-10-CM

## 2018-07-09 DIAGNOSIS — R56.9 SEIZURE (HCC): ICD-10-CM

## 2018-07-09 DIAGNOSIS — F02.818 LATE ONSET ALZHEIMER'S DISEASE WITH BEHAVIORAL DISTURBANCE (HCC): Primary | ICD-10-CM

## 2018-07-09 PROCEDURE — 99214 OFFICE O/P EST MOD 30 MIN: CPT | Performed by: PSYCHIATRY & NEUROLOGY

## 2018-07-09 NOTE — PROGRESS NOTES
"Subjective      CC: dementia    History of Present Illness   Silva Ruano is a 89 y.o. female who returns to clinic today for evaluation of cognitive impairment. Her family  has noted symptoms since at least 2015 marked initially by forgetfulness. This has gradually worsened  over time. Additional associated symptoms have included impairments in essentially all spheres of cognition. She has had associated symptoms of delusions and hallucinations. Her family manages her medications and finances. She is currently residing at her sister's home in [place]. No further associated symptoms or modifying factors have been noted.    Of note she is followed by Dr. Morocho for lymphoma.     Prior evaluation has included screening blood work  and a head CT  which were unremarkable . She previously took donepezil and Namenda, though these were discontinued due to side effects and lack of perceived benefit.     Since her last visit on 11/3/17, her family has noted a continued cognitive decline with impairments in all spheres of cognition. However, she has had 3 suspected seizures, one manifesting as a staring spell and 2 as GTCS. An EEG showed generalized slowing. An MRI showed no acute changes. Keppra was started at 250 mg bid and she has had no recurrent seizures.      The following portions of the patient's history were reviewed and updated as appropriate: allergies, current medications, past family history, past medical history, past social history, past surgical history and problem list.    Review of Systems   Unable to perform ROS: Dementia       Objective     /60   Ht 157.5 cm (62\")   Wt 56.7 kg (125 lb)   BMI 22.86 kg/m²     General appearance today is normal.       Physical Exam   Neurological: She has normal strength.   Psychiatric: Her speech is normal.        Neurologic Exam     Mental Status   Oriented to person.   Disoriented to place.   Disoriented to time.   Registration: recalls 3 of 3 objects. " Recall of objects at 5 minutes: 0/3 recall. Follows 1 step commands.   Attention: normal.   Speech: speech is normal   Level of consciousness: alert  Unable to name object. Unable to repeat. Abnormal comprehension.     Cranial Nerves   Cranial nerves II through XII intact.     Motor Exam   Muscle bulk: normal  Overall muscle tone: normal    Strength   Strength 5/5 throughout.     Gait, Coordination, and Reflexes     Gait  Gait: (unsteady)      MMSE=untestable      Assessment/Plan   Silva was seen today for alzheimer's disease.    Diagnoses and all orders for this visit:    Late onset Alzheimer's disease with behavioral disturbance    Seizure (CMS/HCC)          Discussion/Summary   Silva Ruano returns to clinic today with a history of Alzheimer's Disease . I again reviewed her current status and treatment options. At this time it was elected to simply continue on Keppra at 250 mg bid. I discussed her potential risk for recurrence, though am hopeful she will do well. She will then follow up in 3 months, or sooner if needed.       I spent 15 minutes out of 25 minutes face to face with the patient and family and discussing diagnosis, prognosis, evaluation, current status, treatment options and management.    As part of this visit I reviewed prior lab results, reviewed radiology results, obtained additional history from the family which is incorporated in the HPI and reviewed records from prior hospitalizations which is incorporated in the HPI.      Edmond Martinez MD

## 2018-10-02 ENCOUNTER — OFFICE VISIT (OUTPATIENT)
Dept: ONCOLOGY | Facility: CLINIC | Age: 83
End: 2018-10-02

## 2018-10-02 VITALS
HEIGHT: 62 IN | OXYGEN SATURATION: 97 % | DIASTOLIC BLOOD PRESSURE: 59 MMHG | HEART RATE: 73 BPM | TEMPERATURE: 97.6 F | RESPIRATION RATE: 16 BRPM | SYSTOLIC BLOOD PRESSURE: 134 MMHG

## 2018-10-02 DIAGNOSIS — C82.80 OTHER TYPE OF FOLLICULAR LYMPHOMA, UNSPECIFIED BODY REGION (HCC): Primary | ICD-10-CM

## 2018-10-02 PROCEDURE — 99213 OFFICE O/P EST LOW 20 MIN: CPT | Performed by: NURSE PRACTITIONER

## 2018-10-02 NOTE — PROGRESS NOTES
"      PROBLEM LIST:  1. 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   b) Stage III disease at presentation in 2008.   c) Responded to Rituxan as a single agent followed by maintenance Rituxan.  2. Alzheimer's disease      CHIEF COMPLAINT: Followup about lymphoma    Subjective     HISTORY OF PRESENT ILLNESS:   Mrs. Ruano is here for follow up evaluation of lymphoma.  Since her last visit she as diagnosed with seizures and started on Keppra. Since taking Keppra she's had no further seizures. She is declining and not able to swallow her pills whole anymore, she requires 24-hour care.  Her daughter stays with her during the day and she has paid caregivers at night that stay with her.      Past Medical History, Past Surgical History, Social History, Family History have been reviewed and are without significant changes except as mentioned.    Review of Systems   Unable to perform ROS: Dementia        Medications:  The current medication list was reviewed in the EMR    ALLERGIES:    Allergies   Allergen Reactions   • Sulfa Antibiotics Unknown (See Comments)       Objective      /59   Pulse 73   Temp 97.6 °F (36.4 °C)   Resp 16   Ht 157.5 cm (62\")   SpO2 97%          General: well appearing, in no acute distress, in a wheelchair   HEENT: sclera anicteric, oropharynx clear  Lymphatics: no cervical, supraclavicular, or axillary adenopathy  Cardiovascular: regular rate and rhythm, no murmurs  Lungs: clear to auscultation bilaterally  Abdomen: soft, nontender, nondistended.  No palpable masses or organomegaly  Extremeties: no lower extremity edema, cords or calf tenderness  Skin: no rashes, lesions,  or petechiae. A few scattered bruises on her forearms     RECENT LABS:  Hematology WBC   Date Value Ref Range Status   05/31/2018 6.87 3.50 - 10.80 10*3/mm3 Final     Hemoglobin   Date Value Ref Range Status   05/31/2018 11.1 (L) 11.5 - 15.5 g/dL Final "     Hematocrit   Date Value Ref Range Status   05/31/2018 32.7 (L) 34.5 - 44.0 % Final     MCV   Date Value Ref Range Status   05/31/2018 84.1 80.0 - 99.0 fL Final     RDW   Date Value Ref Range Status   05/31/2018 13.8 11.3 - 14.5 % Final     MPV   Date Value Ref Range Status   05/31/2018 8.5 6.0 - 12.0 fL Final     Platelets   Date Value Ref Range Status   05/31/2018 212 150 - 450 10*3/mm3 Final     Immature Grans %   Date Value Ref Range Status   05/31/2018 0.3 0.0 - 0.6 % Final     Neutrophils, Absolute   Date Value Ref Range Status   05/31/2018 4.08 1.50 - 8.30 10*3/mm3 Final     Lymphocytes, Absolute   Date Value Ref Range Status   05/31/2018 2.03 0.60 - 4.80 10*3/mm3 Final     Monocytes, Absolute   Date Value Ref Range Status   05/31/2018 0.41 0.00 - 1.00 10*3/mm3 Final     Eosinophils, Absolute   Date Value Ref Range Status   05/31/2018 0.32 (H) 0.00 - 0.30 10*3/mm3 Final     Basophils, Absolute   Date Value Ref Range Status   05/31/2018 0.03 0.00 - 0.20 10*3/mm3 Final     Immature Grans, Absolute   Date Value Ref Range Status   05/31/2018 0.02 0.00 - 0.03 10*3/mm3 Final     nRBC   Date Value Ref Range Status   03/27/2018 0.0 0.0 - 0.0 /100 WBC Final       Glucose   Date Value Ref Range Status   05/31/2018 84 70 - 100 mg/dL Final     Sodium   Date Value Ref Range Status   05/31/2018 133 132 - 146 mmol/L Final     Potassium   Date Value Ref Range Status   05/31/2018 3.9 3.5 - 5.5 mmol/L Final     CO2   Date Value Ref Range Status   05/31/2018 22.0 20.0 - 31.0 mmol/L Final     Chloride   Date Value Ref Range Status   05/31/2018 104 99 - 109 mmol/L Final     Anion Gap   Date Value Ref Range Status   05/31/2018 7.0 3.0 - 11.0 mmol/L Final     Creatinine   Date Value Ref Range Status   05/31/2018 0.50 (L) 0.60 - 1.30 mg/dL Final     BUN   Date Value Ref Range Status   05/31/2018 <5 (L) 9 - 23 mg/dL Final     BUN/Creatinine Ratio   Date Value Ref Range Status   05/31/2018  7.0 - 25.0 Final     Comment:     Unable  to calculate Bun/Crea Ratio.     Calcium   Date Value Ref Range Status   05/31/2018 8.8 8.7 - 10.4 mg/dL Final     eGFR Non  Amer   Date Value Ref Range Status   05/31/2018 115 >60 mL/min/1.73 Final     Alkaline Phosphatase   Date Value Ref Range Status   05/29/2018 108 (H) 25 - 100 U/L Final     Total Protein   Date Value Ref Range Status   05/29/2018 6.2 5.7 - 8.2 g/dL Final     ALT (SGPT)   Date Value Ref Range Status   05/29/2018 14 7 - 40 U/L Final     AST (SGOT)   Date Value Ref Range Status   05/29/2018 20 0 - 33 U/L Final     Total Bilirubin   Date Value Ref Range Status   05/29/2018 0.2 (L) 0.3 - 1.2 mg/dL Final     Albumin   Date Value Ref Range Status   05/29/2018 3.60 3.20 - 4.80 g/dL Final     Globulin   Date Value Ref Range Status   05/29/2018 2.6 gm/dL Final     A/G Ratio   Date Value Ref Range Status   05/14/2014 1.2 1.0 - 2.0 Final       LDH   Date Value Ref Range Status   03/27/2018 273 (L) 313 - 618 U/L Final          Assessment/Plan   IMPRESSION:   1. Low-grade lymphoma.  Mrs. Solano has no current evidence of disease recurrence at this time. Since she is declining physically we will change her visits to yearly. She will contniue to be followed closely by Dr. Cortes.  We can certainly see her sooner if there are any questions of recurrent lymphoma that rise          PLAN:  1. We will plan on seeing her back in 1 year for follow up evaluation. We have asked her daughter to contact us in the interim, if any new symptoms arise.               April Tellez, [facility]    10/2/2018          CC:

## 2018-10-17 ENCOUNTER — APPOINTMENT (OUTPATIENT)
Dept: CT IMAGING | Facility: HOSPITAL | Age: 83
End: 2018-10-17

## 2018-10-17 ENCOUNTER — APPOINTMENT (OUTPATIENT)
Dept: GENERAL RADIOLOGY | Facility: HOSPITAL | Age: 83
End: 2018-10-17

## 2018-10-17 ENCOUNTER — HOSPITAL ENCOUNTER (EMERGENCY)
Facility: HOSPITAL | Age: 83
Discharge: HOME | End: 2018-10-17
Attending: EMERGENCY MEDICINE | Admitting: EMERGENCY MEDICINE

## 2018-10-17 VITALS
WEIGHT: 125 LBS | HEART RATE: 79 BPM | TEMPERATURE: 98 F | HEIGHT: 63 IN | SYSTOLIC BLOOD PRESSURE: 160 MMHG | BODY MASS INDEX: 22.15 KG/M2 | OXYGEN SATURATION: 99 % | RESPIRATION RATE: 17 BRPM | DIASTOLIC BLOOD PRESSURE: 76 MMHG

## 2018-10-17 DIAGNOSIS — S01.81XA FACIAL LACERATION, INITIAL ENCOUNTER: Primary | ICD-10-CM

## 2018-10-17 DIAGNOSIS — S80.01XA CONTUSION OF RIGHT KNEE, INITIAL ENCOUNTER: ICD-10-CM

## 2018-10-17 DIAGNOSIS — S09.90XA INJURY OF HEAD, INITIAL ENCOUNTER: ICD-10-CM

## 2018-10-17 DIAGNOSIS — S00.83XA FACIAL CONTUSION, INITIAL ENCOUNTER: ICD-10-CM

## 2018-10-17 PROCEDURE — 99284 EMERGENCY DEPT VISIT MOD MDM: CPT

## 2018-10-17 PROCEDURE — 70486 CT MAXILLOFACIAL W/O DYE: CPT

## 2018-10-17 PROCEDURE — 72125 CT NECK SPINE W/O DYE: CPT

## 2018-10-17 PROCEDURE — 70450 CT HEAD/BRAIN W/O DYE: CPT

## 2018-10-17 PROCEDURE — 73562 X-RAY EXAM OF KNEE 3: CPT

## 2018-10-17 RX ORDER — ACETAMINOPHEN 325 MG/1
975 TABLET ORAL ONCE
Status: COMPLETED | OUTPATIENT
Start: 2018-10-17 | End: 2018-10-17

## 2018-10-17 RX ORDER — LIDOCAINE HYDROCHLORIDE 20 MG/ML
10 INJECTION, SOLUTION INFILTRATION; PERINEURAL ONCE
Status: COMPLETED | OUTPATIENT
Start: 2018-10-17 | End: 2018-10-17

## 2018-10-17 RX ORDER — LIDOCAINE HYDROCHLORIDE 20 MG/ML
10 INJECTION, SOLUTION INFILTRATION; PERINEURAL ONCE
Status: DISCONTINUED | OUTPATIENT
Start: 2018-10-17 | End: 2018-10-17 | Stop reason: HOSPADM

## 2018-10-17 RX ADMIN — ACETAMINOPHEN 975 MG: 325 TABLET, FILM COATED ORAL at 18:34

## 2018-10-17 RX ADMIN — LIDOCAINE HYDROCHLORIDE 10 ML: 20 INJECTION, SOLUTION INFILTRATION; PERINEURAL at 18:35

## 2018-10-17 NOTE — DISCHARGE INSTRUCTIONS
You can get the lacerations wet in 24 hours. You can apply a very small amount of antibiotic ointment. She should see her PCP for recheck in 2-3 days and can return to the ER or the PCP for suture removal in 7 days. Return to the ER sooner if any worsening symptoms. Take Tylenol as needed for pain.

## 2018-10-17 NOTE — ED PROVIDER NOTES
Subjective   History of Present Illness  This is a 89-year-old female who was sitting on a chair when her daughter ran outside to get something out of the car and when she came back into the house the patient was lying on the booker floor with 2 large lacerations on her forehead.  Patient was awake when the daughter found her.  She has Alzheimer's dementia and is not oriented to place or time.  She denies any other pain except for her right knee.  There is a small skin abrasion.  Some mild swelling.  She denies any hip or pelvic pain.  Daughter states that she did walk to the car with her walker with assistance.  Review of Systems   All other systems reviewed and are negative.      Past Medical History:   Diagnosis Date   • Alzheimer disease    • Arthritis    • Closed fracture of neck of right femur, Acute 2/12/2018   • Diverticulosis    • Hypertension    • Pathologic fracture of clavicle, left, initial encounter    • Skin cancer     lymphoma   • Thyroid disease        Allergies   Allergen Reactions   • Sulfa Antibiotics Unknown (See Comments)       Past Surgical History:   Procedure Laterality Date   • CHOLECYSTECTOMY     • HIP HEMIARTHROPLASTY Right 2/12/2018    Procedure: HIP HEMIARTHROPLASTY;  Surgeon: Rohith Andersen MD;  Location: formerly [facility];  Service:    • HYSTERECTOMY     • OVARIAN CYST REMOVAL         Family History   Problem Relation Age of Onset   • Heart disease Other    • Hypertension Other    • Cancer Other        Social History     Social History   • Marital status:      Social History Main Topics   • Smoking status: Never Smoker   • Smokeless tobacco: Never Used   • Alcohol use No   • Drug use: No   • Sexual activity: Defer     Other Topics Concern   • Not on file           Objective   Physical Exam   Constitutional:   Elderly female who appears stated age.  In no acute distress.   HENT:   Mouth/Throat: Oropharynx is clear and moist.   Two lacerations to the mid forehead and the left  forehead extending to the left eyebrow.  Mid laceration is approximately 2 cm and the other is approximately 3.5 cm. TMs are pearly gray.  There is no nasal or mouth trauma.   Eyes: Pupils are equal, round, and reactive to light. Conjunctivae and EOM are normal.   Neck: Normal range of motion. Neck supple.   Patient is unable to determine if she has cervical spine pain   Cardiovascular: Normal rate, regular rhythm and intact distal pulses.    2/6 holosystolic murmur   Pulmonary/Chest: Effort normal and breath sounds normal.   Abdominal: Soft. Bowel sounds are normal.   Musculoskeletal: Normal range of motion. She exhibits tenderness. She exhibits no edema.   She has tenderness over the right knee and there is a small abrasion.  There is minimal swelling.  She has normal range of motion with the knee.  She denies any hip or pelvic pain.  She denies any thoracic or lumbar spine pain.   Neurological: She is alert.   Oriented to person, not place or time   Skin: Skin is warm and dry. Capillary refill takes less than 2 seconds.   Psychiatric:   Patient follows all directions and commands.  She is not oriented to place or time.  She is oriented to person.   Nursing note and vitals reviewed.      Laceration Repair  Date/Time: 10/17/2018 8:31 PM  Performed by: THEA MOCK  Authorized by: MISTY MCKEON     Consent:     Consent obtained:  Emergent situation    Consent given by:  Guardian    Risks discussed:  Infection, pain and poor cosmetic result    Alternatives discussed:  No treatment  Anesthesia (see MAR for exact dosages):     Anesthesia method:  Local infiltration    Local anesthetic:  Lidocaine 2% w/o epi  Laceration details:     Location:  Face    Face location:  Forehead    Wound length (cm): 3.5 cm.    Laceration depth: 8.  Repair type:     Repair type:  Complex  Pre-procedure details:     Preparation:  Patient was prepped and draped in usual sterile fashion  Exploration:     Limited defect created  (wound extended): no      Hemostasis achieved with:  Direct pressure    Wound exploration: wound explored through full range of motion      Wound extent: no nerve damage noted      Contaminated: no    Treatment:     Area cleansed with:  Saline and Shur-Clens    Amount of cleaning:  Standard    Irrigation solution:  Sterile saline    Irrigation method:  Syringe    Visualized foreign bodies/material removed: no      Debridement:  None    Undermining:  None    Scar revision: no    Subcutaneous repair:     Suture size:  3-0    Suture material:  Chromic gut    Suture technique:  Simple interrupted    Number of subcutaneous sutures: 3.  Skin repair:     Repair method:  Sutures    Suture size:  5-0    Suture material:  Prolene    Suture technique:  Simple interrupted    Number of sutures: 14.  Approximation:     Approximation:  Close    Vermilion border: well-aligned    Post-procedure details:     Dressing:  Open (no dressing)    Patient tolerance of procedure:  Tolerated well, no immediate complications  Comments:      Second laceration-mid forehead, 2 cm in length, 3 mm in depth, closed with 5.0 prolene suture using 6 intermittent sutures.               ED Course  ED Course as of Oct 17 1906   Wed Oct 17, 2018   1700 FINAL REPORT     CLINICAL HISTORY:  Fall     FINDINGS:  Axial images of the head were obtained without contrast. Coronal  reformatted images were also obtained. This study was performed  with techniques to keep radiation doses as low as reasonably  achievable (ALARA). Individualized dose reduction techniques  using automated exposure control or adjustment of mA and/or kV  according to the patient''s size were employed.  There is  generalized age-appropriate atrophy.  Periventricular  low-attenuation areas are seen consistent with moderate chronic  ischemic changes.  There is no evidence of intracranial  hemorrhage or mass.  There is no evidence of acute infarct.  There is no evidence of shift of the midline  structures.  No  skull abnormality is seen on the bone window images.  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  There is left  frontal scalp soft tissue swelling.     IMPRESSION:  Atrophy and moderate chronic ischemic changes.  No acute  intracranial abnormality identified.     Authenticated by Sean Brown III, MD on 10/17/2018  04:52:52 PM  [CM]      ED Course User Index  [CM] Nevaeh Schmidt ERINN Baxter      FINAL REPORT     TECHNIQUE:  Axial images were obtained using tomography through the cervical  spine without contrast. MPR reconstructions in coronal and  sagittal planes was performed  This study was performed with  techniques to keep radiation doses as low as reasonably  achievable, (ALARA). Individualized dose reduction techniques  using automated exposure control or adjustment of mA and/or kV  according to the patient''''s size were employed.     CLINICAL HISTORY:  fall     FINDINGS:  There are no acute fractures. Alignment is anatomic. No  prevertebral soft tissue swelling. No basilar invagination.  There are spondylitic and degenerative changes in the cervical  spine.      No gross evidence of adenopathy in the neck soft  tissues. The visualized portions of the posterior fossa are  grossly unremarkable. No evidence of a pneumothorax in the lung  apices.     IMPRESSION:  No evidence of acute bony injury in the cervical spine. Consider  flexion and extension films to evaluate for occult ligamentous  injury if clinically warranted.    Axial images were obtained using computed tomography through the  facial bones without contrast. MPR Reconstruction in the coronal  plane was performed.  This study was performed with techniques  to keep radiation doses as low as reasonably achievable,  (ALARA). Individualized dose reduction techniques using  automated exposure control or adjustment of mA and/or kV  according to the patient''''s size were employed.     CLINICAL  HISTORY:  fall     FINDINGS:  The pterygoid plates are intact. The zygomatic arches are  intact. The paranasal sinuses and cells are well developed and  well aerated. The nasal bones are intact. The bony nasal septum  is intact. No acute facial fracture. No orbital fracture. No  proptosis. The extraocular muscles are symmetric. No intraconal  fluid.  The visualized portions of the upper cervical spine  demonstrate no acute abnormality. No gross evidence of  adenopathy.     IMPRESSION:  No acute bony abnormality in the maxillofacial structures.     Authenticated by Gianni Porras MD on 10/17/2018 05:27:24 PM      Patient had a tetanus booster in April 2018.  Her daughter was instructed on care for the 2 lacerations.  I would really like them to follow up with her primary care provider couple days for recheck and the sutures should come out in approximately 7 days.  The daughter stated she did not think that the primary care provider would remove the sutures and I indicated that they could come back to the emergency department for that.  Patient was given Tylenol for headache and is otherwise acting appropriately.    Family denies any questions.  MDM      Final diagnoses:   Facial laceration, initial encounter   Facial contusion, initial encounter   Contusion of right knee, initial encounter   Injury of head, initial encounter            Nevaeh Schmidt, APRN  10/17/18 [id number]

## 2018-11-29 ENCOUNTER — APPOINTMENT (OUTPATIENT)
Dept: CT IMAGING | Facility: HOSPITAL | Age: 83
End: 2018-11-29

## 2018-11-29 ENCOUNTER — HOSPITAL ENCOUNTER (EMERGENCY)
Facility: HOSPITAL | Age: 83
Discharge: HOME | End: 2018-11-29
Attending: EMERGENCY MEDICINE | Admitting: EMERGENCY MEDICINE

## 2018-11-29 VITALS
OXYGEN SATURATION: 97 % | BODY MASS INDEX: 22.15 KG/M2 | RESPIRATION RATE: 18 BRPM | WEIGHT: 125 LBS | DIASTOLIC BLOOD PRESSURE: 61 MMHG | SYSTOLIC BLOOD PRESSURE: 134 MMHG | HEIGHT: 63 IN | TEMPERATURE: 98.3 F | HEART RATE: 96 BPM

## 2018-11-29 DIAGNOSIS — R56.9 SEIZURE-LIKE ACTIVITY (HCC): Primary | ICD-10-CM

## 2018-11-29 DIAGNOSIS — E83.42 HYPOMAGNESEMIA: ICD-10-CM

## 2018-11-29 DIAGNOSIS — G40.909 SEIZURE DISORDER (HCC): ICD-10-CM

## 2018-11-29 DIAGNOSIS — E87.6 HYPOKALEMIA: ICD-10-CM

## 2018-11-29 LAB
ALBUMIN SERPL-MCNC: 4.2 G/DL (ref 3.5–5)
ALBUMIN/GLOB SERPL: 1.2 G/DL (ref 1–2)
ALP SERPL-CCNC: 118 U/L (ref 38–126)
ALT SERPL W P-5'-P-CCNC: 31 U/L (ref 13–69)
ANION GAP SERPL CALCULATED.3IONS-SCNC: 9.4 MMOL/L (ref 10–20)
AST SERPL-CCNC: 32 U/L (ref 15–46)
BASOPHILS # BLD AUTO: 0.05 10*3/MM3 (ref 0–0.2)
BASOPHILS NFR BLD AUTO: 0.4 % (ref 0–2.5)
BILIRUB SERPL-MCNC: 0.7 MG/DL (ref 0.2–1.3)
BUN BLD-MCNC: 16 MG/DL (ref 7–20)
BUN/CREAT SERPL: 17.8 (ref 7.1–23.5)
CALCIUM SPEC-SCNC: 9.8 MG/DL (ref 8.4–10.2)
CHLORIDE SERPL-SCNC: 100 MMOL/L (ref 98–107)
CO2 SERPL-SCNC: 30 MMOL/L (ref 26–30)
CREAT BLD-MCNC: 0.9 MG/DL (ref 0.6–1.3)
DEPRECATED RDW RBC AUTO: 41.2 FL (ref 37–54)
EOSINOPHIL # BLD AUTO: 0.2 10*3/MM3 (ref 0–0.7)
EOSINOPHIL NFR BLD AUTO: 1.7 % (ref 0–7)
ERYTHROCYTE [DISTWIDTH] IN BLOOD BY AUTOMATED COUNT: 12.8 % (ref 11.5–14.5)
GFR SERPL CREATININE-BSD FRML MDRD: 58 ML/MIN/1.73
GLOBULIN UR ELPH-MCNC: 3.5 GM/DL
GLUCOSE BLD-MCNC: 115 MG/DL (ref 74–98)
HCT VFR BLD AUTO: 38.9 % (ref 37–47)
HGB BLD-MCNC: 13.4 G/DL (ref 12–16)
HOLD SPECIMEN: NORMAL
HOLD SPECIMEN: NORMAL
IMM GRANULOCYTES # BLD: 0.07 10*3/MM3 (ref 0–0.06)
IMM GRANULOCYTES NFR BLD: 0.6 % (ref 0–0.6)
LYMPHOCYTES # BLD AUTO: 1.43 10*3/MM3 (ref 0.6–3.4)
LYMPHOCYTES NFR BLD AUTO: 12.1 % (ref 10–50)
MAGNESIUM SERPL-MCNC: 1.4 MG/DL (ref 1.6–2.3)
MCH RBC QN AUTO: 30.4 PG (ref 27–31)
MCHC RBC AUTO-ENTMCNC: 34.4 G/DL (ref 30–37)
MCV RBC AUTO: 88.2 FL (ref 81–99)
MONOCYTES # BLD AUTO: 0.48 10*3/MM3 (ref 0–0.9)
MONOCYTES NFR BLD AUTO: 4.1 % (ref 0–12)
NEUTROPHILS # BLD AUTO: 9.57 10*3/MM3 (ref 2–6.9)
NEUTROPHILS NFR BLD AUTO: 81.1 % (ref 37–80)
NRBC BLD MANUAL-RTO: 0 /100 WBC (ref 0–0)
PLATELET # BLD AUTO: 226 10*3/MM3 (ref 130–400)
PMV BLD AUTO: 9 FL (ref 6–12)
POTASSIUM BLD-SCNC: 3.4 MMOL/L (ref 3.5–5.1)
PROT SERPL-MCNC: 7.7 G/DL (ref 6.3–8.2)
RBC # BLD AUTO: 4.41 10*6/MM3 (ref 4.2–5.4)
SODIUM BLD-SCNC: 136 MMOL/L (ref 137–145)
TROPONIN I SERPL-MCNC: <0.012 NG/ML (ref 0–0.03)
WBC NRBC COR # BLD: 11.8 10*3/MM3 (ref 4.8–10.8)
WHOLE BLOOD HOLD SPECIMEN: NORMAL
WHOLE BLOOD HOLD SPECIMEN: NORMAL

## 2018-11-29 PROCEDURE — 70450 CT HEAD/BRAIN W/O DYE: CPT

## 2018-11-29 PROCEDURE — 99285 EMERGENCY DEPT VISIT HI MDM: CPT

## 2018-11-29 PROCEDURE — 80177 DRUG SCRN QUAN LEVETIRACETAM: CPT | Performed by: PHYSICIAN ASSISTANT

## 2018-11-29 PROCEDURE — 83735 ASSAY OF MAGNESIUM: CPT

## 2018-11-29 PROCEDURE — 93005 ELECTROCARDIOGRAM TRACING: CPT

## 2018-11-29 PROCEDURE — 84484 ASSAY OF TROPONIN QUANT: CPT

## 2018-11-29 PROCEDURE — 85025 COMPLETE CBC W/AUTO DIFF WBC: CPT

## 2018-11-29 PROCEDURE — 80053 COMPREHEN METABOLIC PANEL: CPT

## 2018-11-29 RX ORDER — SODIUM CHLORIDE 0.9 % (FLUSH) 0.9 %
10 SYRINGE (ML) INJECTION AS NEEDED
Status: DISCONTINUED | OUTPATIENT
Start: 2018-11-29 | End: 2018-11-29 | Stop reason: HOSPADM

## 2018-11-29 RX ORDER — POTASSIUM CHLORIDE 750 MG/1
10 TABLET, FILM COATED, EXTENDED RELEASE ORAL DAILY
Qty: 3 TABLET | Refills: 0 | Status: SHIPPED | OUTPATIENT
Start: 2018-11-29 | End: 2019-01-09

## 2018-11-29 RX ORDER — MAGNESIUM 30 MG
30 TABLET ORAL DAILY
Qty: 3 TABLET | Refills: 0 | Status: SHIPPED | OUTPATIENT
Start: 2018-11-29 | End: 2018-12-02

## 2018-11-29 RX ORDER — MAGNESIUM GLUCONATE 27 MG(500)
54 TABLET ORAL ONCE
Status: COMPLETED | OUTPATIENT
Start: 2018-11-29 | End: 2018-11-29

## 2018-11-29 RX ORDER — POTASSIUM CHLORIDE 750 MG/1
20 CAPSULE, EXTENDED RELEASE ORAL ONCE
Status: COMPLETED | OUTPATIENT
Start: 2018-11-29 | End: 2018-11-29

## 2018-11-29 RX ORDER — POTASSIUM CHLORIDE 1.5 G/1.77G
20 POWDER, FOR SOLUTION ORAL ONCE
Status: COMPLETED | OUTPATIENT
Start: 2018-11-29 | End: 2018-11-29

## 2018-11-29 RX ADMIN — POTASSIUM CHLORIDE 20 MEQ: 1.5 POWDER, FOR SOLUTION ORAL at 11:50

## 2018-11-29 RX ADMIN — Medication 54 MG: at 11:26

## 2018-11-29 RX ADMIN — POTASSIUM CHLORIDE 20 MEQ: 750 CAPSULE, EXTENDED RELEASE ORAL at 11:26

## 2018-12-03 LAB — LEVETIRACETAM SERPL-MCNC: 16.7 UG/ML (ref 10–40)

## 2019-01-01 ENCOUNTER — APPOINTMENT (OUTPATIENT)
Dept: GENERAL RADIOLOGY | Facility: HOSPITAL | Age: 84
End: 2019-01-01

## 2019-01-01 ENCOUNTER — OFFICE VISIT (OUTPATIENT)
Dept: ORTHOPEDIC SURGERY | Facility: CLINIC | Age: 84
End: 2019-01-01

## 2019-01-01 ENCOUNTER — OFFICE VISIT (OUTPATIENT)
Dept: ONCOLOGY | Facility: CLINIC | Age: 84
End: 2019-01-01

## 2019-01-01 ENCOUNTER — APPOINTMENT (OUTPATIENT)
Dept: CT IMAGING | Facility: HOSPITAL | Age: 84
End: 2019-01-01

## 2019-01-01 ENCOUNTER — HOSPITAL ENCOUNTER (EMERGENCY)
Facility: HOSPITAL | Age: 84
Discharge: HOME | End: 2019-10-29
Attending: EMERGENCY MEDICINE | Admitting: EMERGENCY MEDICINE

## 2019-01-01 ENCOUNTER — LAB (OUTPATIENT)
Dept: LAB | Facility: HOSPITAL | Age: 84
End: 2019-01-01

## 2019-01-01 ENCOUNTER — TELEPHONE (OUTPATIENT)
Dept: EMERGENCY DEPT | Facility: HOSPITAL | Age: 84
End: 2019-01-01

## 2019-01-01 ENCOUNTER — HOSPITAL ENCOUNTER (EMERGENCY)
Facility: HOSPITAL | Age: 84
Discharge: HOME | End: 2019-09-08
Attending: STUDENT IN AN ORGANIZED HEALTH CARE EDUCATION/TRAINING PROGRAM | Admitting: STUDENT IN AN ORGANIZED HEALTH CARE EDUCATION/TRAINING PROGRAM

## 2019-01-01 VITALS — RESPIRATION RATE: 18 BRPM | WEIGHT: 126 LBS | BODY MASS INDEX: 21.51 KG/M2 | HEIGHT: 64 IN

## 2019-01-01 VITALS
TEMPERATURE: 98.2 F | BODY MASS INDEX: 22.32 KG/M2 | WEIGHT: 126 LBS | DIASTOLIC BLOOD PRESSURE: 52 MMHG | HEART RATE: 72 BPM | HEIGHT: 63 IN | SYSTOLIC BLOOD PRESSURE: 113 MMHG | RESPIRATION RATE: 14 BRPM

## 2019-01-01 VITALS — WEIGHT: 126 LBS | BODY MASS INDEX: 21.51 KG/M2 | RESPIRATION RATE: 18 BRPM | HEIGHT: 64 IN

## 2019-01-01 VITALS — RESPIRATION RATE: 18 BRPM | WEIGHT: 125 LBS | HEIGHT: 63 IN | BODY MASS INDEX: 22.15 KG/M2

## 2019-01-01 VITALS
BODY MASS INDEX: 22.32 KG/M2 | HEART RATE: 70 BPM | TEMPERATURE: 97.5 F | HEIGHT: 63 IN | OXYGEN SATURATION: 97 % | SYSTOLIC BLOOD PRESSURE: 133 MMHG | WEIGHT: 126 LBS | DIASTOLIC BLOOD PRESSURE: 72 MMHG | RESPIRATION RATE: 18 BRPM

## 2019-01-01 VITALS
BODY MASS INDEX: 18.19 KG/M2 | DIASTOLIC BLOOD PRESSURE: 72 MMHG | OXYGEN SATURATION: 97 % | HEIGHT: 68 IN | TEMPERATURE: 97.9 F | HEART RATE: 75 BPM | RESPIRATION RATE: 16 BRPM | SYSTOLIC BLOOD PRESSURE: 159 MMHG | WEIGHT: 120 LBS

## 2019-01-01 VITALS — RESPIRATION RATE: 18 BRPM | WEIGHT: 126 LBS | HEIGHT: 64 IN | BODY MASS INDEX: 21.51 KG/M2

## 2019-01-01 VITALS — RESPIRATION RATE: 18 BRPM | HEIGHT: 64 IN | BODY MASS INDEX: 21.51 KG/M2 | WEIGHT: 126 LBS

## 2019-01-01 DIAGNOSIS — M25.552 ARTHRALGIA OF LEFT HIP: Primary | ICD-10-CM

## 2019-01-01 DIAGNOSIS — C82.80 OTHER TYPE OF FOLLICULAR LYMPHOMA, UNSPECIFIED BODY REGION (HCC): Primary | ICD-10-CM

## 2019-01-01 DIAGNOSIS — M17.11 PRIMARY LOCALIZED OSTEOARTHRITIS OF RIGHT KNEE: Primary | ICD-10-CM

## 2019-01-01 DIAGNOSIS — M11.262 CHONDROCALCINOSIS OF LEFT KNEE: ICD-10-CM

## 2019-01-01 DIAGNOSIS — M17.12 PRIMARY LOCALIZED OSTEOARTHRITIS OF LEFT KNEE: ICD-10-CM

## 2019-01-01 DIAGNOSIS — S01.01XA LACERATION OF SCALP, INITIAL ENCOUNTER: Primary | ICD-10-CM
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
## 2019-01-01 DIAGNOSIS — C82.80 OTHER TYPE OF FOLLICULAR LYMPHOMA, UNSPECIFIED BODY REGION (HCC): ICD-10-CM

## 2019-01-01 DIAGNOSIS — S56.912A FOREARM STRAIN, LEFT, INITIAL ENCOUNTER: ICD-10-CM

## 2019-01-01 DIAGNOSIS — S80.02XA CONTUSION OF LEFT KNEE, INITIAL ENCOUNTER: Primary | ICD-10-CM

## 2019-01-01 LAB
ALBUMIN SERPL-MCNC: 3.4 G/DL (ref 3.5–5.2)
ALBUMIN/GLOB SERPL: 1 G/DL
ALP SERPL-CCNC: 141 U/L (ref 39–117)
ALT SERPL W P-5'-P-CCNC: 15 U/L (ref 1–33)
ANION GAP SERPL CALCULATED.3IONS-SCNC: 14.4 MMOL/L (ref 5–15)
AST SERPL-CCNC: 20 U/L (ref 1–32)
BACTERIA SPEC AEROBE CULT: ABNORMAL
BILIRUB SERPL-MCNC: 0.2 MG/DL (ref 0.2–1.2)
BUN BLD-MCNC: 10 MG/DL (ref 8–23)
BUN/CREAT SERPL: 13.9 (ref 7–25)
CALCIUM SPEC-SCNC: 9.1 MG/DL (ref 8.2–9.6)
CHLORIDE SERPL-SCNC: 94 MMOL/L (ref 98–107)
CO2 SERPL-SCNC: 23.6 MMOL/L (ref 22–29)
CREAT BLD-MCNC: 0.72 MG/DL (ref 0.57–1)
GFR SERPL CREATININE-BSD FRML MDRD: 75 ML/MIN/1.73
GLOBULIN UR ELPH-MCNC: 3.5 GM/DL
GLUCOSE BLD-MCNC: 90 MG/DL (ref 65–99)
LDH SERPL-CCNC: 111 U/L (ref 135–214)
POTASSIUM BLD-SCNC: 3.6 MMOL/L (ref 3.5–5.2)
PROT SERPL-MCNC: 6.9 G/DL (ref 6–8.5)
SODIUM BLD-SCNC: 132 MMOL/L (ref 136–145)

## 2019-01-01 PROCEDURE — 80053 COMPREHEN METABOLIC PANEL: CPT

## 2019-01-01 PROCEDURE — 36415 COLL VENOUS BLD VENIPUNCTURE: CPT

## 2019-01-01 PROCEDURE — 20610 DRAIN/INJ JOINT/BURSA W/O US: CPT | Performed by: PHYSICIAN ASSISTANT

## 2019-01-01 PROCEDURE — 72170 X-RAY EXAM OF PELVIS: CPT

## 2019-01-01 PROCEDURE — 71045 X-RAY EXAM CHEST 1 VIEW: CPT

## 2019-01-01 PROCEDURE — 99282 EMERGENCY DEPT VISIT SF MDM: CPT

## 2019-01-01 PROCEDURE — 72125 CT NECK SPINE W/O DYE: CPT

## 2019-01-01 PROCEDURE — 99284 EMERGENCY DEPT VISIT MOD MDM: CPT

## 2019-01-01 PROCEDURE — 99212 OFFICE O/P EST SF 10 MIN: CPT | Performed by: PHYSICIAN ASSISTANT

## 2019-01-01 PROCEDURE — 99213 OFFICE O/P EST LOW 20 MIN: CPT | Performed by: NURSE PRACTITIONER

## 2019-01-01 PROCEDURE — 73562 X-RAY EXAM OF KNEE 3: CPT

## 2019-01-01 PROCEDURE — 83615 LACTATE (LD) (LDH) ENZYME: CPT

## 2019-01-01 PROCEDURE — 70450 CT HEAD/BRAIN W/O DYE: CPT

## 2019-01-01 PROCEDURE — 99213 OFFICE O/P EST LOW 20 MIN: CPT | Performed by: PHYSICIAN ASSISTANT

## 2019-01-01 RX ORDER — CIPROFLOXACIN 500 MG/1
TABLET, FILM COATED ORAL
Refills: 0 | COMMUNITY
Start: 2019-01-01 | End: 2020-01-01

## 2019-01-01 RX ORDER — METHYLPREDNISOLONE ACETATE 40 MG/ML
40 INJECTION, SUSPENSION INTRA-ARTICULAR; INTRALESIONAL; INTRAMUSCULAR; SOFT TISSUE
Status: COMPLETED | OUTPATIENT
Start: 2019-01-01 | End: 2019-01-01

## 2019-01-01 RX ORDER — LIDOCAINE HYDROCHLORIDE AND EPINEPHRINE 10; 10 MG/ML; UG/ML
10 INJECTION, SOLUTION INFILTRATION; PERINEURAL ONCE
Status: COMPLETED | OUTPATIENT
Start: 2019-01-01 | End: 2019-01-01

## 2019-01-01 RX ORDER — LIDOCAINE HYDROCHLORIDE 10 MG/ML
2 INJECTION, SOLUTION INFILTRATION; PERINEURAL
Status: COMPLETED | OUTPATIENT
Start: 2019-01-01 | End: 2019-01-01

## 2019-01-01 RX ORDER — DOXYCYCLINE HYCLATE 100 MG/1
CAPSULE ORAL
Refills: 0 | COMMUNITY
Start: 2019-01-01 | End: 2019-01-01

## 2019-01-01 RX ORDER — OXYBUTYNIN CHLORIDE 5 MG/1
TABLET, EXTENDED RELEASE ORAL
COMMUNITY
Start: 2019-07-22 | End: 2019-01-01

## 2019-01-01 RX ADMIN — LIDOCAINE HYDROCHLORIDE 2 ML: 10 INJECTION, SOLUTION INFILTRATION; PERINEURAL at 14:37

## 2019-01-01 RX ADMIN — METHYLPREDNISOLONE ACETATE 40 MG: 40 INJECTION, SUSPENSION INTRA-ARTICULAR; INTRALESIONAL; INTRAMUSCULAR; SOFT TISSUE at 14:37

## 2019-01-01 RX ADMIN — LIDOCAINE HYDROCHLORIDE,EPINEPHRINE BITARTRATE 10 ML: 10; .01 INJECTION, SOLUTION INFILTRATION; PERINEURAL at 21:29

## 2019-01-03 ENCOUNTER — OFFICE VISIT (OUTPATIENT)
Dept: ORTHOPEDIC SURGERY | Facility: CLINIC | Age: 84
End: 2019-01-03

## 2019-01-03 VITALS — WEIGHT: 148 LBS | BODY MASS INDEX: 26.22 KG/M2 | RESPIRATION RATE: 18 BRPM | HEIGHT: 63 IN

## 2019-01-03 DIAGNOSIS — M17.11 PRIMARY OSTEOARTHRITIS OF RIGHT KNEE: Primary | ICD-10-CM

## 2019-01-03 DIAGNOSIS — M25.561 ARTHRALGIA OF RIGHT KNEE: ICD-10-CM

## 2019-01-03 DIAGNOSIS — M17.11 PATELLOFEMORAL ARTHRITIS OF RIGHT KNEE: ICD-10-CM

## 2019-01-03 PROCEDURE — 20610 DRAIN/INJ JOINT/BURSA W/O US: CPT | Performed by: PHYSICIAN ASSISTANT

## 2019-01-03 NOTE — PROGRESS NOTES
"Subjective   Silva Ruano is a 89 y.o. female here today for injection therapy.    Patient has had Visco supplementation injections in the past and would like to repeat these today.  Chief Complaint   Patient presents with   • Right Knee - Injections     Supartz #1       Past Medical History:   Diagnosis Date   • Alzheimer disease    • Arthritis    • Closed fracture of neck of right femur, Acute 2/12/2018   • Diverticulosis    • Hypertension    • Pathologic fracture of clavicle, left, initial encounter    • Skin cancer     lymphoma   • Thyroid disease         Past Surgical History:   Procedure Laterality Date   • CHOLECYSTECTOMY     • HIP HEMIARTHROPLASTY Right 2/12/2018    Performed by Rohith Andersen MD at [facility] OR   • HYSTERECTOMY     • OVARIAN CYST REMOVAL         Allergies   Allergen Reactions   • Sulfa Antibiotics Unknown (See Comments)       Objective   Resp 18   Ht 160 cm (63\")   Wt 67.1 kg (148 lb)   BMI 26.22 kg/m²    Physical Exam  Skin exam stable with no erythema, ecchymosis or rash. No new swelling. No motor or sensory changes. Distal pulse intact.    Large Joint Arthrocentesis  Date/Time: 1/3/2019 3:17 PM  Consent given by: patient  Site marked: site marked  Timeout: Immediately prior to procedure a time out was called to verify the correct patient, procedure, equipment, support staff and site/side marked as required   Supporting Documentation  Indications: pain   Procedure Details  Preparation: Patient was prepped and draped in the usual sterile fashion  Needle gauge: 21 G.  Approach: anterolateral  Medications administered: 25 mg sodium hyaluronate 25 MG/2.5ML  Patient tolerance: patient tolerated the procedure well with no immediate complications          Assessment/Plan      Diagnosis Plan   1. Primary osteoarthritis of right knee  Large Joint Arthrocentesis   2. Patellofemoral arthritis of right knee  Large Joint Arthrocentesis   3. Arthralgia of right knee  Large Joint " Arthrocentesis       No complications of injection noted.    Discussion of orthopaedic goals and activities and patient and/or guardian expressed appreciation. Call or notify for any adverse effect from injection therapy. Ice, heat, and/or modalities as beneficial. Watch for signs and symptoms of infection.    Recommendations:  Work/Activity Status: May perform usual activities as tolerated. May return to routine exercise and physical work as tolerated. No strenuous activity.  Patient and/or guardian is encouraged to call or return for any issues or concerns.  FU in 1 week      Patient agreeable to call or return sooner for any concerns.

## 2019-01-07 ENCOUNTER — HOSPITAL ENCOUNTER (EMERGENCY)
Facility: HOSPITAL | Age: 84
Discharge: HOME | End: 2019-01-07
Attending: EMERGENCY MEDICINE | Admitting: EMERGENCY MEDICINE

## 2019-01-07 VITALS
SYSTOLIC BLOOD PRESSURE: 120 MMHG | HEART RATE: 68 BPM | RESPIRATION RATE: 18 BRPM | HEIGHT: 63 IN | BODY MASS INDEX: 26.22 KG/M2 | TEMPERATURE: 98.1 F | OXYGEN SATURATION: 96 % | WEIGHT: 148 LBS | DIASTOLIC BLOOD PRESSURE: 72 MMHG

## 2019-01-07 DIAGNOSIS — N39.0 URINARY TRACT INFECTION WITHOUT HEMATURIA, SITE UNSPECIFIED: Primary | ICD-10-CM

## 2019-01-07 LAB
ALBUMIN SERPL-MCNC: 3.6 G/DL (ref 3.5–5)
ALBUMIN/GLOB SERPL: 1.3 G/DL (ref 1–2)
ALP SERPL-CCNC: 93 U/L (ref 38–126)
ALT SERPL W P-5'-P-CCNC: 22 U/L (ref 13–69)
ANION GAP SERPL CALCULATED.3IONS-SCNC: 9.7 MMOL/L (ref 10–20)
AST SERPL-CCNC: 22 U/L (ref 15–46)
BACTERIA UR QL AUTO: ABNORMAL /HPF
BASOPHILS # BLD AUTO: 0.03 10*3/MM3 (ref 0–0.2)
BASOPHILS NFR BLD AUTO: 0.4 % (ref 0–2.5)
BILIRUB SERPL-MCNC: 0.4 MG/DL (ref 0.2–1.3)
BILIRUB UR QL STRIP: NEGATIVE
BUN BLD-MCNC: 10 MG/DL (ref 7–20)
BUN/CREAT SERPL: 16.7 (ref 7.1–23.5)
CALCIUM SPEC-SCNC: 9 MG/DL (ref 8.4–10.2)
CHLORIDE SERPL-SCNC: 97 MMOL/L (ref 98–107)
CLARITY UR: CLEAR
CO2 SERPL-SCNC: 29 MMOL/L (ref 26–30)
COLOR UR: YELLOW
CREAT BLD-MCNC: 0.6 MG/DL (ref 0.6–1.3)
DEPRECATED RDW RBC AUTO: 41.1 FL (ref 37–54)
EOSINOPHIL # BLD AUTO: 0.63 10*3/MM3 (ref 0–0.7)
EOSINOPHIL NFR BLD AUTO: 8.4 % (ref 0–7)
ERYTHROCYTE [DISTWIDTH] IN BLOOD BY AUTOMATED COUNT: 12.7 % (ref 11.5–14.5)
GFR SERPL CREATININE-BSD FRML MDRD: 93 ML/MIN/1.73
GLOBULIN UR ELPH-MCNC: 2.8 GM/DL
GLUCOSE BLD-MCNC: 84 MG/DL (ref 74–98)
GLUCOSE UR STRIP-MCNC: NEGATIVE MG/DL
HCT VFR BLD AUTO: 34.6 % (ref 37–47)
HGB BLD-MCNC: 12 G/DL (ref 12–16)
HGB UR QL STRIP.AUTO: ABNORMAL
HOLD SPECIMEN: NORMAL
HOLD SPECIMEN: NORMAL
HYALINE CASTS UR QL AUTO: ABNORMAL /LPF
IMM GRANULOCYTES # BLD AUTO: 0.03 10*3/MM3 (ref 0–0.06)
IMM GRANULOCYTES NFR BLD AUTO: 0.4 % (ref 0–0.6)
KETONES UR QL STRIP: NEGATIVE
LEUKOCYTE ESTERASE UR QL STRIP.AUTO: ABNORMAL
LYMPHOCYTES # BLD AUTO: 2.2 10*3/MM3 (ref 0.6–3.4)
LYMPHOCYTES NFR BLD AUTO: 29.4 % (ref 10–50)
MCH RBC QN AUTO: 30.6 PG (ref 27–31)
MCHC RBC AUTO-ENTMCNC: 34.7 G/DL (ref 30–37)
MCV RBC AUTO: 88.3 FL (ref 81–99)
MONOCYTES # BLD AUTO: 0.52 10*3/MM3 (ref 0–0.9)
MONOCYTES NFR BLD AUTO: 7 % (ref 0–12)
NEUTROPHILS # BLD AUTO: 4.07 10*3/MM3 (ref 2–6.9)
NEUTROPHILS NFR BLD AUTO: 54.4 % (ref 37–80)
NITRITE UR QL STRIP: NEGATIVE
NRBC BLD AUTO-RTO: 0 /100 WBC (ref 0–0)
PH UR STRIP.AUTO: 7 [PH] (ref 5–8)
PLATELET # BLD AUTO: 181 10*3/MM3 (ref 130–400)
PMV BLD AUTO: 8.7 FL (ref 6–12)
POTASSIUM BLD-SCNC: 3.7 MMOL/L (ref 3.5–5.1)
PROT SERPL-MCNC: 6.4 G/DL (ref 6.3–8.2)
PROT UR QL STRIP: NEGATIVE
RBC # BLD AUTO: 3.92 10*6/MM3 (ref 4.2–5.4)
RBC # UR: ABNORMAL /HPF
REF LAB TEST METHOD: ABNORMAL
SODIUM BLD-SCNC: 132 MMOL/L (ref 137–145)
SP GR UR STRIP: 1.01 (ref 1–1.03)
SQUAMOUS #/AREA URNS HPF: ABNORMAL /HPF
UROBILINOGEN UR QL STRIP: ABNORMAL
WBC NRBC COR # BLD: 7.48 10*3/MM3 (ref 4.8–10.8)
WBC UR QL AUTO: ABNORMAL /HPF
WHOLE BLOOD HOLD SPECIMEN: NORMAL
WHOLE BLOOD HOLD SPECIMEN: NORMAL

## 2019-01-07 PROCEDURE — 99284 EMERGENCY DEPT VISIT MOD MDM: CPT

## 2019-01-07 PROCEDURE — 85025 COMPLETE CBC W/AUTO DIFF WBC: CPT | Performed by: EMERGENCY MEDICINE

## 2019-01-07 PROCEDURE — 93005 ELECTROCARDIOGRAM TRACING: CPT

## 2019-01-07 PROCEDURE — 93005 ELECTROCARDIOGRAM TRACING: CPT | Performed by: EMERGENCY MEDICINE

## 2019-01-07 PROCEDURE — P9612 CATHETERIZE FOR URINE SPEC: HCPCS

## 2019-01-07 PROCEDURE — 25010000002 CEFTRIAXONE SODIUM-DEXTROSE 1-3.74 GM-%(50ML) RECONSTITUTED SOLUTION: Performed by: EMERGENCY MEDICINE

## 2019-01-07 PROCEDURE — 80053 COMPREHEN METABOLIC PANEL: CPT | Performed by: EMERGENCY MEDICINE

## 2019-01-07 PROCEDURE — 96365 THER/PROPH/DIAG IV INF INIT: CPT

## 2019-01-07 PROCEDURE — 87086 URINE CULTURE/COLONY COUNT: CPT | Performed by: EMERGENCY MEDICINE

## 2019-01-07 PROCEDURE — 81001 URINALYSIS AUTO W/SCOPE: CPT | Performed by: EMERGENCY MEDICINE

## 2019-01-07 PROCEDURE — 87106 FUNGI IDENTIFICATION YEAST: CPT | Performed by: EMERGENCY MEDICINE

## 2019-01-07 RX ORDER — CEFTRIAXONE 1 G/50ML
1 INJECTION, SOLUTION INTRAVENOUS ONCE
Status: COMPLETED | OUTPATIENT
Start: 2019-01-07 | End: 2019-01-07

## 2019-01-07 RX ORDER — SODIUM CHLORIDE 0.9 % (FLUSH) 0.9 %
10 SYRINGE (ML) INJECTION AS NEEDED
Status: DISCONTINUED | OUTPATIENT
Start: 2019-01-07 | End: 2019-01-07 | Stop reason: HOSPADM

## 2019-01-07 RX ORDER — CEPHALEXIN 500 MG/1
500 CAPSULE ORAL 3 TIMES DAILY
Qty: 21 CAPSULE | Refills: 0 | Status: SHIPPED | OUTPATIENT
Start: 2019-01-07 | End: 2019-01-14

## 2019-01-07 RX ADMIN — CEFTRIAXONE 1 G: 1 INJECTION, SOLUTION INTRAVENOUS at 10:18

## 2019-01-07 NOTE — ED PROVIDER NOTES
Subjective   89 year old female presenting with dysuria. She is brought in by family who provides the history.  Apparently for the last 2-3 days patient has been getting up in the night to urinate which is unusual for her.  Yesterday started complaining that she was having dysuria.  They're going to take her to the doctor today but could not get her in the car so they called 911 and brought her here.  Patient has no complaints at this time.            Review of Systems   Unable to perform ROS: Dementia       Past Medical History:   Diagnosis Date   • Alzheimer disease    • Arthritis    • Closed fracture of neck of right femur, Acute 2/12/2018   • Diverticulosis    • Hypertension    • Pathologic fracture of clavicle, left, initial encounter    • Skin cancer     lymphoma   • Thyroid disease        Allergies   Allergen Reactions   • Sulfa Antibiotics Unknown (See Comments)       Past Surgical History:   Procedure Laterality Date   • CHOLECYSTECTOMY     • HIP HEMIARTHROPLASTY Right 2/12/2018    Procedure: HIP HEMIARTHROPLASTY;  Surgeon: Rohith Andersen MD;  Location: [facility];  Service:    • HYSTERECTOMY     • OVARIAN CYST REMOVAL         Family History   Problem Relation Age of Onset   • Heart disease Other    • Hypertension Other    • Cancer Other        Social History     Socioeconomic History   • Marital status:      Spouse name: Not on file   • Number of children: Not on file   • Years of education: Not on file   • Highest education level: Not on file   Tobacco Use   • Smoking status: Never Smoker   • Smokeless tobacco: Never Used   Substance and Sexual Activity   • Alcohol use: No   • Drug use: No   • Sexual activity: Defer           Objective   Physical Exam   Constitutional: She is oriented to person, place, and time. No distress.   Elderly, frail   HENT:   Head: Normocephalic and atraumatic.   Right Ear: External ear normal.   Left Ear: External ear normal.   Nose: Nose normal.   Mouth/Throat:  Oropharynx is clear and moist.   Eyes: Conjunctivae and EOM are normal. Pupils are equal, round, and reactive to light.   Neck: Normal range of motion. Neck supple.   Cardiovascular: Normal rate, regular rhythm, normal heart sounds and intact distal pulses.   Pulmonary/Chest: Effort normal and breath sounds normal. No respiratory distress.   Abdominal: Soft. Bowel sounds are normal. She exhibits no distension. There is no tenderness. There is no rebound and no guarding.   Musculoskeletal: Normal range of motion. She exhibits no edema, tenderness or deformity.   Neurological: She is alert and oriented to person, place, and time.   Skin: Skin is warm and dry. No rash noted.   Psychiatric: She has a normal mood and affect. Her behavior is normal.   Nursing note and vitals reviewed.      Procedures           ED Course                  MDM  Number of Diagnoses or Management Options  Urinary tract infection without hematuria, site unspecified:   Diagnosis management comments: 89-year-old female with dysuria.  Elderly, frail lady in no distress with an otherwise nonfocal exam.  We'll check basic labs and urinalysis.  Disposition pending workup.    DDX: UTI, dehydration, electrolyte abnormality    EKG interpreted by me: Sinus rhythm, normal intervals, left axis, no acute ST/T changes, this is an abnormal EKG, this is unchanged from previous    Lab work is largely unremarkable.  UA is consistent with infection.  We'll treat with antibiotics.  I think she is safe for discharge home.  Encouraged them to follow-up closely with her primary doctor for further evaluation.       Amount and/or Complexity of Data Reviewed  Clinical lab tests: reviewed          Final diagnoses:   Urinary tract infection without hematuria, site unspecified            Oni Robles MD  01/07/19 [id number]

## 2019-01-09 ENCOUNTER — OFFICE VISIT (OUTPATIENT)
Dept: NEUROLOGY | Facility: CLINIC | Age: 84
End: 2019-01-09

## 2019-01-09 VITALS
DIASTOLIC BLOOD PRESSURE: 61 MMHG | SYSTOLIC BLOOD PRESSURE: 117 MMHG | HEIGHT: 63 IN | WEIGHT: 148 LBS | BODY MASS INDEX: 26.22 KG/M2

## 2019-01-09 DIAGNOSIS — F02.818 LATE ONSET ALZHEIMER'S DISEASE WITH BEHAVIORAL DISTURBANCE (HCC): Primary | ICD-10-CM

## 2019-01-09 DIAGNOSIS — R56.9 SEIZURE (HCC): ICD-10-CM

## 2019-01-09 DIAGNOSIS — G30.1 LATE ONSET ALZHEIMER'S DISEASE WITH BEHAVIORAL DISTURBANCE (HCC): Primary | ICD-10-CM

## 2019-01-09 PROCEDURE — 99213 OFFICE O/P EST LOW 20 MIN: CPT | Performed by: PSYCHIATRY & NEUROLOGY

## 2019-01-09 RX ORDER — NITROFURANTOIN MACROCRYSTALS 50 MG/1
50 CAPSULE ORAL DAILY
Refills: 11 | COMMUNITY
Start: 2018-11-19 | End: 2019-04-01 | Stop reason: HOSPADM

## 2019-01-09 RX ORDER — TOLTERODINE 4 MG/1
4 CAPSULE, EXTENDED RELEASE ORAL DAILY
Refills: 1 | COMMUNITY
Start: 2018-12-31 | End: 2020-01-01 | Stop reason: HOSPADM

## 2019-01-09 RX ORDER — HYDROCHLOROTHIAZIDE 25 MG/1
25 TABLET ORAL DAILY
Refills: 3 | COMMUNITY
Start: 2018-12-26 | End: 2020-01-01

## 2019-01-09 NOTE — PROGRESS NOTES
"Subjective      CC: dementia    History of Present Illness   Silva Ruano is a 89 y.o. female who returns to clinic today for evaluation of cognitive impairment. Her family  has noted symptoms since at least 2015 marked initially by forgetfulness. This has gradually worsened  over time. Additional associated symptoms have included impairments in essentially all spheres of cognition. She has had associated symptoms of delusions and hallucinations.     She has had 3 suspected seizures, one manifesting as a staring spell and 2 as GTCS. An EEG showed generalized slowing. An MRI showed no acute changes. Keppra was started at 250 mg bid and she has had no recurrent seizures.    Of note she has followed by Dr. Morocho for lymphoma.     Prior evaluation has included screening blood work  and a head CT  which were unremarkable . She previously took donepezil and Namenda, though these were discontinued due to side effects and lack of perceived benefit.     Since her last visit on 7/9/18 she is largely unchanged. However, she did have a possible recurrent seizure in 11/18. During this episode she became limp, appeared gray and unresponsive. She had a large BM at the end. When seen by paramedics she had orthostatic hypotension. She was seen in the ED in [place] and a Keppra level was 16.7 (10-40). There have been no other changes noted in her interval history.       The following portions of the patient's history were reviewed and updated as appropriate: allergies, current medications, past family history, past medical history, past social history, past surgical history and problem list.    Review of Systems   Unable to perform ROS: Dementia       Objective     /61   Ht 160 cm (62.99\")   Wt 67.1 kg (148 lb)   BMI 26.22 kg/m²       Physical Exam   Psychiatric: Her speech is normal.        Neurologic Exam     Mental Status   Oriented to person.   Disoriented to place.   Disoriented to time.   Follows 1 step " commands.   Attention: normal.   Speech: speech is normal   Level of consciousness: alert  Normal comprehension.     Cranial Nerves   Cranial nerves II through XII intact.     Motor Exam   Muscle bulk: normal  Overall muscle tone: normalGrips well bilaterally       MMSE=untestable      Assessment/Plan   Silva was seen today for alzheimer's disease.    Diagnoses and all orders for this visit:    Late onset Alzheimer's disease with behavioral disturbance    Seizure (CMS/HCC)          Discussion/Summary   Silva Ruano returns to clinic today with a history of Alzheimer's Disease . I again reviewed her current status and treatment options. At this time it was elected to simply continue on Keppra at 250 mg bid. I suspect her event in 11/18 was syncopal rather than epileptic. She will then follow up in 6 months, or sooner if needed.     I spent 20 minutes face to face with the patient and family. I spent 12 minutes counseling and discussing diagnosis, current status, treatment options and management.    As part of this visit I reviewed prior lab results, obtained additional history from the family which is incorporated in the HPI and reviewed records from prior hospitalizations which is incorporated in the HPI.      Edmond Martinez MD

## 2019-01-10 ENCOUNTER — OFFICE VISIT (OUTPATIENT)
Dept: ORTHOPEDIC SURGERY | Facility: CLINIC | Age: 84
End: 2019-01-10

## 2019-01-10 VITALS — BODY MASS INDEX: 26.22 KG/M2 | HEIGHT: 63 IN | RESPIRATION RATE: 18 BRPM | WEIGHT: 148 LBS

## 2019-01-10 DIAGNOSIS — M17.11 PRIMARY OSTEOARTHRITIS OF RIGHT KNEE: Primary | ICD-10-CM

## 2019-01-10 DIAGNOSIS — M17.11 PATELLOFEMORAL ARTHRITIS OF RIGHT KNEE: ICD-10-CM

## 2019-01-10 DIAGNOSIS — M25.561 ARTHRALGIA OF RIGHT KNEE: ICD-10-CM

## 2019-01-10 LAB — BACTERIA SPEC AEROBE CULT: ABNORMAL

## 2019-01-10 PROCEDURE — 20610 DRAIN/INJ JOINT/BURSA W/O US: CPT | Performed by: PHYSICIAN ASSISTANT

## 2019-01-10 NOTE — PROGRESS NOTES
"Subjective   Silva Ruano is a 89 y.o. female here today for injection therapy.    Chief Complaint   Patient presents with   • Right Knee - Injections     Supartz #2       Past Medical History:   Diagnosis Date   • Alzheimer disease    • Arthritis    • Closed fracture of neck of right femur, Acute 2/12/2018   • Diverticulosis    • Hypertension    • Pathologic fracture of clavicle, left, initial encounter    • Skin cancer     lymphoma   • Thyroid disease         Past Surgical History:   Procedure Laterality Date   • CHOLECYSTECTOMY     • HIP HEMIARTHROPLASTY Right 2/12/2018    Performed by Rohith Andersen MD at [facility]   • HYSTERECTOMY     • OVARIAN CYST REMOVAL         Allergies   Allergen Reactions   • Sulfa Antibiotics Unknown (See Comments)       Objective   Resp 18   Ht 160 cm (62.99\")   Wt 67.1 kg (148 lb)   BMI 26.23 kg/m²    Physical Exam  Skin exam stable with no erythema, ecchymosis or rash. No new swelling. No motor or sensory changes. Distal pulse intact.    Large Joint Arthrocentesis: R knee  Date/Time: 1/10/2019 1:10 PM  Consent given by: patient  Site marked: site marked  Timeout: Immediately prior to procedure a time out was called to verify the correct patient, procedure, equipment, support staff and site/side marked as required   Supporting Documentation  Indications: pain   Procedure Details  Location: knee - R knee  Preparation: Patient was prepped and draped in the usual sterile fashion  Needle gauge: 21 G.  Approach: anterolateral  Medications administered: 25 mg sodium hyaluronate 25 MG/2.5ML  Patient tolerance: patient tolerated the procedure well with no immediate complications          Assessment/Plan      Diagnosis Plan   1. Primary osteoarthritis of right knee  Large Joint Arthrocentesis: R knee   2. Patellofemoral arthritis of right knee  Large Joint Arthrocentesis: R knee   3. Arthralgia of right knee  Large Joint Arthrocentesis: R knee       No complications of " injection noted.    Discussion of orthopaedic goals and activities and patient and/or guardian expressed appreciation. Call or notify for any adverse effect from injection therapy. Ice, heat, and/or modalities as beneficial. Watch for signs and symptoms of infection.    Recommendations:  Work/Activity Status: May perform usual activities as tolerated. May return to routine exercise and physical work as tolerated. No strenuous activity.  Patient and/or guardian is encouraged to call or return for any issues or concerns.    FU in 1 week    Patient agreeable to call or return sooner for any concerns.

## 2019-01-17 ENCOUNTER — OFFICE VISIT (OUTPATIENT)
Dept: ORTHOPEDIC SURGERY | Facility: CLINIC | Age: 84
End: 2019-01-17

## 2019-01-17 VITALS — BODY MASS INDEX: 26.22 KG/M2 | RESPIRATION RATE: 18 BRPM | WEIGHT: 148 LBS | HEIGHT: 63 IN

## 2019-01-17 DIAGNOSIS — M17.11 PRIMARY OSTEOARTHRITIS OF RIGHT KNEE: Primary | ICD-10-CM

## 2019-01-17 DIAGNOSIS — M25.561 ARTHRALGIA OF RIGHT KNEE: ICD-10-CM

## 2019-01-17 DIAGNOSIS — M17.11 PATELLOFEMORAL ARTHRITIS OF RIGHT KNEE: ICD-10-CM

## 2019-01-17 PROCEDURE — 20610 DRAIN/INJ JOINT/BURSA W/O US: CPT | Performed by: PHYSICIAN ASSISTANT

## 2019-01-17 NOTE — PROGRESS NOTES
"Subjective   Silva Ruano is a 89 y.o. female here today for injection therapy.    Chief Complaint   Patient presents with   • Right Knee - Injections     Supartz #3       Past Medical History:   Diagnosis Date   • Alzheimer disease    • Arthritis    • Closed fracture of neck of right femur, Acute 2/12/2018   • Diverticulosis    • Hypertension    • Pathologic fracture of clavicle, left, initial encounter    • Skin cancer     lymphoma   • Thyroid disease         Past Surgical History:   Procedure Laterality Date   • CHOLECYSTECTOMY     • HIP HEMIARTHROPLASTY Right 2/12/2018    Performed by Rohith Andersen MD at [facility] OR   • HYSTERECTOMY     • OVARIAN CYST REMOVAL         Allergies   Allergen Reactions   • Sulfa Antibiotics Unknown (See Comments)       Objective   Resp 18   Ht 160 cm (62.99\")   Wt 67.1 kg (148 lb)   BMI 26.23 kg/m²    Physical Exam  Skin exam stable with no erythema, ecchymosis or rash. No new swelling. No motor or sensory changes. Distal pulse intact.    Large Joint Arthrocentesis: R knee  Date/Time: 1/17/2019 3:35 PM  Consent given by: patient  Site marked: site marked  Timeout: Immediately prior to procedure a time out was called to verify the correct patient, procedure, equipment, support staff and site/side marked as required   Supporting Documentation  Indications: pain   Procedure Details  Location: knee - R knee  Preparation: Patient was prepped and draped in the usual sterile fashion  Needle gauge: 21 G.  Approach: anterolateral  Medications administered: 25 mg sodium hyaluronate 25 MG/2.5ML  Patient tolerance: patient tolerated the procedure well with no immediate complications          Assessment/Plan      Diagnosis Plan   1. Primary osteoarthritis of right knee  Large Joint Arthrocentesis: R knee   2. Patellofemoral arthritis of right knee  Large Joint Arthrocentesis: R knee   3. Arthralgia of right knee  Large Joint Arthrocentesis: R knee       No complications of " injection noted.    Discussion of orthopaedic goals and activities and patient and/or guardian expressed appreciation. Call or notify for any adverse effect from injection therapy. Ice, heat, and/or modalities as beneficial. Watch for signs and symptoms of infection.    Recommendations:  Work/Activity Status: May perform usual activities as tolerated. May return to routine exercise and physical work as tolerated. No strenuous activity.  Patient and/or guardian is encouraged to call or return for any issues or concerns.    FU in 1 week    Patient agreeable to call or return sooner for any concerns.

## 2019-01-24 ENCOUNTER — OFFICE VISIT (OUTPATIENT)
Dept: ORTHOPEDIC SURGERY | Facility: CLINIC | Age: 84
End: 2019-01-24

## 2019-01-24 VITALS — HEIGHT: 63 IN | RESPIRATION RATE: 18 BRPM | BODY MASS INDEX: 26.22 KG/M2 | WEIGHT: 148 LBS

## 2019-01-24 DIAGNOSIS — M17.11 LOCALIZED OSTEOARTHRITIS OF RIGHT KNEE: Primary | ICD-10-CM

## 2019-01-24 PROCEDURE — 20610 DRAIN/INJ JOINT/BURSA W/O US: CPT | Performed by: PHYSICIAN ASSISTANT

## 2019-01-24 NOTE — PROGRESS NOTES
"Subjective   Silva Ruano is a 89 y.o. female here today for injection therapy.    Chief Complaint   Patient presents with   • Right Knee - Injections     Supartz # 4       Past Medical History:   Diagnosis Date   • Alzheimer disease    • Arthritis    • Closed fracture of neck of right femur, Acute 2/12/2018   • Diverticulosis    • Hypertension    • Pathologic fracture of clavicle, left, initial encounter    • Skin cancer     lymphoma   • Thyroid disease         Past Surgical History:   Procedure Laterality Date   • CHOLECYSTECTOMY     • HIP HEMIARTHROPLASTY Right 2/12/2018    Performed by Rohith Andersen MD at [facility] OR   • HYSTERECTOMY     • OVARIAN CYST REMOVAL         Allergies   Allergen Reactions   • Sulfa Antibiotics Unknown (See Comments)       Objective   Resp 18   Ht 159.8 cm (62.9\")   Wt 67.1 kg (148 lb)   BMI 26.30 kg/m²    Physical Exam  Skin exam stable with no erythema, ecchymosis or rash. No new swelling. No motor or sensory changes. Distal pulse intact.    Large Joint Arthrocentesis: R knee  Date/Time: 1/24/2019 3:40 PM  Consent given by: patient  Site marked: site marked  Timeout: Immediately prior to procedure a time out was called to verify the correct patient, procedure, equipment, support staff and site/side marked as required   Supporting Documentation  Indications: pain   Procedure Details  Location: knee - R knee  Preparation: Patient was prepped and draped in the usual sterile fashion  Needle size: 22 G  Approach: anterolateral  Medications administered: 25 mg sodium hyaluronate 25 MG/2.5ML  Patient tolerance: patient tolerated the procedure well with no immediate complications          Assessment/Plan      Diagnosis Plan   1. Localized osteoarthritis of right knee  Large Joint Arthrocentesis: R knee       No complications of injection noted.    Discussion of orthopaedic goals and activities and patient and/or guardian expressed appreciation. Call or notify for any " adverse effect from injection therapy. Ice, heat, and/or modalities as beneficial. Watch for signs and symptoms of infection.    Recommendations:  Work/Activity Status: May perform usual activities as tolerated. May return to routine exercise and physical work as tolerated. No strenuous activity.  Patient and/or guardian is encouraged to call or return for any issues or concerns.     FU in 1 week  Patient agreeable to call or return sooner for any concerns.

## 2019-01-31 ENCOUNTER — OFFICE VISIT (OUTPATIENT)
Dept: ORTHOPEDIC SURGERY | Facility: CLINIC | Age: 84
End: 2019-01-31

## 2019-01-31 VITALS — RESPIRATION RATE: 18 BRPM | WEIGHT: 148 LBS | BODY MASS INDEX: 26.22 KG/M2 | HEIGHT: 63 IN

## 2019-01-31 DIAGNOSIS — M17.11 PRIMARY OSTEOARTHRITIS OF RIGHT KNEE: Primary | ICD-10-CM

## 2019-01-31 PROCEDURE — 20610 DRAIN/INJ JOINT/BURSA W/O US: CPT | Performed by: PHYSICIAN ASSISTANT

## 2019-01-31 NOTE — PROGRESS NOTES
"Subjective   Silva Ruano is a 89 y.o. female here today for injection therapy.    Chief Complaint   Patient presents with   • Right Knee - Injections     Supartz #5 (Office Supplied)       Past Medical History:   Diagnosis Date   • Alzheimer disease    • Arthritis    • Closed fracture of neck of right femur, Acute 2/12/2018   • Diverticulosis    • Hypertension    • Pathologic fracture of clavicle, left, initial encounter    • Skin cancer     lymphoma   • Thyroid disease         Past Surgical History:   Procedure Laterality Date   • CHOLECYSTECTOMY     • HIP HEMIARTHROPLASTY Right 2/12/2018    [facility]   • HYSTERECTOMY     • OVARIAN CYST REMOVAL         Allergies   Allergen Reactions   • Sulfa Antibiotics Unknown (See Comments)       Objective   Resp 18   Ht 159.8 cm (62.9\")   Wt 67.1 kg (148 lb)   BMI 26.30 kg/m²    Physical Exam  Skin exam stable with no erythema, ecchymosis or rash. No new swelling. No motor or sensory changes. Distal pulse intact.    Large Joint Arthrocentesis: R knee  Date/Time: 1/31/2019 1:28 PM  Consent given by: patient  Site marked: site marked  Timeout: Immediately prior to procedure a time out was called to verify the correct patient, procedure, equipment, support staff and site/side marked as required   Supporting Documentation  Indications: pain   Procedure Details  Location: knee - R knee  Preparation: Patient was prepped and draped in the usual sterile fashion  Approach: anterolateral  Medications administered: 25 mg sodium hyaluronate 25 MG/2.5ML  Patient tolerance: patient tolerated the procedure well with no immediate complications          Assessment/Plan      Diagnosis Plan   1. Primary osteoarthritis of right knee  Large Joint Arthrocentesis: R knee       No complications of injection noted.    Discussion of orthopaedic goals and activities and patient and/or guardian expressed appreciation. Call or notify for any adverse " effect from injection therapy. Ice, heat, and/or modalities as beneficial. Watch for signs and symptoms of infection.    Recommendations:  Work/Activity Status: May perform usual activities as tolerated. May return to routine exercise and physical work as tolerated. No strenuous activity.  Patient and/or guardian is encouraged to call or return for any issues or concerns.    FU in 6 months    \Patient agreeable to call or return sooner for any concerns.

## 2019-02-24 ENCOUNTER — APPOINTMENT (OUTPATIENT)
Dept: GENERAL RADIOLOGY | Facility: HOSPITAL | Age: 84
End: 2019-02-24

## 2019-02-24 ENCOUNTER — APPOINTMENT (OUTPATIENT)
Dept: CT IMAGING | Facility: HOSPITAL | Age: 84
End: 2019-02-24

## 2019-02-24 ENCOUNTER — HOSPITAL ENCOUNTER (INPATIENT)
Facility: HOSPITAL | Age: 84
LOS: 5 days | Discharge: HOME HEALTH | End: 2019-03-01
Attending: EMERGENCY MEDICINE | Admitting: INTERNAL MEDICINE

## 2019-02-24 DIAGNOSIS — Z74.09 IMPAIRED FUNCTIONAL MOBILITY, BALANCE, GAIT, AND ENDURANCE: ICD-10-CM

## 2019-02-24 DIAGNOSIS — R56.9 SEIZURE (HCC): ICD-10-CM

## 2019-02-24 DIAGNOSIS — F02.818 LATE ONSET ALZHEIMER'S DISEASE WITH BEHAVIORAL DISTURBANCE (HCC): ICD-10-CM

## 2019-02-24 DIAGNOSIS — N30.00 ACUTE CYSTITIS WITHOUT HEMATURIA: Primary | ICD-10-CM

## 2019-02-24 DIAGNOSIS — J18.9 PNEUMONIA OF BOTH LOWER LOBES DUE TO INFECTIOUS ORGANISM: ICD-10-CM

## 2019-02-24 DIAGNOSIS — G30.1 LATE ONSET ALZHEIMER'S DISEASE WITH BEHAVIORAL DISTURBANCE (HCC): ICD-10-CM

## 2019-02-24 DIAGNOSIS — I10 ESSENTIAL HYPERTENSION: ICD-10-CM

## 2019-02-24 DIAGNOSIS — R53.1 WEAKNESS: ICD-10-CM

## 2019-02-24 DIAGNOSIS — S22.080D CLOSED WEDGE COMPRESSION FRACTURE OF ELEVENTH THORACIC VERTEBRA WITH ROUTINE HEALING: ICD-10-CM

## 2019-02-24 DIAGNOSIS — M48.54XA COMPRESSION FRACTURE OF THORACIC SPINE, NON-TRAUMATIC, INITIAL ENCOUNTER (HCC): ICD-10-CM

## 2019-02-24 DIAGNOSIS — Z78.9 IMPAIRED MOBILITY AND ADLS: ICD-10-CM

## 2019-02-24 DIAGNOSIS — Z74.09 IMPAIRED MOBILITY AND ADLS: ICD-10-CM

## 2019-02-24 DIAGNOSIS — R53.1 GENERALIZED WEAKNESS: ICD-10-CM

## 2019-02-24 PROBLEM — N39.0 ACUTE UTI (URINARY TRACT INFECTION): Status: ACTIVE | Noted: 2019-02-24

## 2019-02-24 LAB
ALBUMIN SERPL-MCNC: 3.6 G/DL (ref 3.5–5)
ALBUMIN/GLOB SERPL: 1.1 G/DL (ref 1–2)
ALP SERPL-CCNC: 162 U/L (ref 38–126)
ALT SERPL W P-5'-P-CCNC: 22 U/L (ref 13–69)
ANION GAP SERPL CALCULATED.3IONS-SCNC: 12.5 MMOL/L (ref 10–20)
AST SERPL-CCNC: 26 U/L (ref 15–46)
BACTERIA UR QL AUTO: ABNORMAL /HPF
BASOPHILS # BLD AUTO: 0.03 10*3/MM3 (ref 0–0.2)
BASOPHILS NFR BLD AUTO: 0.3 % (ref 0–2.5)
BILIRUB SERPL-MCNC: 0.3 MG/DL (ref 0.2–1.3)
BILIRUB UR QL STRIP: NEGATIVE
BUN BLD-MCNC: 13 MG/DL (ref 7–20)
BUN/CREAT SERPL: 18.6 (ref 7.1–23.5)
CALCIUM SPEC-SCNC: 9.6 MG/DL (ref 8.4–10.2)
CHLORIDE SERPL-SCNC: 97 MMOL/L (ref 98–107)
CLARITY UR: CLEAR
CO2 SERPL-SCNC: 27 MMOL/L (ref 26–30)
COLOR UR: YELLOW
CREAT BLD-MCNC: 0.7 MG/DL (ref 0.6–1.3)
DEPRECATED RDW RBC AUTO: 38.8 FL (ref 37–54)
EOSINOPHIL # BLD AUTO: 0.13 10*3/MM3 (ref 0–0.7)
EOSINOPHIL NFR BLD AUTO: 1.2 % (ref 0–7)
ERYTHROCYTE [DISTWIDTH] IN BLOOD BY AUTOMATED COUNT: 12 % (ref 11.5–14.5)
GFR SERPL CREATININE-BSD FRML MDRD: 78 ML/MIN/1.73
GLOBULIN UR ELPH-MCNC: 3.3 GM/DL
GLUCOSE BLD-MCNC: 116 MG/DL (ref 74–98)
GLUCOSE UR STRIP-MCNC: NEGATIVE MG/DL
HCT VFR BLD AUTO: 35.5 % (ref 37–47)
HGB BLD-MCNC: 12.1 G/DL (ref 12–16)
HGB UR QL STRIP.AUTO: NEGATIVE
HYALINE CASTS UR QL AUTO: ABNORMAL /LPF
IMM GRANULOCYTES # BLD AUTO: 0.05 10*3/MM3 (ref 0–0.06)
IMM GRANULOCYTES NFR BLD AUTO: 0.5 % (ref 0–0.6)
KETONES UR QL STRIP: NEGATIVE
LEUKOCYTE ESTERASE UR QL STRIP.AUTO: ABNORMAL
LYMPHOCYTES # BLD AUTO: 2.06 10*3/MM3 (ref 0.6–3.4)
LYMPHOCYTES NFR BLD AUTO: 19.1 % (ref 10–50)
MCH RBC QN AUTO: 30.1 PG (ref 27–31)
MCHC RBC AUTO-ENTMCNC: 34.1 G/DL (ref 30–37)
MCV RBC AUTO: 88.3 FL (ref 81–99)
MONOCYTES # BLD AUTO: 0.79 10*3/MM3 (ref 0–0.9)
MONOCYTES NFR BLD AUTO: 7.3 % (ref 0–12)
NEUTROPHILS # BLD AUTO: 7.72 10*3/MM3 (ref 2–6.9)
NEUTROPHILS NFR BLD AUTO: 71.6 % (ref 37–80)
NITRITE UR QL STRIP: NEGATIVE
NRBC BLD AUTO-RTO: 0 /100 WBC (ref 0–0)
PH UR STRIP.AUTO: 7 [PH] (ref 5–8)
PLATELET # BLD AUTO: 205 10*3/MM3 (ref 130–400)
PMV BLD AUTO: 8.3 FL (ref 6–12)
POTASSIUM BLD-SCNC: 3.5 MMOL/L (ref 3.5–5.1)
PROCALCITONIN SERPL-MCNC: <0.05 NG/ML
PROT SERPL-MCNC: 6.9 G/DL (ref 6.3–8.2)
PROT UR QL STRIP: NEGATIVE
RBC # BLD AUTO: 4.02 10*6/MM3 (ref 4.2–5.4)
RBC # UR: ABNORMAL /HPF
REF LAB TEST METHOD: ABNORMAL
SODIUM BLD-SCNC: 133 MMOL/L (ref 137–145)
SP GR UR STRIP: 1.01 (ref 1–1.03)
SQUAMOUS #/AREA URNS HPF: ABNORMAL /HPF
TROPONIN I SERPL-MCNC: <0.012 NG/ML (ref 0–0.03)
TSH SERPL DL<=0.05 MIU/L-ACNC: 1.2 MIU/ML (ref 0.47–4.68)
UROBILINOGEN UR QL STRIP: ABNORMAL
WBC NRBC COR # BLD: 10.78 10*3/MM3 (ref 4.8–10.8)
WBC UR QL AUTO: ABNORMAL /HPF

## 2019-02-24 PROCEDURE — 72128 CT CHEST SPINE W/O DYE: CPT

## 2019-02-24 PROCEDURE — 25010000002 AZITHROMYCIN: Performed by: NURSE PRACTITIONER

## 2019-02-24 PROCEDURE — 87040 BLOOD CULTURE FOR BACTERIA: CPT | Performed by: NURSE PRACTITIONER

## 2019-02-24 PROCEDURE — 84484 ASSAY OF TROPONIN QUANT: CPT | Performed by: NURSE PRACTITIONER

## 2019-02-24 PROCEDURE — 93005 ELECTROCARDIOGRAM TRACING: CPT | Performed by: NURSE PRACTITIONER

## 2019-02-24 PROCEDURE — 87086 URINE CULTURE/COLONY COUNT: CPT | Performed by: NURSE PRACTITIONER

## 2019-02-24 PROCEDURE — 72131 CT LUMBAR SPINE W/O DYE: CPT

## 2019-02-24 PROCEDURE — 84145 PROCALCITONIN (PCT): CPT | Performed by: INTERNAL MEDICINE

## 2019-02-24 PROCEDURE — 25010000002 ENOXAPARIN PER 10 MG: Performed by: INTERNAL MEDICINE

## 2019-02-24 PROCEDURE — 85025 COMPLETE CBC W/AUTO DIFF WBC: CPT | Performed by: NURSE PRACTITIONER

## 2019-02-24 PROCEDURE — 84443 ASSAY THYROID STIM HORMONE: CPT | Performed by: INTERNAL MEDICINE

## 2019-02-24 PROCEDURE — 71045 X-RAY EXAM CHEST 1 VIEW: CPT

## 2019-02-24 PROCEDURE — 99223 1ST HOSP IP/OBS HIGH 75: CPT | Performed by: INTERNAL MEDICINE

## 2019-02-24 PROCEDURE — 99284 EMERGENCY DEPT VISIT MOD MDM: CPT

## 2019-02-24 PROCEDURE — 25810000003 SODIUM CHLORIDE 0.9 % WITH KCL 20 MEQ 20-0.9 MEQ/L-% SOLUTION: Performed by: INTERNAL MEDICINE

## 2019-02-24 PROCEDURE — 81001 URINALYSIS AUTO W/SCOPE: CPT | Performed by: NURSE PRACTITIONER

## 2019-02-24 PROCEDURE — 80053 COMPREHEN METABOLIC PANEL: CPT | Performed by: NURSE PRACTITIONER

## 2019-02-24 PROCEDURE — P9612 CATHETERIZE FOR URINE SPEC: HCPCS

## 2019-02-24 PROCEDURE — 25010000002 CEFTRIAXONE SODIUM-DEXTROSE 1-3.74 GM-%(50ML) RECONSTITUTED SOLUTION: Performed by: NURSE PRACTITIONER

## 2019-02-24 RX ORDER — LEVETIRACETAM 500 MG/1
250 TABLET ORAL 2 TIMES DAILY
Status: DISCONTINUED | OUTPATIENT
Start: 2019-02-24 | End: 2019-03-01 | Stop reason: HOSPADM

## 2019-02-24 RX ORDER — METOPROLOL SUCCINATE 25 MG/1
25 TABLET, EXTENDED RELEASE ORAL NIGHTLY
Status: DISCONTINUED | OUTPATIENT
Start: 2019-02-24 | End: 2019-02-25

## 2019-02-24 RX ORDER — SODIUM CHLORIDE AND POTASSIUM CHLORIDE 150; 900 MG/100ML; MG/100ML
100 INJECTION, SOLUTION INTRAVENOUS CONTINUOUS
Status: DISCONTINUED | OUTPATIENT
Start: 2019-02-24 | End: 2019-02-27

## 2019-02-24 RX ORDER — QUETIAPINE FUMARATE 25 MG/1
25 TABLET, FILM COATED ORAL NIGHTLY PRN
Status: DISCONTINUED | OUTPATIENT
Start: 2019-02-24 | End: 2019-03-01 | Stop reason: HOSPADM

## 2019-02-24 RX ORDER — SODIUM CHLORIDE 0.9 % (FLUSH) 0.9 %
3-10 SYRINGE (ML) INJECTION AS NEEDED
Status: DISCONTINUED | OUTPATIENT
Start: 2019-02-24 | End: 2019-03-01 | Stop reason: HOSPADM

## 2019-02-24 RX ORDER — LANOLIN ALCOHOL/MO/W.PET/CERES
6 CREAM (GRAM) TOPICAL NIGHTLY PRN
Status: DISCONTINUED | OUTPATIENT
Start: 2019-02-24 | End: 2019-03-01 | Stop reason: HOSPADM

## 2019-02-24 RX ORDER — ACETAMINOPHEN 325 MG/1
975 TABLET ORAL ONCE
Status: COMPLETED | OUTPATIENT
Start: 2019-02-24 | End: 2019-02-24

## 2019-02-24 RX ORDER — SODIUM CHLORIDE 0.9 % (FLUSH) 0.9 %
3 SYRINGE (ML) INJECTION EVERY 12 HOURS SCHEDULED
Status: DISCONTINUED | OUTPATIENT
Start: 2019-02-24 | End: 2019-03-01 | Stop reason: HOSPADM

## 2019-02-24 RX ORDER — LEVOTHYROXINE SODIUM 0.05 MG/1
50 TABLET ORAL
Status: DISCONTINUED | OUTPATIENT
Start: 2019-02-25 | End: 2019-03-01 | Stop reason: HOSPADM

## 2019-02-24 RX ORDER — SODIUM CHLORIDE 9 MG/ML
125 INJECTION, SOLUTION INTRAVENOUS CONTINUOUS
Status: DISCONTINUED | OUTPATIENT
Start: 2019-02-24 | End: 2019-02-24

## 2019-02-24 RX ORDER — BISACODYL 10 MG
10 SUPPOSITORY, RECTAL RECTAL DAILY PRN
Status: DISCONTINUED | OUTPATIENT
Start: 2019-02-24 | End: 2019-03-01 | Stop reason: HOSPADM

## 2019-02-24 RX ORDER — ACETAMINOPHEN 325 MG/1
650 TABLET ORAL EVERY 4 HOURS PRN
Status: DISCONTINUED | OUTPATIENT
Start: 2019-02-24 | End: 2019-03-01 | Stop reason: HOSPADM

## 2019-02-24 RX ORDER — PANTOPRAZOLE SODIUM 40 MG/1
40 TABLET, DELAYED RELEASE ORAL EVERY MORNING
Status: DISCONTINUED | OUTPATIENT
Start: 2019-02-25 | End: 2019-03-01 | Stop reason: HOSPADM

## 2019-02-24 RX ORDER — OXYBUTYNIN CHLORIDE 5 MG/1
5 TABLET, EXTENDED RELEASE ORAL NIGHTLY
Status: DISCONTINUED | OUTPATIENT
Start: 2019-02-24 | End: 2019-02-25

## 2019-02-24 RX ORDER — LOSARTAN POTASSIUM 50 MG/1
50 TABLET ORAL NIGHTLY
Status: DISCONTINUED | OUTPATIENT
Start: 2019-02-24 | End: 2019-03-01 | Stop reason: HOSPADM

## 2019-02-24 RX ORDER — CEFTRIAXONE 1 G/50ML
1 INJECTION, SOLUTION INTRAVENOUS EVERY 24 HOURS
Status: DISCONTINUED | OUTPATIENT
Start: 2019-02-25 | End: 2019-02-27

## 2019-02-24 RX ORDER — PANTOPRAZOLE SODIUM 40 MG/1
40 TABLET, DELAYED RELEASE ORAL DAILY
Qty: 14 TABLET | Refills: 0 | Status: SHIPPED | OUTPATIENT
Start: 2019-02-24 | End: 2019-02-24

## 2019-02-24 RX ORDER — ONDANSETRON 4 MG/1
4 TABLET, FILM COATED ORAL EVERY 6 HOURS PRN
Qty: 12 TABLET | Refills: 0 | Status: SHIPPED | OUTPATIENT
Start: 2019-02-24 | End: 2019-02-24

## 2019-02-24 RX ORDER — DOCUSATE SODIUM 100 MG/1
100 CAPSULE, LIQUID FILLED ORAL 2 TIMES DAILY
Status: DISCONTINUED | OUTPATIENT
Start: 2019-02-24 | End: 2019-03-01 | Stop reason: HOSPADM

## 2019-02-24 RX ORDER — CEFTRIAXONE 1 G/50ML
1 INJECTION, SOLUTION INTRAVENOUS ONCE
Status: COMPLETED | OUTPATIENT
Start: 2019-02-24 | End: 2019-02-24

## 2019-02-24 RX ORDER — L.ACID,PARA/B.BIFIDUM/S.THERM 8B CELL
1 CAPSULE ORAL DAILY
Status: DISCONTINUED | OUTPATIENT
Start: 2019-02-24 | End: 2019-03-01 | Stop reason: HOSPADM

## 2019-02-24 RX ORDER — ONDANSETRON 2 MG/ML
4 INJECTION INTRAMUSCULAR; INTRAVENOUS EVERY 6 HOURS PRN
Status: DISCONTINUED | OUTPATIENT
Start: 2019-02-24 | End: 2019-03-01 | Stop reason: HOSPADM

## 2019-02-24 RX ADMIN — CEFTRIAXONE 1 G: 1 INJECTION, SOLUTION INTRAVENOUS at 17:24

## 2019-02-24 RX ADMIN — SODIUM CHLORIDE 500 ML: 9 INJECTION, SOLUTION INTRAVENOUS at 15:28

## 2019-02-24 RX ADMIN — POTASSIUM CHLORIDE AND SODIUM CHLORIDE 100 ML/HR: 900; 150 INJECTION, SOLUTION INTRAVENOUS at 20:00

## 2019-02-24 RX ADMIN — ENOXAPARIN SODIUM 40 MG: 40 INJECTION SUBCUTANEOUS at 20:36

## 2019-02-24 RX ADMIN — Medication 1 CAPSULE: at 20:35

## 2019-02-24 RX ADMIN — METOPROLOL SUCCINATE 25 MG: 25 TABLET, EXTENDED RELEASE ORAL at 20:34

## 2019-02-24 RX ADMIN — ACETAMINOPHEN 975 MG: 325 TABLET, FILM COATED ORAL at 17:24

## 2019-02-24 RX ADMIN — LOSARTAN POTASSIUM 50 MG: 50 TABLET, FILM COATED ORAL at 20:35

## 2019-02-24 RX ADMIN — OXYBUTYNIN CHLORIDE 5 MG: 5 TABLET, EXTENDED RELEASE ORAL at 20:35

## 2019-02-24 RX ADMIN — AZITHROMYCIN 500 MG: 500 INJECTION, POWDER, LYOPHILIZED, FOR SOLUTION INTRAVENOUS at 18:03

## 2019-02-24 RX ADMIN — ESTROGENS, CONJUGATED 0.62 MG: 0.62 TABLET, FILM COATED ORAL at 20:35

## 2019-02-24 RX ADMIN — LEVETIRACETAM 250 MG: 500 TABLET, FILM COATED ORAL at 20:36

## 2019-02-25 ENCOUNTER — APPOINTMENT (OUTPATIENT)
Dept: ULTRASOUND IMAGING | Facility: HOSPITAL | Age: 84
End: 2019-02-25

## 2019-02-25 LAB
ANION GAP SERPL CALCULATED.3IONS-SCNC: 8.9 MMOL/L (ref 10–20)
BASOPHILS # BLD AUTO: 0.04 10*3/MM3 (ref 0–0.2)
BASOPHILS NFR BLD AUTO: 0.4 % (ref 0–2.5)
BUN BLD-MCNC: 10 MG/DL (ref 7–20)
BUN/CREAT SERPL: 16.7 (ref 7.1–23.5)
CALCIUM SPEC-SCNC: 8.9 MG/DL (ref 8.4–10.2)
CHLORIDE SERPL-SCNC: 102 MMOL/L (ref 98–107)
CO2 SERPL-SCNC: 26 MMOL/L (ref 26–30)
CREAT BLD-MCNC: 0.6 MG/DL (ref 0.6–1.3)
D-LACTATE SERPL-SCNC: 1.6 MMOL/L (ref 0.5–2)
DEPRECATED RDW RBC AUTO: 39 FL (ref 37–54)
EOSINOPHIL # BLD AUTO: 0.12 10*3/MM3 (ref 0–0.7)
EOSINOPHIL NFR BLD AUTO: 1.3 % (ref 0–7)
ERYTHROCYTE [DISTWIDTH] IN BLOOD BY AUTOMATED COUNT: 12 % (ref 11.5–14.5)
GFR SERPL CREATININE-BSD FRML MDRD: 93 ML/MIN/1.73
GLUCOSE BLD-MCNC: 94 MG/DL (ref 74–98)
HCT VFR BLD AUTO: 31.1 % (ref 37–47)
HGB BLD-MCNC: 10.8 G/DL (ref 12–16)
IMM GRANULOCYTES # BLD AUTO: 0.05 10*3/MM3 (ref 0–0.06)
IMM GRANULOCYTES NFR BLD AUTO: 0.6 % (ref 0–0.6)
LYMPHOCYTES # BLD AUTO: 1.7 10*3/MM3 (ref 0.6–3.4)
LYMPHOCYTES NFR BLD AUTO: 18.9 % (ref 10–50)
MAGNESIUM SERPL-MCNC: 1.5 MG/DL (ref 1.6–2.3)
MCH RBC QN AUTO: 30.5 PG (ref 27–31)
MCHC RBC AUTO-ENTMCNC: 34.7 G/DL (ref 30–37)
MCV RBC AUTO: 87.9 FL (ref 81–99)
MONOCYTES # BLD AUTO: 0.59 10*3/MM3 (ref 0–0.9)
MONOCYTES NFR BLD AUTO: 6.6 % (ref 0–12)
NEUTROPHILS # BLD AUTO: 6.49 10*3/MM3 (ref 2–6.9)
NEUTROPHILS NFR BLD AUTO: 72.2 % (ref 37–80)
NRBC BLD AUTO-RTO: 0 /100 WBC (ref 0–0)
PLATELET # BLD AUTO: 179 10*3/MM3 (ref 130–400)
PMV BLD AUTO: 8.5 FL (ref 6–12)
POTASSIUM BLD-SCNC: 3.9 MMOL/L (ref 3.5–5.1)
RBC # BLD AUTO: 3.54 10*6/MM3 (ref 4.2–5.4)
SODIUM BLD-SCNC: 133 MMOL/L (ref 137–145)
WBC NRBC COR # BLD: 8.99 10*3/MM3 (ref 4.8–10.8)

## 2019-02-25 PROCEDURE — 83605 ASSAY OF LACTIC ACID: CPT | Performed by: NURSE PRACTITIONER

## 2019-02-25 PROCEDURE — 97162 PT EVAL MOD COMPLEX 30 MIN: CPT

## 2019-02-25 PROCEDURE — 92610 EVALUATE SWALLOWING FUNCTION: CPT

## 2019-02-25 PROCEDURE — 25010000002 ENOXAPARIN PER 10 MG: Performed by: INTERNAL MEDICINE

## 2019-02-25 PROCEDURE — 80048 BASIC METABOLIC PNL TOTAL CA: CPT | Performed by: INTERNAL MEDICINE

## 2019-02-25 PROCEDURE — 93970 EXTREMITY STUDY: CPT

## 2019-02-25 PROCEDURE — 85025 COMPLETE CBC W/AUTO DIFF WBC: CPT | Performed by: INTERNAL MEDICINE

## 2019-02-25 PROCEDURE — 25010000002 CEFTRIAXONE SODIUM-DEXTROSE 1-3.74 GM-%(50ML) RECONSTITUTED SOLUTION: Performed by: INTERNAL MEDICINE

## 2019-02-25 PROCEDURE — 99222 1ST HOSP IP/OBS MODERATE 55: CPT | Performed by: ORTHOPAEDIC SURGERY

## 2019-02-25 PROCEDURE — 25810000003 SODIUM CHLORIDE 0.9 % WITH KCL 20 MEQ 20-0.9 MEQ/L-% SOLUTION: Performed by: INTERNAL MEDICINE

## 2019-02-25 PROCEDURE — 83735 ASSAY OF MAGNESIUM: CPT | Performed by: INTERNAL MEDICINE

## 2019-02-25 PROCEDURE — 25010000002 MAGNESIUM SULFATE IN D5W 1G/100ML (PREMIX) 1-5 GM/100ML-% SOLUTION: Performed by: INTERNAL MEDICINE

## 2019-02-25 PROCEDURE — 97166 OT EVAL MOD COMPLEX 45 MIN: CPT

## 2019-02-25 PROCEDURE — 99232 SBSQ HOSP IP/OBS MODERATE 35: CPT | Performed by: INTERNAL MEDICINE

## 2019-02-25 PROCEDURE — 25010000002 AZITHROMYCIN: Performed by: INTERNAL MEDICINE

## 2019-02-25 RX ORDER — MAGNESIUM SULFATE 1 G/100ML
1 INJECTION INTRAVENOUS ONCE
Status: COMPLETED | OUTPATIENT
Start: 2019-02-25 | End: 2019-02-25

## 2019-02-25 RX ORDER — OXYBUTYNIN CHLORIDE 5 MG/1
2.5 TABLET ORAL 2 TIMES DAILY
Status: DISCONTINUED | OUTPATIENT
Start: 2019-02-25 | End: 2019-03-01 | Stop reason: HOSPADM

## 2019-02-25 RX ORDER — LORATADINE 10 MG/1
10 TABLET ORAL 2 TIMES DAILY
COMMUNITY
End: 2020-01-01 | Stop reason: HOSPADM

## 2019-02-25 RX ADMIN — ENOXAPARIN SODIUM 40 MG: 40 INJECTION SUBCUTANEOUS at 21:01

## 2019-02-25 RX ADMIN — LEVETIRACETAM 250 MG: 500 TABLET, FILM COATED ORAL at 21:00

## 2019-02-25 RX ADMIN — LEVETIRACETAM 250 MG: 500 TABLET, FILM COATED ORAL at 09:02

## 2019-02-25 RX ADMIN — METOPROLOL TARTRATE 12.5 MG: 25 TABLET ORAL at 21:00

## 2019-02-25 RX ADMIN — LOSARTAN POTASSIUM 50 MG: 50 TABLET, FILM COATED ORAL at 20:59

## 2019-02-25 RX ADMIN — POTASSIUM CHLORIDE AND SODIUM CHLORIDE 100 ML/HR: 900; 150 INJECTION, SOLUTION INTRAVENOUS at 16:20

## 2019-02-25 RX ADMIN — POTASSIUM CHLORIDE AND SODIUM CHLORIDE 100 ML/HR: 900; 150 INJECTION, SOLUTION INTRAVENOUS at 06:07

## 2019-02-25 RX ADMIN — MELATONIN TAB 3 MG 6 MG: 3 TAB at 21:01

## 2019-02-25 RX ADMIN — LEVOTHYROXINE SODIUM 50 MCG: 50 TABLET ORAL at 06:07

## 2019-02-25 RX ADMIN — AZITHROMYCIN 500 MG: 500 INJECTION, POWDER, LYOPHILIZED, FOR SOLUTION INTRAVENOUS at 17:51

## 2019-02-25 RX ADMIN — MAGNESIUM SULFATE HEPTAHYDRATE 1 G: 1 INJECTION, SOLUTION INTRAVENOUS at 10:06

## 2019-02-25 RX ADMIN — SODIUM CHLORIDE, PRESERVATIVE FREE 3 ML: 5 INJECTION INTRAVENOUS at 09:02

## 2019-02-25 RX ADMIN — OXYBUTYNIN CHLORIDE 2.5 MG: 5 TABLET ORAL at 20:59

## 2019-02-25 RX ADMIN — MELATONIN TAB 3 MG 6 MG: 3 TAB at 01:53

## 2019-02-25 RX ADMIN — CEFTRIAXONE 1 G: 1 INJECTION, SOLUTION INTRAVENOUS at 16:20

## 2019-02-25 RX ADMIN — ESTROGENS, CONJUGATED 0.62 MG: 0.62 TABLET, FILM COATED ORAL at 20:59

## 2019-02-25 RX ADMIN — Medication 1 CAPSULE: at 09:02

## 2019-02-26 PROCEDURE — 97530 THERAPEUTIC ACTIVITIES: CPT

## 2019-02-26 PROCEDURE — 25810000003 SODIUM CHLORIDE 0.9 % WITH KCL 20 MEQ 20-0.9 MEQ/L-% SOLUTION: Performed by: INTERNAL MEDICINE

## 2019-02-26 PROCEDURE — 97116 GAIT TRAINING THERAPY: CPT

## 2019-02-26 PROCEDURE — 25010000002 AZITHROMYCIN: Performed by: INTERNAL MEDICINE

## 2019-02-26 PROCEDURE — 25010000002 ENOXAPARIN PER 10 MG: Performed by: INTERNAL MEDICINE

## 2019-02-26 PROCEDURE — 99232 SBSQ HOSP IP/OBS MODERATE 35: CPT | Performed by: INTERNAL MEDICINE

## 2019-02-26 PROCEDURE — 25010000002 CEFTRIAXONE SODIUM-DEXTROSE 1-3.74 GM-%(50ML) RECONSTITUTED SOLUTION: Performed by: INTERNAL MEDICINE

## 2019-02-26 RX ADMIN — Medication 1 CAPSULE: at 09:31

## 2019-02-26 RX ADMIN — OXYBUTYNIN CHLORIDE 2.5 MG: 5 TABLET ORAL at 09:31

## 2019-02-26 RX ADMIN — AZITHROMYCIN 500 MG: 500 INJECTION, POWDER, LYOPHILIZED, FOR SOLUTION INTRAVENOUS at 18:24

## 2019-02-26 RX ADMIN — ENOXAPARIN SODIUM 40 MG: 40 INJECTION SUBCUTANEOUS at 20:46

## 2019-02-26 RX ADMIN — POTASSIUM CHLORIDE AND SODIUM CHLORIDE 100 ML/HR: 900; 150 INJECTION, SOLUTION INTRAVENOUS at 18:24

## 2019-02-26 RX ADMIN — LEVOTHYROXINE SODIUM 50 MCG: 50 TABLET ORAL at 06:05

## 2019-02-26 RX ADMIN — SODIUM CHLORIDE, PRESERVATIVE FREE 3 ML: 5 INJECTION INTRAVENOUS at 09:33

## 2019-02-26 RX ADMIN — METOPROLOL TARTRATE 12.5 MG: 25 TABLET ORAL at 09:29

## 2019-02-26 RX ADMIN — LEVETIRACETAM 250 MG: 500 TABLET, FILM COATED ORAL at 20:47

## 2019-02-26 RX ADMIN — METOPROLOL TARTRATE 12.5 MG: 25 TABLET ORAL at 20:47

## 2019-02-26 RX ADMIN — LOSARTAN POTASSIUM 50 MG: 50 TABLET, FILM COATED ORAL at 20:48

## 2019-02-26 RX ADMIN — OXYBUTYNIN CHLORIDE 2.5 MG: 5 TABLET ORAL at 20:47

## 2019-02-26 RX ADMIN — ESTROGENS, CONJUGATED 0.62 MG: 0.62 TABLET, FILM COATED ORAL at 20:48

## 2019-02-26 RX ADMIN — POTASSIUM CHLORIDE AND SODIUM CHLORIDE 100 ML/HR: 900; 150 INJECTION, SOLUTION INTRAVENOUS at 06:05

## 2019-02-26 RX ADMIN — MELATONIN TAB 3 MG 6 MG: 3 TAB at 20:48

## 2019-02-26 RX ADMIN — LEVETIRACETAM 250 MG: 500 TABLET, FILM COATED ORAL at 09:30

## 2019-02-26 RX ADMIN — CEFTRIAXONE 1 G: 1 INJECTION, SOLUTION INTRAVENOUS at 17:53

## 2019-02-27 LAB
ANION GAP SERPL CALCULATED.3IONS-SCNC: 8.9 MMOL/L (ref 10–20)
BACTERIA SPEC AEROBE CULT: ABNORMAL
BUN BLD-MCNC: 7 MG/DL (ref 7–20)
BUN/CREAT SERPL: 11.7 (ref 7.1–23.5)
CALCIUM SPEC-SCNC: 8.4 MG/DL (ref 8.4–10.2)
CHLORIDE SERPL-SCNC: 109 MMOL/L (ref 98–107)
CO2 SERPL-SCNC: 21 MMOL/L (ref 26–30)
CREAT BLD-MCNC: 0.6 MG/DL (ref 0.6–1.3)
DEPRECATED RDW RBC AUTO: 39.6 FL (ref 37–54)
ERYTHROCYTE [DISTWIDTH] IN BLOOD BY AUTOMATED COUNT: 12.2 % (ref 11.5–14.5)
GFR SERPL CREATININE-BSD FRML MDRD: 93 ML/MIN/1.73
GLUCOSE BLD-MCNC: 88 MG/DL (ref 74–98)
HCT VFR BLD AUTO: 29.5 % (ref 37–47)
HGB BLD-MCNC: 9.9 G/DL (ref 12–16)
MCH RBC QN AUTO: 29.6 PG (ref 27–31)
MCHC RBC AUTO-ENTMCNC: 33.6 G/DL (ref 30–37)
MCV RBC AUTO: 88.1 FL (ref 81–99)
PLATELET # BLD AUTO: 187 10*3/MM3 (ref 130–400)
PMV BLD AUTO: 8.7 FL (ref 6–12)
POTASSIUM BLD-SCNC: 3.9 MMOL/L (ref 3.5–5.1)
RBC # BLD AUTO: 3.35 10*6/MM3 (ref 4.2–5.4)
SODIUM BLD-SCNC: 135 MMOL/L (ref 137–145)
WBC NRBC COR # BLD: 8.26 10*3/MM3 (ref 4.8–10.8)

## 2019-02-27 PROCEDURE — 80048 BASIC METABOLIC PNL TOTAL CA: CPT | Performed by: INTERNAL MEDICINE

## 2019-02-27 PROCEDURE — 85027 COMPLETE CBC AUTOMATED: CPT | Performed by: INTERNAL MEDICINE

## 2019-02-27 PROCEDURE — 25010000002 AZITHROMYCIN: Performed by: INTERNAL MEDICINE

## 2019-02-27 PROCEDURE — 97530 THERAPEUTIC ACTIVITIES: CPT

## 2019-02-27 PROCEDURE — 25010000002 ENOXAPARIN PER 10 MG: Performed by: INTERNAL MEDICINE

## 2019-02-27 PROCEDURE — 97535 SELF CARE MNGMENT TRAINING: CPT

## 2019-02-27 PROCEDURE — 25810000003 SODIUM CHLORIDE 0.9 % WITH KCL 20 MEQ 20-0.9 MEQ/L-% SOLUTION: Performed by: INTERNAL MEDICINE

## 2019-02-27 PROCEDURE — 99232 SBSQ HOSP IP/OBS MODERATE 35: CPT | Performed by: INTERNAL MEDICINE

## 2019-02-27 PROCEDURE — 97110 THERAPEUTIC EXERCISES: CPT

## 2019-02-27 RX ORDER — AMOXICILLIN 500 MG/1
500 CAPSULE ORAL EVERY 8 HOURS SCHEDULED
Status: DISCONTINUED | OUTPATIENT
Start: 2019-02-27 | End: 2019-03-01 | Stop reason: HOSPADM

## 2019-02-27 RX ADMIN — METOPROLOL TARTRATE 12.5 MG: 25 TABLET ORAL at 09:21

## 2019-02-27 RX ADMIN — LEVOTHYROXINE SODIUM 50 MCG: 50 TABLET ORAL at 05:54

## 2019-02-27 RX ADMIN — METOPROLOL TARTRATE 12.5 MG: 25 TABLET ORAL at 21:28

## 2019-02-27 RX ADMIN — DOCUSATE SODIUM 100 MG: 100 CAPSULE, LIQUID FILLED ORAL at 21:28

## 2019-02-27 RX ADMIN — Medication 1 CAPSULE: at 09:22

## 2019-02-27 RX ADMIN — LOSARTAN POTASSIUM 50 MG: 50 TABLET, FILM COATED ORAL at 21:27

## 2019-02-27 RX ADMIN — OXYBUTYNIN CHLORIDE 2.5 MG: 5 TABLET ORAL at 09:21

## 2019-02-27 RX ADMIN — SODIUM CHLORIDE, PRESERVATIVE FREE 3 ML: 5 INJECTION INTRAVENOUS at 21:27

## 2019-02-27 RX ADMIN — AZITHROMYCIN 500 MG: 500 INJECTION, POWDER, LYOPHILIZED, FOR SOLUTION INTRAVENOUS at 18:17

## 2019-02-27 RX ADMIN — MELATONIN TAB 3 MG 6 MG: 3 TAB at 21:37

## 2019-02-27 RX ADMIN — AMOXICILLIN 500 MG: 500 CAPSULE ORAL at 21:28

## 2019-02-27 RX ADMIN — OXYBUTYNIN CHLORIDE 2.5 MG: 5 TABLET ORAL at 21:29

## 2019-02-27 RX ADMIN — DOCUSATE SODIUM 100 MG: 100 CAPSULE, LIQUID FILLED ORAL at 09:21

## 2019-02-27 RX ADMIN — ENOXAPARIN SODIUM 40 MG: 40 INJECTION SUBCUTANEOUS at 21:27

## 2019-02-27 RX ADMIN — POTASSIUM CHLORIDE AND SODIUM CHLORIDE 100 ML/HR: 900; 150 INJECTION, SOLUTION INTRAVENOUS at 04:59

## 2019-02-27 RX ADMIN — ESTROGENS, CONJUGATED 0.62 MG: 0.62 TABLET, FILM COATED ORAL at 21:28

## 2019-02-27 RX ADMIN — LEVETIRACETAM 250 MG: 500 TABLET, FILM COATED ORAL at 09:21

## 2019-02-27 RX ADMIN — LEVETIRACETAM 250 MG: 500 TABLET, FILM COATED ORAL at 21:28

## 2019-02-28 PROCEDURE — 97530 THERAPEUTIC ACTIVITIES: CPT

## 2019-02-28 PROCEDURE — 25010000002 ENOXAPARIN PER 10 MG: Performed by: INTERNAL MEDICINE

## 2019-02-28 PROCEDURE — 25010000002 AZITHROMYCIN: Performed by: INTERNAL MEDICINE

## 2019-02-28 PROCEDURE — 97116 GAIT TRAINING THERAPY: CPT

## 2019-02-28 PROCEDURE — 97535 SELF CARE MNGMENT TRAINING: CPT

## 2019-02-28 PROCEDURE — 99232 SBSQ HOSP IP/OBS MODERATE 35: CPT | Performed by: INTERNAL MEDICINE

## 2019-02-28 RX ADMIN — DOCUSATE SODIUM 100 MG: 100 CAPSULE, LIQUID FILLED ORAL at 21:09

## 2019-02-28 RX ADMIN — PANTOPRAZOLE SODIUM 40 MG: 40 TABLET, DELAYED RELEASE ORAL at 06:24

## 2019-02-28 RX ADMIN — METOPROLOL TARTRATE 12.5 MG: 25 TABLET ORAL at 21:09

## 2019-02-28 RX ADMIN — LOSARTAN POTASSIUM 50 MG: 50 TABLET, FILM COATED ORAL at 21:08

## 2019-02-28 RX ADMIN — SODIUM CHLORIDE, PRESERVATIVE FREE 3 ML: 5 INJECTION INTRAVENOUS at 21:10

## 2019-02-28 RX ADMIN — LEVETIRACETAM 250 MG: 500 TABLET, FILM COATED ORAL at 21:09

## 2019-02-28 RX ADMIN — ESTROGENS, CONJUGATED 0.62 MG: 0.62 TABLET, FILM COATED ORAL at 21:09

## 2019-02-28 RX ADMIN — AMOXICILLIN 500 MG: 500 CAPSULE ORAL at 21:08

## 2019-02-28 RX ADMIN — ENOXAPARIN SODIUM 40 MG: 40 INJECTION SUBCUTANEOUS at 21:09

## 2019-02-28 RX ADMIN — AMOXICILLIN 500 MG: 500 CAPSULE ORAL at 06:24

## 2019-02-28 RX ADMIN — AMOXICILLIN 500 MG: 500 CAPSULE ORAL at 14:54

## 2019-02-28 RX ADMIN — AZITHROMYCIN 500 MG: 500 INJECTION, POWDER, LYOPHILIZED, FOR SOLUTION INTRAVENOUS at 18:24

## 2019-02-28 RX ADMIN — OXYBUTYNIN CHLORIDE 2.5 MG: 5 TABLET ORAL at 21:09

## 2019-02-28 RX ADMIN — LEVOTHYROXINE SODIUM 50 MCG: 50 TABLET ORAL at 06:24

## 2019-02-28 RX ADMIN — MELATONIN TAB 3 MG 6 MG: 3 TAB at 21:08

## 2019-03-01 VITALS
DIASTOLIC BLOOD PRESSURE: 81 MMHG | RESPIRATION RATE: 15 BRPM | HEIGHT: 62 IN | OXYGEN SATURATION: 96 % | SYSTOLIC BLOOD PRESSURE: 145 MMHG | HEART RATE: 82 BPM | WEIGHT: 137.25 LBS | TEMPERATURE: 97.9 F | BODY MASS INDEX: 25.26 KG/M2

## 2019-03-01 LAB
BACTERIA SPEC AEROBE CULT: NORMAL
BACTERIA SPEC AEROBE CULT: NORMAL

## 2019-03-01 PROCEDURE — 99238 HOSP IP/OBS DSCHRG MGMT 30/<: CPT | Performed by: INTERNAL MEDICINE

## 2019-03-01 RX ORDER — AMOXICILLIN 500 MG/1
500 CAPSULE ORAL EVERY 8 HOURS SCHEDULED
Qty: 5 CAPSULE | Refills: 0 | Status: SHIPPED | OUTPATIENT
Start: 2019-03-01 | End: 2019-03-03

## 2019-03-01 RX ORDER — L.ACID,PARA/B.BIFIDUM/S.THERM 8B CELL
1 CAPSULE ORAL DAILY
Qty: 20 CAPSULE | Refills: 0 | Status: ON HOLD | OUTPATIENT
Start: 2019-03-02 | End: 2019-03-28

## 2019-03-01 RX ADMIN — SODIUM CHLORIDE, PRESERVATIVE FREE 3 ML: 5 INJECTION INTRAVENOUS at 09:59

## 2019-03-01 RX ADMIN — METOPROLOL TARTRATE 12.5 MG: 25 TABLET ORAL at 09:59

## 2019-03-01 RX ADMIN — LEVETIRACETAM 250 MG: 500 TABLET, FILM COATED ORAL at 09:58

## 2019-03-01 RX ADMIN — Medication 1 CAPSULE: at 09:59

## 2019-03-01 RX ADMIN — QUETIAPINE FUMARATE 25 MG: 25 TABLET ORAL at 00:47

## 2019-03-01 RX ADMIN — OXYBUTYNIN CHLORIDE 2.5 MG: 5 TABLET ORAL at 09:59

## 2019-03-01 NOTE — PROGRESS NOTES
Continued Stay Note   Manuel     Patient Name: Silva Ruano  MRN: [id number]  Today's Date: 3/1/2019    Admit Date: 2/24/2019    Discharge Plan     Row Name 03/01/19 0957       Plan    Plan  Home with HH    Patient/Family in Agreement with Plan  yes    Plan Comments  Spoke to Yanet with [facility].  She states that their facility will most likely accept, but will send representative to see pt first.  F/U with pt and daughter.  Per daughter, they no longer want to go to a facility; they will take pt home.  Daughter requests HH with [facility].  List of facilities discussed.  Daughter states that she has used [facility] and really liked it, so she feels like [facility] will be okay to try.  Dr. Contreras updated.    Daughter will continue to try to get placement at [facility].  She was informed that she has 30 days from date of discharge to use her qualifying 3 day stay.  List of nursing facilities, HH list and [facility] info given to daughter.        Discharge Codes    No documentation.       Expected Discharge Date and Time     Expected Discharge Date Expected Discharge Time    Feb 28, 2019             Patricia Odom

## 2019-03-01 NOTE — DISCHARGE SUMMARY
[facility]   DISCHARGE SUMMARY      Name:  Silva Ruano   Age:  89 y.o.  Sex:  female  :  1925  MRN:  [id number]   Visit Number:  [id number]    Admission Date:  2019  Date of Discharge:  3/1/2019  Primary Care Physician:  Yasmine Cortes MD    Important issues to note:    1.  Complete antibiotic therapy with amoxicillin as prescribed.  2.  Follow-up with primary care in 1 week.  3.  Follow-up with Dr. Arciniega in 4-6-week and repeat imaging of the thoracolumbar spine at that time.    Discharge Diagnoses:     1.  Acute urinary tract infection, present on admission.  2.  Subacute T11 compression fracture, conservative management as per orthopedics.  3.  Hypomagnesemia, resolved.  4.  Seizure disorder on Keppra.  5.  Advanced Alzheimer's dementia.  6.  Non-Hodgkin's lymphoma in remission.  7.  Essential hypertension.  8.  Acquired hypothyroidism.      Non Hodgkin's lymphoma (CMS/HCC)    Late onset Alzheimer's disease with behavioral disturbance    Generalized weakness    Hyponatremia    Seizure (CMS/HCC)    Pneumonia    Acute UTI (urinary tract infection)    Closed wedge compression fracture of eleventh thoracic vertebra with routine healing    Presenting Problem:    Pneumonia [J18.9]     Consults:     Consults     Date and Time Order Name Status Description    2019 Inpatient Orthopedic Surgery Consult Completed         Consulting Physician(s)     Provider Relationship Specialty    Lei Arciniega MD Consulting Physician Orthopedic Surgery        Procedures Performed:    None.    History of presenting illness:    Patient is a 89-year-old  female with Alzheimer's disease, hypertension, lymphoma 10 years ago, hypothyroidism who comes in after a couple day history of weakness.  She usually walks with a walker with assistance though she does have a shuffling gait.  She also feeds herself.  The last few days she has been unable to do this.   She is not eating or taking her medications.  She is not able to walk at all and the family is unable to move her.  She lives in a condo, the daughter and the sister take care of her there.  She has not had a cough.  She has not had fever, chills, shortness of breath.  She does complain of bilateral leg pain.  She is unable to answer any questions with any consistency herself given her underlying condition.  She has advanced Alzheimer's dementia.  She has essential hypertension which is controlled with medications.  She had lymphoma which was treated with chemotherapy and has been in remission for 10 years.  CT of the spine showed a T11 compression fracture which was new.  No recent falls according to the family.  She does not have history of pathological fracture either.  Also suggested bilateral airspace disease although the patient does not have increased WBC count.  Urinalysis has increased WBCs and 4+ bacteria.  She is known to have recurrent UTIs in the past.  She also has a history of seizures which were first diagnosed last June.  She has had 3 breakthrough seizures since then.  She is on [facility] follows with Dr. Martinez from neurology at [facility].  No seizure activity in the past few days.    Hospital Course:    She was admitted on 2/24/2019 with generalized weakness and was noted to have urinary tract infection.  She also had hypomagnesemia and was given IV magnesium.  She was continued on IV antibiotic therapy with Rocephin for her urinary tract infection.    Her mental status improved significantly over the next 2-3 days and she was able to work with physical therapy and initially was able to sit in the chair.  Subsequently she was able to walk a few feet with physical therapy and walker.  Was able to tolerate diet.  She was seen by speech therapy on 2/25/2019 and they recommended mechanical soft diet with honey thick liquids.    She was seen by Dr. Arciniega from orthopedic services for her T11  fracture and he has recommended conservative management and repeat imaging as an outpatient. The daughter wanted the patient to go to short-term rehab facility at [facility] but unfortunately they were not able to accept her.  The daughter then decided to take the patient back to her home with home health.  Patient lives at [facility].  According to the daughter, she has 24/7 caregivers.    Her urine culture came back positive for lactobacillus.  She was switched to oral amoxicillin.  During the hospital stay, patient also completed a course of azithromycin for suspected bronchitis.  She has been afebrile throughout the hospital stay and is currently being discharged home with home health.  She is advised to follow-up with her primary care physician in 1 week and Dr. Arciniega 4-6 weeks.  Patient's discharge condition and plan was discussed with her daughter who is at the bedside.    Vital Signs:    Temp:  [97.1 °F (36.2 °C)-98.7 °F (37.1 °C)] 97.9 °F (36.6 °C)  Heart Rate:  [82-97] 82  Resp:  [15-16] 15  BP: (145-175)/(71-85) 145/81    Physical Exam:    General Appearance:  Alert and cooperative, not in any acute distress.   Head:  Atraumatic and normocephalic, without obvious abnormality.   Eyes:          Conjunctivae and sclerae normal, no Icterus. No pallor. Extra-occular movements are within normal limits.   Ears:  Ears appear intact with no abnormalities noted.   Throat: No oral lesions, no thrush, oral mucosa moist.   Neck: Supple, trachea midline, no thyromegaly, no carotid bruit.   Back:   Minimal kyphoscoliosis present. No tenderness to palpation,   range of motion normal.   Lungs:   Chest shape is normal. Breath sounds heard bilaterally equally.  No crackles or wheezing. No Pleural rub or bronchial breathing.   Heart:  Normal S1 and S2, no murmur, no gallop, no rub. No JVD.   Abdomen:   Normal bowel sounds, no masses, no organomegaly. Soft, non-tender, non-distended, no  guarding, no rebound tenderness. Midline surgical scar noted in the lower abdomen.   Extremities: Moves all extremities well, no edema, no cyanosis, no clubbing.   Skin: No bleeding, bruising or rash.   Neurologic: Alert and oriented to person. She does have dementia but is able to answer a few questions. Moves all four limbs equally. No tremors. No facial asymetry.     Pertinent Lab Results:     Results from last 7 days   Lab Units 02/27/19  0534 02/25/19  0635 02/24/19  1639   SODIUM mmol/L 135* 133* 133*   POTASSIUM mmol/L 3.9 3.9 3.5   CHLORIDE mmol/L 109* 102 97*   CO2 mmol/L 21.0* 26.0 27.0   BUN mg/dL 7 10 13   CREATININE mg/dL 0.60 0.60 0.70   CALCIUM mg/dL 8.4 8.9 9.6   BILIRUBIN mg/dL  --   --  0.3   ALK PHOS U/L  --   --  162*   ALT (SGPT) U/L  --   --  22   AST (SGOT) U/L  --   --  26   GLUCOSE mg/dL 88 94 116*     Results from last 7 days   Lab Units 02/27/19  0534 02/25/19  0635 02/24/19  1639   WBC 10*3/mm3 8.26 8.99 10.78   HEMOGLOBIN g/dL 9.9* 10.8* 12.1   HEMATOCRIT % 29.5* 31.1* 35.5*   PLATELETS 10*3/mm3 187 179 205         Results from last 7 days   Lab Units 02/24/19  1639   TROPONIN I ng/mL <0.012       Results from last 7 days   Lab Units 02/24/19  1516   COLOR UA  Yellow   GLUCOSE UA  Negative   KETONES UA  Negative   LEUKOCYTES UA  Small (1+)*   PH, URINE  7.0   BILIRUBIN UA  Negative   UROBILINOGEN UA  0.2 E.U./dL     Results from last 7 days   Lab Units 02/24/19  2109 02/24/19  2106 02/24/19  1516   BLOODCX  No growth at 4 days No growth at 4 days  --    URINECX   --   --  >100,000 CFU/mL Lactobacillus species*     Pertinent Radiology Results:    Imaging Results (all)     Procedure Component Value Units Date/Time    US Venous Doppler Lower Extremity Bilateral (duplex) [[id number]] Collected:  02/25/19 0851     Updated:  02/25/19 0856    Narrative:       BILATERAL LOWER EXTREMITY VENOUS ULTRASOUND     INDICATION: Leg pain.     FINDINGS: Partial compressibility of the left common femoral  vein with  some peripherally located increased echogenicity suggestive of minimal  chronic thrombosis. Acute process thought much less likely but difficult  to definitively exclude. There are no other areas of echogenic venous  thrombosis identified. Normal compressibility and color flow otherwise  seen in the bilateral lower extremity venous structures. Minimal soft  tissue edema distally.       Impression:       1. 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  2. No other sonographic evidence of bilateral lower extremity deep  venous thrombosis.     This report was finalized on 2/25/2019 8:54 AM by Alton Rodriguez MD.    CT Lumbar Spine Without Contrast [[id number]] Collected:  02/24/19 1642     Updated:  02/24/19 1643    Narrative:       FINAL REPORT    CLINICAL HISTORY:  Decreased ROM and pain in low back and legs    FINDINGS:  Multiple contiguous transaxial slices through the lumbar spine  were obtained with coronal and sagittal reformatted images.  Bones are demineralized.  There is no acute fracture or  subluxation.  There is diffuse endplate degenerative  osteophytosis and facet joint hypertrophy.  There is scattered  posterior disc osteophyte complexes with a scattered mild to  moderate canal stenosis and bilateral neural foraminal  encroachment.  Paravertebral soft tissues are unremarkable.  Impression: Degenerative changes but no acute abnormality      Impression:       Authenticated by Awais Magana MD on 02/24/2019 04:42:58 PM    CT Thoracic Spine Without Contrast [[id number]] Collected:  02/24/19 1639     Updated:  02/24/19 1640    Narrative:       FINAL REPORT    CLINICAL HISTORY:  Decreased ROM of legs, pain in rivas    FINDINGS:  Multiple contiguous transaxial slices through the thoracic spine  were obtained with coronal and sagittal reformatted images.  Bones are demineralized.  There is moderate kyphosis.  There is  an acute compression  fracture of the superior endplate of T11  with approximately 20% loss vertebral body height.  There is  subluxation of the posterior superior endplate without canal  stenosis.  There is no additional spine fracture.  There is  diffuse endplate degenerative osteophytosis and facet joint  hypertrophy.  There is no acute disc herniation.  There is  bilateral pulmonary airspace disease and effusions.  Impression:  Acute compression fracture T11  Kyphosis and degenerative changes      Impression:       Authenticated by Awais Magana MD on 02/24/2019 04:39:09 PM    XR Chest 1 View [[id number]] Collected:  02/24/19 1549     Updated:  02/24/19 1553    Narrative:       PROCEDURE: XR CHEST 1 VW-     HISTORY: Altered mental status, please send for over read.     COMPARISON: May 14, 2014.     FINDINGS: Atherosclerosis is noted. Surgical clips are seen in the right  upper quadrant. The heart is normal in size. The mediastinum is  unremarkable. Prominent interstitial markings, likely chronic. There is  no pneumothorax.  There are no acute osseous abnormalities.           Impression:       No acute cardiopulmonary process.     Continued followup is recommended.     This report was finalized on 2/24/2019 3:51 PM by Johnathan Cr DO.        Condition on Discharge:      Stable.    Code status during the hospital stay:    Code Status and Medical Interventions:   Ordered at: 02/24/19 1917     Limited Support to NOT Include:    Cardioversion/Defibrillation    Intubation     Level Of Support Discussed With:    Health Care Surrogate     Code Status:    No CPR     Medical Interventions (Level of Support Prior to Arrest):    Limited     Discharge Disposition:    [place] [facility]    Discharge Medications:       Discharge Medications      New Medications      Instructions Start Date   amoxicillin 500 MG capsule  Commonly known as:  AMOXIL   500 mg, Oral, Every 8 Hours Scheduled      lactobacillus acidophilus capsule capsule   1 capsule,  Oral, Daily         Continue These Medications      Instructions Start Date   acetaminophen 325 MG tablet  Commonly known as:  TYLENOL   650 mg, Oral, Every 4 Hours PRN      CLARITIN 10 MG tablet  Generic drug:  loratadine   10 mg, Oral, 2 Times Daily      hydrochlorothiazide 25 MG tablet  Commonly known as:  HYDRODIURIL   25 mg, Oral, Daily      levETIRAcetam 250 MG tablet  Commonly known as:  KEPPRA   250 mg, Oral, 2 Times Daily      levothyroxine 50 MCG tablet  Commonly known as:  SYNTHROID, LEVOTHROID   50 mcg, Oral, Daily      losartan 50 MG tablet  Commonly known as:  COZAAR   50 mg, Oral, Daily      metoprolol succinate XL 25 MG 24 hr tablet  Commonly known as:  TOPROL-XL   25 mg, Oral, Daily      nitrofurantoin 50 MG capsule  Commonly known as:  MACRODANTIN   50 mg, Oral, Daily      omeprazole 20 MG capsule  Commonly known as:  priLOSEC   20 mg, Oral, Daily      PREMARIN 0.625 MG tablet  Generic drug:  estrogens (conjugated)   0.625 mg, Oral, Daily      QUEtiapine 25 MG tablet  Commonly known as:  SEROquel   25 mg, Oral, Nightly PRN      tolterodine LA 4 MG 24 hr capsule  Commonly known as:  DETROL LA   4 mg, Oral, Daily           Discharge Diet:     Diet Instructions     Diet: Dysphagia; Honey Thick Liquids; Mechanical Soft      Discharge Diet:  Dysphagia    Fluid Consistency:  Honey Thick Liquids    Pureed Options:  Mechanical Soft        Activity at Discharge:     Activity Instructions     Activity as Tolerated          Follow-up Appointments:    Additional Instructions for the Follow-ups that You Need to Schedule     Ambulatory Referral to Home Health   As directed      Face to Face Visit Date:  3/1/2019    Follow-up Provider for Plan of Care?:  I treated the patient in an acute care facility and will not continue treatment after discharge.    Follow-up Provider:  KATHARINE WHELAN [[id number]]    Reason/Clinical Findings:  UTI, T11 fracture, HTN, Dementia, Siezure disorder    Describe mobility  limitations that make leaving home difficult:  Generalized weakness, Fall risk    Nursing/Therapeutic Services Requested:  Skilled Nursing Physical Therapy Occupational Therapy    Skilled nursing orders:  Medication education Cardiopulmonary assessments    PT orders:  Therapeutic exercise Gait Training Transfer training Strengthening    Weight Bearing Status:  As Tolerated    Occupational orders:  Activities of daily living Strengthening    Frequency:  1 Week 1           Follow-up Information     Yasmine Cortes MD Follow up on 3/15/2019.    Specialty:  Family Medicine  Why:  @11am  Contact information:  [address] [place] [address]  [phone]             Lei Arciniega MD Follow up in 6 week(s).    Specialty:  Orthopedic Surgery  Contact information:  [address]  [facility] [id number] [phone]                 Future Appointments   Date Time Provider Department Center   7/9/2019  3:30 PM Yasmine Barone PA-C [facility] None   8/6/2019  4:00 PM Cristóbal Corbin PA-C [facility] None   10/1/2019  3:30 PM Yasmine Wagoner APRN [facility]     Test Results Pending at Discharge:     Order Current Status    Blood Culture With HENRIETTA - Blood, Hand, Right Preliminary result    Blood Culture With HENRIETTA - Blood, Hand, Right Preliminary result             Raulito Posada MD  03/01/19  12:29 PM    Time spent: 20 min.    Dictated utilizing Dragon dictation.

## 2019-03-01 NOTE — PLAN OF CARE
Problem: Pneumonia (Adult)  Goal: Signs and Symptoms of Listed Potential Problems Will be Absent, Minimized or Managed (Pneumonia)  Outcome: Ongoing (interventions implemented as appropriate)   02/28/19 2837   Goal/Outcome Evaluation   Problems Assessed (Pneumonia) all   Problems Present (Pneumonia) infection progression       Problem: Skin Injury Risk (Adult)  Goal: Skin Health and Integrity  Outcome: Ongoing (interventions implemented as appropriate)   02/28/19 7933   Skin Injury Risk (Adult)   Skin Health and Integrity making progress toward outcome

## 2019-03-01 NOTE — DISCHARGE PLACEMENT REQUEST
"SKYE Tubbs RN  Case Management  Phone:  [phone]; Fax:  [phone]    Discharge summary attached.    Silva Goodson (89 y.o. Female)     Date of Birth Social Security Number Address Home Phone MRN    06/01/1925  [address] [place] [address] [phone] [id number]    [employer] Marital Status          Sikh        Admission Date Admission Type Admitting Provider Attending Provider Department, Room/Bed    2/24/19 Emergency Epifanio Gordon DO  [facility] MED SURG  3, 313/1    Discharge Date Discharge Disposition Discharge Destination        3/1/2019 Home-Health Care Svc              Attending Provider:  (none)   Allergies:  Sulfa Antibiotics    Isolation:  None   Infection:  None   Code Status:  No CPR    Ht:  157.5 cm (62.01\")   Wt:  62.3 kg (137 lb 4 oz)    Admission Cmt:  None   Principal Problem:  None                Active Insurance as of 2/24/2019     Primary Coverage     Payor Plan Insurance Group Employer/Plan Group    MEDICARE MEDICARE A & B      Payor Plan Address Payor Plan Phone Number Payor Plan Fax Number Effective Dates    [address] [phone]  5/1/1990 - None Entered    [facility] [id number]       Subscriber Name Subscriber Birth Date Member ID       SILVA GOODSON 6/1/1925 [id number]           Secondary Coverage     Payor Plan Insurance Group Employer/Plan Group    AARP MED SUPP AARP HEALTH CARE OPTIONS      Payor Plan Address Payor Plan Phone Number Payor Plan Fax Number Effective Dates    [facility] [phone]  1/1/2017 - None Entered    [address]       [facility] [id number]       Subscriber Name Subscriber Birth Date Member ID       SILVA GOODSON 6/1/1925 [id number]                 Emergency Contacts      (Rel.) Home Phone Work Phone Mobile Phone    Shana Ambriz (Power of ) -- -- [phone]    AlineVandana (Sister) [phone] -- --               Discharge Summary      Swetha, " MD Raulito at 3/1/2019 12:19 PM              [facility]   DISCHARGE SUMMARY      Name:  Silva Ruano   Age:  89 y.o.  Sex:  female  :  1925  MRN:  [id number]   Visit Number:  [id number]    Admission Date:  2019  Date of Discharge:  3/1/2019  Primary Care Physician:  Yasmine Cortes MD    Important issues to note:    1.  Complete antibiotic therapy with amoxicillin as prescribed.  2.  Follow-up with primary care in 1 week.  3.  Follow-up with Dr. Arciniega in 4-6-week and repeat imaging of the thoracolumbar spine at that time.    Discharge Diagnoses:     1.  Acute urinary tract infection, present on admission.  2.  Subacute T11 compression fracture, conservative management as per orthopedics.  3.  Hypomagnesemia, resolved.  4.  Seizure disorder on Keppra.  5.  Advanced Alzheimer's dementia.  6.  Non-Hodgkin's lymphoma in remission.  7.  Essential hypertension.  8.  Acquired hypothyroidism.      Non Hodgkin's lymphoma (CMS/HCC)    Late onset Alzheimer's disease with behavioral disturbance    Generalized weakness    Hyponatremia    Seizure (CMS/HCC)    Pneumonia    Acute UTI (urinary tract infection)    Closed wedge compression fracture of eleventh thoracic vertebra with routine healing    Presenting Problem:    Pneumonia [J18.9]     Consults:     Consults     Date and Time Order Name Status Description    2019 Inpatient Orthopedic Surgery Consult Completed         Consulting Physician(s)     Provider Relationship Specialty    Lei Arciniega MD Consulting Physician Orthopedic Surgery        Procedures Performed:    None.    History of presenting illness:    Patient is a 89-year-old  female with Alzheimer's disease, hypertension, lymphoma 10 years ago, hypothyroidism who comes in after a couple day history of weakness.  She usually walks with a walker with assistance though she does have a shuffling gait.  She also feeds herself.  The last few  days she has been unable to do this.  She is not eating or taking her medications.  She is not able to walk at all and the family is unable to move her.  She lives in a condo, the daughter and the sister take care of her there.  She has not had a cough.  She has not had fever, chills, shortness of breath.  She does complain of bilateral leg pain.  She is unable to answer any questions with any consistency herself given her underlying condition.  She has advanced Alzheimer's dementia.  She has essential hypertension which is controlled with medications.  She had lymphoma which was treated with chemotherapy and has been in remission for 10 years.  CT of the spine showed a T11 compression fracture which was new.  No recent falls according to the family.  She does not have history of pathological fracture either.  Also suggested bilateral airspace disease although the patient does not have increased WBC count.  Urinalysis has increased WBCs and 4+ bacteria.  She is known to have recurrent UTIs in the past.  She also has a history of seizures which were first diagnosed last June.  She has had 3 breakthrough seizures since then.  She is on [facility] follows with Dr. Martinez from neurology at [facility].  No seizure activity in the past few days.    Hospital Course:    She was admitted on 2/24/2019 with generalized weakness and was noted to have urinary tract infection.  She also had hypomagnesemia and was given IV magnesium.  She  was continued on IV antibiotic therapy with Rocephin for her urinary tract infection.     Her mental status improved significantly over the next 2-3 days and she was able to work with physical therapy and initially was able to sit in the chair.  Subsequently she was able to walk a few feet with physical therapy and walker.  Was able to tolerate diet.  She was seen by speech therapy on 2/25/2019 and they recommended mechanical soft diet with honey thick liquids.    She was seen by Dr. Arciniega  from orthopedic services for her T11 fracture and he has recommended conservative management and repeat imaging as an outpatient. The daughter wanted the patient to go to short-term rehab facility at [facility] but unfortunately they were not able to accept her.  The daughter then decided to take the patient back to her home with home health.  Patient lives at [facility].  According to the daughter, she has 24/7 caregivers.    Her urine culture came back positive for lactobacillus.  She was switched to oral amoxicillin.  During the hospital stay, patient also completed a course of azithromycin for suspected bronchitis.  She has been afebrile throughout the hospital stay and is currently being discharged home with home health.  She is advised to follow-up with her primary care physician in 1 week and Dr. Arciniega 4-6 weeks.  Patient's discharge condition and plan was discussed with her daughter who is at the bedside.    Vital Signs:    Temp:  [97.1 °F (36.2 °C)-98.7 °F (37.1 °C)] 97.9 °F (36.6 °C)  Heart Rate:  [82-97] 82  Resp:  [15-16] 15  BP: (145-175)/(71-85) 145/81    Physical Exam:    General Appearance:  Alert and cooperative, not in any acute distress.   Head:  Atraumatic and normocephalic, without obvious abnormality.   Eyes:          Conjunctivae and sclerae normal, no Icterus. No pallor. Extra-occular movements are within normal limits.   Ears:  Ears appear intact with no abnormalities noted.   Throat: No oral lesions, no thrush, oral mucosa moist.   Neck: Supple, trachea midline, no thyromegaly, no carotid bruit.   Back:   Minimal kyphoscoliosis present. No tenderness to palpation,   range of motion normal.   Lungs:   Chest shape is normal. Breath sounds heard bilaterally equally.  No crackles or wheezing. No Pleural rub or bronchial breathing.   Heart:  Normal S1 and S2, no murmur, no gallop, no rub. No JVD.   Abdomen:   Normal bowel sounds, no masses, no organomegaly. Soft,  non-tender, non-distended, no guarding, no rebound tenderness. Midline surgical scar noted in the lower abdomen.   Extremities: Moves all extremities well, no edema, no cyanosis, no clubbing.   Skin: No bleeding, bruising or rash.   Neurologic: Alert and oriented to person. She does have dementia but is able to answer a few questions. Moves all four limbs equally. No tremors. No facial asymetry.     Pertinent Lab Results:     Results from last 7 days   Lab Units 02/27/19  0534 02/25/19  0635 02/24/19  1639   SODIUM mmol/L 135* 133* 133*   POTASSIUM mmol/L 3.9 3.9 3.5   CHLORIDE mmol/L 109* 102 97*   CO2 mmol/L 21.0* 26.0 27.0   BUN mg/dL 7 10 13   CREATININE mg/dL 0.60 0.60 0.70   CALCIUM mg/dL 8.4 8.9 9.6   BILIRUBIN mg/dL  --   --  0.3   ALK PHOS U/L  --   --  162*   ALT (SGPT) U/L  --   --  22   AST (SGOT) U/L  --   --  26   GLUCOSE mg/dL 88 94 116*     Results from last 7 days   Lab Units 02/27/19  0534 02/25/19  0635 02/24/19  1639   WBC 10*3/mm3 8.26 8.99 10.78   HEMOGLOBIN g/dL 9.9* 10.8* 12.1   HEMATOCRIT % 29.5* 31.1* 35.5*   PLATELETS 10*3/mm3 187 179 205         Results from last 7 days   Lab Units 02/24/19  1639   TROPONIN I ng/mL <0.012       Results from last 7 days   Lab Units 02/24/19  1516   COLOR UA  Yellow   GLUCOSE UA  Negative   KETONES UA  Negative   LEUKOCYTES UA  Small (1+)*   PH, URINE  7.0   BILIRUBIN UA  Negative   UROBILINOGEN UA  0.2 E.U./dL     Results from last 7 days   Lab Units 02/24/19  2109 02/24/19  2106 02/24/19  1516   BLOODCX  No growth at 4 days No growth at 4 days  --    URINECX   --   --  >100,000 CFU/mL Lactobacillus species*     Pertinent Radiology Results:    Imaging Results (all)     Procedure Component Value Units Date/Time    US Venous Doppler Lower Extremity Bilateral (duplex) [[id number]] Collected:  02/25/19 0851     Updated:  02/25/19 0856    Narrative:       BILATERAL LOWER EXTREMITY VENOUS ULTRASOUND     INDICATION: Leg pain.     FINDINGS: Partial compressibility  of the left common femoral vein with  some peripherally located increased echogenicity suggestive of minimal  chronic thrombosis. Acute process thought much less likely but difficult  to definitively exclude. There are no other areas of echogenic venous  thrombosis identified. Normal compressibility and color flow otherwise  seen in the bilateral lower extremity venous structures. Minimal soft  tissue edema distally.       Impression:       1. 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  2. No other sonographic evidence of bilateral lower extremity deep  venous thrombosis.     This report was finalized on 2/25/2019 8:54 AM by Alton Rodriguez MD.    CT Lumbar Spine Without Contrast [[id number]] Collected:  02/24/19 1642     Updated:  02/24/19 1643    Narrative:       FINAL REPORT    CLINICAL HISTORY:  Decreased ROM and pain in low back and legs    FINDINGS:  Multiple contiguous transaxial slices through the lumbar spine  were obtained with coronal and sagittal reformatted images.  Bones are demineralized.  There is no acute fracture or  subluxation.  There is diffuse endplate degenerative  osteophytosis and facet joint hypertrophy.  There is scattered  posterior disc osteophyte complexes with a scattered mild to  moderate canal stenosis and bilateral neural foraminal  encroachment.  Paravertebral soft tissues are unremarkable.  Impression: Degenerative changes but no acute abnormality      Impression:       Authenticated by Awais Magana MD on 02/24/2019 04:42:58 PM    CT Thoracic Spine Without Contrast [[id number]] Collected:  02/24/19 1639     Updated:  02/24/19 1640    Narrative:       FINAL REPORT    CLINICAL HISTORY:  Decreased ROM of legs, pain in rivas    FINDINGS:  Multiple contiguous transaxial slices through the thoracic spine  were obtained with coronal and sagittal reformatted images.  Bones are demineralized.  There is moderate kyphosis.  There  is  an acute compression fracture of the superior endplate of T11  with approximately 20% loss vertebral body height.  There is  subluxation of the posterior superior endplate without canal  stenosis.  There is no additional spine fracture.  There is  diffuse endplate degenerative osteophytosis and facet joint  hypertrophy.  There is no acute disc herniation.  There is  bilateral pulmonary airspace disease and effusions.  Impression:  Acute compression fracture T11  Kyphosis and degenerative changes      Impression:       Authenticated by Awais Magana MD on 02/24/2019 04:39:09 PM    XR Chest 1 View [[id number]] Collected:  02/24/19 1549     Updated:  02/24/19 1553    Narrative:       PROCEDURE: XR CHEST 1 VW-     HISTORY: Altered mental status, please send for over read.     COMPARISON: May 14, 2014.     FINDINGS: Atherosclerosis is noted. Surgical clips are seen in the right  upper quadrant. The heart is normal in size. The mediastinum is  unremarkable. Prominent interstitial markings, likely chronic. There is  no pneumothorax.  There are no acute osseous abnormalities.           Impression:       No acute cardiopulmonary process.     Continued followup is recommended.     This report was finalized on 2/24/2019 3:51 PM by Johnathan Cr DO.        Condition on Discharge:      Stable.    Code status during the hospital stay:    Code Status and Medical Interventions:   Ordered at: 02/24/19 1917     Limited Support to NOT Include:    Cardioversion/Defibrillation    Intubation     Level Of Support Discussed With:    Health Care Surrogate     Code Status:    No CPR     Medical Interventions (Level of Support Prior to Arrest):    Limited     Discharge Disposition:    Home-Health Care [facility]    Discharge Medications:       Discharge Medications      New Medications      Instructions Start Date   amoxicillin 500 MG capsule  Commonly known as:  AMOXIL   500 mg, Oral, Every 8 Hours Scheduled      lactobacillus acidophilus  capsule capsule   1 capsule, Oral, Daily         Continue These Medications      Instructions Start Date   acetaminophen 325 MG tablet  Commonly known as:  TYLENOL   650 mg, Oral, Every 4 Hours PRN      CLARITIN 10 MG tablet  Generic drug:  loratadine   10 mg, Oral, 2 Times Daily      hydrochlorothiazide 25 MG tablet  Commonly known as:  HYDRODIURIL   25 mg, Oral, Daily      levETIRAcetam 250 MG tablet  Commonly known as:  KEPPRA   250 mg, Oral, 2 Times Daily      levothyroxine 50 MCG tablet  Commonly known as:  SYNTHROID, LEVOTHROID   50 mcg, Oral, Daily      losartan 50 MG tablet  Commonly known as:  COZAAR   50 mg, Oral, Daily      metoprolol succinate XL 25 MG 24 hr tablet  Commonly known as:  TOPROL-XL   25 mg, Oral, Daily      nitrofurantoin 50 MG capsule  Commonly known as:  MACRODANTIN   50 mg, Oral, Daily      omeprazole 20 MG capsule  Commonly known as:  priLOSEC   20 mg, Oral, Daily      PREMARIN 0.625 MG tablet  Generic drug:  estrogens (conjugated)   0.625 mg, Oral, Daily      QUEtiapine 25 MG tablet  Commonly known as:  SEROquel   25 mg, Oral, Nightly PRN      tolterodine LA 4 MG 24 hr capsule  Commonly known as:  DETROL LA   4 mg, Oral, Daily           Discharge Diet:     Diet Instructions     Diet: Dysphagia; Honey Thick Liquids; Mechanical Soft      Discharge Diet:  Dysphagia    Fluid Consistency:  Honey Thick Liquids    Pureed Options:  Mechanical Soft        Activity at Discharge:     Activity Instructions     Activity as Tolerated          Follow-up Appointments:    Additional Instructions for the Follow-ups that You Need to Schedule     Ambulatory Referral to Home Health   As directed      Face to Face Visit Date:  3/1/2019    Follow-up Provider for Plan of Care?:  I treated the patient in an acute care facility and will not continue treatment after discharge.    Follow-up Provider:  KATHARINE WHELAN [[id number]]    Reason/Clinical Findings:  UTI, T11 fracture, HTN, Dementia, Siezure disorder     Describe mobility limitations that make leaving home difficult:  Generalized weakness, Fall risk    Nursing/Therapeutic Services Requested:  Skilled Nursing Physical Therapy Occupational Therapy    Skilled nursing orders:  Medication education Cardiopulmonary assessments    PT orders:  Therapeutic exercise Gait Training Transfer training Strengthening    Weight Bearing Status:  As Tolerated    Occupational orders:  Activities of daily living Strengthening    Frequency:  1 Week 1           Follow-up Information     Yasmine Cortes MD Follow up on 3/15/2019.    Specialty:  Family Medicine  Why:  @11am  Contact information:  [address] [place] [address]  [phone]             Lei Arciniega MD Follow up in 6 week(s).    Specialty:  Orthopedic Surgery  Contact information:  [address]  [facility] [id number] [phone]                 Future Appointments   Date Time Provider Department Center   7/9/2019  3:30 PM Yasmine Barone PA-C [facility] None   8/6/2019  4:00 PM Cristóbal Corbin PA-C [facility] None   10/1/2019  3:30 PM Yasmine Wagoner, APRN [facility] TD     Test Results Pending at Discharge:     Order Current Status    Blood Culture With HENRIETTA - Blood, Hand, Right Preliminary result    Blood Culture With HENRIETTA - Blood, Hand, Right Preliminary result             Raulito Posada MD  03/01/19  12:29 PM    Time spent: 20 min.    Dictated utilizing Dragon dictation.      Electronically signed by Raulito Posada MD at 3/1/2019 12:31 PM

## 2019-03-01 NOTE — PROGRESS NOTES
Case Management Discharge Note    Final Note: Received HH order.  Called [facility] per previous conversation with pt and daughter.  Spoke with Zhanna.  Per Zhanna they accept referral and will nurse see pt Monday.  CM will continue to     Destination      No service has been selected for the patient.      Durable Medical Equipment      No service has been selected for the patient.      Dialysis/Infusion      No service has been selected for the patient.      Home Medical Care - Selection Complete      Service Provider Request Status Selected Services Address Phone Number Fax Number    [facility] Selected Home Health Services [address], located within [facility] [address] [phone]      [facility] Resources      No service has been selected for the patient.        Other: Other(Private vehicle)    Final Discharge Disposition Code: 06 - home with home health care

## 2019-03-01 NOTE — PROGRESS NOTES
Continued Stay Note  [facility]     Patient Name: Silva Ruano  MRN: [id number]  Today's Date: 3/1/2019    Admit Date: 2/24/2019    Discharge Plan     Row Name 03/01/19 1541       Plan    Plan  Received call from pt's daughter, Shana.  She states they are in trouble and things are not as they expected since they got home.  She would like to go to rehab.      Called [facility], spoke with Laura.  Per Laura, they have reviewed and will accept.  She will call family.  Laura reports that she was able to access pt's d/c summary via EPIC system.    Row Name 03/01/19 1325       Plan    Plan  Home with HH    Patient/Family in Agreement with Plan  yes    Final Discharge Disposition Code  06 - home with home health care    Final Note  Received  order.  Called [facility] per previous conversation with pt and daughter.  Spoke with Zhanna.  Per Zhanna they accept referral and will nurse see pt Monday.  CM will continue to         Discharge Codes    No documentation.       Expected Discharge Date and Time     Expected Discharge Date Expected Discharge Time    Mar 1, 2019             Patricia Odom

## 2019-03-02 ENCOUNTER — READMISSION MANAGEMENT (OUTPATIENT)
Dept: CALL CENTER | Facility: HOSPITAL | Age: 84
End: 2019-03-02

## 2019-03-02 NOTE — OUTREACH NOTE
Prep Survey      Responses   Facility patient discharged from?  [facility]   Is patient eligible?  Yes   Discharge diagnosis  Acute urinary tract infection   Does the patient have one of the following disease processes/diagnoses(primary or secondary)?  Other   Does the patient have Home health ordered?  Yes   What is the Home health agency?   [facility]   Is there a DME ordered?  No   General alerts for this patient  Advanced Alzheimer's dementia   Prep survey completed?  Yes          Willow Barclay RN

## 2019-03-05 ENCOUNTER — READMISSION MANAGEMENT (OUTPATIENT)
Dept: CALL CENTER | Facility: HOSPITAL | Age: 84
End: 2019-03-05

## 2019-03-05 NOTE — OUTREACH NOTE
Medical Week 1 Survey      Responses   Facility patient discharged from?  [facility]   Does the patient have one of the following disease processes/diagnoses(primary or secondary)?  Other   Is there a successful TCM telephone encounter documented?  No   Week 1 attempt successful?  No   Unsuccessful attempts  Attempt 1          Lynda Lopez RN

## 2019-03-06 ENCOUNTER — READMISSION MANAGEMENT (OUTPATIENT)
Dept: CALL CENTER | Facility: HOSPITAL | Age: 84
End: 2019-03-06

## 2019-03-06 NOTE — OUTREACH NOTE
Medical Week 1 Survey      Responses   Facility patient discharged from?  [facility]   Does the patient have one of the following disease processes/diagnoses(primary or secondary)?  Other   Is there a successful TCM telephone encounter documented?  No   Week 1 attempt successful?  No   Revoke  Other transitional program   Revoked  No further contact(revokes)-requires comment   Wrap up additional comments  Pt daughter, Shana, reports that pt entered skilled nursing for rehab.           Mendoza Gutiérrez RN

## 2019-03-28 ENCOUNTER — HOSPITAL ENCOUNTER (INPATIENT)
Facility: HOSPITAL | Age: 84
LOS: 4 days | Discharge: HOME HEALTH | End: 2019-04-01
Attending: EMERGENCY MEDICINE | Admitting: INTERNAL MEDICINE

## 2019-03-28 ENCOUNTER — APPOINTMENT (OUTPATIENT)
Dept: GENERAL RADIOLOGY | Facility: HOSPITAL | Age: 84
End: 2019-03-28

## 2019-03-28 ENCOUNTER — APPOINTMENT (OUTPATIENT)
Dept: CT IMAGING | Facility: HOSPITAL | Age: 84
End: 2019-03-28

## 2019-03-28 DIAGNOSIS — Z74.09 IMPAIRED FUNCTIONAL MOBILITY, BALANCE, GAIT, AND ENDURANCE: ICD-10-CM

## 2019-03-28 DIAGNOSIS — Z78.9 IMPAIRED MOBILITY AND ADLS: ICD-10-CM

## 2019-03-28 DIAGNOSIS — Z74.09 IMPAIRED MOBILITY AND ADLS: ICD-10-CM

## 2019-03-28 DIAGNOSIS — E87.1 HYPONATREMIA: ICD-10-CM

## 2019-03-28 DIAGNOSIS — E86.0 DEHYDRATION: ICD-10-CM

## 2019-03-28 DIAGNOSIS — N39.0 ACUTE LOWER UTI (URINARY TRACT INFECTION): Primary | ICD-10-CM

## 2019-03-28 DIAGNOSIS — R53.1 WEAKNESS: ICD-10-CM

## 2019-03-28 PROBLEM — G93.41 METABOLIC ENCEPHALOPATHY: Status: ACTIVE | Noted: 2019-03-28

## 2019-03-28 PROBLEM — E03.9 HYPOTHYROIDISM (ACQUIRED): Status: ACTIVE | Noted: 2019-03-28

## 2019-03-28 PROBLEM — E83.42 HYPOMAGNESEMIA: Status: ACTIVE | Noted: 2019-03-28

## 2019-03-28 PROBLEM — Z85.79 HISTORY OF LYMPHOMA: Status: ACTIVE | Noted: 2019-03-28

## 2019-03-28 LAB
ALBUMIN SERPL-MCNC: 3.4 G/DL (ref 3.5–5)
ALBUMIN/GLOB SERPL: 1.2 G/DL (ref 1–2)
ALP SERPL-CCNC: 127 U/L (ref 38–126)
ALT SERPL W P-5'-P-CCNC: 28 U/L (ref 13–69)
ANION GAP SERPL CALCULATED.3IONS-SCNC: 13.5 MMOL/L (ref 10–20)
AST SERPL-CCNC: 25 U/L (ref 15–46)
BACTERIA UR QL AUTO: ABNORMAL /HPF
BASOPHILS # BLD AUTO: 0.02 10*3/MM3 (ref 0–0.2)
BASOPHILS NFR BLD AUTO: 0.4 % (ref 0–1.5)
BILIRUB SERPL-MCNC: 0.2 MG/DL (ref 0.2–1.3)
BILIRUB UR QL STRIP: NEGATIVE
BUN BLD-MCNC: 9 MG/DL (ref 7–20)
BUN/CREAT SERPL: 11.3 (ref 7.1–23.5)
CALCIUM SPEC-SCNC: 9.1 MG/DL (ref 8.4–10.2)
CHLORIDE SERPL-SCNC: 87 MMOL/L (ref 98–107)
CLARITY UR: CLEAR
CO2 SERPL-SCNC: 30 MMOL/L (ref 26–30)
COLOR UR: YELLOW
CREAT BLD-MCNC: 0.8 MG/DL (ref 0.6–1.3)
D-LACTATE SERPL-SCNC: 1.6 MMOL/L (ref 0.5–2)
DEPRECATED RDW RBC AUTO: 38.4 FL (ref 37–54)
EOSINOPHIL # BLD AUTO: 0.14 10*3/MM3 (ref 0–0.4)
EOSINOPHIL NFR BLD AUTO: 2.5 % (ref 0.3–6.2)
ERYTHROCYTE [DISTWIDTH] IN BLOOD BY AUTOMATED COUNT: 12.3 % (ref 12.3–15.4)
FLUAV AG NPH QL: NEGATIVE
FLUBV AG NPH QL IA: NEGATIVE
GFR SERPL CREATININE-BSD FRML MDRD: 67 ML/MIN/1.73
GLOBULIN UR ELPH-MCNC: 2.9 GM/DL
GLUCOSE BLD-MCNC: 95 MG/DL (ref 74–98)
GLUCOSE UR STRIP-MCNC: NEGATIVE MG/DL
HCT VFR BLD AUTO: 34 % (ref 34–46.6)
HGB BLD-MCNC: 11.9 G/DL (ref 12–15.9)
HGB UR QL STRIP.AUTO: NEGATIVE
HYALINE CASTS UR QL AUTO: ABNORMAL /LPF
IMM GRANULOCYTES # BLD AUTO: 0.04 10*3/MM3 (ref 0–0.05)
IMM GRANULOCYTES NFR BLD AUTO: 0.7 % (ref 0–0.5)
KETONES UR QL STRIP: NEGATIVE
LEUKOCYTE ESTERASE UR QL STRIP.AUTO: ABNORMAL
LIPASE SERPL-CCNC: 82 U/L (ref 23–300)
LYMPHOCYTES # BLD AUTO: 1.53 10*3/MM3 (ref 0.7–3.1)
LYMPHOCYTES NFR BLD AUTO: 26.8 % (ref 19.6–45.3)
MAGNESIUM SERPL-MCNC: 1.5 MG/DL (ref 1.6–2.3)
MCH RBC QN AUTO: 30 PG (ref 26.6–33)
MCHC RBC AUTO-ENTMCNC: 35 G/DL (ref 31.5–35.7)
MCV RBC AUTO: 85.6 FL (ref 79–97)
MONOCYTES # BLD AUTO: 0.45 10*3/MM3 (ref 0.1–0.9)
MONOCYTES NFR BLD AUTO: 7.9 % (ref 5–12)
NEUTROPHILS # BLD AUTO: 3.53 10*3/MM3 (ref 1.4–7)
NEUTROPHILS NFR BLD AUTO: 61.7 % (ref 42.7–76)
NITRITE UR QL STRIP: POSITIVE
NRBC BLD AUTO-RTO: 0 /100 WBC (ref 0–0)
NT-PROBNP SERPL-MCNC: 1130 PG/ML (ref 0–450)
PH UR STRIP.AUTO: 6.5 [PH] (ref 5–8)
PLATELET # BLD AUTO: 142 10*3/MM3 (ref 140–450)
PMV BLD AUTO: 8.8 FL (ref 6–12)
POTASSIUM BLD-SCNC: 3.5 MMOL/L (ref 3.5–5.1)
PROCALCITONIN SERPL-MCNC: <0.05 NG/ML
PROT SERPL-MCNC: 6.3 G/DL (ref 6.3–8.2)
PROT UR QL STRIP: NEGATIVE
RBC # BLD AUTO: 3.97 10*6/MM3 (ref 3.77–5.28)
RBC # UR: ABNORMAL /HPF
REF LAB TEST METHOD: ABNORMAL
SODIUM BLD-SCNC: 127 MMOL/L (ref 137–145)
SP GR UR STRIP: 1.01 (ref 1–1.03)
SQUAMOUS #/AREA URNS HPF: ABNORMAL /HPF
TROPONIN I SERPL-MCNC: <0.012 NG/ML (ref 0–0.03)
TSH SERPL DL<=0.05 MIU/L-ACNC: 1.83 MIU/ML (ref 0.47–4.68)
UROBILINOGEN UR QL STRIP: ABNORMAL
WBC NRBC COR # BLD: 5.71 10*3/MM3 (ref 3.4–10.8)
WBC UR QL AUTO: ABNORMAL /HPF

## 2019-03-28 PROCEDURE — 87804 INFLUENZA ASSAY W/OPTIC: CPT | Performed by: PHYSICIAN ASSISTANT

## 2019-03-28 PROCEDURE — 83880 ASSAY OF NATRIURETIC PEPTIDE: CPT | Performed by: PHYSICIAN ASSISTANT

## 2019-03-28 PROCEDURE — 84443 ASSAY THYROID STIM HORMONE: CPT | Performed by: PHYSICIAN ASSISTANT

## 2019-03-28 PROCEDURE — 70450 CT HEAD/BRAIN W/O DYE: CPT

## 2019-03-28 PROCEDURE — 94799 UNLISTED PULMONARY SVC/PX: CPT

## 2019-03-28 PROCEDURE — 87040 BLOOD CULTURE FOR BACTERIA: CPT | Performed by: PHYSICIAN ASSISTANT

## 2019-03-28 PROCEDURE — 85025 COMPLETE CBC W/AUTO DIFF WBC: CPT | Performed by: PHYSICIAN ASSISTANT

## 2019-03-28 PROCEDURE — 84145 PROCALCITONIN (PCT): CPT | Performed by: PHYSICIAN ASSISTANT

## 2019-03-28 PROCEDURE — 25010000002 LEVETRIRACETAM PER 10 MG: Performed by: NURSE PRACTITIONER

## 2019-03-28 PROCEDURE — 99284 EMERGENCY DEPT VISIT MOD MDM: CPT

## 2019-03-28 PROCEDURE — 25010000002 CEFTRIAXONE SODIUM-DEXTROSE 1-3.74 GM-%(50ML) RECONSTITUTED SOLUTION: Performed by: PHYSICIAN ASSISTANT

## 2019-03-28 PROCEDURE — 87086 URINE CULTURE/COLONY COUNT: CPT | Performed by: PHYSICIAN ASSISTANT

## 2019-03-28 PROCEDURE — 83690 ASSAY OF LIPASE: CPT | Performed by: PHYSICIAN ASSISTANT

## 2019-03-28 PROCEDURE — 87077 CULTURE AEROBIC IDENTIFY: CPT | Performed by: PHYSICIAN ASSISTANT

## 2019-03-28 PROCEDURE — 80053 COMPREHEN METABOLIC PANEL: CPT | Performed by: PHYSICIAN ASSISTANT

## 2019-03-28 PROCEDURE — 25010000002 MAGNESIUM SULFATE 2 GM/50ML SOLUTION: Performed by: NURSE PRACTITIONER

## 2019-03-28 PROCEDURE — 25010000002 ENOXAPARIN PER 10 MG: Performed by: NURSE PRACTITIONER

## 2019-03-28 PROCEDURE — 99222 1ST HOSP IP/OBS MODERATE 55: CPT | Performed by: NURSE PRACTITIONER

## 2019-03-28 PROCEDURE — 71045 X-RAY EXAM CHEST 1 VIEW: CPT

## 2019-03-28 PROCEDURE — 87186 SC STD MICRODIL/AGAR DIL: CPT | Performed by: PHYSICIAN ASSISTANT

## 2019-03-28 PROCEDURE — 83735 ASSAY OF MAGNESIUM: CPT | Performed by: PHYSICIAN ASSISTANT

## 2019-03-28 PROCEDURE — 81001 URINALYSIS AUTO W/SCOPE: CPT | Performed by: PHYSICIAN ASSISTANT

## 2019-03-28 PROCEDURE — 84484 ASSAY OF TROPONIN QUANT: CPT | Performed by: PHYSICIAN ASSISTANT

## 2019-03-28 PROCEDURE — 83605 ASSAY OF LACTIC ACID: CPT | Performed by: PHYSICIAN ASSISTANT

## 2019-03-28 RX ORDER — DEXTROSE AND SODIUM CHLORIDE 5; .9 G/100ML; G/100ML
75 INJECTION, SOLUTION INTRAVENOUS CONTINUOUS
Status: DISCONTINUED | OUTPATIENT
Start: 2019-03-28 | End: 2019-03-29

## 2019-03-28 RX ORDER — LANOLIN ALCOHOL/MO/W.PET/CERES
3 CREAM (GRAM) TOPICAL NIGHTLY PRN
Status: DISCONTINUED | OUTPATIENT
Start: 2019-03-28 | End: 2019-04-01 | Stop reason: HOSPADM

## 2019-03-28 RX ORDER — ONDANSETRON 2 MG/ML
4 INJECTION INTRAMUSCULAR; INTRAVENOUS EVERY 6 HOURS PRN
Status: DISCONTINUED | OUTPATIENT
Start: 2019-03-28 | End: 2019-04-01 | Stop reason: HOSPADM

## 2019-03-28 RX ORDER — ACETAMINOPHEN 325 MG/1
650 TABLET ORAL EVERY 4 HOURS PRN
Status: DISCONTINUED | OUTPATIENT
Start: 2019-03-28 | End: 2019-04-01 | Stop reason: HOSPADM

## 2019-03-28 RX ORDER — FAMOTIDINE 20 MG/1
40 TABLET, FILM COATED ORAL DAILY
Status: DISCONTINUED | OUTPATIENT
Start: 2019-03-28 | End: 2019-03-29

## 2019-03-28 RX ORDER — MAGNESIUM SULFATE HEPTAHYDRATE 40 MG/ML
2 INJECTION, SOLUTION INTRAVENOUS ONCE
Status: COMPLETED | OUTPATIENT
Start: 2019-03-28 | End: 2019-03-28

## 2019-03-28 RX ORDER — CEFTRIAXONE 1 G/50ML
1 INJECTION, SOLUTION INTRAVENOUS ONCE
Status: COMPLETED | OUTPATIENT
Start: 2019-03-28 | End: 2019-03-28

## 2019-03-28 RX ORDER — METOPROLOL SUCCINATE 25 MG/1
25 TABLET, EXTENDED RELEASE ORAL DAILY
Status: DISCONTINUED | OUTPATIENT
Start: 2019-03-28 | End: 2019-03-28

## 2019-03-28 RX ORDER — CEFTRIAXONE 1 G/50ML
1 INJECTION, SOLUTION INTRAVENOUS EVERY 24 HOURS
Status: COMPLETED | OUTPATIENT
Start: 2019-03-29 | End: 2019-03-31

## 2019-03-28 RX ORDER — SODIUM CHLORIDE 9 MG/ML
75 INJECTION, SOLUTION INTRAVENOUS CONTINUOUS
Status: DISCONTINUED | OUTPATIENT
Start: 2019-03-28 | End: 2019-03-28

## 2019-03-28 RX ORDER — LEVOTHYROXINE SODIUM 0.05 MG/1
50 TABLET ORAL
Status: DISCONTINUED | OUTPATIENT
Start: 2019-03-29 | End: 2019-04-01 | Stop reason: HOSPADM

## 2019-03-28 RX ORDER — SODIUM CHLORIDE 0.9 % (FLUSH) 0.9 %
3-10 SYRINGE (ML) INJECTION AS NEEDED
Status: DISCONTINUED | OUTPATIENT
Start: 2019-03-28 | End: 2019-04-01 | Stop reason: HOSPADM

## 2019-03-28 RX ORDER — SODIUM CHLORIDE 0.9 % (FLUSH) 0.9 %
3 SYRINGE (ML) INJECTION EVERY 12 HOURS SCHEDULED
Status: DISCONTINUED | OUTPATIENT
Start: 2019-03-28 | End: 2019-04-01 | Stop reason: HOSPADM

## 2019-03-28 RX ORDER — METOPROLOL TARTRATE 5 MG/5ML
2.5 INJECTION INTRAVENOUS EVERY 6 HOURS PRN
Status: DISCONTINUED | OUTPATIENT
Start: 2019-03-28 | End: 2019-04-01 | Stop reason: HOSPADM

## 2019-03-28 RX ADMIN — MAGNESIUM SULFATE HEPTAHYDRATE 2 G: 40 INJECTION, SOLUTION INTRAVENOUS at 17:10

## 2019-03-28 RX ADMIN — ENOXAPARIN SODIUM 30 MG: 30 INJECTION SUBCUTANEOUS at 17:10

## 2019-03-28 RX ADMIN — LEVETIRACETAM 250 MG: 500 INJECTION, SOLUTION, CONCENTRATE INTRAVENOUS at 21:35

## 2019-03-28 RX ADMIN — MELATONIN 3 MG: at 22:19

## 2019-03-28 RX ADMIN — SODIUM CHLORIDE 125 ML/HR: 9 INJECTION, SOLUTION INTRAVENOUS at 11:09

## 2019-03-28 RX ADMIN — DEXTROSE AND SODIUM CHLORIDE 75 ML/HR: 5; 900 INJECTION, SOLUTION INTRAVENOUS at 17:10

## 2019-03-28 RX ADMIN — CEFTRIAXONE 1 G: 1 INJECTION, SOLUTION INTRAVENOUS at 12:23

## 2019-03-28 RX ADMIN — FAMOTIDINE 40 MG: 20 TABLET, FILM COATED ORAL at 17:10

## 2019-03-29 LAB
ANION GAP SERPL CALCULATED.3IONS-SCNC: 11.2 MMOL/L (ref 10–20)
BASOPHILS # BLD AUTO: 0.02 10*3/MM3 (ref 0–0.2)
BASOPHILS NFR BLD AUTO: 0.5 % (ref 0–1.5)
BUN BLD-MCNC: 8 MG/DL (ref 7–20)
BUN/CREAT SERPL: 13.3 (ref 7.1–23.5)
CALCIUM SPEC-SCNC: 8.6 MG/DL (ref 8.4–10.2)
CHLORIDE SERPL-SCNC: 98 MMOL/L (ref 98–107)
CO2 SERPL-SCNC: 26 MMOL/L (ref 26–30)
CREAT BLD-MCNC: 0.6 MG/DL (ref 0.6–1.3)
DEPRECATED RDW RBC AUTO: 38.1 FL (ref 37–54)
EOSINOPHIL # BLD AUTO: 0.21 10*3/MM3 (ref 0–0.4)
EOSINOPHIL NFR BLD AUTO: 4.8 % (ref 0.3–6.2)
ERYTHROCYTE [DISTWIDTH] IN BLOOD BY AUTOMATED COUNT: 12.3 % (ref 12.3–15.4)
GFR SERPL CREATININE-BSD FRML MDRD: 93 ML/MIN/1.73
GLUCOSE BLD-MCNC: 99 MG/DL (ref 74–98)
HCT VFR BLD AUTO: 32 % (ref 34–46.6)
HGB BLD-MCNC: 11.3 G/DL (ref 12–15.9)
IMM GRANULOCYTES # BLD AUTO: 0.02 10*3/MM3 (ref 0–0.05)
IMM GRANULOCYTES NFR BLD AUTO: 0.5 % (ref 0–0.5)
LYMPHOCYTES # BLD AUTO: 1.71 10*3/MM3 (ref 0.7–3.1)
LYMPHOCYTES NFR BLD AUTO: 39 % (ref 19.6–45.3)
MCH RBC QN AUTO: 30.1 PG (ref 26.6–33)
MCHC RBC AUTO-ENTMCNC: 35.3 G/DL (ref 31.5–35.7)
MCV RBC AUTO: 85.1 FL (ref 79–97)
MONOCYTES # BLD AUTO: 0.36 10*3/MM3 (ref 0.1–0.9)
MONOCYTES NFR BLD AUTO: 8.2 % (ref 5–12)
NEUTROPHILS # BLD AUTO: 2.07 10*3/MM3 (ref 1.4–7)
NEUTROPHILS NFR BLD AUTO: 47 % (ref 42.7–76)
NRBC BLD AUTO-RTO: 0 /100 WBC (ref 0–0)
PLATELET # BLD AUTO: 135 10*3/MM3 (ref 140–450)
PMV BLD AUTO: 8.4 FL (ref 6–12)
POTASSIUM BLD-SCNC: 3.2 MMOL/L (ref 3.5–5.1)
RBC # BLD AUTO: 3.76 10*6/MM3 (ref 3.77–5.28)
SODIUM BLD-SCNC: 132 MMOL/L (ref 137–145)
WBC NRBC COR # BLD: 4.39 10*3/MM3 (ref 3.4–10.8)

## 2019-03-29 PROCEDURE — 25010000002 LEVETRIRACETAM PER 10 MG: Performed by: NURSE PRACTITIONER

## 2019-03-29 PROCEDURE — 97165 OT EVAL LOW COMPLEX 30 MIN: CPT

## 2019-03-29 PROCEDURE — 80048 BASIC METABOLIC PNL TOTAL CA: CPT | Performed by: NURSE PRACTITIONER

## 2019-03-29 PROCEDURE — 25010000002 ENOXAPARIN PER 10 MG: Performed by: NURSE PRACTITIONER

## 2019-03-29 PROCEDURE — 97162 PT EVAL MOD COMPLEX 30 MIN: CPT

## 2019-03-29 PROCEDURE — 93005 ELECTROCARDIOGRAM TRACING: CPT | Performed by: PHYSICIAN ASSISTANT

## 2019-03-29 PROCEDURE — 99232 SBSQ HOSP IP/OBS MODERATE 35: CPT | Performed by: INTERNAL MEDICINE

## 2019-03-29 PROCEDURE — 92610 EVALUATE SWALLOWING FUNCTION: CPT

## 2019-03-29 PROCEDURE — 25810000003 SODIUM CHLORIDE 0.9 % WITH KCL 20 MEQ 20-0.9 MEQ/L-% SOLUTION: Performed by: INTERNAL MEDICINE

## 2019-03-29 PROCEDURE — 85025 COMPLETE CBC W/AUTO DIFF WBC: CPT | Performed by: NURSE PRACTITIONER

## 2019-03-29 PROCEDURE — 25010000002 CEFTRIAXONE SODIUM-DEXTROSE 1-3.74 GM-%(50ML) RECONSTITUTED SOLUTION: Performed by: NURSE PRACTITIONER

## 2019-03-29 RX ORDER — QUETIAPINE FUMARATE 25 MG/1
25 TABLET, FILM COATED ORAL NIGHTLY PRN
Status: DISCONTINUED | OUTPATIENT
Start: 2019-03-29 | End: 2019-04-01 | Stop reason: HOSPADM

## 2019-03-29 RX ORDER — POTASSIUM CHLORIDE 750 MG/1
20 CAPSULE, EXTENDED RELEASE ORAL 2 TIMES DAILY WITH MEALS
Status: DISCONTINUED | OUTPATIENT
Start: 2019-03-29 | End: 2019-03-29

## 2019-03-29 RX ORDER — LEVETIRACETAM 500 MG/1
250 TABLET ORAL 2 TIMES DAILY
Status: DISCONTINUED | OUTPATIENT
Start: 2019-03-29 | End: 2019-03-30

## 2019-03-29 RX ORDER — POTASSIUM CHLORIDE 1.5 G/1.77G
20 POWDER, FOR SOLUTION ORAL 2 TIMES DAILY WITH MEALS
Status: DISCONTINUED | OUTPATIENT
Start: 2019-03-29 | End: 2019-03-31

## 2019-03-29 RX ORDER — PANTOPRAZOLE SODIUM 40 MG/1
40 TABLET, DELAYED RELEASE ORAL EVERY MORNING
Status: DISCONTINUED | OUTPATIENT
Start: 2019-03-30 | End: 2019-04-01 | Stop reason: HOSPADM

## 2019-03-29 RX ORDER — FAMOTIDINE 20 MG/1
20 TABLET, FILM COATED ORAL DAILY
Status: DISCONTINUED | OUTPATIENT
Start: 2019-03-29 | End: 2019-04-01 | Stop reason: HOSPADM

## 2019-03-29 RX ORDER — SODIUM CHLORIDE AND POTASSIUM CHLORIDE 150; 900 MG/100ML; MG/100ML
75 INJECTION, SOLUTION INTRAVENOUS CONTINUOUS
Status: DISCONTINUED | OUTPATIENT
Start: 2019-03-29 | End: 2019-04-01 | Stop reason: HOSPADM

## 2019-03-29 RX ORDER — LOSARTAN POTASSIUM 50 MG/1
50 TABLET ORAL DAILY
Status: DISCONTINUED | OUTPATIENT
Start: 2019-03-30 | End: 2019-03-31

## 2019-03-29 RX ADMIN — QUETIAPINE FUMARATE 25 MG: 25 TABLET ORAL at 21:40

## 2019-03-29 RX ADMIN — DEXTROSE AND SODIUM CHLORIDE 75 ML/HR: 5; 900 INJECTION, SOLUTION INTRAVENOUS at 06:16

## 2019-03-29 RX ADMIN — POTASSIUM CHLORIDE 20 MEQ: 1.5 POWDER, FOR SOLUTION ORAL at 09:46

## 2019-03-29 RX ADMIN — SODIUM CHLORIDE AND POTASSIUM CHLORIDE 75 ML/HR: 9; 1.49 INJECTION, SOLUTION INTRAVENOUS at 09:46

## 2019-03-29 RX ADMIN — LEVETIRACETAM 250 MG: 500 TABLET ORAL at 21:40

## 2019-03-29 RX ADMIN — LEVETIRACETAM 250 MG: 500 INJECTION, SOLUTION, CONCENTRATE INTRAVENOUS at 08:58

## 2019-03-29 RX ADMIN — CEFTRIAXONE 1 G: 1 INJECTION, SOLUTION INTRAVENOUS at 11:47

## 2019-03-29 RX ADMIN — FAMOTIDINE 20 MG: 20 TABLET, FILM COATED ORAL at 08:58

## 2019-03-29 RX ADMIN — ENOXAPARIN SODIUM 30 MG: 30 INJECTION SUBCUTANEOUS at 16:07

## 2019-03-29 RX ADMIN — LEVOTHYROXINE SODIUM 50 MCG: 50 TABLET ORAL at 06:16

## 2019-03-29 RX ADMIN — POTASSIUM CHLORIDE 20 MEQ: 1.5 POWDER, FOR SOLUTION ORAL at 17:06

## 2019-03-30 LAB
ANION GAP SERPL CALCULATED.3IONS-SCNC: 10.8 MMOL/L (ref 10–20)
BACTERIA SPEC AEROBE CULT: ABNORMAL
BUN BLD-MCNC: 8 MG/DL (ref 7–20)
BUN/CREAT SERPL: 16 (ref 7.1–23.5)
CALCIUM SPEC-SCNC: 8.6 MG/DL (ref 8.4–10.2)
CHLORIDE SERPL-SCNC: 105 MMOL/L (ref 98–107)
CO2 SERPL-SCNC: 24 MMOL/L (ref 26–30)
CREAT BLD-MCNC: 0.5 MG/DL (ref 0.6–1.3)
GFR SERPL CREATININE-BSD FRML MDRD: 115 ML/MIN/1.73
GLUCOSE BLD-MCNC: 82 MG/DL (ref 74–98)
MAGNESIUM SERPL-MCNC: 1.7 MG/DL (ref 1.6–2.3)
POTASSIUM BLD-SCNC: 3.8 MMOL/L (ref 3.5–5.1)
SODIUM BLD-SCNC: 136 MMOL/L (ref 137–145)

## 2019-03-30 PROCEDURE — 25810000003 SODIUM CHLORIDE 0.9 % WITH KCL 20 MEQ 20-0.9 MEQ/L-% SOLUTION: Performed by: INTERNAL MEDICINE

## 2019-03-30 PROCEDURE — 83735 ASSAY OF MAGNESIUM: CPT | Performed by: INTERNAL MEDICINE

## 2019-03-30 PROCEDURE — 25010000002 CEFTRIAXONE SODIUM-DEXTROSE 1-3.74 GM-%(50ML) RECONSTITUTED SOLUTION: Performed by: NURSE PRACTITIONER

## 2019-03-30 PROCEDURE — 97530 THERAPEUTIC ACTIVITIES: CPT

## 2019-03-30 PROCEDURE — 99232 SBSQ HOSP IP/OBS MODERATE 35: CPT | Performed by: INTERNAL MEDICINE

## 2019-03-30 PROCEDURE — 25010000002 ENOXAPARIN PER 10 MG: Performed by: NURSE PRACTITIONER

## 2019-03-30 PROCEDURE — 97116 GAIT TRAINING THERAPY: CPT

## 2019-03-30 PROCEDURE — 80048 BASIC METABOLIC PNL TOTAL CA: CPT | Performed by: INTERNAL MEDICINE

## 2019-03-30 RX ORDER — LEVETIRACETAM 100 MG/ML
250 SOLUTION ORAL 2 TIMES DAILY
Status: DISCONTINUED | OUTPATIENT
Start: 2019-03-30 | End: 2019-04-01 | Stop reason: HOSPADM

## 2019-03-30 RX ADMIN — SODIUM CHLORIDE AND POTASSIUM CHLORIDE 75 ML/HR: 9; 1.49 INJECTION, SOLUTION INTRAVENOUS at 18:01

## 2019-03-30 RX ADMIN — LEVETIRACETAM 250 MG: 500 SOLUTION ORAL at 08:48

## 2019-03-30 RX ADMIN — POTASSIUM CHLORIDE 20 MEQ: 1.5 POWDER, FOR SOLUTION ORAL at 18:01

## 2019-03-30 RX ADMIN — LEVETIRACETAM 250 MG: 500 SOLUTION ORAL at 20:21

## 2019-03-30 RX ADMIN — LEVOTHYROXINE SODIUM 50 MCG: 50 TABLET ORAL at 06:26

## 2019-03-30 RX ADMIN — POTASSIUM CHLORIDE 20 MEQ: 1.5 POWDER, FOR SOLUTION ORAL at 08:44

## 2019-03-30 RX ADMIN — CEFTRIAXONE 1 G: 1 INJECTION, SOLUTION INTRAVENOUS at 11:41

## 2019-03-30 RX ADMIN — FAMOTIDINE 20 MG: 20 TABLET, FILM COATED ORAL at 08:44

## 2019-03-30 RX ADMIN — SODIUM CHLORIDE AND POTASSIUM CHLORIDE 75 ML/HR: 9; 1.49 INJECTION, SOLUTION INTRAVENOUS at 00:14

## 2019-03-30 RX ADMIN — ENOXAPARIN SODIUM 30 MG: 30 INJECTION SUBCUTANEOUS at 18:07

## 2019-03-30 RX ADMIN — LOSARTAN POTASSIUM 50 MG: 50 TABLET, FILM COATED ORAL at 08:44

## 2019-03-30 RX ADMIN — PANTOPRAZOLE SODIUM 40 MG: 40 TABLET, DELAYED RELEASE ORAL at 06:26

## 2019-03-31 PROCEDURE — 97530 THERAPEUTIC ACTIVITIES: CPT

## 2019-03-31 PROCEDURE — 97116 GAIT TRAINING THERAPY: CPT

## 2019-03-31 PROCEDURE — 97110 THERAPEUTIC EXERCISES: CPT

## 2019-03-31 PROCEDURE — 25810000003 SODIUM CHLORIDE 0.9 % WITH KCL 20 MEQ 20-0.9 MEQ/L-% SOLUTION: Performed by: INTERNAL MEDICINE

## 2019-03-31 PROCEDURE — 25010000002 CEFTRIAXONE SODIUM-DEXTROSE 1-3.74 GM-%(50ML) RECONSTITUTED SOLUTION: Performed by: NURSE PRACTITIONER

## 2019-03-31 PROCEDURE — 25010000002 ENOXAPARIN PER 10 MG: Performed by: NURSE PRACTITIONER

## 2019-03-31 RX ORDER — LEVOFLOXACIN 500 MG/1
500 TABLET, FILM COATED ORAL EVERY 24 HOURS
Status: DISCONTINUED | OUTPATIENT
Start: 2019-04-01 | End: 2019-04-01 | Stop reason: HOSPADM

## 2019-03-31 RX ORDER — METOPROLOL SUCCINATE 25 MG/1
25 TABLET, EXTENDED RELEASE ORAL
Status: DISCONTINUED | OUTPATIENT
Start: 2019-03-31 | End: 2019-04-01 | Stop reason: HOSPADM

## 2019-03-31 RX ORDER — LOSARTAN POTASSIUM 50 MG/1
100 TABLET ORAL DAILY
Status: DISCONTINUED | OUTPATIENT
Start: 2019-04-01 | End: 2019-04-01 | Stop reason: HOSPADM

## 2019-03-31 RX ADMIN — LEVETIRACETAM 250 MG: 500 SOLUTION ORAL at 09:45

## 2019-03-31 RX ADMIN — QUETIAPINE FUMARATE 25 MG: 25 TABLET ORAL at 21:21

## 2019-03-31 RX ADMIN — MELATONIN 3 MG: at 22:44

## 2019-03-31 RX ADMIN — LEVOTHYROXINE SODIUM 50 MCG: 50 TABLET ORAL at 05:51

## 2019-03-31 RX ADMIN — SODIUM CHLORIDE AND POTASSIUM CHLORIDE 75 ML/HR: 9; 1.49 INJECTION, SOLUTION INTRAVENOUS at 21:03

## 2019-03-31 RX ADMIN — SODIUM CHLORIDE, PRESERVATIVE FREE 3 ML: 5 INJECTION INTRAVENOUS at 21:04

## 2019-03-31 RX ADMIN — CEFTRIAXONE 1 G: 1 INJECTION, SOLUTION INTRAVENOUS at 11:52

## 2019-03-31 RX ADMIN — FAMOTIDINE 20 MG: 20 TABLET, FILM COATED ORAL at 09:45

## 2019-03-31 RX ADMIN — ENOXAPARIN SODIUM 30 MG: 30 INJECTION SUBCUTANEOUS at 17:22

## 2019-03-31 RX ADMIN — METOPROLOL SUCCINATE 25 MG: 25 TABLET, EXTENDED RELEASE ORAL at 11:49

## 2019-03-31 RX ADMIN — LOSARTAN POTASSIUM 50 MG: 50 TABLET, FILM COATED ORAL at 09:45

## 2019-03-31 RX ADMIN — PANTOPRAZOLE SODIUM 40 MG: 40 TABLET, DELAYED RELEASE ORAL at 05:51

## 2019-03-31 RX ADMIN — LEVETIRACETAM 250 MG: 500 SOLUTION ORAL at 21:04

## 2019-03-31 RX ADMIN — POTASSIUM CHLORIDE 20 MEQ: 1.5 POWDER, FOR SOLUTION ORAL at 09:49

## 2019-04-01 VITALS
OXYGEN SATURATION: 98 % | SYSTOLIC BLOOD PRESSURE: 134 MMHG | WEIGHT: 128.4 LBS | RESPIRATION RATE: 17 BRPM | HEIGHT: 66 IN | HEART RATE: 72 BPM | DIASTOLIC BLOOD PRESSURE: 81 MMHG | BODY MASS INDEX: 20.63 KG/M2 | TEMPERATURE: 97.7 F

## 2019-04-01 LAB
ALBUMIN SERPL-MCNC: 3 G/DL (ref 3.5–5)
ALBUMIN/GLOB SERPL: 1.1 G/DL (ref 1–2)
ALP SERPL-CCNC: 133 U/L (ref 38–126)
ALT SERPL W P-5'-P-CCNC: 34 U/L (ref 13–69)
ANION GAP SERPL CALCULATED.3IONS-SCNC: 7.1 MMOL/L (ref 10–20)
AST SERPL-CCNC: 30 U/L (ref 15–46)
BILIRUB SERPL-MCNC: 0.3 MG/DL (ref 0.2–1.3)
BUN BLD-MCNC: 6 MG/DL (ref 7–20)
BUN/CREAT SERPL: 10 (ref 7.1–23.5)
CALCIUM SPEC-SCNC: 9 MG/DL (ref 8.4–10.2)
CHLORIDE SERPL-SCNC: 107 MMOL/L (ref 98–107)
CO2 SERPL-SCNC: 24 MMOL/L (ref 26–30)
CREAT BLD-MCNC: 0.6 MG/DL (ref 0.6–1.3)
DEPRECATED RDW RBC AUTO: 39.9 FL (ref 37–54)
ERYTHROCYTE [DISTWIDTH] IN BLOOD BY AUTOMATED COUNT: 12.5 % (ref 12.3–15.4)
GFR SERPL CREATININE-BSD FRML MDRD: 93 ML/MIN/1.73
GLOBULIN UR ELPH-MCNC: 2.8 GM/DL
GLUCOSE BLD-MCNC: 80 MG/DL (ref 74–98)
HCT VFR BLD AUTO: 33.7 % (ref 34–46.6)
HGB BLD-MCNC: 11.7 G/DL (ref 12–15.9)
MCH RBC QN AUTO: 30.2 PG (ref 26.6–33)
MCHC RBC AUTO-ENTMCNC: 34.7 G/DL (ref 31.5–35.7)
MCV RBC AUTO: 86.9 FL (ref 79–97)
PLATELET # BLD AUTO: 138 10*3/MM3 (ref 140–450)
PMV BLD AUTO: 8.5 FL (ref 6–12)
POTASSIUM BLD-SCNC: 4.1 MMOL/L (ref 3.5–5.1)
PROT SERPL-MCNC: 5.8 G/DL (ref 6.3–8.2)
RBC # BLD AUTO: 3.88 10*6/MM3 (ref 3.77–5.28)
SODIUM BLD-SCNC: 134 MMOL/L (ref 137–145)
WBC NRBC COR # BLD: 5.65 10*3/MM3 (ref 3.4–10.8)

## 2019-04-01 PROCEDURE — 80053 COMPREHEN METABOLIC PANEL: CPT | Performed by: INTERNAL MEDICINE

## 2019-04-01 PROCEDURE — 85027 COMPLETE CBC AUTOMATED: CPT | Performed by: INTERNAL MEDICINE

## 2019-04-01 RX ORDER — CIPROFLOXACIN 500 MG/1
500 TABLET, FILM COATED ORAL 2 TIMES DAILY
Qty: 10 TABLET | Refills: 0 | Status: SHIPPED | OUTPATIENT
Start: 2019-04-01 | End: 2019-06-13

## 2019-04-01 RX ORDER — LOSARTAN POTASSIUM 100 MG/1
100 TABLET ORAL DAILY
Qty: 30 TABLET | Refills: 0 | Status: SHIPPED | OUTPATIENT
Start: 2019-04-01 | End: 2019-08-06

## 2019-04-01 RX ADMIN — LEVOTHYROXINE SODIUM 50 MCG: 50 TABLET ORAL at 06:55

## 2019-04-01 RX ADMIN — PANTOPRAZOLE SODIUM 40 MG: 40 TABLET, DELAYED RELEASE ORAL at 06:55

## 2019-04-01 RX ADMIN — LOSARTAN POTASSIUM 100 MG: 50 TABLET, FILM COATED ORAL at 09:11

## 2019-04-01 RX ADMIN — LEVETIRACETAM 250 MG: 500 SOLUTION ORAL at 09:11

## 2019-04-01 RX ADMIN — METOPROLOL SUCCINATE 25 MG: 25 TABLET, EXTENDED RELEASE ORAL at 09:11

## 2019-04-01 RX ADMIN — FAMOTIDINE 20 MG: 20 TABLET, FILM COATED ORAL at 09:11

## 2019-04-01 RX ADMIN — SODIUM CHLORIDE, PRESERVATIVE FREE 3 ML: 5 INJECTION INTRAVENOUS at 09:12

## 2019-04-01 NOTE — PROGRESS NOTES
Dr Galan's office called to request approval for  Palliative Care Referral at discharge.  Informed this was a family request.  Received approval.  HCP notified of Palliative Care referral.  Clinicals had been faxed on 3/31/19.  Updated clinicals and DC summary faxed to HCP 4/1/19    Pt discharged to home with family 4/1/19 @ 11:00

## 2019-04-01 NOTE — PROGRESS NOTES
Visited pt this am.  Pt sleeping.  Pt's daughter and sister present.  They related pt was being discharged to home today.  Daughter reported pt was talking a lot yesterday--more so than usual.    I confirmed with pt's daughter that she still wanted Palliative Care to follow.  She confirmed.   I informed her I would let Dr Galan know.

## 2019-04-01 NOTE — PROGRESS NOTES
Case Management Discharge Note    Final Note: Received HH order (nursing, PT/OT).  Spoke with pt's daughter via phone call.  Shana confirms pt had [facility] prior to admission and would like to have them again.  Called [facility]; spoke with Patricia.  Referral accepted.  Order and clinicals efaxed to [phone].    Destination      No service has been selected for the patient.      Durable Medical Equipment      No service has been selected for the patient.      Dialysis/Infusion      No service has been selected for the patient.      Home Medical Care - Selection Complete      Service Provider Request Status Selected Services Address Phone Number Fax Number    [facility] Selected Home Health Services [address], [place] [address] [phone]      Therapy      No service has been selected for the patient.      Community Resources      No service has been selected for the patient.        Transportation Services  Private: Car    Final Discharge Disposition Code: 06 - home with home health care

## 2019-04-01 NOTE — PLAN OF CARE
Problem: Urinary Tract Infection (Adult)  Goal: Signs and Symptoms of Listed Potential Problems Will be Absent, Minimized or Managed (Urinary Tract Infection)  Outcome: Ongoing (interventions implemented as appropriate)   04/01/19 0444   Goal/Outcome Evaluation   Problems Assessed (Urinary Tract Infection) all   Problems Present (UTI) none       Problem: Hospitalized Acutely Ill Older (Adult)  Goal: Signs and Symptoms of Listed Potential Problems Will be Absent, Minimized or Managed (Hospitalized Acutely Ill Older)  Outcome: Ongoing (interventions implemented as appropriate)   04/01/19 0444   Goal/Outcome Evaluation   Problems Assessed (Acutely Ill Older Adult) all   Problems Present (Acute Older Adult) functional decline/self-care deficit;other (see comments)  (confusion)       Problem: Confusion, Acute (Adult)  Goal: Cognitive/Functional Impairments Minimized  Outcome: Ongoing (interventions implemented as appropriate)   04/01/19 0444   Confusion, Acute (Adult)   Cognitive/Functional Impairments Minimized making progress toward outcome     Goal: Safety  Outcome: Ongoing (interventions implemented as appropriate)   04/01/19 0444   Confusion, Acute (Adult)   Safety making progress toward outcome       Problem: Fall Risk (Adult)  Goal: Absence of Fall  Outcome: Ongoing (interventions implemented as appropriate)   04/01/19 0444   Fall Risk (Adult)   Absence of Fall making progress toward outcome       Problem: Patient Care Overview  Goal: Plan of Care Review  Outcome: Ongoing (interventions implemented as appropriate)   04/01/19 0444   Coping/Psychosocial   Plan of Care Reviewed With patient   Plan of Care Review   Progress improving   OTHER   Outcome Summary Patient is more alert this evening than previously reported. She rested well most of the night. vitals stable. will continue to monitor.      Goal: Interprofessional Rounds/Family Conf  Outcome: Ongoing (interventions implemented as appropriate)   04/01/19 0444    Interdisciplinary Rounds/Family Conf   Participants family;pharmacy;patient;physician;nursing       Problem: Skin Injury Risk (Adult)  Goal: Skin Health and Integrity  Outcome: Ongoing (interventions implemented as appropriate)   04/01/19 8557   Skin Injury Risk (Adult)   Skin Health and Integrity making progress toward outcome

## 2019-04-01 NOTE — DISCHARGE PLACEMENT REQUEST
"SKYE Tubbs RN  Case Management  Phone:  [phone]; Fax:  [phone]    Resumption of care HH order (nursing, PT/OT).  Please see attached order and clinicals.    Silva Goodson (89 y.o. Female)     Date of Birth Social Security Number Address Home Phone MRN    06/01/1925  [address] [place] [address] [phone] [id number]    Muslim Marital Status          Rastafari        Admission Date Admission Type Admitting Provider Attending Provider Department, Room/Bed    3/28/19 Emergency Miryam Keith MD  [facility] MED SURG  4, 412/1    Discharge Date Discharge Disposition Discharge Destination        4/1/2019 Home-Health Care [facility]              Attending Provider:  (none)   Allergies:  Sulfa Antibiotics    Isolation:  None   Infection:  None   Code Status:  Prior    Ht:  167.6 cm (66\")   Wt:  58.2 kg (128 lb 6.4 oz)    Admission Cmt:  None   Principal Problem:  Acute lower UTI (urinary tract infection) [N39.0]                 Active Insurance as of 3/28/2019     Primary Coverage     Payor Plan Insurance Group Employer/Plan Group    MEDICARE MEDICARE A & B      Payor Plan Address Payor Plan Phone Number Payor Plan Fax Number Effective Dates    [address] [phone]  5/1/1990 - None Entered    [facility] [address]       Subscriber Name Subscriber Birth Date Member ID       SILVA GOODSON 6/1/1925 [id number]           Secondary Coverage     Payor Plan Insurance Group Employer/Plan Group    AARP MED SUPP AARP HEALTH CARE OPTIONS      Payor Plan Address Payor Plan Phone Number Payor Plan Fax Number Effective Dates    [facility] [phone]  1/1/2017 - None Entered    [address]       [facility] [id number]       Subscriber Name Subscriber Birth Date Member ID       SILVA GOODSON 6/1/1925 [id number]                 Emergency Contacts      (Rel.) Home Phone Work Phone Mobile Phone    Shana Ambriz (Power of ) -- -- " [phone]    Vandana Mireles (Sister) [phone] -- --        [facility] MED SURG  4  [address] [facility] [address]  Phone:  [phone]  Fax:   Date: 2019      Ambulatory Referral to Home Health     Patient:  Silva Ruano MRN:  [id number]   [address] [facility] [address] :  1925  SSN:    Phone: [phone] Sex:  F      INSURANCE PAYOR PLAN GROUP # SUBSCRIBER ID   Primary:  Secondary:    MEDICARE  AARP MED SUPP [id number]  [id number]      [id number]  [id number]      Referring Provider Information:  LORRIE MASON Phone: [phone] Fax:       Referral Information:   # Visits:  1 Referral Type: Home Health [42]   Urgency:  Routine Referral Reason: Specialty Services Required   Start Date: 2019 End Date:  To be determined by Insurer   Diagnosis: Acute lower UTI (urinary tract infection) (N39.0 [ICD-10-CM] 599.0 [ICD-9-CM])  Weakness (R53.1 [ICD-10-CM] 780.79 [ICD-9-CM])  Impaired functional mobility, balance, gait, and endurance (Z74.09 [ICD-10-CM] V49.89 [ICD-9-CM])  Dehydration (E86.0 [ICD-10-CM] 276.51 [ICD-9-CM])      Refer to Dept:   Refer to Provider:   Refer to Facility:       Face to Face Visit Date: 2019  Follow-up Provider for Plan of Care? I will be treating the patient on an ongoing basis.  Please send me the Plan of Care for signature.  Follow-up Provider: LORRIE MASON [[id number]]  Reason/Clinical Findings: deconditioned and weakness  Describe mobility limitations that make leaving home difficult: weakness and increased difficulty ambulating  Nursing/Therapeutic Services Requested: Skilled Nursing  Nursing/Therapeutic Services Requested: Physical Therapy  Nursing/Therapeutic Services Requested: Occupational Therapy  Skilled nursing orders: Medication education (UTI prevention education)  Skilled nursing orders: Other  PT orders: Gait Training  PT orders: Transfer training  PT orders: Strengthening  PT orders: Home safety  assessment  Weight Bearing Status: As Tolerated  Occupational orders: Strengthening  Occupational orders: Home safety assessment  Frequency: 1 Week 1     This document serves as a request of services and does not constitute Insurance authorization or approval of services.  To determine eligibility, please contact the members Insurance carrier to verify and review coverage.     If you have medical questions regarding this request for services. Please contact [facility] MED SURG  4 at [phone] between the hours of 8:00am - 5:00pm (Mon-Fri).       Verbal Order Mode: Telephone with readback   Authorizing Provider: Chuy Galan MD  Authorizing Provider's NPI: [id number]     Order Entered By: Patricia Odom 2019  2:41 PM     Electronically signed by:                 Discharge Summary      Chuy Galan MD at 2019  8:59 AM              [facility] HOSPITALIST   DISCHARGE SUMMARY      Name:  Silva Ruano   Age:  89 y.o.  Sex:  female  :  1925  MRN:  [id number]   Visit Number:  [id number]  Primary Care Physician:  Chuy Galan MD  Date of Discharge:  2019  Admission Date:  3/28/2019      Discharge Diagnosis:         Acute lower UTI (urinary tract infection)    Non Hodgkin's lymphoma (CMS/HCC)    Alzheimer's dementia, late onset, with behavioral disturbance    Essential hypertension    Hyponatremia    Seizure (CMS/HCC)    Metabolic encephalopathy    History of lymphoma    Dehydration    Hypomagnesemia    Hypothyroidism (acquired)          Consults:     Consults     Date and Time Order Name Status Description    2019 Inpatient Orthopedic Surgery Consult Completed           Procedures Performed:                 Hospital Course:   The patient was admitted on 3/28/2019  Please see H&P for details on admission HPI and ROS.  89-year-old patient was admitted because of mentation changes with a UTI.  She was found to be having UTI secondary to Klebsiella  and initially was given Rocephin and which has been switched over to Cipro based on sensitivity for the next 5 days upon discharge.  Patient has improved she had weakness and PT eval and treatment was done and as per their evaluation she is able to ambulate with the help of walker and assistance and family will be able to take care of her at home.  The preference of the family is to take her home and she is stable for discharge today.  She did get IV fluids and that helped her and her blood pressure was slightly on the higher side so the Cozaar was increased from 50 mg   To 100 mg.  She also had mild hyponatremia and hypokalemia which has been corrected and back to normal besides that patient is having age-related beginning of some dementia but not on any medication and she is able to talk and with the help of feeding she is able to eat well.  There is no nausea vomiting.  Patient is to follow-up with Dr. Cortes but the daughter prefers to switch over under my care as I had seen her at the nursing home and would prefer in case if she has to go back to nursing home to be under my care as outpatient.  I will see her in the office as a follow-up from hospital on Friday and the daughter agrees with the plan of care.  Home health services will be restarted to which she was just started after discharge from the nursing home 2 weeks ago.  PT eval and treatment at home to be continued.    Vital Signs:     Temp:  [97.4 °F (36.3 °C)-97.8 °F (36.6 °C)] 97.7 °F (36.5 °C)  Heart Rate:  [65-73] 72  Resp:  [16-17] 17  BP: (134-167)/(68-82) 134/81    Physical Exam:     General Appearance:    Alert and cooperative, not in any acute distress.   Head:    Atraumatic and normocephalic, without obvious abnormality.   Eyes:            PERRLA,  No pallor. Extra-occular movements are within normal limits.   Neck:   Supple,  No lymphglands, no bruit   Lungs:     Chest shape is normal. Breath sounds heard bilaterally equally.  No crackles  or wheezing.     Heart:    Normal S1 and S2, no murmur,  No JVD   Abdomen:     Normal bowel sounds, no masses, no organomegaly. Soft        non-tender, no guarding, no rebound                tenderness   Extremities:   Moves all extremities well, no edema, no cyanosis,    Skin:   No  bruising or rash.   Neurologic:   Grossly non focal and moves all extrimities equally.        Pertinent Lab Results:     Results from last 7 days   Lab Units 04/01/19  0553 03/30/19  0555 03/29/19  0521 03/28/19  1106   SODIUM mmol/L 134* 136* 132* 127*   POTASSIUM mmol/L 4.1 3.8 3.2* 3.5   CHLORIDE mmol/L 107 105 98 87*   CO2 mmol/L 24.0* 24.0* 26.0 30.0   BUN mg/dL 6* 8 8 9   CREATININE mg/dL 0.60 0.50* 0.60 0.80   CALCIUM mg/dL 9.0 8.6 8.6 9.1   BILIRUBIN mg/dL 0.3  --   --  0.2   ALK PHOS U/L 133*  --   --  127*   ALT (SGPT) U/L 34  --   --  28   AST (SGOT) U/L 30  --   --  25   GLUCOSE mg/dL 80 82 99* 95     Results from last 7 days   Lab Units 04/01/19  0553 03/29/19  0521 03/28/19  1106   WBC 10*3/mm3 5.65 4.39 5.71   HEMOGLOBIN g/dL 11.7* 11.3* 11.9*   HEMATOCRIT % 33.7* 32.0* 34.0   PLATELETS 10*3/mm3 138* 135* 142         Blood Culture   Date Value Ref Range Status   03/28/2019 No growth at 3 days  Preliminary   03/28/2019 No growth at 3 days  Preliminary     Urine Culture   Date Value Ref Range Status   03/28/2019 >100,000 CFU/mL Klebsiella aerogenes (A)  Final       Pertinent Radiology Results:    Imaging Results (most recent)     Procedure Component Value Units Date/Time    CT Head Without Contrast [[id number]] Collected:  03/28/19 1304     Updated:  03/28/19 1307    Narrative:       PROCEDURE: CT HEAD WO CONTRAST-     HISTORY: weak/worsening     TECHNIQUE: Axial images were obtained from the skull vertex through the  base without contrast.      COMPARISON: 11/29/2018.     FINDINGS: There is generalized cerebral volume loss. There are patchy  hypodensities in the periventricular white matter consistent with  chronic small  vessel ischemic change. There is no CT evidence of acute  hemorrhage. There is no mass, mass effect, or midline shift. There is no  hydrocephalus. Bone windows reveal no osseous abnormalities. The  visualized paranasal sinuses are clear.       Impression:       Findings consistent with chronic small vessel ischemic  change with no acute intracranial abnormality.        This study was performed with techniques to keep radiation doses as low  as reasonably achievable (ALARA). Individualized dose reduction  techniques using automated exposure control or adjustment of mA and/or  kV according to the patient size were employed.      This report was finalized on 3/28/2019 1:05 PM by Kaylen Stephens M.D..    XR Chest 1 View [[id number]] Collected:  03/28/19 1038     Updated:  03/28/19 1042    Narrative:       PROCEDURE: XR CHEST 1 VW-     HISTORY: cough     COMPARISON: 02/24/2019.     FINDINGS: The heart is normal in size. The mediastinum is unremarkable.  The lungs are clear. There is no pneumothorax.  There are no acute  osseous abnormalities.       Impression:       No acute cardiopulmonary process.     Continued followup is recommended.     This report was finalized on 3/28/2019 10:40 AM by Kaylen Stephens M.D..                  Discharge Disposition:      Home-Health Care c    Discharge Medication:         Discharge Medications      New Medications      Instructions Start Date   ciprofloxacin 500 MG tablet  Commonly known as:  CIPRO   500 mg, Oral, 2 Times Daily         Changes to Medications      Instructions Start Date   acetaminophen 325 MG tablet  Commonly known as:  TYLENOL  What changed:  when to take this   650 mg, Oral, Every 4 Hours PRN      losartan 100 MG tablet  Commonly known as:  COZAAR  What changed:    · medication strength  · how much to take   100 mg, Oral, Daily         Continue These Medications      Instructions Start Date   CLARITIN 10 MG tablet  Generic drug:  loratadine   10 mg, Oral, 2  Times Daily      hydrochlorothiazide 25 MG tablet  Commonly known as:  HYDRODIURIL   25 mg, Oral, Daily      levETIRAcetam 250 MG tablet  Commonly known as:  KEPPRA   250 mg, Oral, 2 Times Daily      levothyroxine 50 MCG tablet  Commonly known as:  SYNTHROID, LEVOTHROID   50 mcg, Oral, Daily      metoprolol succinate XL 25 MG 24 hr tablet  Commonly known as:  TOPROL-XL   25 mg, Oral, Daily      omeprazole 20 MG capsule  Commonly known as:  priLOSEC   20 mg, Oral, Daily      PREMARIN 0.625 MG tablet  Generic drug:  estrogens (conjugated)   0.625 mg, Oral, Daily      QUEtiapine 25 MG tablet  Commonly known as:  SEROquel   25 mg, Oral, Nightly PRN      tolterodine LA 4 MG 24 hr capsule  Commonly known as:  DETROL LA   4 mg, Oral, Daily         Stop These Medications    nitrofurantoin 50 MG capsule  Commonly known as:  MACRODANTIN            Discharge Diet:             Follow-up Appointments:      Future Appointments   Date Time Provider Department Center   4/12/2019  1:15 PM Cristóbal Corbin PA-C [facility] None   7/9/2019  3:30 PM Yasmine Barone PA-C [facility] None   8/6/2019  4:00 PM Cristóbal Corbin PA-C [facility] None   10/1/2019  3:30 PM Yasmine Wagoner APRN [facility]         Test Results Pending at Discharge:       Order Current Status    Blood Culture With HENRIETTA - Blood, Arm, Left Preliminary result    Blood Culture With HENRIETTA - Blood, Hand, Left Preliminary result             Chuy Galan MD  04/01/19  8:59 AM    Time:  Discharge 35 min    Please note that portions of this note may have been completed with a voice recognition program. Efforts were made to edit the dictations, but occasionally words are mistranscribed.        Electronically signed by Chuy Galan MD at 4/1/2019  9:02 AM

## 2019-04-01 NOTE — DISCHARGE SUMMARY
[facility]   DISCHARGE SUMMARY      Name:  Silva Ruano   Age:  89 y.o.  Sex:  female  :  1925  MRN:  [id number]   Visit Number:  [id number]  Primary Care Physician:  Chuy Galan MD  Date of Discharge:  2019  Admission Date:  3/28/2019      Discharge Diagnosis:         Acute lower UTI (urinary tract infection)    Non Hodgkin's lymphoma (CMS/HCC)    Alzheimer's dementia, late onset, with behavioral disturbance    Essential hypertension    Hyponatremia    Seizure (CMS/HCC)    Metabolic encephalopathy    History of lymphoma    Dehydration    Hypomagnesemia    Hypothyroidism (acquired)          Consults:     Consults     Date and Time Order Name Status Description    2019 Inpatient Orthopedic Surgery Consult Completed           Procedures Performed:                 Hospital Course:   The patient was admitted on 3/28/2019  Please see H&P for details on admission HPI and ROS.  89-year-old patient was admitted because of mentation changes with a UTI.  She was found to be having UTI secondary to Klebsiella and initially was given Rocephin and which has been switched over to Cipro based on sensitivity for the next 5 days upon discharge.  Patient has improved she had weakness and PT eval and treatment was done and as per their evaluation she is able to ambulate with the help of walker and assistance and family will be able to take care of her at home.  The preference of the family is to take her home and she is stable for discharge today.  She did get IV fluids and that helped her and her blood pressure was slightly on the higher side so the Cozaar was increased from 50 mg   To 100 mg.  She also had mild hyponatremia and hypokalemia which has been corrected and back to normal besides that patient is having age-related beginning of some dementia but not on any medication and she is able to talk and with the help of feeding she is able to eat well.  There is no  nausea vomiting.  Patient is to follow-up with Dr. Cortes but the daughter prefers to switch over under my care as I had seen her at the nursing home and would prefer in case if she has to go back to nursing home to be under my care as outpatient.  I will see her in the office as a follow-up from hospital on Friday and the daughter agrees with the plan of care.  Home health services will be restarted to which she was just started after discharge from the nursing home 2 weeks ago.  PT eval and treatment at home to be continued.    Vital Signs:     Temp:  [97.4 °F (36.3 °C)-97.8 °F (36.6 °C)] 97.7 °F (36.5 °C)  Heart Rate:  [65-73] 72  Resp:  [16-17] 17  BP: (134-167)/(68-82) 134/81    Physical Exam:     General Appearance:    Alert and cooperative, not in any acute distress.   Head:    Atraumatic and normocephalic, without obvious abnormality.   Eyes:            PERRLA,  No pallor. Extra-occular movements are within normal limits.   Neck:   Supple,  No lymphglands, no bruit   Lungs:     Chest shape is normal. Breath sounds heard bilaterally equally.  No crackles or wheezing.     Heart:    Normal S1 and S2, no murmur,  No JVD   Abdomen:     Normal bowel sounds, no masses, no organomegaly. Soft        non-tender, no guarding, no rebound                tenderness   Extremities:   Moves all extremities well, no edema, no cyanosis,    Skin:   No  bruising or rash.   Neurologic:   Grossly non focal and moves all extrimities equally.        Pertinent Lab Results:     Results from last 7 days   Lab Units 04/01/19  0553 03/30/19  0555 03/29/19  0521 03/28/19  1106   SODIUM mmol/L 134* 136* 132* 127*   POTASSIUM mmol/L 4.1 3.8 3.2* 3.5   CHLORIDE mmol/L 107 105 98 87*   CO2 mmol/L 24.0* 24.0* 26.0 30.0   BUN mg/dL 6* 8 8 9   CREATININE mg/dL 0.60 0.50* 0.60 0.80   CALCIUM mg/dL 9.0 8.6 8.6 9.1   BILIRUBIN mg/dL 0.3  --   --  0.2   ALK PHOS U/L 133*  --   --  127*   ALT (SGPT) U/L 34  --   --  28   AST (SGOT) U/L 30  --    --  25   GLUCOSE mg/dL 80 82 99* 95     Results from last 7 days   Lab Units 04/01/19  0553 03/29/19  0521 03/28/19  1106   WBC 10*3/mm3 5.65 4.39 5.71   HEMOGLOBIN g/dL 11.7* 11.3* 11.9*   HEMATOCRIT % 33.7* 32.0* 34.0   PLATELETS 10*3/mm3 138* 135* 142         Blood Culture   Date Value Ref Range Status   03/28/2019 No growth at 3 days  Preliminary   03/28/2019 No growth at 3 days  Preliminary     Urine Culture   Date Value Ref Range Status   03/28/2019 >100,000 CFU/mL Klebsiella aerogenes (A)  Final       Pertinent Radiology Results:    Imaging Results (most recent)     Procedure Component Value Units Date/Time    CT Head Without Contrast [[id number]] Collected:  03/28/19 1304     Updated:  03/28/19 1307    Narrative:       PROCEDURE: CT HEAD WO CONTRAST-     HISTORY: weak/worsening     TECHNIQUE: Axial images were obtained from the skull vertex through the  base without contrast.      COMPARISON: 11/29/2018.     FINDINGS: There is generalized cerebral volume loss. There are patchy  hypodensities in the periventricular white matter consistent with  chronic small vessel ischemic change. There is no CT evidence of acute  hemorrhage. There is no mass, mass effect, or midline shift. There is no  hydrocephalus. Bone windows reveal no osseous abnormalities. The  visualized paranasal sinuses are clear.       Impression:       Findings consistent with chronic small vessel ischemic  change with no acute intracranial abnormality.        This study was performed with techniques to keep radiation doses as low  as reasonably achievable (ALARA). Individualized dose reduction  techniques using automated exposure control or adjustment of mA and/or  kV according to the patient size were employed.      This report was finalized on 3/28/2019 1:05 PM by Kaylen Stephens M.D..    XR Chest 1 View [[id number]] Collected:  03/28/19 1038     Updated:  03/28/19 1042    Narrative:       PROCEDURE: XR CHEST 1 VW-     HISTORY: cough      COMPARISON: 02/24/2019.     FINDINGS: The heart is normal in size. The mediastinum is unremarkable.  The lungs are clear. There is no pneumothorax.  There are no acute  osseous abnormalities.       Impression:       No acute cardiopulmonary process.     Continued followup is recommended.     This report was finalized on 3/28/2019 10:40 AM by Kaylen Stephens M.D..                  Discharge Disposition:      Home-Health Care Sv    Discharge Medication:         Discharge Medications      New Medications      Instructions Start Date   ciprofloxacin 500 MG tablet  Commonly known as:  CIPRO   500 mg, Oral, 2 Times Daily         Changes to Medications      Instructions Start Date   acetaminophen 325 MG tablet  Commonly known as:  TYLENOL  What changed:  when to take this   650 mg, Oral, Every 4 Hours PRN      losartan 100 MG tablet  Commonly known as:  COZAAR  What changed:    · medication strength  · how much to take   100 mg, Oral, Daily         Continue These Medications      Instructions Start Date   CLARITIN 10 MG tablet  Generic drug:  loratadine   10 mg, Oral, 2 Times Daily      hydrochlorothiazide 25 MG tablet  Commonly known as:  HYDRODIURIL   25 mg, Oral, Daily      levETIRAcetam 250 MG tablet  Commonly known as:  KEPPRA   250 mg, Oral, 2 Times Daily      levothyroxine 50 MCG tablet  Commonly known as:  SYNTHROID, LEVOTHROID   50 mcg, Oral, Daily      metoprolol succinate XL 25 MG 24 hr tablet  Commonly known as:  TOPROL-XL   25 mg, Oral, Daily      omeprazole 20 MG capsule  Commonly known as:  priLOSEC   20 mg, Oral, Daily      PREMARIN 0.625 MG tablet  Generic drug:  estrogens (conjugated)   0.625 mg, Oral, Daily      QUEtiapine 25 MG tablet  Commonly known as:  SEROquel   25 mg, Oral, Nightly PRN      tolterodine LA 4 MG 24 hr capsule  Commonly known as:  DETROL LA   4 mg, Oral, Daily         Stop These Medications    nitrofurantoin 50 MG capsule  Commonly known as:  MACRODANTIN            Discharge  Diet:             Follow-up Appointments:      Future Appointments   Date Time Provider Department Center   4/12/2019  1:15 PM Cristóbal Corbin PA-C [facility] None   7/9/2019  3:30 PM Yasmine Barone PA-C [facility] None   8/6/2019  4:00 PM Cristóbal Corbin PA-C [facility] None   10/1/2019  3:30 PM Yasmine Wagoner, APRN [facility]         Test Results Pending at Discharge:       Order Current Status    Blood Culture With HENRIETTA - Blood, Arm, Left Preliminary result    Blood Culture With HENRIETTA - Blood, Hand, Left Preliminary result             Chuy Galan MD  04/01/19  8:59 AM    Time:  Discharge 35 min    Please note that portions of this note may have been completed with a voice recognition program. Efforts were made to edit the dictations, but occasionally words are mistranscribed.

## 2019-04-02 ENCOUNTER — READMISSION MANAGEMENT (OUTPATIENT)
Dept: CALL CENTER | Facility: HOSPITAL | Age: 84
End: 2019-04-02

## 2019-04-02 LAB
BACTERIA SPEC AEROBE CULT: NORMAL
BACTERIA SPEC AEROBE CULT: NORMAL

## 2019-04-02 NOTE — OUTREACH NOTE
Prep Survey      Responses   Facility patient discharged from?  [facility]   Is patient eligible?  Yes   Discharge diagnosis  Acute lower UTI (urinary tract infection   Does the patient have one of the following disease processes/diagnoses(primary or secondary)?  Other   Does the patient have Home health ordered?  Yes   What is the Home health agency?   [facility]   Is there a DME ordered?  No   What DME was ordered?  lift device?   Prep survey completed?  Yes          Mikaela Iqbal RN

## 2019-04-03 ENCOUNTER — READMISSION MANAGEMENT (OUTPATIENT)
Dept: CALL CENTER | Facility: HOSPITAL | Age: 84
End: 2019-04-03

## 2019-04-03 NOTE — OUTREACH NOTE
Medical Week 1 Survey      Responses   Facility patient discharged from?  [facility]   Does the patient have one of the following disease processes/diagnoses(primary or secondary)?  Other   Is there a successful TCM telephone encounter documented?  No   Week 1 attempt successful?  Yes   Call start time  1549   Call end time  1600   Is patient permission given to speak with other caregiver?  Yes   List who call center can speak with  DaughterMartinez ( Patient has Altzhemiers)   Person spoke with today (if not patient) and relationship  Daughter - Martinez Ambriz    Meds reviewed with patient/caregiver?  Yes   Is the patient having any side effects they believe may be caused by any medication additions or changes?  No   Does the patient have all medications ordered at discharge?  Yes   Is the patient taking all medications as directed (includes completed medication regime)?  Yes   Medication comments  Daughter until today did not know to stop the Macrodantin.     Does the patient have a primary care provider?   Yes   Does the patient have an appointment with their PCP within 7 days of discharge?  Yes   Has the patient kept scheduled appointments due by today?  N/A   Has home health visited the patient within 72 hours of discharge?  Yes   Has all DME been delivered?  No   DME comments  Home Health called today and are coming tomorrow.     Did the patient receive a copy of their discharge instructions?  Yes   Nursing interventions  Reviewed instructions with patient   What is the patient's perception of their health status since discharge?  Improving   Is the patient/caregiver able to teach back signs and symptoms related to disease process for when to call PCP?  Yes   Is the patient/caregiver able to teach back signs and symptoms related to disease process for when to call 911?  Yes   Is the patient/caregiver able to teach back the hierarchy of who to call/visit for symptoms/problems? PCP, Specialist, Home health  nurse, Urgent Care, ED, 911  Yes   Week 1 call completed?  Yes          Jolene Herring RN

## 2019-04-10 ENCOUNTER — READMISSION MANAGEMENT (OUTPATIENT)
Dept: CALL CENTER | Facility: HOSPITAL | Age: 84
End: 2019-04-10

## 2019-04-10 NOTE — OUTREACH NOTE
Medical Week 2 Survey      Responses   Facility patient discharged from?  [facility]   Does the patient have one of the following disease processes/diagnoses(primary or secondary)?  Other   Week 2 attempt successful?  No   Unsuccessful attempts  Attempt 1          Viridiana Burk RN

## 2019-04-12 ENCOUNTER — READMISSION MANAGEMENT (OUTPATIENT)
Dept: CALL CENTER | Facility: HOSPITAL | Age: 84
End: 2019-04-12

## 2019-04-12 NOTE — OUTREACH NOTE
Medical Week 2 Survey      Responses   Facility patient discharged from?  [facility]   Does the patient have one of the following disease processes/diagnoses(primary or secondary)?  Other   Week 2 attempt successful?  Yes   Call start time  1423   General alerts for this patient  Advanced Alzheimer's dementia   Discharge diagnosis  Acute lower UTI (urinary tract infection   Call end time  1426   Is patient permission given to speak with other caregiver?  Yes   List who call center can speak with  DaughterMartinez ( Patient has Alzheimers)   Person spoke with today (if not patient) and relationship  Daughter - Martinez Ambriz    Meds reviewed with patient/caregiver?  Yes   Is the patient having any side effects they believe may be caused by any medication additions or changes?  No   Does the patient have all medications ordered at discharge?  Yes   Is the patient taking all medications as directed (includes completed medication regime)?  Yes   Does the patient have a primary care provider?   Yes   Does the patient have an appointment with their PCP within 7 days of discharge?  Yes   Comments regarding PCP  Dr Chuy Galan   Has the patient kept scheduled appointments due by today?  Yes   What is the Home health agency?   [facility]   Has home health visited the patient within 72 hours of discharge?  Yes   Psychosocial issues?  Yes   Psychosocial comments  Alzheimers Dementia   Did the patient receive a copy of their discharge instructions?  Yes   Nursing interventions  Reviewed instructions with patient [Reviewed with daughter]   What is the patient's perception of their health status since discharge?  Improving   Is the patient/caregiver able to teach back signs and symptoms related to disease process for when to call PCP?  Yes   Is the patient/caregiver able to teach back signs and symptoms related to disease process for when to call 911?  Yes   Is the patient/caregiver able to teach back the hierarchy of  who to call/visit for symptoms/problems? PCP, Specialist, Home health nurse, Urgent Care, ED, 911  Yes   Week 2 Call Completed?  Yes          Tamera Noble RN

## 2019-04-22 ENCOUNTER — READMISSION MANAGEMENT (OUTPATIENT)
Dept: CALL CENTER | Facility: HOSPITAL | Age: 84
End: 2019-04-22

## 2019-04-22 NOTE — OUTREACH NOTE
Medical Week 4 Survey      Responses   Facility patient discharged from?  [facility]   Does the patient have one of the following disease processes/diagnoses(primary or secondary)?  Other   Week 4 attempt successful?  Yes   Call start time  1208   Call end time  1211   Is patient permission given to speak with other caregiver?  Yes   List who call center can speak with  Martinez christy    Person spoke with today (if not patient) and relationship  Daughter, Martinez Ambriz.    Meds reviewed with patient/caregiver?  Yes   Is the patient having any side effects they believe may be caused by any medication additions or changes?  No   Is the patient taking all medications as directed (includes completed medication regime)?  Yes   Has the patient kept scheduled appointments due by today?  Yes   Is the patient still receiving Home Health Services?  Yes   Home health comments  [facility]    Psychosocial issues?  Yes   What is the patient's perception of their health status since discharge?  Improving   Is the patient/caregiver able to teach back signs and symptoms related to disease process for when to call PCP?  Yes   Is the patient/caregiver able to teach back signs and symptoms related to disease process for when to call 911?  Yes   Is the patient/caregiver able to teach back the hierarchy of who to call/visit for symptoms/problems? PCP, Specialist, Home health nurse, Urgent Care, ED, 911  Yes   Week 4 Call Completed?  Yes   Would the patient like one additional call?  No          Jolene Herring RN

## 2019-06-05 ENCOUNTER — HOSPITAL ENCOUNTER (EMERGENCY)
Facility: HOSPITAL | Age: 84
Discharge: HOME | End: 2019-06-05
Attending: EMERGENCY MEDICINE | Admitting: EMERGENCY MEDICINE

## 2019-06-05 VITALS
BODY MASS INDEX: 23.63 KG/M2 | TEMPERATURE: 98.7 F | SYSTOLIC BLOOD PRESSURE: 105 MMHG | HEIGHT: 62 IN | DIASTOLIC BLOOD PRESSURE: 60 MMHG | WEIGHT: 128.4 LBS | RESPIRATION RATE: 15 BRPM | HEART RATE: 76 BPM | OXYGEN SATURATION: 97 %

## 2019-06-05 DIAGNOSIS — N30.00 ACUTE CYSTITIS WITHOUT HEMATURIA: Primary | ICD-10-CM

## 2019-06-05 LAB
ALBUMIN SERPL-MCNC: 3.4 G/DL (ref 3.5–5)
ALBUMIN/GLOB SERPL: 1.1 G/DL (ref 1–2)
ALP SERPL-CCNC: 136 U/L (ref 38–126)
ALT SERPL W P-5'-P-CCNC: 29 U/L (ref 13–69)
ANION GAP SERPL CALCULATED.3IONS-SCNC: 14.1 MMOL/L (ref 10–20)
AST SERPL-CCNC: 23 U/L (ref 15–46)
BACTERIA UR QL AUTO: ABNORMAL /HPF
BASOPHILS # BLD AUTO: 0.04 10*3/MM3 (ref 0–0.2)
BASOPHILS NFR BLD AUTO: 0.5 % (ref 0–1.5)
BILIRUB SERPL-MCNC: 0.3 MG/DL (ref 0.2–1.3)
BILIRUB UR QL STRIP: NEGATIVE
BUN BLD-MCNC: 11 MG/DL (ref 7–20)
BUN/CREAT SERPL: 13.8 (ref 7.1–23.5)
CALCIUM SPEC-SCNC: 8.8 MG/DL (ref 8.4–10.2)
CHLORIDE SERPL-SCNC: 92 MMOL/L (ref 98–107)
CLARITY UR: ABNORMAL
CO2 SERPL-SCNC: 29 MMOL/L (ref 26–30)
COLOR UR: YELLOW
CREAT BLD-MCNC: 0.8 MG/DL (ref 0.6–1.3)
DEPRECATED RDW RBC AUTO: 43.3 FL (ref 37–54)
EOSINOPHIL # BLD AUTO: 0.35 10*3/MM3 (ref 0–0.4)
EOSINOPHIL NFR BLD AUTO: 4.6 % (ref 0.3–6.2)
ERYTHROCYTE [DISTWIDTH] IN BLOOD BY AUTOMATED COUNT: 13.2 % (ref 12.3–15.4)
GFR SERPL CREATININE-BSD FRML MDRD: 67 ML/MIN/1.73
GLOBULIN UR ELPH-MCNC: 3.1 GM/DL
GLUCOSE BLD-MCNC: 91 MG/DL (ref 74–98)
GLUCOSE UR STRIP-MCNC: NEGATIVE MG/DL
HCT VFR BLD AUTO: 33.5 % (ref 34–46.6)
HGB BLD-MCNC: 11.3 G/DL (ref 12–15.9)
HGB UR QL STRIP.AUTO: ABNORMAL
HYALINE CASTS UR QL AUTO: ABNORMAL /LPF
IMM GRANULOCYTES # BLD AUTO: 0.04 10*3/MM3 (ref 0–0.05)
IMM GRANULOCYTES NFR BLD AUTO: 0.5 % (ref 0–0.5)
KETONES UR QL STRIP: NEGATIVE
LEUKOCYTE ESTERASE UR QL STRIP.AUTO: ABNORMAL
LYMPHOCYTES # BLD AUTO: 2.62 10*3/MM3 (ref 0.7–3.1)
LYMPHOCYTES NFR BLD AUTO: 34.2 % (ref 19.6–45.3)
MCH RBC QN AUTO: 29.8 PG (ref 26.6–33)
MCHC RBC AUTO-ENTMCNC: 33.7 G/DL (ref 31.5–35.7)
MCV RBC AUTO: 88.4 FL (ref 79–97)
MONOCYTES # BLD AUTO: 0.68 10*3/MM3 (ref 0.1–0.9)
MONOCYTES NFR BLD AUTO: 8.9 % (ref 5–12)
NEUTROPHILS # BLD AUTO: 3.92 10*3/MM3 (ref 1.7–7)
NEUTROPHILS NFR BLD AUTO: 51.3 % (ref 42.7–76)
NITRITE UR QL STRIP: NEGATIVE
NRBC BLD AUTO-RTO: 0 /100 WBC (ref 0–0.2)
PH UR STRIP.AUTO: 7.5 [PH] (ref 5–8)
PLATELET # BLD AUTO: 207 10*3/MM3 (ref 140–450)
PMV BLD AUTO: 8.1 FL (ref 6–12)
POTASSIUM BLD-SCNC: 4.1 MMOL/L (ref 3.5–5.1)
PROT SERPL-MCNC: 6.5 G/DL (ref 6.3–8.2)
PROT UR QL STRIP: ABNORMAL
RBC # BLD AUTO: 3.79 10*6/MM3 (ref 3.77–5.28)
RBC # UR: ABNORMAL /HPF
REF LAB TEST METHOD: ABNORMAL
SODIUM BLD-SCNC: 131 MMOL/L (ref 137–145)
SP GR UR STRIP: 1.01 (ref 1–1.03)
SQUAMOUS #/AREA URNS HPF: ABNORMAL /HPF
UROBILINOGEN UR QL STRIP: ABNORMAL
WBC NRBC COR # BLD: 7.65 10*3/MM3 (ref 3.4–10.8)
WBC UR QL AUTO: ABNORMAL /HPF

## 2019-06-05 PROCEDURE — 80053 COMPREHEN METABOLIC PANEL: CPT | Performed by: PHYSICIAN ASSISTANT

## 2019-06-05 PROCEDURE — 85025 COMPLETE CBC W/AUTO DIFF WBC: CPT | Performed by: PHYSICIAN ASSISTANT

## 2019-06-05 PROCEDURE — P9612 CATHETERIZE FOR URINE SPEC: HCPCS

## 2019-06-05 PROCEDURE — 81001 URINALYSIS AUTO W/SCOPE: CPT | Performed by: PHYSICIAN ASSISTANT

## 2019-06-05 PROCEDURE — 99283 EMERGENCY DEPT VISIT LOW MDM: CPT

## 2019-06-05 RX ORDER — FLUCONAZOLE 150 MG/1
150 TABLET ORAL ONCE
Qty: 1 TABLET | Refills: 0 | Status: SHIPPED | OUTPATIENT
Start: 2019-06-05 | End: 2019-06-05

## 2019-06-05 RX ORDER — CEPHALEXIN 250 MG/1
500 CAPSULE ORAL ONCE
Status: COMPLETED | OUTPATIENT
Start: 2019-06-05 | End: 2019-06-05

## 2019-06-05 RX ORDER — NITROFURANTOIN MACROCRYSTALS 100 MG/1
100 CAPSULE ORAL DAILY
COMMUNITY
End: 2019-08-06

## 2019-06-05 RX ORDER — SODIUM CHLORIDE 0.9 % (FLUSH) 0.9 %
10 SYRINGE (ML) INJECTION AS NEEDED
Status: DISCONTINUED | OUTPATIENT
Start: 2019-06-05 | End: 2019-06-05 | Stop reason: HOSPADM

## 2019-06-05 RX ORDER — CEPHALEXIN 500 MG/1
500 CAPSULE ORAL 3 TIMES DAILY
Qty: 15 CAPSULE | Refills: 0 | Status: SHIPPED | OUTPATIENT
Start: 2019-06-05 | End: 2019-06-10

## 2019-06-05 RX ADMIN — CEPHALEXIN 500 MG: 250 CAPSULE ORAL at 19:04

## 2019-06-05 NOTE — ED PROVIDER NOTES
Subjective   She states that for the past 2 to 3 days the patient's been complaining of dysuria.  Family also states the patient has seen mildly confused although it this time she is at her neuro baseline per accompanying family member.  The patient has a history of Alzheimer's and dementia and is at baseline not oriented to person place or time.  At this time she states she has some pain but will not tell me where.  Her abdomen is soft and nontender to exam.  Family states the patient gets frequent urinary tract infections.            Review of Systems   Genitourinary: Positive for dysuria.   All other systems reviewed and are negative.      Past Medical History:   Diagnosis Date   • Alzheimer disease    • Arthritis    • Closed fracture of neck of right femur, Acute 2/12/2018   • Diverticulosis    • Hypertension    • Pathologic fracture of clavicle, left, initial encounter    • Skin cancer     lymphoma   • Thyroid disease        Allergies   Allergen Reactions   • Sulfa Antibiotics Hives       Past Surgical History:   Procedure Laterality Date   • CHOLECYSTECTOMY     • HIP HEMIARTHROPLASTY Right 2/12/2018    Procedure: HIP HEMIARTHROPLASTY;  Surgeon: Rohith Andersen MD;  Location: [facility];  Service:    • HYSTERECTOMY     • OVARIAN CYST REMOVAL         Family History   Problem Relation Age of Onset   • Heart disease Other    • Hypertension Other    • Cancer Other        Social History     Socioeconomic History   • Marital status:      Spouse name: Not on file   • Number of children: Not on file   • Years of education: Not on file   • Highest education level: Not on file   Tobacco Use   • Smoking status: Never Smoker   • Smokeless tobacco: Never Used   Substance and Sexual Activity   • Alcohol use: No   • Drug use: No   • Sexual activity: Defer           Objective   Physical Exam   Constitutional: She appears well-developed and well-nourished. No distress.   HENT:   Head: Normocephalic and atraumatic.    Neck: Normal range of motion.   Cardiovascular: Normal rate and regular rhythm.   Pulmonary/Chest: Effort normal.   Abdominal: Soft. She exhibits no distension. There is no tenderness. There is no rebound and no guarding.   Genitourinary: Vagina normal. Pelvic exam was performed with patient supine. There is no rash, lesion or injury on the right labia. There is no rash, lesion or injury on the left labia. No erythema or bleeding in the vagina. No vaginal discharge found.   Genitourinary Comments: External  exam performed prior to in and out cath chaperoned by Ila EAST.   Musculoskeletal: Normal range of motion.   Neurological: She is alert. She has normal strength. GCS eye subscore is 4. GCS verbal subscore is 5. GCS motor subscore is 6.   Skin: Skin is warm. She is not diaphoretic.   Psychiatric: She has a normal mood and affect. Her behavior is normal.       Procedures           ED Course    6:57 PM  Patient looks very well overall.  Vital signs within normal limits.  Patient in no acute distress.  Nontoxic in appearance.  Discussed with family.  And also discussed with Dr. Goode.  Will treat with antibiotics orally and strict return to care precautions.              MDM      Final diagnoses:   Acute cystitis without hematuria            Chaparro Prince PA-C  06/05/19 0708

## 2019-06-13 ENCOUNTER — HOSPITAL ENCOUNTER (EMERGENCY)
Facility: HOSPITAL | Age: 84
Discharge: HOME | End: 2019-06-13
Attending: EMERGENCY MEDICINE | Admitting: EMERGENCY MEDICINE

## 2019-06-13 VITALS
BODY MASS INDEX: 23.63 KG/M2 | RESPIRATION RATE: 18 BRPM | HEIGHT: 62 IN | DIASTOLIC BLOOD PRESSURE: 69 MMHG | WEIGHT: 128.4 LBS | HEART RATE: 70 BPM | OXYGEN SATURATION: 96 % | SYSTOLIC BLOOD PRESSURE: 114 MMHG | TEMPERATURE: 98.3 F

## 2019-06-13 DIAGNOSIS — N30.00 ACUTE CYSTITIS WITHOUT HEMATURIA: Primary | ICD-10-CM

## 2019-06-13 LAB
ALBUMIN SERPL-MCNC: 3.6 G/DL (ref 3.5–5)
ALBUMIN/GLOB SERPL: 1.2 G/DL (ref 1–2)
ALP SERPL-CCNC: 128 U/L (ref 38–126)
ALT SERPL W P-5'-P-CCNC: 27 U/L (ref 13–69)
ANION GAP SERPL CALCULATED.3IONS-SCNC: 12.1 MMOL/L (ref 10–20)
AST SERPL-CCNC: 24 U/L (ref 15–46)
BACTERIA UR QL AUTO: ABNORMAL /HPF
BASOPHILS # BLD AUTO: 0.04 10*3/MM3 (ref 0–0.2)
BASOPHILS NFR BLD AUTO: 0.5 % (ref 0–1.5)
BILIRUB SERPL-MCNC: 0.3 MG/DL (ref 0.2–1.3)
BILIRUB UR QL STRIP: NEGATIVE
BUN BLD-MCNC: 14 MG/DL (ref 7–20)
BUN/CREAT SERPL: 17.5 (ref 7.1–23.5)
CALCIUM SPEC-SCNC: 8.9 MG/DL (ref 8.4–10.2)
CHLORIDE SERPL-SCNC: 95 MMOL/L (ref 98–107)
CLARITY UR: ABNORMAL
CO2 SERPL-SCNC: 29 MMOL/L (ref 26–30)
COLOR UR: YELLOW
CREAT BLD-MCNC: 0.8 MG/DL (ref 0.6–1.3)
DEPRECATED RDW RBC AUTO: 41.2 FL (ref 37–54)
EOSINOPHIL # BLD AUTO: 0.22 10*3/MM3 (ref 0–0.4)
EOSINOPHIL NFR BLD AUTO: 2.6 % (ref 0.3–6.2)
ERYTHROCYTE [DISTWIDTH] IN BLOOD BY AUTOMATED COUNT: 13 % (ref 12.3–15.4)
GFR SERPL CREATININE-BSD FRML MDRD: 67 ML/MIN/1.73
GLOBULIN UR ELPH-MCNC: 3.1 GM/DL
GLUCOSE BLD-MCNC: 97 MG/DL (ref 74–98)
GLUCOSE UR STRIP-MCNC: NEGATIVE MG/DL
HCT VFR BLD AUTO: 34.4 % (ref 34–46.6)
HGB BLD-MCNC: 11.9 G/DL (ref 12–15.9)
HGB UR QL STRIP.AUTO: ABNORMAL
HYALINE CASTS UR QL AUTO: ABNORMAL /LPF
IMM GRANULOCYTES # BLD AUTO: 0.08 10*3/MM3 (ref 0–0.05)
IMM GRANULOCYTES NFR BLD AUTO: 0.9 % (ref 0–0.5)
KETONES UR QL STRIP: NEGATIVE
LEUKOCYTE ESTERASE UR QL STRIP.AUTO: ABNORMAL
LYMPHOCYTES # BLD AUTO: 1.84 10*3/MM3 (ref 0.7–3.1)
LYMPHOCYTES NFR BLD AUTO: 21.4 % (ref 19.6–45.3)
MCH RBC QN AUTO: 30 PG (ref 26.6–33)
MCHC RBC AUTO-ENTMCNC: 34.6 G/DL (ref 31.5–35.7)
MCV RBC AUTO: 86.6 FL (ref 79–97)
MONOCYTES # BLD AUTO: 0.52 10*3/MM3 (ref 0.1–0.9)
MONOCYTES NFR BLD AUTO: 6.1 % (ref 5–12)
NEUTROPHILS # BLD AUTO: 5.88 10*3/MM3 (ref 1.7–7)
NEUTROPHILS NFR BLD AUTO: 68.5 % (ref 42.7–76)
NITRITE UR QL STRIP: POSITIVE
NRBC BLD AUTO-RTO: 0 /100 WBC (ref 0–0.2)
PH UR STRIP.AUTO: 8 [PH] (ref 5–8)
PLATELET # BLD AUTO: 236 10*3/MM3 (ref 140–450)
PMV BLD AUTO: 8.4 FL (ref 6–12)
POTASSIUM BLD-SCNC: 4.1 MMOL/L (ref 3.5–5.1)
PROT SERPL-MCNC: 6.7 G/DL (ref 6.3–8.2)
PROT UR QL STRIP: ABNORMAL
RBC # BLD AUTO: 3.97 10*6/MM3 (ref 3.77–5.28)
RBC # UR: ABNORMAL /HPF
REF LAB TEST METHOD: ABNORMAL
SODIUM BLD-SCNC: 132 MMOL/L (ref 137–145)
SP GR UR STRIP: 1.01 (ref 1–1.03)
SQUAMOUS #/AREA URNS HPF: ABNORMAL /HPF
UROBILINOGEN UR QL STRIP: ABNORMAL
WBC CLUMPS # UR AUTO: ABNORMAL /HPF
WBC NRBC COR # BLD: 8.58 10*3/MM3 (ref 3.4–10.8)
WBC UR QL AUTO: ABNORMAL /HPF

## 2019-06-13 PROCEDURE — 87186 SC STD MICRODIL/AGAR DIL: CPT | Performed by: PHYSICIAN ASSISTANT

## 2019-06-13 PROCEDURE — 25010000002 LEVOFLOXACIN PER 250 MG: Performed by: PHYSICIAN ASSISTANT

## 2019-06-13 PROCEDURE — 96366 THER/PROPH/DIAG IV INF ADDON: CPT

## 2019-06-13 PROCEDURE — 87086 URINE CULTURE/COLONY COUNT: CPT | Performed by: PHYSICIAN ASSISTANT

## 2019-06-13 PROCEDURE — 85025 COMPLETE CBC W/AUTO DIFF WBC: CPT | Performed by: PHYSICIAN ASSISTANT

## 2019-06-13 PROCEDURE — 96365 THER/PROPH/DIAG IV INF INIT: CPT

## 2019-06-13 PROCEDURE — 80053 COMPREHEN METABOLIC PANEL: CPT | Performed by: PHYSICIAN ASSISTANT

## 2019-06-13 PROCEDURE — P9612 CATHETERIZE FOR URINE SPEC: HCPCS

## 2019-06-13 PROCEDURE — 81001 URINALYSIS AUTO W/SCOPE: CPT | Performed by: PHYSICIAN ASSISTANT

## 2019-06-13 PROCEDURE — 99283 EMERGENCY DEPT VISIT LOW MDM: CPT

## 2019-06-13 PROCEDURE — 87077 CULTURE AEROBIC IDENTIFY: CPT | Performed by: PHYSICIAN ASSISTANT

## 2019-06-13 RX ORDER — SODIUM CHLORIDE 0.9 % (FLUSH) 0.9 %
10 SYRINGE (ML) INJECTION AS NEEDED
Status: DISCONTINUED | OUTPATIENT
Start: 2019-06-13 | End: 2019-06-13 | Stop reason: HOSPADM

## 2019-06-13 RX ORDER — LEVOFLOXACIN 5 MG/ML
750 INJECTION, SOLUTION INTRAVENOUS ONCE
Status: COMPLETED | OUTPATIENT
Start: 2019-06-13 | End: 2019-06-13

## 2019-06-13 RX ORDER — LEVOFLOXACIN 500 MG/1
500 TABLET, FILM COATED ORAL DAILY
Qty: 7 TABLET | Refills: 0 | Status: SHIPPED | OUTPATIENT
Start: 2019-06-13 | End: 2019-06-20

## 2019-06-13 RX ADMIN — LEVOFLOXACIN 750 MG: 5 INJECTION, SOLUTION INTRAVENOUS at 13:05

## 2019-06-15 LAB — BACTERIA SPEC AEROBE CULT: ABNORMAL

## 2019-06-22 ENCOUNTER — APPOINTMENT (OUTPATIENT)
Dept: CT IMAGING | Facility: HOSPITAL | Age: 84
End: 2019-06-22

## 2019-06-22 ENCOUNTER — HOSPITAL ENCOUNTER (EMERGENCY)
Facility: HOSPITAL | Age: 84
Discharge: HOME | End: 2019-06-22
Attending: EMERGENCY MEDICINE | Admitting: EMERGENCY MEDICINE

## 2019-06-22 VITALS
BODY MASS INDEX: 21.62 KG/M2 | SYSTOLIC BLOOD PRESSURE: 113 MMHG | HEIGHT: 63 IN | OXYGEN SATURATION: 97 % | HEART RATE: 68 BPM | DIASTOLIC BLOOD PRESSURE: 54 MMHG | WEIGHT: 122 LBS | TEMPERATURE: 97.5 F | RESPIRATION RATE: 16 BRPM

## 2019-06-22 DIAGNOSIS — E86.0 DEHYDRATION, MILD: ICD-10-CM

## 2019-06-22 DIAGNOSIS — K59.00 CONSTIPATION, UNSPECIFIED CONSTIPATION TYPE: ICD-10-CM

## 2019-06-22 DIAGNOSIS — R10.30 LOWER ABDOMINAL PAIN: Primary | ICD-10-CM

## 2019-06-22 LAB
ALBUMIN SERPL-MCNC: 3.7 G/DL (ref 3.5–5)
ALBUMIN/GLOB SERPL: 1.1 G/DL (ref 1–2)
ALP SERPL-CCNC: 91 U/L (ref 38–126)
ALT SERPL W P-5'-P-CCNC: 12 U/L (ref 13–69)
ANION GAP SERPL CALCULATED.3IONS-SCNC: 16.2 MMOL/L (ref 10–20)
AST SERPL-CCNC: 29 U/L (ref 15–46)
BASOPHILS # BLD AUTO: 0.04 10*3/MM3 (ref 0–0.2)
BASOPHILS NFR BLD AUTO: 0.6 % (ref 0–1.5)
BILIRUB SERPL-MCNC: 0.4 MG/DL (ref 0.2–1.3)
BILIRUB UR QL STRIP: NEGATIVE
BUN BLD-MCNC: 12 MG/DL (ref 7–20)
BUN/CREAT SERPL: 20 (ref 7.1–23.5)
CALCIUM SPEC-SCNC: 9 MG/DL (ref 8.4–10.2)
CHLORIDE SERPL-SCNC: 90 MMOL/L (ref 98–107)
CLARITY UR: CLEAR
CO2 SERPL-SCNC: 27 MMOL/L (ref 26–30)
COLOR UR: YELLOW
CREAT BLD-MCNC: 0.6 MG/DL (ref 0.6–1.3)
DEPRECATED RDW RBC AUTO: 39.3 FL (ref 37–54)
EOSINOPHIL # BLD AUTO: 0.3 10*3/MM3 (ref 0–0.4)
EOSINOPHIL NFR BLD AUTO: 4.2 % (ref 0.3–6.2)
ERYTHROCYTE [DISTWIDTH] IN BLOOD BY AUTOMATED COUNT: 12.8 % (ref 12.3–15.4)
GFR SERPL CREATININE-BSD FRML MDRD: 93 ML/MIN/1.73
GLOBULIN UR ELPH-MCNC: 3.4 GM/DL
GLUCOSE BLD-MCNC: 102 MG/DL (ref 74–98)
GLUCOSE UR STRIP-MCNC: NEGATIVE MG/DL
HCT VFR BLD AUTO: 34.7 % (ref 34–46.6)
HGB BLD-MCNC: 12.1 G/DL (ref 12–15.9)
HGB UR QL STRIP.AUTO: NEGATIVE
IMM GRANULOCYTES # BLD AUTO: 0.05 10*3/MM3 (ref 0–0.05)
IMM GRANULOCYTES NFR BLD AUTO: 0.7 % (ref 0–0.5)
KETONES UR QL STRIP: NEGATIVE
LEUKOCYTE ESTERASE UR QL STRIP.AUTO: NEGATIVE
LIPASE SERPL-CCNC: 46 U/L (ref 23–300)
LYMPHOCYTES # BLD AUTO: 1.97 10*3/MM3 (ref 0.7–3.1)
LYMPHOCYTES NFR BLD AUTO: 27.4 % (ref 19.6–45.3)
MCH RBC QN AUTO: 29.7 PG (ref 26.6–33)
MCHC RBC AUTO-ENTMCNC: 34.9 G/DL (ref 31.5–35.7)
MCV RBC AUTO: 85 FL (ref 79–97)
MONOCYTES # BLD AUTO: 0.45 10*3/MM3 (ref 0.1–0.9)
MONOCYTES NFR BLD AUTO: 6.3 % (ref 5–12)
NEUTROPHILS # BLD AUTO: 4.38 10*3/MM3 (ref 1.7–7)
NEUTROPHILS NFR BLD AUTO: 60.8 % (ref 42.7–76)
NITRITE UR QL STRIP: NEGATIVE
NRBC BLD AUTO-RTO: 0 /100 WBC (ref 0–0.2)
PH UR STRIP.AUTO: 7 [PH] (ref 5–8)
PLATELET # BLD AUTO: 215 10*3/MM3 (ref 140–450)
PMV BLD AUTO: 8.6 FL (ref 6–12)
POTASSIUM BLD-SCNC: 4.2 MMOL/L (ref 3.5–5.1)
PROT SERPL-MCNC: 7.1 G/DL (ref 6.3–8.2)
PROT UR QL STRIP: NEGATIVE
RBC # BLD AUTO: 4.08 10*6/MM3 (ref 3.77–5.28)
SODIUM BLD-SCNC: 129 MMOL/L (ref 137–145)
SP GR UR STRIP: 1.01 (ref 1–1.03)
UROBILINOGEN UR QL STRIP: NORMAL
WBC NRBC COR # BLD: 7.19 10*3/MM3 (ref 3.4–10.8)

## 2019-06-22 PROCEDURE — 85025 COMPLETE CBC W/AUTO DIFF WBC: CPT | Performed by: PHYSICIAN ASSISTANT

## 2019-06-22 PROCEDURE — 74176 CT ABD & PELVIS W/O CONTRAST: CPT

## 2019-06-22 PROCEDURE — 99283 EMERGENCY DEPT VISIT LOW MDM: CPT

## 2019-06-22 PROCEDURE — P9612 CATHETERIZE FOR URINE SPEC: HCPCS

## 2019-06-22 PROCEDURE — 83690 ASSAY OF LIPASE: CPT | Performed by: PHYSICIAN ASSISTANT

## 2019-06-22 PROCEDURE — 80053 COMPREHEN METABOLIC PANEL: CPT | Performed by: PHYSICIAN ASSISTANT

## 2019-06-22 PROCEDURE — 81003 URINALYSIS AUTO W/O SCOPE: CPT | Performed by: PHYSICIAN ASSISTANT

## 2019-06-22 RX ORDER — SODIUM CHLORIDE 0.9 % (FLUSH) 0.9 %
10 SYRINGE (ML) INJECTION AS NEEDED
Status: DISCONTINUED | OUTPATIENT
Start: 2019-06-22 | End: 2019-06-22 | Stop reason: HOSPADM

## 2019-06-22 RX ORDER — POLYETHYLENE GLYCOL 3350 17 G/17G
17 POWDER, FOR SOLUTION ORAL DAILY
Qty: 7 PACKET | Refills: 0 | Status: SHIPPED | OUTPATIENT
Start: 2019-06-22 | End: 2019-06-29

## 2019-06-22 RX ADMIN — SODIUM CHLORIDE 500 ML: 9 INJECTION, SOLUTION INTRAVENOUS at 14:27

## 2019-07-16 ENCOUNTER — APPOINTMENT (OUTPATIENT)
Dept: CT IMAGING | Facility: HOSPITAL | Age: 84
End: 2019-07-16

## 2019-07-16 ENCOUNTER — HOSPITAL ENCOUNTER (EMERGENCY)
Facility: HOSPITAL | Age: 84
Discharge: HOME | End: 2019-07-16
Attending: EMERGENCY MEDICINE | Admitting: EMERGENCY MEDICINE

## 2019-07-16 VITALS
OXYGEN SATURATION: 97 % | WEIGHT: 123 LBS | SYSTOLIC BLOOD PRESSURE: 119 MMHG | DIASTOLIC BLOOD PRESSURE: 62 MMHG | TEMPERATURE: 97.9 F | RESPIRATION RATE: 16 BRPM | BODY MASS INDEX: 22.63 KG/M2 | HEART RATE: 70 BPM | HEIGHT: 62 IN

## 2019-07-16 DIAGNOSIS — K59.00 CONSTIPATION, UNSPECIFIED CONSTIPATION TYPE: Primary | ICD-10-CM

## 2019-07-16 LAB
ALBUMIN SERPL-MCNC: 3.4 G/DL (ref 3.5–5)
ALBUMIN/GLOB SERPL: 1.1 G/DL (ref 1–2)
ALP SERPL-CCNC: 90 U/L (ref 38–126)
ALT SERPL W P-5'-P-CCNC: 21 U/L (ref 13–69)
ANION GAP SERPL CALCULATED.3IONS-SCNC: 12.2 MMOL/L (ref 10–20)
AST SERPL-CCNC: 23 U/L (ref 15–46)
BASOPHILS # BLD AUTO: 0.04 10*3/MM3 (ref 0–0.2)
BASOPHILS NFR BLD AUTO: 0.6 % (ref 0–1.5)
BILIRUB SERPL-MCNC: 0.2 MG/DL (ref 0.2–1.3)
BILIRUB UR QL STRIP: NEGATIVE
BUN BLD-MCNC: 8 MG/DL (ref 7–20)
BUN/CREAT SERPL: 13.3 (ref 7.1–23.5)
CALCIUM SPEC-SCNC: 8.9 MG/DL (ref 8.4–10.2)
CHLORIDE SERPL-SCNC: 94 MMOL/L (ref 98–107)
CLARITY UR: CLEAR
CO2 SERPL-SCNC: 29 MMOL/L (ref 26–30)
COLOR UR: YELLOW
CREAT BLD-MCNC: 0.6 MG/DL (ref 0.6–1.3)
DEPRECATED RDW RBC AUTO: 40.2 FL (ref 37–54)
EOSINOPHIL # BLD AUTO: 0.35 10*3/MM3 (ref 0–0.4)
EOSINOPHIL NFR BLD AUTO: 4.9 % (ref 0.3–6.2)
ERYTHROCYTE [DISTWIDTH] IN BLOOD BY AUTOMATED COUNT: 12.6 % (ref 12.3–15.4)
GFR SERPL CREATININE-BSD FRML MDRD: 93 ML/MIN/1.73
GLOBULIN UR ELPH-MCNC: 3 GM/DL
GLUCOSE BLD-MCNC: 96 MG/DL (ref 74–98)
GLUCOSE UR STRIP-MCNC: NEGATIVE MG/DL
HCT VFR BLD AUTO: 34.3 % (ref 34–46.6)
HGB BLD-MCNC: 11.7 G/DL (ref 12–15.9)
HGB UR QL STRIP.AUTO: NEGATIVE
IMM GRANULOCYTES # BLD AUTO: 0.05 10*3/MM3 (ref 0–0.05)
IMM GRANULOCYTES NFR BLD AUTO: 0.7 % (ref 0–0.5)
KETONES UR QL STRIP: NEGATIVE
LEUKOCYTE ESTERASE UR QL STRIP.AUTO: NEGATIVE
LIPASE SERPL-CCNC: 39 U/L (ref 23–300)
LYMPHOCYTES # BLD AUTO: 2.4 10*3/MM3 (ref 0.7–3.1)
LYMPHOCYTES NFR BLD AUTO: 33.8 % (ref 19.6–45.3)
MCH RBC QN AUTO: 29.7 PG (ref 26.6–33)
MCHC RBC AUTO-ENTMCNC: 34.1 G/DL (ref 31.5–35.7)
MCV RBC AUTO: 87.1 FL (ref 79–97)
MONOCYTES # BLD AUTO: 0.47 10*3/MM3 (ref 0.1–0.9)
MONOCYTES NFR BLD AUTO: 6.6 % (ref 5–12)
NEUTROPHILS # BLD AUTO: 3.8 10*3/MM3 (ref 1.7–7)
NEUTROPHILS NFR BLD AUTO: 53.4 % (ref 42.7–76)
NITRITE UR QL STRIP: NEGATIVE
NRBC BLD AUTO-RTO: 0 /100 WBC (ref 0–0.2)
PH UR STRIP.AUTO: 7.5 [PH] (ref 5–8)
PLATELET # BLD AUTO: 186 10*3/MM3 (ref 140–450)
PMV BLD AUTO: 8.3 FL (ref 6–12)
POTASSIUM BLD-SCNC: 4.2 MMOL/L (ref 3.5–5.1)
PROT SERPL-MCNC: 6.4 G/DL (ref 6.3–8.2)
PROT UR QL STRIP: NEGATIVE
RBC # BLD AUTO: 3.94 10*6/MM3 (ref 3.77–5.28)
SODIUM BLD-SCNC: 131 MMOL/L (ref 137–145)
SP GR UR STRIP: 1.01 (ref 1–1.03)
UROBILINOGEN UR QL STRIP: NORMAL
WBC NRBC COR # BLD: 7.11 10*3/MM3 (ref 3.4–10.8)

## 2019-07-16 PROCEDURE — 80053 COMPREHEN METABOLIC PANEL: CPT | Performed by: EMERGENCY MEDICINE

## 2019-07-16 PROCEDURE — 99283 EMERGENCY DEPT VISIT LOW MDM: CPT

## 2019-07-16 PROCEDURE — P9612 CATHETERIZE FOR URINE SPEC: HCPCS

## 2019-07-16 PROCEDURE — 81003 URINALYSIS AUTO W/O SCOPE: CPT | Performed by: EMERGENCY MEDICINE

## 2019-07-16 PROCEDURE — 25010000002 IOPAMIDOL 61 % SOLUTION: Performed by: EMERGENCY MEDICINE

## 2019-07-16 PROCEDURE — 83690 ASSAY OF LIPASE: CPT | Performed by: EMERGENCY MEDICINE

## 2019-07-16 PROCEDURE — 74177 CT ABD & PELVIS W/CONTRAST: CPT

## 2019-07-16 PROCEDURE — 85025 COMPLETE CBC W/AUTO DIFF WBC: CPT | Performed by: EMERGENCY MEDICINE

## 2019-07-16 RX ADMIN — IOPAMIDOL 100 ML: 612 INJECTION, SOLUTION INTRAVENOUS at 13:42

## 2019-08-06 ENCOUNTER — OFFICE VISIT (OUTPATIENT)
Dept: ORTHOPEDIC SURGERY | Facility: CLINIC | Age: 84
End: 2019-08-06

## 2019-08-06 VITALS — WEIGHT: 123 LBS | RESPIRATION RATE: 18 BRPM | BODY MASS INDEX: 22.63 KG/M2 | HEIGHT: 62 IN

## 2019-08-06 DIAGNOSIS — M17.11 PRIMARY LOCALIZED OSTEOARTHRITIS OF RIGHT KNEE: Primary | ICD-10-CM

## 2019-08-06 PROCEDURE — 20610 DRAIN/INJ JOINT/BURSA W/O US: CPT | Performed by: PHYSICIAN ASSISTANT

## 2019-08-06 RX ORDER — FLUCONAZOLE 150 MG/1
TABLET ORAL
COMMUNITY
Start: 2019-06-06 | End: 2019-01-01

## 2019-08-06 RX ORDER — TRIAMCINOLONE ACETONIDE 1 MG/G
CREAM TOPICAL
COMMUNITY
Start: 2019-08-05 | End: 2020-01-01

## 2019-08-06 RX ORDER — LOSARTAN POTASSIUM 50 MG/1
50 TABLET ORAL DAILY
Refills: 3 | COMMUNITY
Start: 2019-07-10 | End: 2020-01-01 | Stop reason: HOSPADM

## 2019-08-06 RX ORDER — NITROFURANTOIN 25; 75 MG/1; MG/1
100 CAPSULE ORAL DAILY
Refills: 3 | COMMUNITY
Start: 2019-05-21 | End: 2019-01-01

## 2019-08-06 RX ORDER — FLUOROURACIL 50 MG/G
CREAM TOPICAL
Refills: 0 | COMMUNITY
Start: 2019-06-03 | End: 2020-01-01

## 2019-08-06 NOTE — PROGRESS NOTES
"Subjective   Silva Ruano is a 89 y.o. female here today for injection therapy.    Chief Complaint   Patient presents with   • Right Knee - Pain, Injections     Supartz # 1       Past Medical History:   Diagnosis Date   • Alzheimer disease    • Arthritis    • Closed fracture of neck of right femur, Acute 2/12/2018   • Diverticulosis    • Hypertension    • Pathologic fracture of clavicle, left, initial encounter    • Skin cancer     lymphoma   • Thyroid disease          Past Surgical History:   Procedure Laterality Date   • CHOLECYSTECTOMY     • HIP HEMIARTHROPLASTY Right 2/12/2018    Procedure: HIP HEMIARTHROPLASTY;  Surgeon: Rohith Andersen MD;  Location: [facility];  Service:    • HYSTERECTOMY     • OVARIAN CYST REMOVAL         Allergies   Allergen Reactions   • Sulfa Antibiotics Hives       Objective   Resp 18   Ht 157.5 cm (62\")   Wt 55.8 kg (123 lb)   BMI 22.50 kg/m²    Physical Exam  Skin exam stable with no erythema, ecchymosis or rash. No new swelling. No motor or sensory changes. Distal pulse intact.    Large Joint Arthrocentesis: R knee  Date/Time: 8/6/2019 4:11 PM  Consent given by: patient  Site marked: site marked  Timeout: Immediately prior to procedure a time out was called to verify the correct patient, procedure, equipment, support staff and site/side marked as required   Supporting Documentation  Indications: pain   Procedure Details  Location: knee - R knee  Preparation: Patient was prepped and draped in the usual sterile fashion  Needle size: 22 G (21g)  Approach: anterolateral  Medications administered: 25 mg sodium hyaluronate 25 MG/2.5ML  Patient tolerance: patient tolerated the procedure well with no immediate complications          Assessment/Plan      Diagnosis Plan   1. Primary localized osteoarthritis of right knee  Large Joint Arthrocentesis: R knee       No complications of injection noted.    Discussion of orthopaedic goals and activities and patient and/or guardian " expressed appreciation. Call or notify for any adverse effect from injection therapy. Ice, heat, and/or modalities as beneficial. Watch for signs and symptoms of infection.    Recommendations:  Work/Activity Status: May perform usual activities as tolerated. May return to routine exercise and physical work as tolerated. No strenuous activity.  Patient and/or guardian is encouraged to call or return for any issues or concerns.    FU in 1 week    Patient agreeable to call or return sooner for any concerns.

## 2019-08-08 ENCOUNTER — HOSPITAL ENCOUNTER (EMERGENCY)
Facility: HOSPITAL | Age: 84
Discharge: HOME | End: 2019-08-08
Attending: EMERGENCY MEDICINE | Admitting: EMERGENCY MEDICINE

## 2019-08-08 VITALS
RESPIRATION RATE: 18 BRPM | OXYGEN SATURATION: 98 % | BODY MASS INDEX: 22.32 KG/M2 | SYSTOLIC BLOOD PRESSURE: 118 MMHG | TEMPERATURE: 97.8 F | HEART RATE: 68 BPM | DIASTOLIC BLOOD PRESSURE: 54 MMHG | HEIGHT: 63 IN | WEIGHT: 126 LBS

## 2019-08-08 DIAGNOSIS — F03.91 DEMENTIA WITH BEHAVIORAL DISTURBANCE, UNSPECIFIED DEMENTIA TYPE: ICD-10-CM

## 2019-08-08 DIAGNOSIS — N39.0 ACUTE UTI: Primary | ICD-10-CM

## 2019-08-08 LAB
ALBUMIN SERPL-MCNC: 3.4 G/DL (ref 3.5–5.2)
ALBUMIN/GLOB SERPL: 1.1 G/DL
ALP SERPL-CCNC: 109 U/L (ref 39–117)
ALT SERPL W P-5'-P-CCNC: 10 U/L (ref 1–33)
ANION GAP SERPL CALCULATED.3IONS-SCNC: 14.7 MMOL/L (ref 5–15)
AST SERPL-CCNC: 16 U/L (ref 1–32)
BACTERIA UR QL AUTO: ABNORMAL /HPF
BASOPHILS # BLD AUTO: 0.05 10*3/MM3 (ref 0–0.2)
BASOPHILS NFR BLD AUTO: 0.8 % (ref 0–1.5)
BILIRUB SERPL-MCNC: 0.3 MG/DL (ref 0.2–1.2)
BILIRUB UR QL STRIP: NEGATIVE
BUN BLD-MCNC: 9 MG/DL (ref 8–23)
BUN/CREAT SERPL: 13 (ref 7–25)
CALCIUM SPEC-SCNC: 8.9 MG/DL (ref 8.2–9.6)
CHLORIDE SERPL-SCNC: 94 MMOL/L (ref 98–107)
CLARITY UR: CLEAR
CO2 SERPL-SCNC: 24.2 MMOL/L (ref 22–29)
COLOR UR: YELLOW
CREAT BLD-MCNC: 0.69 MG/DL (ref 0.57–1)
DEPRECATED RDW RBC AUTO: 40.5 FL (ref 37–54)
EOSINOPHIL # BLD AUTO: 0.33 10*3/MM3 (ref 0–0.4)
EOSINOPHIL NFR BLD AUTO: 5.3 % (ref 0.3–6.2)
ERYTHROCYTE [DISTWIDTH] IN BLOOD BY AUTOMATED COUNT: 12.6 % (ref 12.3–15.4)
GFR SERPL CREATININE-BSD FRML MDRD: 79 ML/MIN/1.73
GLOBULIN UR ELPH-MCNC: 3 GM/DL
GLUCOSE BLD-MCNC: 99 MG/DL (ref 65–99)
GLUCOSE UR STRIP-MCNC: NEGATIVE MG/DL
HCT VFR BLD AUTO: 34.5 % (ref 34–46.6)
HGB BLD-MCNC: 11.6 G/DL (ref 12–15.9)
HGB UR QL STRIP.AUTO: NEGATIVE
HOLD SPECIMEN: NORMAL
HOLD SPECIMEN: NORMAL
HYALINE CASTS UR QL AUTO: ABNORMAL /LPF
IMM GRANULOCYTES # BLD AUTO: 0.04 10*3/MM3 (ref 0–0.05)
IMM GRANULOCYTES NFR BLD AUTO: 0.6 % (ref 0–0.5)
KETONES UR QL STRIP: NEGATIVE
LEUKOCYTE ESTERASE UR QL STRIP.AUTO: ABNORMAL
LYMPHOCYTES # BLD AUTO: 1.99 10*3/MM3 (ref 0.7–3.1)
LYMPHOCYTES NFR BLD AUTO: 31.9 % (ref 19.6–45.3)
MCH RBC QN AUTO: 29.7 PG (ref 26.6–33)
MCHC RBC AUTO-ENTMCNC: 33.6 G/DL (ref 31.5–35.7)
MCV RBC AUTO: 88.2 FL (ref 79–97)
MONOCYTES # BLD AUTO: 0.41 10*3/MM3 (ref 0.1–0.9)
MONOCYTES NFR BLD AUTO: 6.6 % (ref 5–12)
NEUTROPHILS # BLD AUTO: 3.41 10*3/MM3 (ref 1.7–7)
NEUTROPHILS NFR BLD AUTO: 54.8 % (ref 42.7–76)
NITRITE UR QL STRIP: NEGATIVE
NRBC BLD AUTO-RTO: 0 /100 WBC (ref 0–0.2)
PH UR STRIP.AUTO: 7 [PH] (ref 5–8)
PLATELET # BLD AUTO: 197 10*3/MM3 (ref 140–450)
PMV BLD AUTO: 8.3 FL (ref 6–12)
POTASSIUM BLD-SCNC: 3.9 MMOL/L (ref 3.5–5.2)
PROT SERPL-MCNC: 6.4 G/DL (ref 6–8.5)
PROT UR QL STRIP: NEGATIVE
RBC # BLD AUTO: 3.91 10*6/MM3 (ref 3.77–5.28)
RBC # UR: ABNORMAL /HPF
REF LAB TEST METHOD: ABNORMAL
SODIUM BLD-SCNC: 129 MMOL/L (ref 136–145)
SP GR UR STRIP: 1.01 (ref 1–1.03)
SQUAMOUS #/AREA URNS HPF: ABNORMAL /HPF
UROBILINOGEN UR QL STRIP: ABNORMAL
WBC NRBC COR # BLD: 6.23 10*3/MM3 (ref 3.4–10.8)
WBC UR QL AUTO: ABNORMAL /HPF
WHOLE BLOOD HOLD SPECIMEN: NORMAL
WHOLE BLOOD HOLD SPECIMEN: NORMAL

## 2019-08-08 PROCEDURE — P9612 CATHETERIZE FOR URINE SPEC: HCPCS

## 2019-08-08 PROCEDURE — 87186 SC STD MICRODIL/AGAR DIL: CPT

## 2019-08-08 PROCEDURE — 81001 URINALYSIS AUTO W/SCOPE: CPT

## 2019-08-08 PROCEDURE — 87086 URINE CULTURE/COLONY COUNT: CPT

## 2019-08-08 PROCEDURE — 87184 SC STD DISK METHOD PER PLATE: CPT

## 2019-08-08 PROCEDURE — 85025 COMPLETE CBC W/AUTO DIFF WBC: CPT

## 2019-08-08 PROCEDURE — 80053 COMPREHEN METABOLIC PANEL: CPT

## 2019-08-08 PROCEDURE — 87077 CULTURE AEROBIC IDENTIFY: CPT

## 2019-08-08 PROCEDURE — 99283 EMERGENCY DEPT VISIT LOW MDM: CPT

## 2019-08-08 RX ORDER — SODIUM CHLORIDE 0.9 % (FLUSH) 0.9 %
10 SYRINGE (ML) INJECTION AS NEEDED
Status: DISCONTINUED | OUTPATIENT
Start: 2019-08-08 | End: 2019-08-08 | Stop reason: HOSPADM

## 2019-08-08 RX ORDER — LEVOFLOXACIN 500 MG/1
500 TABLET, FILM COATED ORAL DAILY
Qty: 7 TABLET | Refills: 0 | OUTPATIENT
Start: 2019-08-08 | End: 2019-01-01

## 2019-08-08 NOTE — ED PROVIDER NOTES
Subjective   History of Present Illness  This is a 89-year-old female who is brought in by EMS for complaint of increased combativeness.  The family contacted EMS because she was more combative today than normal.  They also report that her urine was darker yesterday than normal.  They deny her having any nausea vomiting or fever.  They report that her baseline mental status is that she does not know person place or time  Review of Systems   Constitutional: Positive for activity change.   HENT: Negative.    Eyes: Negative.    Respiratory: Negative.    Cardiovascular: Negative.    Gastrointestinal: Negative.    Endocrine: Negative.    Genitourinary: Negative.    Musculoskeletal: Negative.    Skin: Negative.    Allergic/Immunologic: Negative.    Neurological: Negative.    Hematological: Negative.    Psychiatric/Behavioral: Negative.    All other systems reviewed and are negative.      Past Medical History:   Diagnosis Date   • Alzheimer disease    • Arthritis    • Closed fracture of neck of right femur, Acute 2/12/2018   • Diverticulosis    • Hypertension    • Pathologic fracture of clavicle, left, initial encounter    • Skin cancer     lymphoma   • Thyroid disease        Allergies   Allergen Reactions   • Sulfa Antibiotics Hives       Past Surgical History:   Procedure Laterality Date   • CHOLECYSTECTOMY     • HIP HEMIARTHROPLASTY Right 2/12/2018    Procedure: HIP HEMIARTHROPLASTY;  Surgeon: Rohith Andersen MD;  Location: [facility];  Service:    • HYSTERECTOMY     • OVARIAN CYST REMOVAL         Family History   Problem Relation Age of Onset   • Heart disease Other    • Hypertension Other    • Cancer Other        Social History     Socioeconomic History   • Marital status:      Spouse name: Not on file   • Number of children: Not on file   • Years of education: Not on file   • Highest education level: Not on file   Tobacco Use   • Smoking status: Never Smoker   • Smokeless tobacco: Never Used   Substance  and Sexual Activity   • Alcohol use: No   • Drug use: No   • Sexual activity: Defer           Objective   Physical Exam   Constitutional: She appears well-developed and well-nourished.   Nursing note and vitals reviewed.  GEN: No acute distress  Head: Normocephalic, atraumatic  Eyes: Pupils equal round reactive to light  ENT: Posterior pharynx normal in appearance, oral mucosa is moist  Chest: Nontender to palpation  Cardiovascular: Regular rate  Lungs: Clear to auscultation bilaterally  Abdomen: Soft, nontender, nondistended, no peritoneal signs  Extremities: No edema, normal appearance  Neuro: GCS 14  Psych: Mood and affect are confused and hitting staff      Procedures           ED Course  ED Course as of Aug 08 1227   Thu Aug 08, 2019   1225 Primary caregiver reports that she had to have Levaquin last time for her urinary tract infection and is requesting Levaquin.  [TW]      ED Course User Index  [TW] Tawana Mary, ERINN                  MDM  Number of Diagnoses or Management Options     Amount and/or Complexity of Data Reviewed  Clinical lab tests: ordered and reviewed  Review and summarize past medical records: yes  Discuss the patient with other providers: yes    Risk of Complications, Morbidity, and/or Mortality  Presenting problems: low  Diagnostic procedures: low  Management options: low          Final diagnoses:   Acute UTI   Dementia with behavioral disturbance, unspecified dementia type            Tawana Mary, ERINN  08/08/19 1227

## 2019-08-09 ENCOUNTER — HOSPITAL ENCOUNTER (EMERGENCY)
Facility: HOSPITAL | Age: 84
Discharge: HOME | End: 2019-08-10
Attending: EMERGENCY MEDICINE | Admitting: EMERGENCY MEDICINE

## 2019-08-09 DIAGNOSIS — S01.81XA FACIAL LACERATION, INITIAL ENCOUNTER: Primary | ICD-10-CM
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
## 2019-08-09 DIAGNOSIS — S00.83XA FACIAL HEMATOMA, INITIAL ENCOUNTER: ICD-10-CM

## 2019-08-09 PROCEDURE — 99283 EMERGENCY DEPT VISIT LOW MDM: CPT

## 2019-08-10 ENCOUNTER — APPOINTMENT (OUTPATIENT)
Dept: GENERAL RADIOLOGY | Facility: HOSPITAL | Age: 84
End: 2019-08-10

## 2019-08-10 ENCOUNTER — APPOINTMENT (OUTPATIENT)
Dept: CT IMAGING | Facility: HOSPITAL | Age: 84
End: 2019-08-10

## 2019-08-10 VITALS
WEIGHT: 126 LBS | SYSTOLIC BLOOD PRESSURE: 148 MMHG | OXYGEN SATURATION: 94 % | DIASTOLIC BLOOD PRESSURE: 75 MMHG | TEMPERATURE: 97.6 F | HEART RATE: 89 BPM | HEIGHT: 64 IN | BODY MASS INDEX: 21.51 KG/M2 | RESPIRATION RATE: 16 BRPM

## 2019-08-10 PROCEDURE — 70450 CT HEAD/BRAIN W/O DYE: CPT

## 2019-08-10 PROCEDURE — 72170 X-RAY EXAM OF PELVIS: CPT

## 2019-08-10 PROCEDURE — 71045 X-RAY EXAM CHEST 1 VIEW: CPT

## 2019-08-10 PROCEDURE — 72125 CT NECK SPINE W/O DYE: CPT

## 2019-08-10 RX ORDER — LIDOCAINE HYDROCHLORIDE AND EPINEPHRINE 10; 10 MG/ML; UG/ML
10 INJECTION, SOLUTION INFILTRATION; PERINEURAL ONCE
Status: COMPLETED | OUTPATIENT
Start: 2019-08-10 | End: 2019-08-10

## 2019-08-10 RX ADMIN — LIDOCAINE HYDROCHLORIDE,EPINEPHRINE BITARTRATE 10 ML: 10; .01 INJECTION, SOLUTION INFILTRATION; PERINEURAL at 02:13

## 2019-08-10 NOTE — ED PROVIDER NOTES
Subjective   89-year-old female presented to the ED with her daughter for chief complaint of fall.  Per the daughter the patient got up and walked across the carpeted floor without her walker.  When she got to the hardwood floor she slipped and fell landing on the right side of her head.  She does not take any blood thinners.  She does have a hematoma and laceration to the right lateral forehead.  They deny loss of conscious.  Patient is pleasantly demented but unable to provide any accurate history.  She does deny any pain but I am unsure if she understands her questions.  Further history and review of systems is limited secondary to the patient's dementia.            Review of Systems   Reason unable to perform ROS: dementia.       Past Medical History:   Diagnosis Date   • Alzheimer disease    • Arthritis    • Closed fracture of neck of right femur, Acute 2/12/2018   • Diverticulosis    • Hypertension    • Pathologic fracture of clavicle, left, initial encounter    • Skin cancer     lymphoma   • Thyroid disease        Allergies   Allergen Reactions   • Sulfa Antibiotics Hives       Past Surgical History:   Procedure Laterality Date   • CHOLECYSTECTOMY     • HIP HEMIARTHROPLASTY Right 2/12/2018    Procedure: HIP HEMIARTHROPLASTY;  Surgeon: Rohith Andersen MD;  Location: [facility];  Service:    • HYSTERECTOMY     • OVARIAN CYST REMOVAL         Family History   Problem Relation Age of Onset   • Heart disease Other    • Hypertension Other    • Cancer Other        Social History     Socioeconomic History   • Marital status:      Spouse name: Not on file   • Number of children: Not on file   • Years of education: Not on file   • Highest education level: Not on file   Tobacco Use   • Smoking status: Never Smoker   • Smokeless tobacco: Never Used   Substance and Sexual Activity   • Alcohol use: No   • Drug use: No   • Sexual activity: Defer           Objective   Physical Exam   Constitutional: She is  oriented to person, place, and time. No distress.   Elderly appearing    HENT:   Head: Normocephalic.   Nose: Nose normal.   Hematoma with small 2 cm laceration right lateral forehead   Eyes: Conjunctivae and EOM are normal.   Cardiovascular: Normal rate, regular rhythm and intact distal pulses.   Pulmonary/Chest: Effort normal and breath sounds normal. No respiratory distress.   Abdominal: Soft. She exhibits no distension. There is no tenderness. There is no guarding.   Musculoskeletal: She exhibits no edema or deformity.   Multiple bruises to bilateral  arms, no obvious deformities, no TTP    Neurological: She is alert and oriented to person, place, and time. No cranial nerve deficit.   Skin: She is not diaphoretic.   Nursing note and vitals reviewed.      Laceration Repair  Date/Time: 8/10/2019 2:21 AM  Performed by: Jose Guadalupe Goode DO  Authorized by: Jose Guadalupe Goode DO     Consent:     Consent obtained:  Verbal    Consent given by:  Patient    Alternatives discussed:  No treatment  Anesthesia (see MAR for exact dosages):     Anesthesia method:  Local infiltration    Local anesthetic:  Lidocaine 1% WITH epi  Laceration details:     Location:  Face    Face location:  Forehead    Length (cm):  2  Repair type:     Repair type:  Simple  Pre-procedure details:     Preparation:  Patient was prepped and draped in usual sterile fashion  Exploration:     Wound exploration: wound explored through full range of motion      Contaminated: no    Treatment:     Area cleansed with:  Saline    Amount of cleaning:  Standard    Irrigation solution:  Tap water    Irrigation method:  Tap    Visualized foreign bodies/material removed: no    Skin repair:     Repair method:  Sutures    Suture size:  5-0    Suture technique:  Simple interrupted    Number of sutures:  2  Approximation:     Approximation:  Close    Vermilion border: well-aligned    Post-procedure details:     Dressing:  Open (no dressing)               ED Course      She  presents status post mechanical fall.  Will obtain imaging to rule out any traumatic injury.  Disposition is likely discharge home.    Imaging was negative for acute traumatic injury.  Laceration was repaired without difficulty.  Patient will be discharged to follow-up as needed.        MDM      Final diagnoses:   Facial laceration, initial encounter   Facial hematoma, initial encounter   Fall, initial encounter            Jose Guadalupe Goode, DO  08/10/19 0226

## 2019-08-15 NOTE — PROGRESS NOTES
"Subjective   Silva Ruano is a 89 y.o. female here today for injection therapy.    Chief Complaint   Patient presents with   • Right Knee - Injections     Supartz # 2       Past Medical History:   Diagnosis Date   • Alzheimer disease    • Arthritis    • Closed fracture of neck of right femur, Acute 2/12/2018   • Diverticulosis    • Hypertension    • Pathologic fracture of clavicle, left, initial encounter    • Skin cancer     lymphoma   • Thyroid disease          Past Surgical History:   Procedure Laterality Date   • CHOLECYSTECTOMY     • HIP HEMIARTHROPLASTY Right 2/12/2018    Procedure: HIP HEMIARTHROPLASTY;  Surgeon: Rohith Andersen MD;  Location: [facility];  Service:    • HYSTERECTOMY     • OVARIAN CYST REMOVAL         Allergies   Allergen Reactions   • Sulfa Antibiotics Hives       Objective   Resp 18   Ht 162.6 cm (64\")   Wt 57.2 kg (126 lb)   BMI 21.63 kg/m²    Physical Exam  Skin exam stable with no erythema, ecchymosis or rash. No new swelling. No motor or sensory changes. Distal pulse intact.    Large Joint Arthrocentesis: R knee  Date/Time: 8/15/2019 3:12 PM  Consent given by: patient  Site marked: site marked  Timeout: Immediately prior to procedure a time out was called to verify the correct patient, procedure, equipment, support staff and site/side marked as required   Supporting Documentation  Indications: pain   Procedure Details  Location: knee - R knee  Preparation: Patient was prepped and draped in the usual sterile fashion  Needle size: 22 G (21g)  Approach: anterolateral  Medications administered: 25 mg sodium hyaluronate 25 MG/2.5ML  Patient tolerance: patient tolerated the procedure well with no immediate complications          Assessment/Plan      Diagnosis Plan   1. Primary localized osteoarthritis of right knee  Large Joint Arthrocentesis: R knee       No complications of injection noted.    Discussion of orthopaedic goals and activities and patient and/or guardian " expressed appreciation. Call or notify for any adverse effect from injection therapy. Ice, heat, and/or modalities as beneficial. Watch for signs and symptoms of infection.    Recommendations:  Work/Activity Status: May perform usual activities as tolerated. May return to routine exercise and physical work as tolerated. No strenuous activity.  Patient and/or guardian is encouraged to call or return for any issues or concerns.  Fu in 1 week     Patient agreeable to call or return sooner for any concerns.

## 2019-08-19 NOTE — TELEPHONE ENCOUNTER
Spoke with patient's daughter to let her know I would be calling in an antibiotic that would cover the bacteria that grew out on culture.

## 2019-08-22 NOTE — PROGRESS NOTES
"Subjective   Silva Ruano is a 89 y.o. female here today for injection therapy.    Chief Complaint   Patient presents with   • Right Knee - Injections     Supartz # 3       Past Medical History:   Diagnosis Date   • Alzheimer disease    • Arthritis    • Closed fracture of neck of right femur, Acute 2/12/2018   • Diverticulosis    • Hypertension    • Pathologic fracture of clavicle, left, initial encounter    • Skin cancer     lymphoma   • Thyroid disease          Past Surgical History:   Procedure Laterality Date   • CHOLECYSTECTOMY     • HIP HEMIARTHROPLASTY Right 2/12/2018    Procedure: HIP HEMIARTHROPLASTY;  Surgeon: Rohith Andersen MD;  [facility]  Service:    • HYSTERECTOMY     • OVARIAN CYST REMOVAL         Allergies   Allergen Reactions   • Sulfa Antibiotics Hives       Objective   Resp 18   Ht 162.6 cm (64\")   Wt 57.2 kg (126 lb)   BMI 21.63 kg/m²    Physical Exam  Skin exam stable with no erythema, ecchymosis or rash. No new swelling. No motor or sensory changes. Distal pulse intact.    Large Joint Arthrocentesis: R knee  Date/Time: 8/22/2019 3:10 PM  Consent given by: patient  Site marked: site marked  Timeout: Immediately prior to procedure a time out was called to verify the correct patient, procedure, equipment, support staff and site/side marked as required   Supporting Documentation  Indications: pain   Procedure Details  Location: knee - R knee  Preparation: Patient was prepped and draped in the usual sterile fashion  Needle size: 22 G (21g)  Approach: anterolateral  Medications administered: 25 mg sodium hyaluronate 25 MG/2.5ML  Patient tolerance: patient tolerated the procedure well with no immediate complications          Assessment/Plan      Diagnosis Plan   1. Primary localized osteoarthritis of right knee  Large Joint Arthrocentesis: R knee       No complications of injection noted.    Discussion of orthopaedic goals and activities and patient and/or guardian " expressed appreciation. Call or notify for any adverse effect from injection therapy. Ice, heat, and/or modalities as beneficial. Watch for signs and symptoms of infection.    Recommendations:  Work/Activity Status: May perform usual activities as tolerated. May return to routine exercise and physical work as tolerated. No strenuous activity.  Patient and/or guardian is encouraged to call or return for any issues or concerns.  Fu in 1 week    Patient agreeable to call or return sooner for any concerns.

## 2019-08-29 NOTE — PROGRESS NOTES
"Subjective   Phoebedelmira Ruano is a 89 y.o. female here today for injection therapy.    Chief Complaint   Patient presents with   • Right Knee - Injections     Patients daughter states she slipped at home on her way to come to office.   She did grab her left forearm after the fall.    Past Medical History:   Diagnosis Date   • Alzheimer disease    • Arthritis    • Closed fracture of neck of right femur, Acute 2/12/2018   • Diverticulosis    • Hypertension    • Pathologic fracture of clavicle, left, initial encounter    • Skin cancer     lymphoma   • Thyroid disease          Past Surgical History:   Procedure Laterality Date   • CHOLECYSTECTOMY     • HIP HEMIARTHROPLASTY Right 2/12/2018    Procedure: HIP HEMIARTHROPLASTY;  Surgeon: Rohith Andersen MD;  Location: [facility];  Service:    • HYSTERECTOMY     • OVARIAN CYST REMOVAL         Allergies   Allergen Reactions   • Sulfa Antibiotics Hives       Objective   Resp 18   Ht 162.6 cm (64\")   Wt 57.2 kg (126 lb)   BMI 21.63 kg/m²    Physical Exam   Constitutional: She appears well-developed.   Pulmonary/Chest: Effort normal.   Musculoskeletal:        Left shoulder: She exhibits normal range of motion, no tenderness, no bony tenderness, no swelling, no effusion, no crepitus, no deformity and no pain.        Left elbow: She exhibits normal range of motion, no swelling, no effusion, no deformity and no laceration. No tenderness found. No radial head, no medial epicondyle, no lateral epicondyle and no olecranon process tenderness noted.        Left wrist: She exhibits normal range of motion, no tenderness, no bony tenderness, no swelling, no effusion, no crepitus, no deformity and no laceration.        Left hand: She exhibits no tenderness, no bony tenderness, normal capillary refill, no deformity and no swelling. Normal sensation noted. Normal strength noted. She exhibits no thumb/finger opposition.   Neurological: She is alert.   Psychiatric: She has a normal " mood and affect.   Vitals reviewed.    Skin exam of RLE is WNL  NO erythema    Patient has good ROM to the left elbow and wrist.   She did NOT grimace or complain of tenderness when I palpated the entire LUE     Good  strength to the left upper extremity  Large Joint Arthrocentesis: R knee  Date/Time: 8/29/2019 3:19 PM  Consent given by: patient  Site marked: site marked  Timeout: Immediately prior to procedure a time out was called to verify the correct patient, procedure, equipment, support staff and site/side marked as required   Supporting Documentation  Indications: pain   Procedure Details  Location: knee - R knee  Preparation: Patient was prepped and draped in the usual sterile fashion  Needle size: 22 G (21g)  Approach: anterolateral  Medications administered: 25 mg sodium hyaluronate 25 MG/2.5ML  Patient tolerance: patient tolerated the procedure well with no immediate complications      X-ray of the left forearm performed in the office for evaluation of pain.  No comparison films are available to review.  No acute fracture or dislocation is noted    Assessment/Plan      Diagnosis Plan   1. Primary localized osteoarthritis of right knee  Large Joint Arthrocentesis    XR Forearm 2 View Left   2. Forearm strain, left, initial encounter         No complications of injection noted.    Discussion of orthopaedic goals and activities and patient and/or guardian expressed appreciation. Call or notify for any adverse effect from injection therapy. Ice, heat, and/or modalities as beneficial. Watch for signs and symptoms of infection.    Recommendations:  Work/Activity Status: May perform usual activities as tolerated. May return to routine exercise and physical work as tolerated. No strenuous activity.  Patient and/or guardian is encouraged to call or return for any issues or concerns.    FU PRN    Patient agreeable to call or return sooner for any concerns.

## 2019-09-05 NOTE — PROGRESS NOTES
"Subjective   Silva Ruano is a 89 y.o. female here today for injection therapy.    Chief Complaint   Patient presents with   • Right Knee - Injections     SUPARTZ # 5       Past Medical History:   Diagnosis Date   • Alzheimer disease    • Arthritis    • Closed fracture of neck of right femur, Acute 2/12/2018   • Diverticulosis    • Hypertension    • Pathologic fracture of clavicle, left, initial encounter    • Skin cancer     lymphoma   • Thyroid disease          Past Surgical History:   Procedure Laterality Date   • CHOLECYSTECTOMY     • HIP HEMIARTHROPLASTY Right 2/12/2018    Procedure: HIP HEMIARTHROPLASTY;  Surgeon: Rohith Andersen MD;  Location: [facility];  Service:    • HYSTERECTOMY     • OVARIAN CYST REMOVAL         Allergies   Allergen Reactions   • Sulfa Antibiotics Hives       Objective   Resp 18   Ht 162.6 cm (64\")   Wt 57.2 kg (126 lb)   BMI 21.63 kg/m²    Physical Exam    Skin exam stable with no erythema, ecchymosis or rash.  No new swelling.  No motor or sensory changes.  Distal pulse intact.    Large Joint Arthrocentesis: R knee  Date/Time: 9/5/2019 3:11 PM  Consent given by: patient  Site marked: site marked  Timeout: Immediately prior to procedure a time out was called to verify the correct patient, procedure, equipment, support staff and site/side marked as required   Supporting Documentation  Indications: pain   Procedure Details  Location: knee - R knee  Preparation: Patient was prepped and draped in the usual sterile fashion  Needle size: 22 G (21G)  Approach: anterolateral  Medications administered: 25 mg sodium hyaluronate 25 MG/2.5ML  Patient tolerance: patient tolerated the procedure well with no immediate complications          Assessment/Plan      Diagnosis Plan   1. Primary localized osteoarthritis of right knee  Large Joint Arthrocentesis: R knee       Discussion of orthopaedic goals and activities and patient and/or guardian expressed appreciation.  Call or notify for " any adverse effect from injection therapy  Ice, heat, and/or modalities as beneficial  Watch for signs and symptoms of infection  Guided on proper techniques for mobility, strength, agility and/or conditioning exercises    Recommendations:  FU in 6 months    Patient agreeable to call or return sooner for any concerns.

## 2019-09-08 NOTE — ED PROVIDER NOTES
Subjective   89-year-old female that presents to the emergency department with her daughter who is her caregiver with concerns for left knee pain.  Daughter states that she fell and struck her knee 10 days ago.  She has been to urgent care or she states that they x-rayed it and told her that it was not broken.  Patient's daughter states that she did not notice it until a few days ago when she was giving her a bath and when she touched the bruise on the knee her mother made her stop.  She states that she does not want to get up and walk currently.  Patient has Alzheimer's and cannot give any history whatsoever.            Review of Systems   All other systems reviewed and are negative.      Past Medical History:   Diagnosis Date   • Alzheimer disease    • Arthritis    • Closed fracture of neck of right femur, Acute 2/12/2018   • Diverticulosis    • Hypertension    • Pathologic fracture of clavicle, left, initial encounter    • Skin cancer     lymphoma   • Thyroid disease        Allergies   Allergen Reactions   • Sulfa Antibiotics Hives       Past Surgical History:   Procedure Laterality Date   • CHOLECYSTECTOMY     • HIP HEMIARTHROPLASTY Right 2/12/2018    Procedure: HIP HEMIARTHROPLASTY;  Surgeon: Rohith Andersen MD;  Location: [facility];  Service:    • HYSTERECTOMY     • OVARIAN CYST REMOVAL         Family History   Problem Relation Age of Onset   • Heart disease Other    • Hypertension Other    • Cancer Other        Social History     Socioeconomic History   • Marital status:      Spouse name: Not on file   • Number of children: Not on file   • Years of education: Not on file   • Highest education level: Not on file   Tobacco Use   • Smoking status: Never Smoker   • Smokeless tobacco: Never Used   Substance and Sexual Activity   • Alcohol use: No   • Drug use: No   • Sexual activity: Defer           Objective   Physical Exam   Musculoskeletal:        Legs:  Nursing note and vitals reviewed.    GEN:  No acute distress  Head: Normocephalic, atraumatic  Eyes: Pupils equal round reactive to light  ENT: Posterior pharynx normal in appearance, oral mucosa is moist  Chest: Nontender to palpation  Cardiovascular: Regular rate  Lungs: Clear to auscultation bilaterally  Abdomen: Soft, nontender, nondistended, no peritoneal signs  Extremities: See graphical documentation  Neuro: Patient will smile but does not communicate verbally with me      Procedures           ED Course                  MDM  Number of Diagnoses or Management Options  Contusion of left knee, initial encounter:   Diagnosis management comments:    XR Knee 3 View Left (Final result)   Result time 09/08/19 16:31:24   Final result by Johnathan Cr DO (09/08/19 16:31:24)             Impression:     No displaced fracture.     Chondrocalcinosis is noted, correlate for CPPD.              This report was finalized on 9/8/2019 4:31 PM by Johnathan Cr DO.        Narrative:     PROCEDURE: XR KNEE 3 VW LEFT-     History: pain, bruising     COMPARISON: None.     FINDINGS:  A 3 view exam demonstrates no displaced fracture or  dislocation. There are mild tricompartmental degenerative changes.  Chondrocalcinosis is noted, correlate for CPPD. Atherosclerosis is  noted.             Patient does have a contusion to her knee that is 10 days old.  Will provide an Ace wrap to give the patient some support and did recommend follow-up with her orthopedic provider if she had continued symptoms.       Amount and/or Complexity of Data Reviewed  Tests in the radiology section of CPT®: reviewed  Decide to obtain previous medical records or to obtain history from someone other than the patient: yes  Obtain history from someone other than the patient: yes  Review and summarize past medical records: yes  Independent visualization of images, tracings, or specimens: yes        Final diagnoses:   Contusion of left knee, initial encounter              Cresencio Horner MD  09/08/19  [id number]

## 2019-09-24 NOTE — PROGRESS NOTES
Subjective   Patient ID: Silva Ruano is a 89 y.o. female  Injury of the Left Knee (Sister states she had a fall around the first of September)         History of Present Illness    Patient presents with caretaker for left knee arthralgia.  She had a fall the beginning of September 2019.  Her sister who is with her states she has grimaced when the left knee is palpated.  She was seen at the urgent care in emergency room and had x-rays which were negative for acute fracture.      Past Medical History:   Diagnosis Date   • Alzheimer disease    • Arthritis    • Closed fracture of neck of right femur, Acute 2/12/2018   • Diverticulosis    • Hypertension    • Pathologic fracture of clavicle, left, initial encounter    • Skin cancer     lymphoma   • Thyroid disease         Past Surgical History:   Procedure Laterality Date   • CHOLECYSTECTOMY     • HIP HEMIARTHROPLASTY Right 2/12/2018    Procedure: HIP HEMIARTHROPLASTY;  Surgeon: Rohith Andersen MD;  Location: [facility];  Service:    • HYSTERECTOMY     • OVARIAN CYST REMOVAL         Family History   Problem Relation Age of Onset   • Heart disease Other    • Hypertension Other    • Cancer Other        Social History     Socioeconomic History   • Marital status:      Spouse name: Not on file   • Number of children: Not on file   • Years of education: Not on file   • Highest education level: Not on file   Tobacco Use   • Smoking status: Never Smoker   • Smokeless tobacco: Never Used   Substance and Sexual Activity   • Alcohol use: No   • Drug use: No   • Sexual activity: Defer         Current Outpatient Medications:   •  acetaminophen (TYLENOL) 325 MG tablet, Take 2 tablets by mouth Every 4 (Four) Hours As Needed for Mild Pain . (Patient taking differently: Take 650 mg by mouth Daily.), Disp: 1 bottle, Rfl: 0  •  ciprofloxacin (CIPRO) 500 MG tablet, TAKE ONE TABLET BY MOUTH TWICE DAILY UNTIL GONE, Disp: , Rfl: 0  •  estrogens, conjugated, (PREMARIN)  "0.625 MG tablet, Take 0.625 mg by mouth Daily., Disp: , Rfl:   •  fluorouracil (EFUDEX) 5 % cream, APPLY TO AFFECTED AREA TWICE DAILY FIVE DAYS A WEEK FOR 4 WEEKS, Disp: , Rfl: 0  •  hydrochlorothiazide (HYDRODIURIL) 25 MG tablet, Take 25 mg by mouth Daily., Disp: , Rfl: 3  •  levETIRAcetam (KEPPRA) 250 MG tablet, Take 250 mg by mouth 2 (Two) Times a Day., Disp: , Rfl: 3  •  levothyroxine (SYNTHROID, LEVOTHROID) 50 MCG tablet, Take 50 mcg by mouth Daily., Disp: , Rfl:   •  loratadine (CLARITIN) 10 MG tablet, Take 10 mg by mouth 2 (Two) Times a Day., Disp: , Rfl:   •  losartan (COZAAR) 50 MG tablet, Take 50 mg by mouth Daily., Disp: , Rfl: 3  •  metoprolol succinate XL (TOPROL-XL) 25 MG 24 hr tablet, Take 25 mg by mouth Daily., Disp: , Rfl: 1  •  omeprazole (priLOSEC) 20 MG capsule, Take 20 mg by mouth Daily., Disp: , Rfl:   •  tolterodine LA (DETROL LA) 4 MG 24 hr capsule, Take 4 mg by mouth Daily., Disp: , Rfl: 1  •  triamcinolone (KENALOG) 0.1 % cream, , Disp: , Rfl:     Allergies   Allergen Reactions   • Sulfa Antibiotics Hives       Review of Systems   Constitutional: Negative for diaphoresis, fever and unexpected weight change.   HENT: Negative for dental problem and sore throat.    Eyes: Negative for visual disturbance.   Respiratory: Negative for shortness of breath.    Cardiovascular: Negative for chest pain.   Gastrointestinal: Negative for abdominal pain, constipation, diarrhea, nausea and vomiting.   Genitourinary: Negative for difficulty urinating and frequency.        Recently prescribed antibiotic for UTI   Musculoskeletal: Positive for arthralgias (left knee) and joint swelling.   Neurological: Negative for headaches.   Hematological: Does not bruise/bleed easily.       I have reviewed the above medical and surgical history, family history, social history, medications, allergies and review of systems.    Objective   Resp 18   Ht 160 cm (63\")   Wt 56.7 kg (125 lb)   BMI 22.14 kg/m²    Physical Exam "   Constitutional: She appears well-nourished. No distress.   Neck: No tracheal deviation present.   Musculoskeletal:        Left hip: She exhibits no bony tenderness.        Left knee: Tenderness found. Medial joint line tenderness noted.   Psychiatric: Her behavior is normal.   Vitals reviewed.    Ortho Exam   Extremity DVT signs are Negative on physical exam with negative Linh sign, with no calf pain, with no palpable cords, with no increased pain with passive stretch/extension and with no skin tone change   Neurologic Exam      Patient does grimace with active flexion extension of the left knee.  She denies having any pain to the left ankle or lower leg.  Patient sitting in wheelchair in NAD    Assessment/Plan   Independent Review of Radiographic Studies:    X-ray of the left hip performed in the office for the evaluation to rule out hip fracture.  Comparison films from August 2019 are available and reviewed.  No acute fracture dislocation    I did review x-ray imaging from [facility] of the left knee.  There is no acute fracture.  Positive chondrocalcinosis.  Large Joint Arthrocentesis: L knee  Date/Time: 9/24/2019 2:37 PM  Consent given by: patient  Site marked: site marked  Timeout: Immediately prior to procedure a time out was called to verify the correct patient, procedure, equipment, support staff and site/side marked as required   Supporting Documentation  Indications: pain   Procedure Details  Location: knee - L knee  Preparation: Patient was prepped and draped in the usual sterile fashion  Needle size: 22 G  Approach: anterolateral  Medications administered: 2 mL lidocaine 1 %; 40 mg methylPREDNISolone acetate 40 MG/ML  Patient tolerance: patient tolerated the procedure well with no immediate complications             Phoebe was seen today for injury.    Diagnoses and all orders for this visit:    Arthralgia of left hip  -     XR Hip With or Without Pelvis 2 - 3 View Left    Primary localized  osteoarthritis of left knee  -     Large Joint Arthrocentesis: L knee    Chondrocalcinosis of left knee       Orthopaedic activities reviewed and patient and guardian(s) expressed appreciation  Discussion of orthopedic goals  Risk, benefits, and merits of treatment alternatives reviewed with the patient and questions answered    Recommendations/Plan:  Exercise, medications, injections, other patient advice, and return appointment as noted.  Patient is encouraged to call or return for any issues or concerns.  Patient agreeable to call or return sooner for any concerns.           EMR Dragon-transcription disclaimer:  This encounter note is an electronic transcription of spoken language to printed text.  Electronic transcription of spoken language may permit erroneous or at times nonsensical words or phrases to be inadvertently transcribed.  Although I have reviewed the note for such errors, some may still exist

## 2019-10-01 NOTE — PROGRESS NOTES
"      PROBLEM LIST:  1. 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   b) Stage III disease at presentation in 2008.   c) Responded to Rituxan as a single agent followed by maintenance Rituxan.  2. Dementia    Subjective     CHIEF COMPLAINT: Follow up Lymphoma    HISTORY OF PRESENT ILLNESS:   Silva Ruano returns for follow-up for lymphoma. She is transitioning care from Dr. Morocho. She has severe dementia and is cared for by her daughter and sister during day with a sitter at night. She has been doing ok overall her sister and daughter report. She has been having itching on the right side of her back. She is under the care of a dermatologist. denies any recurring fevers or infections, no drenching night sweats, rashes itching or lymphadenopathy      Past Medical History, Past Surgical History, Social History, Family History have been reviewed and are without significant changes except as mentioned.    Review of Systems   A comprehensive 14 point review of systems was performed and was negative except as mentioned.    Medications:  The current medication list was reviewed in the EMR    ALLERGIES:    Allergies   Allergen Reactions   • Sulfa Antibiotics Hives       Objective      /52   Pulse 72   Temp 98.2 °F (36.8 °C) (Temporal)   Resp 14   Ht 160 cm (63\")   Wt 57.2 kg (126 lb)   BMI 22.32 kg/m²      Performance Status: 2    General: well appearing female in no acute distress  Neuro: alert and oriented  HEENT: sclera anicteric, oropharynx clear  Lymphatics: no cervical, supraclavicular, or axillary adenopathy  Cardiovascular: regular rate and rhythm, no murmurs  Lungs: clear to auscultation bilaterally  Abdomen: soft, nontender, nondistended.  No palpable organomegaly  Extremeties: no lower extremity edema  Skin: no lesions, bruising, or petechiae. Rash on lower right back.   Psych: mood and affect appropriate      RECENT LABS:  Blood work " was reviewed from August      Lab Results   Component Value Date    HGB 11.6 (L) 08/08/2019    HCT 34.5 08/08/2019    MCV 88.2 08/08/2019     08/08/2019    WBC 6.23 08/08/2019    NEUTROABS 3.41 08/08/2019    LYMPHSABS 1.99 08/08/2019    MONOSABS 0.41 08/08/2019    EOSABS 0.33 08/08/2019    BASOSABS 0.05 08/08/2019     Lab Results   Component Value Date    GLUCOSE 99 08/08/2019    BUN 9 08/08/2019    CREATININE 0.69 08/08/2019     (L) 08/08/2019    K 3.9 08/08/2019    CL 94 (L) 08/08/2019    CO2 24.2 08/08/2019    CALCIUM 8.9 08/08/2019    PROTEINTOT 6.4 08/08/2019    ALBUMIN 3.40 (L) 08/08/2019    BILITOT 0.3 08/08/2019    ALKPHOS 109 08/08/2019    AST 16 08/08/2019    ALT 10 08/08/2019           Assessment/Plan   Silva Ruano is a 89 y.o. year old female with low-grade lymphoma. She does have severe dementia and is unable to communicate very well. The daughter and sister take care of her and overall she seems to be doing fairly well. She has no evidence of recurrence on clinical exam. We will check labs today including CBC, CMP, and LDH. We will contact the patient and her sister with any abnormal results. We will plan to see her back in one year.     Rash: She will plan to continue steroid cream prescribed by her dermatologist and will follow up if rash worsens. We discussed that they can try a very low dose of benadryl of 6.25mg. However, they need to be aware that it could worsen her drowsiness and increase fall risk so she will need to be watched closely after dosing.               I spent a total of  15 minutes in direct patient care, greater than   10  minutes (greater than 50%) were spent in coordination of care, and counseling the patient regarding lymphoma.  I answered any questions patient had with medication and plan.         Yasmine Wagoner APRN  [facility]    10/1/2019          CC:

## 2020-01-01 ENCOUNTER — APPOINTMENT (OUTPATIENT)
Dept: CT IMAGING | Facility: HOSPITAL | Age: 85
End: 2020-01-01

## 2020-01-01 ENCOUNTER — OFFICE VISIT (OUTPATIENT)
Dept: ORTHOPEDIC SURGERY | Facility: CLINIC | Age: 85
End: 2020-01-01

## 2020-01-01 ENCOUNTER — HOSPITAL ENCOUNTER (INPATIENT)
Facility: HOSPITAL | Age: 85
LOS: 2 days | Discharge: HOSPICE | End: 2020-08-14
Attending: EMERGENCY MEDICINE | Admitting: INTERNAL MEDICINE

## 2020-01-01 ENCOUNTER — APPOINTMENT (OUTPATIENT)
Dept: CARDIOLOGY | Facility: HOSPITAL | Age: 85
End: 2020-01-01

## 2020-01-01 ENCOUNTER — APPOINTMENT (OUTPATIENT)
Dept: GENERAL RADIOLOGY | Facility: HOSPITAL | Age: 85
End: 2020-01-01

## 2020-01-01 ENCOUNTER — TELEPHONE (OUTPATIENT)
Dept: ORTHOPEDIC SURGERY | Facility: CLINIC | Age: 85
End: 2020-01-01

## 2020-01-01 ENCOUNTER — HOSPITAL ENCOUNTER (OUTPATIENT)
Facility: HOSPITAL | Age: 85
Setting detail: OBSERVATION
Discharge: HOME | End: 2020-03-07
Attending: EMERGENCY MEDICINE | Admitting: INTERNAL MEDICINE

## 2020-01-01 ENCOUNTER — HOSPITAL ENCOUNTER (EMERGENCY)
Facility: HOSPITAL | Age: 85
Discharge: NURSING, REHAB OR LONG-TERM CARE | End: 2020-06-16
Attending: STUDENT IN AN ORGANIZED HEALTH CARE EDUCATION/TRAINING PROGRAM | Admitting: STUDENT IN AN ORGANIZED HEALTH CARE EDUCATION/TRAINING PROGRAM

## 2020-01-01 ENCOUNTER — READMISSION MANAGEMENT (OUTPATIENT)
Dept: CALL CENTER | Facility: HOSPITAL | Age: 85
End: 2020-01-01

## 2020-01-01 VITALS
TEMPERATURE: 96.5 F | WEIGHT: 100 LBS | HEART RATE: 82 BPM | BODY MASS INDEX: 15.16 KG/M2 | DIASTOLIC BLOOD PRESSURE: 66 MMHG | HEIGHT: 68 IN | SYSTOLIC BLOOD PRESSURE: 132 MMHG | OXYGEN SATURATION: 95 % | RESPIRATION RATE: 16 BRPM

## 2020-01-01 VITALS
HEIGHT: 63 IN | DIASTOLIC BLOOD PRESSURE: 53 MMHG | HEART RATE: 100 BPM | SYSTOLIC BLOOD PRESSURE: 141 MMHG | BODY MASS INDEX: 17.72 KG/M2 | WEIGHT: 100 LBS | RESPIRATION RATE: 16 BRPM | TEMPERATURE: 99 F | OXYGEN SATURATION: 98 %

## 2020-01-01 VITALS — RESPIRATION RATE: 18 BRPM | HEIGHT: 68 IN | WEIGHT: 100 LBS | BODY MASS INDEX: 15.16 KG/M2

## 2020-01-01 VITALS
RESPIRATION RATE: 20 BRPM | OXYGEN SATURATION: 96 % | DIASTOLIC BLOOD PRESSURE: 75 MMHG | HEART RATE: 85 BPM | SYSTOLIC BLOOD PRESSURE: 132 MMHG | TEMPERATURE: 97.4 F

## 2020-01-01 VITALS — RESPIRATION RATE: 18 BRPM | BODY MASS INDEX: 15.16 KG/M2 | HEIGHT: 68 IN | WEIGHT: 100 LBS

## 2020-01-01 DIAGNOSIS — M17.11 PRIMARY LOCALIZED OSTEOARTHRITIS OF RIGHT KNEE: Primary | ICD-10-CM

## 2020-01-01 DIAGNOSIS — S09.90XA HEAD TRAUMA, INITIAL ENCOUNTER: Primary | ICD-10-CM

## 2020-01-01 DIAGNOSIS — G30.1 ALZHEIMER'S DEMENTIA, LATE ONSET, WITH BEHAVIORAL DISTURBANCE (HCC): ICD-10-CM

## 2020-01-01 DIAGNOSIS — R40.2431 GLASGOW COMA SCALE TOTAL SCORE 3-8, IN THE FIELD (EMT OR AMBULANCE) (HCC): Primary | ICD-10-CM

## 2020-01-01 DIAGNOSIS — S00.83XA TRAUMATIC HEMATOMA OF FOREHEAD, INITIAL ENCOUNTER: ICD-10-CM

## 2020-01-01 DIAGNOSIS — R29.810 FACIAL DROOP: ICD-10-CM

## 2020-01-01 DIAGNOSIS — M17.12 PRIMARY LOCALIZED OSTEOARTHRITIS OF LEFT KNEE: Primary | ICD-10-CM

## 2020-01-01 DIAGNOSIS — Z74.09 IMPAIRED MOBILITY AND ADLS: ICD-10-CM

## 2020-01-01 DIAGNOSIS — I63.9 ACUTE CVA (CEREBROVASCULAR ACCIDENT) (HCC): Primary | ICD-10-CM

## 2020-01-01 DIAGNOSIS — M17.11 PRIMARY LOCALIZED OSTEOARTHRITIS OF RIGHT KNEE: ICD-10-CM

## 2020-01-01 DIAGNOSIS — R29.898 WEAKNESS OF LEFT UPPER EXTREMITY: ICD-10-CM

## 2020-01-01 DIAGNOSIS — F02.818 ALZHEIMER'S DEMENTIA, LATE ONSET, WITH BEHAVIORAL DISTURBANCE (HCC): ICD-10-CM

## 2020-01-01 DIAGNOSIS — Z78.9 IMPAIRED MOBILITY AND ADLS: ICD-10-CM

## 2020-01-01 DIAGNOSIS — J96.01 ACUTE RESPIRATORY FAILURE WITH HYPOXIA (HCC): ICD-10-CM

## 2020-01-01 DIAGNOSIS — I48.91 ATRIAL FIBRILLATION WITH RAPID VENTRICULAR RESPONSE (HCC): ICD-10-CM

## 2020-01-01 DIAGNOSIS — D64.9 ANEMIA, UNSPECIFIED TYPE: ICD-10-CM

## 2020-01-01 LAB
ALBUMIN SERPL-MCNC: 2.4 G/DL (ref 3.5–5.2)
ALBUMIN SERPL-MCNC: 2.9 G/DL (ref 3.5–5.2)
ALBUMIN SERPL-MCNC: 2.9 G/DL (ref 3.5–5.2)
ALBUMIN/GLOB SERPL: 0.9 G/DL
ALBUMIN/GLOB SERPL: 1 G/DL
ALP SERPL-CCNC: 118 U/L (ref 39–117)
ALP SERPL-CCNC: 98 U/L (ref 39–117)
ALT SERPL W P-5'-P-CCNC: 10 U/L (ref 1–33)
ALT SERPL W P-5'-P-CCNC: 20 U/L (ref 1–33)
ALT SERPL W P-5'-P-CCNC: 23 U/L (ref 1–33)
ANION GAP SERPL CALCULATED.3IONS-SCNC: 11.8 MMOL/L (ref 5–15)
ANION GAP SERPL CALCULATED.3IONS-SCNC: 13.5 MMOL/L (ref 5–15)
ANION GAP SERPL CALCULATED.3IONS-SCNC: 14 MMOL/L (ref 5–15)
APTT PPP: 31.8 SECONDS (ref 24–37)
AST SERPL-CCNC: 113 U/L (ref 1–32)
AST SERPL-CCNC: 18 U/L (ref 1–32)
AST SERPL-CCNC: 56 U/L (ref 1–32)
BACTERIA SPEC AEROBE CULT: ABNORMAL
BACTERIA UR QL AUTO: ABNORMAL /HPF
BASE EXCESS BLDA CALC-SCNC: 2 MMOL/L (ref -5–5)
BASOPHILS # BLD AUTO: 0.02 10*3/MM3 (ref 0–0.2)
BASOPHILS # BLD AUTO: 0.03 10*3/MM3 (ref 0–0.2)
BASOPHILS # BLD AUTO: 0.03 10*3/MM3 (ref 0–0.2)
BASOPHILS # BLD AUTO: 0.05 10*3/MM3 (ref 0–0.2)
BASOPHILS NFR BLD AUTO: 0.2 % (ref 0–1.5)
BASOPHILS NFR BLD AUTO: 0.2 % (ref 0–1.5)
BASOPHILS NFR BLD AUTO: 0.3 % (ref 0–1.5)
BASOPHILS NFR BLD AUTO: 0.8 % (ref 0–1.5)
BILIRUB SERPL-MCNC: 0.4 MG/DL (ref 0–1.2)
BILIRUB SERPL-MCNC: 0.4 MG/DL (ref 0–1.2)
BILIRUB UR QL STRIP: NEGATIVE
BUN BLD-MCNC: 15 MG/DL (ref 8–23)
BUN SERPL-MCNC: 32 MG/DL (ref 8–23)
BUN SERPL-MCNC: 35 MG/DL (ref 8–23)
BUN/CREAT SERPL: 20.6 (ref 7–25)
BUN/CREAT SERPL: 22.9 (ref 7–25)
BUN/CREAT SERPL: 24.6 (ref 7–25)
CA-I BLDA-SCNC: 1.3 MMOL/L (ref 1.2–1.32)
CALCIUM SPEC-SCNC: 7.8 MG/DL (ref 8.2–9.6)
CALCIUM SPEC-SCNC: 8.1 MG/DL (ref 8.2–9.6)
CALCIUM SPEC-SCNC: 8.7 MG/DL (ref 8.2–9.6)
CHLORIDE SERPL-SCNC: 102 MMOL/L (ref 98–107)
CHLORIDE SERPL-SCNC: 121 MMOL/L (ref 98–107)
CHLORIDE SERPL-SCNC: 127 MMOL/L (ref 98–107)
CHOLEST SERPL-MCNC: 150 MG/DL (ref 0–200)
CLARITY UR: ABNORMAL
CO2 BLDA-SCNC: 29 MMOL/L (ref 24–29)
CO2 SERPL-SCNC: 20 MMOL/L (ref 22–29)
CO2 SERPL-SCNC: 22.2 MMOL/L (ref 22–29)
CO2 SERPL-SCNC: 25.5 MMOL/L (ref 22–29)
COLOR UR: ABNORMAL
CREAT BLD-MCNC: 0.61 MG/DL (ref 0.57–1)
CREAT BLDA-MCNC: 0.8 MG/DL (ref 0.6–1.3)
CREAT SERPL-MCNC: 1.4 MG/DL (ref 0.57–1)
CREAT SERPL-MCNC: 1.7 MG/DL (ref 0.57–1)
DEPRECATED RDW RBC AUTO: 43.8 FL (ref 37–54)
DEPRECATED RDW RBC AUTO: 45.1 FL (ref 37–54)
DEPRECATED RDW RBC AUTO: 48.4 FL (ref 37–54)
DEPRECATED RDW RBC AUTO: 48.7 FL (ref 37–54)
DEPRECATED RDW RBC AUTO: 48.9 FL (ref 37–54)
EOSINOPHIL # BLD AUTO: 0 10*3/MM3 (ref 0–0.4)
EOSINOPHIL # BLD AUTO: 0.03 10*3/MM3 (ref 0–0.4)
EOSINOPHIL # BLD AUTO: 0.24 10*3/MM3 (ref 0–0.4)
EOSINOPHIL # BLD AUTO: 0.24 10*3/MM3 (ref 0–0.4)
EOSINOPHIL NFR BLD AUTO: 0 % (ref 0.3–6.2)
EOSINOPHIL NFR BLD AUTO: 0.2 % (ref 0.3–6.2)
EOSINOPHIL NFR BLD AUTO: 2.6 % (ref 0.3–6.2)
EOSINOPHIL NFR BLD AUTO: 3.7 % (ref 0.3–6.2)
ERYTHROCYTE [DISTWIDTH] IN BLOOD BY AUTOMATED COUNT: 12.9 % (ref 12.3–15.4)
ERYTHROCYTE [DISTWIDTH] IN BLOOD BY AUTOMATED COUNT: 13.2 % (ref 12.3–15.4)
ERYTHROCYTE [DISTWIDTH] IN BLOOD BY AUTOMATED COUNT: 14 % (ref 12.3–15.4)
ERYTHROCYTE [DISTWIDTH] IN BLOOD BY AUTOMATED COUNT: 14.2 % (ref 12.3–15.4)
ERYTHROCYTE [DISTWIDTH] IN BLOOD BY AUTOMATED COUNT: 14.3 % (ref 12.3–15.4)
GFR SERPL CREATININE-BSD FRML MDRD: 28 ML/MIN/1.73
GFR SERPL CREATININE-BSD FRML MDRD: 35 ML/MIN/1.73
GFR SERPL CREATININE-BSD FRML MDRD: 91 ML/MIN/1.73
GLOBULIN UR ELPH-MCNC: 2.6 GM/DL
GLOBULIN UR ELPH-MCNC: 2.9 GM/DL
GLUCOSE BLD-MCNC: 82 MG/DL (ref 65–99)
GLUCOSE BLDC GLUCOMTR-MCNC: 93 MG/DL (ref 70–130)
GLUCOSE BLDC GLUCOMTR-MCNC: 98 MG/DL (ref 70–130)
GLUCOSE SERPL-MCNC: 132 MG/DL (ref 65–99)
GLUCOSE SERPL-MCNC: 141 MG/DL (ref 65–99)
GLUCOSE UR STRIP-MCNC: NEGATIVE MG/DL
HBA1C MFR BLD: 4.8 % (ref 4.8–5.6)
HCO3 BLDA-SCNC: 27.7 MMOL/L (ref 22–26)
HCT VFR BLD AUTO: 35.6 % (ref 34–46.6)
HCT VFR BLD AUTO: 36 % (ref 34–46.6)
HCT VFR BLD AUTO: 36.9 % (ref 34–46.6)
HCT VFR BLD AUTO: 39.7 % (ref 34–46.6)
HCT VFR BLD AUTO: 44.2 % (ref 34–46.6)
HCT VFR BLDA CALC: 34 % (ref 38–51)
HDLC SERPL-MCNC: 42 MG/DL (ref 40–60)
HGB BLD-MCNC: 11.6 G/DL (ref 12–15.9)
HGB BLD-MCNC: 11.8 G/DL (ref 12–15.9)
HGB BLD-MCNC: 12 G/DL (ref 12–15.9)
HGB BLD-MCNC: 12.8 G/DL (ref 12–15.9)
HGB BLD-MCNC: 13.8 G/DL (ref 12–15.9)
HGB BLDA-MCNC: 11.6 G/DL (ref 12–17)
HGB UR QL STRIP.AUTO: ABNORMAL
HOLD SPECIMEN: NORMAL
HOLD SPECIMEN: NORMAL
HYALINE CASTS UR QL AUTO: ABNORMAL /LPF
IMM GRANULOCYTES # BLD AUTO: 0.03 10*3/MM3 (ref 0–0.05)
IMM GRANULOCYTES # BLD AUTO: 0.04 10*3/MM3 (ref 0–0.05)
IMM GRANULOCYTES # BLD AUTO: 0.06 10*3/MM3 (ref 0–0.05)
IMM GRANULOCYTES # BLD AUTO: 0.1 10*3/MM3 (ref 0–0.05)
IMM GRANULOCYTES NFR BLD AUTO: 0.4 % (ref 0–0.5)
IMM GRANULOCYTES NFR BLD AUTO: 0.5 % (ref 0–0.5)
IMM GRANULOCYTES NFR BLD AUTO: 0.7 % (ref 0–0.5)
IMM GRANULOCYTES NFR BLD AUTO: 0.7 % (ref 0–0.5)
INR PPP: 1 (ref 0.8–1.2)
KETONES UR QL STRIP: NEGATIVE
LDLC SERPL CALC-MCNC: 84 MG/DL (ref 0–100)
LDLC/HDLC SERPL: 1.99 {RATIO}
LEUKOCYTE ESTERASE UR QL STRIP.AUTO: ABNORMAL
LYMPHOCYTES # BLD AUTO: 1.17 10*3/MM3 (ref 0.7–3.1)
LYMPHOCYTES # BLD AUTO: 1.96 10*3/MM3 (ref 0.7–3.1)
LYMPHOCYTES # BLD AUTO: 3.53 10*3/MM3 (ref 0.7–3.1)
LYMPHOCYTES # BLD AUTO: 4.51 10*3/MM3 (ref 0.7–3.1)
LYMPHOCYTES NFR BLD AUTO: 13.3 % (ref 19.6–45.3)
LYMPHOCYTES NFR BLD AUTO: 30.3 % (ref 19.6–45.3)
LYMPHOCYTES NFR BLD AUTO: 32.9 % (ref 19.6–45.3)
LYMPHOCYTES NFR BLD AUTO: 38.2 % (ref 19.6–45.3)
MCH RBC QN AUTO: 29.7 PG (ref 26.6–33)
MCH RBC QN AUTO: 30.4 PG (ref 26.6–33)
MCH RBC QN AUTO: 30.5 PG (ref 26.6–33)
MCHC RBC AUTO-ENTMCNC: 31.2 G/DL (ref 31.5–35.7)
MCHC RBC AUTO-ENTMCNC: 32.2 G/DL (ref 31.5–35.7)
MCHC RBC AUTO-ENTMCNC: 32.5 G/DL (ref 31.5–35.7)
MCHC RBC AUTO-ENTMCNC: 32.6 G/DL (ref 31.5–35.7)
MCHC RBC AUTO-ENTMCNC: 32.8 G/DL (ref 31.5–35.7)
MCV RBC AUTO: 93 FL (ref 79–97)
MCV RBC AUTO: 93.2 FL (ref 79–97)
MCV RBC AUTO: 93.7 FL (ref 79–97)
MCV RBC AUTO: 94.7 FL (ref 79–97)
MCV RBC AUTO: 95.3 FL (ref 79–97)
MONOCYTES # BLD AUTO: 0.41 10*3/MM3 (ref 0.1–0.9)
MONOCYTES # BLD AUTO: 0.42 10*3/MM3 (ref 0.1–0.9)
MONOCYTES # BLD AUTO: 0.46 10*3/MM3 (ref 0.1–0.9)
MONOCYTES # BLD AUTO: 0.47 10*3/MM3 (ref 0.1–0.9)
MONOCYTES NFR BLD AUTO: 3.4 % (ref 5–12)
MONOCYTES NFR BLD AUTO: 4.6 % (ref 5–12)
MONOCYTES NFR BLD AUTO: 5.2 % (ref 5–12)
MONOCYTES NFR BLD AUTO: 6.3 % (ref 5–12)
NEUTROPHILS # BLD AUTO: 3.78 10*3/MM3 (ref 1.7–7)
NEUTROPHILS # BLD AUTO: 4.97 10*3/MM3 (ref 1.7–7)
NEUTROPHILS NFR BLD AUTO: 53.9 % (ref 42.7–76)
NEUTROPHILS NFR BLD AUTO: 58.4 % (ref 42.7–76)
NEUTROPHILS NFR BLD AUTO: 62.6 % (ref 42.7–76)
NEUTROPHILS NFR BLD AUTO: 7.1 10*3/MM3 (ref 1.7–7)
NEUTROPHILS NFR BLD AUTO: 8.58 10*3/MM3 (ref 1.7–7)
NEUTROPHILS NFR BLD AUTO: 80.6 % (ref 42.7–76)
NITRITE UR QL STRIP: POSITIVE
NRBC BLD AUTO-RTO: 0 /100 WBC (ref 0–0.2)
NT-PROBNP SERPL-MCNC: 1405 PG/ML (ref 0–1800)
PCO2 BLDA: 53.2 MM HG (ref 35–45)
PH BLDA: 7.33 PH UNITS (ref 7.35–7.6)
PH UR STRIP.AUTO: >=9 [PH] (ref 5–8)
PHOSPHATE SERPL-MCNC: 3 MG/DL (ref 2.5–4.5)
PLATELET # BLD AUTO: 113 10*3/MM3 (ref 140–450)
PLATELET # BLD AUTO: 117 10*3/MM3 (ref 140–450)
PLATELET # BLD AUTO: 136 10*3/MM3 (ref 140–450)
PLATELET # BLD AUTO: 149 10*3/MM3 (ref 140–450)
PLATELET # BLD AUTO: 166 10*3/MM3 (ref 140–450)
PMV BLD AUTO: 10 FL (ref 6–12)
PMV BLD AUTO: 10.5 FL (ref 6–12)
PMV BLD AUTO: 8.7 FL (ref 6–12)
PMV BLD AUTO: 8.8 FL (ref 6–12)
PMV BLD AUTO: 9.9 FL (ref 6–12)
PO2 BLDA: 14 MMHG (ref 80–105)
POTASSIUM BLD-SCNC: 3.7 MMOL/L (ref 3.5–5.2)
POTASSIUM BLDA-SCNC: 4.6 MMOL/L (ref 3.5–4.9)
POTASSIUM SERPL-SCNC: 2.6 MMOL/L (ref 3.5–5.2)
POTASSIUM SERPL-SCNC: 2.7 MMOL/L (ref 3.5–5.2)
PROT SERPL-MCNC: 5 G/DL (ref 6–8.5)
PROT SERPL-MCNC: 5.8 G/DL (ref 6–8.5)
PROT UR QL STRIP: ABNORMAL
PROTHROMBIN TIME: 12 SECONDS (ref 12.8–15.2)
RBC # BLD AUTO: 3.82 10*6/MM3 (ref 3.77–5.28)
RBC # BLD AUTO: 3.87 10*6/MM3 (ref 3.77–5.28)
RBC # BLD AUTO: 3.94 10*6/MM3 (ref 3.77–5.28)
RBC # BLD AUTO: 4.19 10*6/MM3 (ref 3.77–5.28)
RBC # BLD AUTO: 4.64 10*6/MM3 (ref 3.77–5.28)
RBC # UR: ABNORMAL /HPF
REF LAB TEST METHOD: ABNORMAL
SAO2 % BLDA: 14 % (ref 95–98)
SARS-COV-2 RNA PNL SPEC NAA+PROBE: NOT DETECTED
SODIUM BLD-SCNC: 136 MMOL/L (ref 136–145)
SODIUM BLDA-SCNC: 141 MMOL/L (ref 138–146)
SODIUM SERPL-SCNC: 155 MMOL/L (ref 136–145)
SODIUM SERPL-SCNC: 166 MMOL/L (ref 136–145)
SP GR UR STRIP: 1.02 (ref 1–1.03)
SQUAMOUS #/AREA URNS HPF: ABNORMAL /HPF
TRIGL SERPL-MCNC: 122 MG/DL (ref 0–150)
TROPONIN T SERPL-MCNC: 0.11 NG/ML (ref 0–0.03)
TROPONIN T SERPL-MCNC: <0.01 NG/ML (ref 0–0.03)
TSH SERPL DL<=0.05 MIU/L-ACNC: 4.88 UIU/ML (ref 0.27–4.2)
UROBILINOGEN UR QL STRIP: ABNORMAL
VLDLC SERPL-MCNC: 24.4 MG/DL
WBC # BLD AUTO: 13.72 10*3/MM3 (ref 3.4–10.8)
WBC # BLD AUTO: 8.81 10*3/MM3 (ref 3.4–10.8)
WBC # BLD AUTO: 8.99 10*3/MM3 (ref 3.4–10.8)
WBC NRBC COR # BLD: 6.47 10*3/MM3 (ref 3.4–10.8)
WBC NRBC COR # BLD: 9.23 10*3/MM3 (ref 3.4–10.8)
WBC UR QL AUTO: ABNORMAL /HPF
WHOLE BLOOD HOLD SPECIMEN: NORMAL
WHOLE BLOOD HOLD SPECIMEN: NORMAL
YEAST URNS QL MICRO: ABNORMAL /HPF

## 2020-01-01 PROCEDURE — 25010000002 CEFTRIAXONE PER 250 MG: Performed by: INTERNAL MEDICINE

## 2020-01-01 PROCEDURE — 83036 HEMOGLOBIN GLYCOSYLATED A1C: CPT | Performed by: INTERNAL MEDICINE

## 2020-01-01 PROCEDURE — 25010000002 LEVETRIRACETAM PER 10 MG: Performed by: INTERNAL MEDICINE

## 2020-01-01 PROCEDURE — 99217 PR OBSERVATION CARE DISCHARGE MANAGEMENT: CPT | Performed by: INTERNAL MEDICINE

## 2020-01-01 PROCEDURE — 87186 SC STD MICRODIL/AGAR DIL: CPT | Performed by: EMERGENCY MEDICINE

## 2020-01-01 PROCEDURE — P9612 CATHETERIZE FOR URINE SPEC: HCPCS

## 2020-01-01 PROCEDURE — 80061 LIPID PANEL: CPT | Performed by: INTERNAL MEDICINE

## 2020-01-01 PROCEDURE — 84450 TRANSFERASE (AST) (SGOT): CPT | Performed by: EMERGENCY MEDICINE

## 2020-01-01 PROCEDURE — G0378 HOSPITAL OBSERVATION PER HR: HCPCS

## 2020-01-01 PROCEDURE — 93005 ELECTROCARDIOGRAM TRACING: CPT | Performed by: EMERGENCY MEDICINE

## 2020-01-01 PROCEDURE — 70450 CT HEAD/BRAIN W/O DYE: CPT

## 2020-01-01 PROCEDURE — 99225 PR SBSQ OBSERVATION CARE/DAY 25 MINUTES: CPT | Performed by: INTERNAL MEDICINE

## 2020-01-01 PROCEDURE — 99285 EMERGENCY DEPT VISIT HI MDM: CPT

## 2020-01-01 PROCEDURE — 85610 PROTHROMBIN TIME: CPT

## 2020-01-01 PROCEDURE — 20610 DRAIN/INJ JOINT/BURSA W/O US: CPT | Performed by: PHYSICIAN ASSISTANT

## 2020-01-01 PROCEDURE — 80053 COMPREHEN METABOLIC PANEL: CPT | Performed by: INTERNAL MEDICINE

## 2020-01-01 PROCEDURE — 0 IOPAMIDOL PER 1 ML: Performed by: EMERGENCY MEDICINE

## 2020-01-01 PROCEDURE — 71045 X-RAY EXAM CHEST 1 VIEW: CPT

## 2020-01-01 PROCEDURE — 0042T HC CT CEREBRAL PERFUSION W/WO CONTRAST: CPT

## 2020-01-01 PROCEDURE — 82565 ASSAY OF CREATININE: CPT

## 2020-01-01 PROCEDURE — 36415 COLL VENOUS BLD VENIPUNCTURE: CPT

## 2020-01-01 PROCEDURE — 25810000003 DEXTROSE 5 % WITH KCL 20 MEQ 20-5 MEQ/L-% SOLUTION: Performed by: INTERNAL MEDICINE

## 2020-01-01 PROCEDURE — 99215 OFFICE O/P EST HI 40 MIN: CPT | Performed by: PSYCHIATRY & NEUROLOGY

## 2020-01-01 PROCEDURE — 84484 ASSAY OF TROPONIN QUANT: CPT | Performed by: EMERGENCY MEDICINE

## 2020-01-01 PROCEDURE — 85014 HEMATOCRIT: CPT

## 2020-01-01 PROCEDURE — 87635 SARS-COV-2 COVID-19 AMP PRB: CPT | Performed by: EMERGENCY MEDICINE

## 2020-01-01 PROCEDURE — 84295 ASSAY OF SERUM SODIUM: CPT

## 2020-01-01 PROCEDURE — 81001 URINALYSIS AUTO W/SCOPE: CPT | Performed by: EMERGENCY MEDICINE

## 2020-01-01 PROCEDURE — 99283 EMERGENCY DEPT VISIT LOW MDM: CPT

## 2020-01-01 PROCEDURE — 82962 GLUCOSE BLOOD TEST: CPT

## 2020-01-01 PROCEDURE — 85025 COMPLETE CBC W/AUTO DIFF WBC: CPT | Performed by: EMERGENCY MEDICINE

## 2020-01-01 PROCEDURE — 82803 BLOOD GASES ANY COMBINATION: CPT

## 2020-01-01 PROCEDURE — 87077 CULTURE AEROBIC IDENTIFY: CPT | Performed by: EMERGENCY MEDICINE

## 2020-01-01 PROCEDURE — 82947 ASSAY GLUCOSE BLOOD QUANT: CPT

## 2020-01-01 PROCEDURE — 82330 ASSAY OF CALCIUM: CPT

## 2020-01-01 PROCEDURE — 99236 HOSP IP/OBS SAME DATE HI 85: CPT | Performed by: INTERNAL MEDICINE

## 2020-01-01 PROCEDURE — 97166 OT EVAL MOD COMPLEX 45 MIN: CPT

## 2020-01-01 PROCEDURE — 84132 ASSAY OF SERUM POTASSIUM: CPT

## 2020-01-01 PROCEDURE — 85025 COMPLETE CBC W/AUTO DIFF WBC: CPT | Performed by: INTERNAL MEDICINE

## 2020-01-01 PROCEDURE — 85730 THROMBOPLASTIN TIME PARTIAL: CPT | Performed by: EMERGENCY MEDICINE

## 2020-01-01 PROCEDURE — 84460 ALANINE AMINO (ALT) (SGPT): CPT | Performed by: EMERGENCY MEDICINE

## 2020-01-01 PROCEDURE — 85025 COMPLETE CBC W/AUTO DIFF WBC: CPT

## 2020-01-01 PROCEDURE — 87086 URINE CULTURE/COLONY COUNT: CPT | Performed by: EMERGENCY MEDICINE

## 2020-01-01 PROCEDURE — 97161 PT EVAL LOW COMPLEX 20 MIN: CPT

## 2020-01-01 PROCEDURE — 80069 RENAL FUNCTION PANEL: CPT | Performed by: INTERNAL MEDICINE

## 2020-01-01 PROCEDURE — 96375 TX/PRO/DX INJ NEW DRUG ADDON: CPT

## 2020-01-01 PROCEDURE — 84443 ASSAY THYROID STIM HORMONE: CPT | Performed by: INTERNAL MEDICINE

## 2020-01-01 PROCEDURE — 85027 COMPLETE CBC AUTOMATED: CPT | Performed by: INTERNAL MEDICINE

## 2020-01-01 PROCEDURE — 25010000002 AMIODARONE IN DEXTROSE 5% 150-4.21 MG/100ML-% SOLUTION: Performed by: EMERGENCY MEDICINE

## 2020-01-01 PROCEDURE — 83880 ASSAY OF NATRIURETIC PEPTIDE: CPT | Performed by: EMERGENCY MEDICINE

## 2020-01-01 PROCEDURE — 80053 COMPREHEN METABOLIC PANEL: CPT | Performed by: EMERGENCY MEDICINE

## 2020-01-01 PROCEDURE — 92610 EVALUATE SWALLOWING FUNCTION: CPT

## 2020-01-01 PROCEDURE — 96365 THER/PROPH/DIAG IV INF INIT: CPT

## 2020-01-01 RX ORDER — ATORVASTATIN CALCIUM 20 MG/1
20 TABLET, FILM COATED ORAL NIGHTLY
Qty: 30 TABLET | Refills: 0 | Status: SHIPPED | OUTPATIENT
Start: 2020-01-01 | End: 2020-01-01 | Stop reason: HOSPADM

## 2020-01-01 RX ORDER — TOLTERODINE 4 MG/1
4 CAPSULE, EXTENDED RELEASE ORAL DAILY
COMMUNITY
Start: 2020-01-01 | End: 2020-01-01 | Stop reason: HOSPADM

## 2020-01-01 RX ORDER — ATORVASTATIN CALCIUM 40 MG/1
80 TABLET, FILM COATED ORAL NIGHTLY
Status: DISCONTINUED | OUTPATIENT
Start: 2020-01-01 | End: 2020-01-01

## 2020-01-01 RX ORDER — LORAZEPAM 2 MG/ML
0.25 INJECTION INTRAMUSCULAR EVERY 6 HOURS PRN
Start: 2020-01-01 | End: 2020-08-23

## 2020-01-01 RX ORDER — NITROFURANTOIN 25; 75 MG/1; MG/1
100 CAPSULE ORAL DAILY
COMMUNITY
End: 2020-01-01 | Stop reason: HOSPADM

## 2020-01-01 RX ORDER — SODIUM CHLORIDE 0.9 % (FLUSH) 0.9 %
10 SYRINGE (ML) INJECTION EVERY 12 HOURS SCHEDULED
Status: DISCONTINUED | OUTPATIENT
Start: 2020-01-01 | End: 2020-01-01 | Stop reason: HOSPADM

## 2020-01-01 RX ORDER — ASPIRIN 81 MG/1
81 TABLET, CHEWABLE ORAL DAILY
Status: DISCONTINUED | OUTPATIENT
Start: 2020-01-01 | End: 2020-01-01 | Stop reason: HOSPADM

## 2020-01-01 RX ORDER — ASPIRIN 81 MG/1
81 TABLET, CHEWABLE ORAL DAILY
Qty: 30 TABLET | Refills: 0 | Status: SHIPPED | OUTPATIENT
Start: 2020-01-01 | End: 2020-01-01 | Stop reason: HOSPADM

## 2020-01-01 RX ORDER — CEFDINIR 125 MG/5ML
250 POWDER, FOR SUSPENSION ORAL EVERY 12 HOURS SCHEDULED
Status: DISCONTINUED | OUTPATIENT
Start: 2020-01-01 | End: 2020-01-01 | Stop reason: HOSPADM

## 2020-01-01 RX ORDER — LOSARTAN POTASSIUM 50 MG/1
50 TABLET ORAL DAILY
COMMUNITY
Start: 2020-01-01 | End: 2020-01-01 | Stop reason: HOSPADM

## 2020-01-01 RX ORDER — CEFDINIR 125 MG/5ML
250 POWDER, FOR SUSPENSION ORAL EVERY 12 HOURS SCHEDULED
Qty: 60 ML | Refills: 0 | Status: SHIPPED | OUTPATIENT
Start: 2020-01-01 | End: 2020-01-01

## 2020-01-01 RX ORDER — ASPIRIN 300 MG/1
300 SUPPOSITORY RECTAL DAILY
Status: DISCONTINUED | OUTPATIENT
Start: 2020-01-01 | End: 2020-01-01 | Stop reason: HOSPADM

## 2020-01-01 RX ORDER — LORAZEPAM 2 MG/ML
0.25 INJECTION INTRAMUSCULAR EVERY 6 HOURS PRN
Status: DISCONTINUED | OUTPATIENT
Start: 2020-01-01 | End: 2020-01-01 | Stop reason: HOSPADM

## 2020-01-01 RX ORDER — LEVETIRACETAM 250 MG/1
250 TABLET ORAL EVERY 12 HOURS SCHEDULED
Status: DISCONTINUED | OUTPATIENT
Start: 2020-01-01 | End: 2020-01-01 | Stop reason: HOSPADM

## 2020-01-01 RX ORDER — SENNA PLUS 8.6 MG/1
2 TABLET ORAL NIGHTLY
COMMUNITY
End: 2020-01-01 | Stop reason: HOSPADM

## 2020-01-01 RX ORDER — ACETAMINOPHEN 650 MG/1
650 SUPPOSITORY RECTAL EVERY 6 HOURS PRN
Status: DISCONTINUED | OUTPATIENT
Start: 2020-01-01 | End: 2020-01-01 | Stop reason: HOSPADM

## 2020-01-01 RX ORDER — ACETAMINOPHEN 650 MG/1
650 SUPPOSITORY RECTAL EVERY 6 HOURS PRN
Start: 2020-01-01

## 2020-01-01 RX ORDER — MORPHINE SULFATE 2 MG/ML
1 INJECTION, SOLUTION INTRAMUSCULAR; INTRAVENOUS EVERY 4 HOURS PRN
Status: DISCONTINUED | OUTPATIENT
Start: 2020-01-01 | End: 2020-01-01 | Stop reason: HOSPADM

## 2020-01-01 RX ORDER — LEVETIRACETAM 100 MG/ML
250 SOLUTION ORAL 2 TIMES DAILY
COMMUNITY
End: 2020-01-01 | Stop reason: HOSPADM

## 2020-01-01 RX ORDER — OMEPRAZOLE 20 MG/1
20 CAPSULE, DELAYED RELEASE ORAL DAILY
COMMUNITY
Start: 2020-01-01 | End: 2020-01-01 | Stop reason: HOSPADM

## 2020-01-01 RX ORDER — POTASSIUM CHLORIDE, DEXTROSE MONOHYDRATE 150; 5 MG/100ML; G/100ML
75 INJECTION, SOLUTION INTRAVENOUS CONTINUOUS
Status: DISPENSED | OUTPATIENT
Start: 2020-01-01 | End: 2020-01-01

## 2020-01-01 RX ORDER — LEVOTHYROXINE SODIUM 0.05 MG/1
50 TABLET ORAL DAILY
Status: DISCONTINUED | OUTPATIENT
Start: 2020-01-01 | End: 2020-01-01 | Stop reason: HOSPADM

## 2020-01-01 RX ORDER — METOPROLOL SUCCINATE 25 MG/1
TABLET, EXTENDED RELEASE ORAL
COMMUNITY
Start: 2020-01-01 | End: 2020-01-01 | Stop reason: HOSPADM

## 2020-01-01 RX ORDER — SODIUM CHLORIDE 0.9 % (FLUSH) 0.9 %
10 SYRINGE (ML) INJECTION AS NEEDED
Status: DISCONTINUED | OUTPATIENT
Start: 2020-01-01 | End: 2020-01-01 | Stop reason: HOSPADM

## 2020-01-01 RX ORDER — MORPHINE SULFATE 2 MG/ML
1 INJECTION, SOLUTION INTRAMUSCULAR; INTRAVENOUS EVERY 4 HOURS PRN
Start: 2020-01-01 | End: 2020-08-19

## 2020-01-01 RX ORDER — CHLORHEXIDINE GLUCONATE 0.12 MG/ML
30 RINSE ORAL 2 TIMES DAILY
COMMUNITY
End: 2020-01-01 | Stop reason: HOSPADM

## 2020-01-01 RX ORDER — ATORVASTATIN CALCIUM 20 MG/1
20 TABLET, FILM COATED ORAL NIGHTLY
Status: DISCONTINUED | OUTPATIENT
Start: 2020-01-01 | End: 2020-01-01 | Stop reason: HOSPADM

## 2020-01-01 RX ADMIN — SODIUM CHLORIDE 1000 ML: 9 INJECTION, SOLUTION INTRAVENOUS at 04:58

## 2020-01-01 RX ADMIN — LEVETIRACETAM 250 MG: 250 TABLET, FILM COATED ORAL at 21:21

## 2020-01-01 RX ADMIN — Medication 250 MG: at 10:02

## 2020-01-01 RX ADMIN — SODIUM CHLORIDE 1000 ML: 9 INJECTION, SOLUTION INTRAVENOUS at 03:18

## 2020-01-01 RX ADMIN — ASPIRIN 300 MG: 300 SUPPOSITORY RECTAL at 21:02

## 2020-01-01 RX ADMIN — SODIUM CHLORIDE, PRESERVATIVE FREE 10 ML: 5 INJECTION INTRAVENOUS at 21:21

## 2020-01-01 RX ADMIN — IOPAMIDOL 40 ML: 755 INJECTION, SOLUTION INTRAVENOUS at 15:11

## 2020-01-01 RX ADMIN — AMIODARONE HYDROCHLORIDE 150 MG: 1.5 INJECTION, SOLUTION INTRAVENOUS at 04:20

## 2020-01-01 RX ADMIN — LEVOTHYROXINE SODIUM 50 MCG: 50 TABLET ORAL at 05:14

## 2020-01-01 RX ADMIN — LEVETIRACETAM 250 MG: 100 INJECTION, SOLUTION INTRAVENOUS at 21:06

## 2020-01-01 RX ADMIN — LEVETIRACETAM 250 MG: 100 INJECTION, SOLUTION INTRAVENOUS at 11:03

## 2020-01-01 RX ADMIN — ATORVASTATIN CALCIUM 20 MG: 20 TABLET, FILM COATED ORAL at 21:21

## 2020-01-01 RX ADMIN — CEFTRIAXONE SODIUM 1 G: 1 INJECTION, POWDER, FOR SOLUTION INTRAMUSCULAR; INTRAVENOUS at 13:39

## 2020-01-01 RX ADMIN — LEVETIRACETAM 250 MG: 250 TABLET, FILM COATED ORAL at 10:02

## 2020-01-01 RX ADMIN — POTASSIUM CHLORIDE AND DEXTROSE MONOHYDRATE 75 ML/HR: 150; 5 INJECTION, SOLUTION INTRAVENOUS at 09:32

## 2020-01-01 RX ADMIN — CEFTRIAXONE SODIUM 1 G: 1 INJECTION, POWDER, FOR SOLUTION INTRAMUSCULAR; INTRAVENOUS at 22:43

## 2020-01-01 RX ADMIN — POTASSIUM CHLORIDE AND DEXTROSE MONOHYDRATE 75 ML/HR: 150; 5 INJECTION, SOLUTION INTRAVENOUS at 21:34

## 2020-03-05 PROBLEM — G45.9 TIA (TRANSIENT ISCHEMIC ATTACK): Status: ACTIVE | Noted: 2020-01-01

## 2020-03-05 PROBLEM — I63.9 ACUTE CVA (CEREBROVASCULAR ACCIDENT) (HCC): Status: ACTIVE | Noted: 2020-01-01

## 2020-03-05 PROBLEM — N39.0 ACUTE UTI (URINARY TRACT INFECTION): Status: ACTIVE | Noted: 2020-01-01

## 2020-03-05 NOTE — ED PROVIDER NOTES
Subjective   This patient is a 89-year-old female who comes in via EMS for concerns of stroke.  Patient was brought under code 19 status with last known well at approximately 10 AM Eastern time.  Patient arrived here approximately 4 hours and 45 minutes later for evaluation.  I met the patient upon transfer from EMS bed to CT scanner.  Patient is nonverbal and does not provide any history.  All history is provided by EMS.  Evidently, the patient at baseline does have some dementia but does speak and has no dysarthria or facial droop.  At approximately 10 AM Eastern time today, family noticed left-sided facial droop with the patient's tongue hanging out to the left.  She evidently had inability to speak at that time as well.  We do not know about the patient's ambulatory status.  No family is here at the bedside.  I reviewed the past medical documentation indicated an old medication list that does not appear to show anticoagulation.  Patient not a TPA candidate secondary to arrival here beyond 4 and half hours after last known well.  In summary, we have a 89-year-old female with a history of dementia who evidently developed left-sided facial droop and inability to speak at 10 AM Eastern time he was here for evaluation, treatment and management.    Past medical history:  Arthritis, diverticulitis, thyroid disease, lymphoma, hypertension, Alzheimer's disease.    Past family history:  CAD, hypertension, cancer.      History provided by:  EMS personnel and relative  History limited by:  Patient nonverbal  Neurologic Problem   The patient's primary symptoms include an altered mental status and weakness. The patient's pertinent negatives include no syncope. This is a new problem. The current episode started today. The neurological problem developed suddenly. The last time the patient was known to be well was 3/5/2020 10:00 AM.  The problem is unchanged. There was left-sided focality noted. Pertinent negatives include no  chest pain, confusion, fatigue, fever, headaches, light-headedness, nausea, neck pain, palpitations, shortness of breath or vomiting. Her past medical history is significant for dementia. There is no history of a bleeding disorder, a clotting disorder, a CVA or seizures.       Review of Systems   Unable to perform ROS: Patient nonverbal   Constitutional: Negative for chills, fatigue, fever and unexpected weight change.   HENT: Negative for dental problem, ear pain, hearing loss and sinus pressure.    Eyes: Negative for pain and visual disturbance.   Respiratory: Negative for chest tightness and shortness of breath.    Cardiovascular: Negative for chest pain, palpitations and leg swelling.   Gastrointestinal: Negative for diarrhea, nausea and vomiting.   Genitourinary: Negative for difficulty urinating, dyspareunia, hematuria and urgency.   Musculoskeletal: Negative for myalgias, neck pain and neck stiffness.   Neurological: Positive for facial asymmetry, speech difficulty and weakness. Negative for seizures, syncope, light-headedness and headaches.        EMS reports that she is non-verbal.   Psychiatric/Behavioral: Negative for confusion.       Past Medical History:   Diagnosis Date   • Alzheimer disease (CMS/HCC)    • Arthritis    • Closed fracture of neck of right femur, Acute 2/12/2018   • Diverticulosis    • Hypertension    • Pathologic fracture of clavicle, left, initial encounter    • Skin cancer     lymphoma   • Thyroid disease        Allergies   Allergen Reactions   • Sulfa Antibiotics Hives       Past Surgical History:   Procedure Laterality Date   • CHOLECYSTECTOMY     • HIP HEMIARTHROPLASTY Right 2/12/2018    Procedure: HIP HEMIARTHROPLASTY;  Surgeon: Rohith Andersen MD;  Location: [facility];  Service:    • HYSTERECTOMY     • OVARIAN CYST REMOVAL         Family History   Problem Relation Age of Onset   • Heart disease Other    • Hypertension Other    • Cancer Other        Social History      Socioeconomic History   • Marital status:      Spouse name: Not on file   • Number of children: Not on file   • Years of education: Not on file   • Highest education level: Not on file   Tobacco Use   • Smoking status: Never Smoker   • Smokeless tobacco: Never Used   Substance and Sexual Activity   • Alcohol use: No   • Drug use: No   • Sexual activity: Defer         Objective   Physical Exam   Constitutional: She appears well-developed. No distress.   HENT:   Head: Normocephalic and atraumatic.   Right Ear: External ear normal.   Left Ear: External ear normal.   Nose: Nose normal.   Dry mucous membranes.   Eyes: Pupils are equal, round, and reactive to light. EOM are normal. Right eye exhibits no discharge. Left eye exhibits no discharge.   Neck: Normal range of motion. Neck supple. No JVD present.   Cardiovascular: Normal rate, regular rhythm and normal heart sounds. Exam reveals no gallop and no friction rub.   Pulmonary/Chest: Effort normal and breath sounds normal. She has no wheezes. She has no rales. She exhibits no tenderness.   Abdominal: Soft. Bowel sounds are normal. She exhibits no distension and no mass. There is no tenderness. There is no rebound and no guarding.   No signs of acute abdomen.  No pain at McBurney's point.  No pulsatile abdominal mass   Musculoskeletal: Normal range of motion. She exhibits no edema.   Lymphadenopathy:     She has no cervical adenopathy.   Neurological: She is alert.   Patient with ability to move the right arm against resistance and 4 out of 5 fashion at the shoulder and elbow joint.  Unable or unwilling to move left arm at the shoulder, elbow, or wrist for me during examination at the bedside just after arrival.  Patient moves lower extremities at hip and knee minimally in response to painful stimuli only but not in response to verbal request.  Unable to assess sensory exam secondary to patient's status.  Patient nonverbal with obvious left-sided facial  droop.   Skin: Skin is warm and dry. Capillary refill takes 2 to 3 seconds. She is not diaphoretic. No erythema. No pallor.   Nursing note and vitals reviewed.      Procedures         ED Course  ED Course as of Mar 05 1644   Thu Mar 05, 2020   1453 The patient has been exhibiting symptoms for 5 hours. She is not a tPA candidate.    [BS]   1501 I discussed CT of the head personally with radiologist Dr. Arnie Dunaway at approximately 245 or 2:50 PM Eastern time.  He saw no signs of acute intracranial hemorrhage but did find evidence of old left-sided stroke with no right-sided findings.  Labs and CT perfusion of the head pending.  As previously noted, patient not a TPA candidate due to last known normal approximately 5 hours ago.    [RITA]   1502 Patient is currently nonverbal but there is no family here to help delineate whether this is new or chronic for the patient.  I plan to get labs on the patient including AST, ALT, troponin, INR, CBC and Chem-8.  I also plan to get EKG and chest x-ray.  We will admit the patient to the hospitalist and bring neurology in as well.  Stroke neuro interventional team is currently the bedside.  They are assessing NIH stroke scale and will help with management of the patient.  I have already consulted and discussed the case in person with them at the bedside.  We will take good care of the patient.  I plan to discuss with family should they arrive.    [RITA]   1543 Dr. Douglas is bedside re-evaluating the patient and updating her on the results on the studies.    [BS]   1559 Dr. Douglas has paged the hospitalist.    [BS]   1602 Dr. Douglas has discussed the case with Dr. Rucker, neurology.    [BS]   1604 Dr. Douglas has discussed the case with Dr. Mancini, hospitalist.    [BS]   1606 I went back to the patient's bedside and discussed the case with her sister and daughter at great length.  They shared several important elements of the patient's past medical history.  Evidently, the patient  was actually not sitting up today when the symptoms started, but rather the patient was found at 10:00 in bed with the symptoms as the patient was being awakened due to having a doctor's appointment later today.  The patient only speaks transiently throughout the day and does have severe dementia.  She is a DNR.  Family does not want any invasive procedure or aggressive treatment here.  They certainly understand the need for admission.  They have been extremely appreciative for care.    [RITA]   6743 I shared all the patient's findings including CT of the head and CT perfusion as well as lab findings.  I discussed the case with our neurologist and also with hospitalist.  Patient will be admitted with a diagnosis that includes acute CVA, left-sided facial droop, left upper extremity weakness/paralysis.  Patient will be brought into telemetry under the care of the hospitalist with neurology consultation.  Further care per admitting and consultative teams.  Family happy with care.  On reexamination patient continues to be awake but with ongoing facial droop and weakness.    [RITA]      ED Course User Index  [BS] Laron Crowley  [RITA] Jorge Luis Douglas MD     Recent Results (from the past 24 hour(s))   POC Protime / INR    Collection Time: 03/05/20  3:06 PM   Result Value Ref Range    Protime 12.0 (L) 12.8 - 15.2 seconds    INR 1.0 0.8 - 1.2   POC Creatinine    Collection Time: 03/05/20  3:06 PM   Result Value Ref Range    Creatinine 0.80 0.60 - 1.30 mg/dL   POC Surgery Labs    Collection Time: 03/05/20  3:07 PM   Result Value Ref Range    Ionized Calcium 1.30 1.20 - 1.32 mmol/L    POC Potassium 4.6 3.5 - 4.9 mmol/L    Sodium 141 138 - 146 mmol/L    Total CO2 29 24 - 29 mmol/L    Hemoglobin 11.6 (L) 12.0 - 17.0 g/dL    Hematocrit 34 (L) 38 - 51 %    pCO2, Arterial 53.2 (H) 35 - 45 mm Hg    pO2, Arterial 14 (L) 80 - 105 mmHg    Base Excess 2.0000 -5 - 5 mmol/L    O2 Saturation, Arterial 14 (L) 95 - 98 %    pH, Arterial 7.33 (L)  7.35 - 7.6 pH units    HCO3, Arterial 27.7 (H) 22 - 26 mmol/L   Troponin    Collection Time: 03/05/20  3:09 PM   Result Value Ref Range    Troponin T <0.010 0.000 - 0.030 ng/mL   aPTT    Collection Time: 03/05/20  3:09 PM   Result Value Ref Range    PTT 31.8 24.0 - 37.0 seconds   AST    Collection Time: 03/05/20  3:09 PM   Result Value Ref Range    AST (SGOT) 18 1 - 32 U/L   ALT    Collection Time: 03/05/20  3:09 PM   Result Value Ref Range    ALT (SGPT) 10 1 - 33 U/L   Light Blue Top    Collection Time: 03/05/20  3:09 PM   Result Value Ref Range    Extra Tube hold for add-on    Green Top (Gel)    Collection Time: 03/05/20  3:09 PM   Result Value Ref Range    Extra Tube Hold for add-ons.    Lavender Top    Collection Time: 03/05/20  3:09 PM   Result Value Ref Range    Extra Tube hold for add-on    Gold Top - SST    Collection Time: 03/05/20  3:09 PM   Result Value Ref Range    Extra Tube Hold for add-ons.    CBC Auto Differential    Collection Time: 03/05/20  3:09 PM   Result Value Ref Range    WBC 9.23 3.40 - 10.80 10*3/mm3    RBC 3.94 3.77 - 5.28 10*6/mm3    Hemoglobin 12.0 12.0 - 15.9 g/dL    Hematocrit 36.9 34.0 - 46.6 %    MCV 93.7 79.0 - 97.0 fL    MCH 30.5 26.6 - 33.0 pg    MCHC 32.5 31.5 - 35.7 g/dL    RDW 13.2 12.3 - 15.4 %    RDW-SD 45.1 37.0 - 54.0 fl    MPV 8.7 6.0 - 12.0 fL    Platelets 166 140 - 450 10*3/mm3    Neutrophil % 53.9 42.7 - 76.0 %    Lymphocyte % 38.2 19.6 - 45.3 %    Monocyte % 4.6 (L) 5.0 - 12.0 %    Eosinophil % 2.6 0.3 - 6.2 %    Basophil % 0.3 0.0 - 1.5 %    Immature Grans % 0.4 0.0 - 0.5 %    Neutrophils, Absolute 4.97 1.70 - 7.00 10*3/mm3    Lymphocytes, Absolute 3.53 (H) 0.70 - 3.10 10*3/mm3    Monocytes, Absolute 0.42 0.10 - 0.90 10*3/mm3    Eosinophils, Absolute 0.24 0.00 - 0.40 10*3/mm3    Basophils, Absolute 0.03 0.00 - 0.20 10*3/mm3    Immature Grans, Absolute 0.04 0.00 - 0.05 10*3/mm3    nRBC 0.0 0.0 - 0.2 /100 WBC     Note: In addition to lab results from this visit, the labs  listed above may include labs taken at another facility or during a different encounter within the last 24 hours. Please correlate lab times with ED admission and discharge times for further clarification of the services performed during this visit.    XR Chest 1 View   Preliminary Result   Underlying chronic and emphysematous changes seen within the   lung fields bilaterally with no evidence of acute parenchymal disease.       D:  03/05/2020   E:  03/05/2020                  CT Cerebral Perfusion With & Without Contrast   Preliminary Result   No large perfusion abnormality identified to suggest   evidence of reversible ischemia.       D:  03/05/2020   E:  03/05/2020                              CT Head Without Contrast Stroke Protocol   Preliminary Result   Chronic appearing changes of the aging brain stable from   05/19/2018. No new intracranial disease is seen.       NOTE:  The exam time is shown as 2:51 PM. The study was reviewed on the   CT scan monitor and discussed with Dr. Douglas by phone at 2:57 PM.       D:  03/05/2020   E:  03/05/2020                    Vitals:    03/05/20 1545 03/05/20 1600 03/05/20 1615 03/05/20 1630   BP:  129/70  113/66   Pulse: 75 74 80 71   Resp:       Temp:       TempSrc:       SpO2: 98% 92% 100% 98%   Weight:       Height:         Medications   sodium chloride 0.9 % flush 10 mL (has no administration in time range)   iopamidol (ISOVUE-370) 76 % injection 50 mL (40 mL Intravenous Given 3/5/20 1511)     ECG/EMG Results (last 24 hours)     ** No results found for the last 24 hours. **        ECG 12 Lead   Final Result   Test Reason : Acute Stroke Protocol (onset < 12 hrs)   Blood Pressure : **/** mmHG   Vent. Rate : 077 BPM     Atrial Rate : 077 BPM      P-R Int : 224 ms          QRS Dur : 088 ms       QT Int : 418 ms       P-R-T Axes : 060 -39 149 degrees      QTc Int : 473 ms      Sinus rhythm with 1st degree AV block with occasional premature    ventricular   complexes and fusion  complexes   Left axis deviation   Low voltage QRS   Inferior infarct , age undetermined   Abnormal ECG   When compared with ECG of 29-MAY-2018 16:40,   Significant changes have occurred   Confirmed by JORGE LUIS DOUGLAS MD ([id number]) on 3/5/2020 4:40:15 PM      Referred By:  EDMD           Confirmed By:JORGE LUIS DOUGLAS MD                                                   [facility]    Final diagnoses:   Acute CVA (cerebrovascular accident) (CMS/HCC)   Facial droop   Weakness of left upper extremity   Anemia, unspecified type       Documentation assistance provided by qian Crowley.  Information recorded by the scribe was done at my direction and has been verified and validated by me.     Laron Crowley  03/05/20 1500       Jorge Luis Douglas MD  03/05/20 [id number]

## 2020-03-05 NOTE — H&P
[facility] Medicine Services  HISTORY AND PHYSICAL    Patient Name: Silva Ruano  : 1925  MRN: [id number]  Primary Care Physician: Chuy Galan MD  Date of admission: 3/5/2020      Subjective   Subjective     Chief Complaint: Left-sided weakness, left facial droop, aphasia    HPI:  Silva Ruano is a 89 y.o. female with history of progressively worsening dementia, well-controlled hypertension, hypothyroidism, seizure disorder on Keppra.  Patient presents from home with left-sided facial droop, weakness and inability to speak, last known normal was at 10 AM.  Daughter and caregiver are at bedside, they mention that patient had a very good day yesterday, she did not have any complaints until early this morning when they attempted to wake up for her doctor's appointment.  They mentioned that patient was drifting more to the left, she had left facial droop and was generally weak hence her presentation to the ED.  On arrival here CT head, CT cerebral perfusion were done both were unrevealing, labs did show hemoglobin 11.6, platelets 166 potassium 3.6.  Neurology was consulted and hospital medicine was asked to admit.  At the time of my evaluation, patient was lethargic, awoke easily but drifted back to sleep, she did continue to have l facial asymmetry.  Family denies any recent fevers, chills, nausea or vomiting.    Review of Systems   Unable to obtain secondary to mental status    Personal History     Past Medical History:   Diagnosis Date   • Alzheimer disease (CMS/HCC)    • Arthritis    • Closed fracture of neck of right femur, Acute 2018   • Diverticulosis    • Hypertension    • Pathologic fracture of clavicle, left, initial encounter    • Skin cancer     lymphoma   • Thyroid disease        Past Surgical History:   Procedure Laterality Date   • CHOLECYSTECTOMY     • HIP HEMIARTHROPLASTY Right 2018    Procedure: HIP HEMIARTHROPLASTY;  Surgeon: Rohith  Dada Andersen MD;  Location: [facility];  Service:    • HYSTERECTOMY     • OVARIAN CYST REMOVAL         Family History: family history includes Cancer in an other family member; Heart disease in an other family member; Hypertension in an other family member. Otherwise pertinent FHx was reviewed and unremarkable.     Social History:  reports that she has never smoked. She has never used smokeless tobacco. She reports that she does not drink alcohol or use drugs.  Social History     Social History Narrative   • Not on file       Medications:  Available home medication information reviewed.    (Not in a hospital admission)    Allergies   Allergen Reactions   • Sulfa Antibiotics Hives       Objective   Objective     Vital Signs:   Temp:  [95.6 °F (35.3 °C)] 95.6 °F (35.3 °C)  Heart Rate:  [70-80] 71  Resp:  [16] 16  BP: (113-140)/(66-70) 113/66   Total (NIH Stroke Scale): 17    Physical Exam   Constitutional: Chronically ill-appearing elderly lady in awake, alert  Eyes: PERRLA, sclerae anicteric, no conjunctival injection  HENT: NCAT, mucous membranes dry  Neck: Supple, no thyromegaly, no lymphadenopathy, trachea midline  Respiratory: Clear to auscultation bilaterally, nonlabored respirations   Cardiovascular: RRR, no murmurs, rubs, or gallops, palpable pedal pulses bilaterally  Gastrointestinal: Positive bowel sounds, soft, nontender, nondistended  Musculoskeletal: No bilateral ankle edema, no clubbing or cyanosis to extremities  Psychiatric: Appropriate affect, cooperative  Neurologic: Confused, generalized weakness, left facial asymmetry  Skin: No rashes    Results Reviewed:  I have personally reviewed current lab and radiology data.    Results from last 7 days   Lab Units 03/05/20  1509  03/05/20  1506   WBC 10*3/mm3 9.23  --   --    HEMOGLOBIN g/dL 12.0  --   --    HEMOGLOBIN, POC   --    < >  --    HEMATOCRIT % 36.9  --   --    HEMATOCRIT POC   --    < >  --    PLATELETS 10*3/mm3 166  --   --    INR   --   --   1.0    < > = values in this interval not displayed.     Results from last 7 days   Lab Units 03/05/20  1509 03/05/20  1506   CREATININE mg/dL  --  0.80   ALT (SGPT) U/L 10  --    AST (SGOT) U/L 18  --    TROPONIN T ng/mL <0.010  --      Estimated Creatinine Clearance: 30.8 mL/min (by C-G formula based on SCr of 0.8 mg/dL).  Brief Urine Lab Results  (Last result in the past 365 days)      Color   Clarity   Blood   Leuk Est   Nitrite   Protein   CREAT   Urine HCG        03/05/20 1613 Dark Yellow Turbid Small (1+) Large (3+) Positive 30 mg/dL (1+)             Imaging Results (Last 24 Hours)     Procedure Component Value Units Date/Time    XR Chest 1 View [[id number]] Collected:  03/05/20 1539     Updated:  03/05/20 1544    Narrative:       EXAMINATION: XR CHEST 1 VW-03/05/2020:      INDICATION: Acute Stroke Protocol (onset < 12 hrs).      COMPARISON: NONE.     FINDINGS: Portable chest reveals cardiac and mediastinal silhouettes  within normal limits. Underlying chronic and emphysematous changes seen  within the lung fields bilaterally with mild elevation seen of the right  hemidiaphragm. Scarring at the lung apices. Degenerative changes seen  within the spine. Vascular calcification seen within the thoracic aorta.  There are small bilateral pleural effusions or pleural thickening at the  lung bases. The findings are essentially stable and unchanged when  compared to the prior study.           Impression:       Underlying chronic and emphysematous changes seen within the  lung fields bilaterally with no evidence of acute parenchymal disease.     D:  03/05/2020  E:  03/05/2020             CT Cerebral Perfusion With & Without Contrast [[id number]] Collected:  03/05/20 1532     Updated:  03/05/20 1542    Narrative:       EXAMINATION: CT CEREBRAL PERFUSION W WO CONTRAST-03/05/2020:      INDICATION: Stroke, left-sided facial droop.     TECHNIQUE: Cerebral perfusion analysis was performed using computed  tomography with  contrast administration, including post processing of  parametric maps with determination of cerebral blood flow, cerebral  blood volume, and mean transit time.      The radiation dose reduction device was turned on for each scan per the  ALARA (As Low as Reasonably Achievable) protocol.     COMPARISON: NONE.     FINDINGS: There is symmetry identified in the cerebral blood flow  perfusion maps. There is also symmetry identified within the cerebral  blood volume perfusion maps. No evidence of defect identified. There is  no increased mean transient time or time to drain identified. There is a  normal perfusion analysis identified.       Impression:       No large perfusion abnormality identified to suggest  evidence of reversible ischemia.     D:  03/05/2020  E:  03/05/2020                      CT Head Without Contrast Stroke Protocol [[id number]] Collected:  03/05/20 1456     Updated:  03/05/20 1507    Narrative:       EXAMINATION: CT HEAD WO CONTRAST, STROKE PROTOCOL-03/05/2020:      INDICATION: Stroke, left facial droop.     TECHNIQUE: 5 mm unenhanced images through the brain.     The radiation dose reduction device was turned on for each scan per the  ALARA (As Low as Reasonably Achievable) protocol.     COMPARISON: 05/19/2018 head CT scan.     FINDINGS: There is expected advanced generalized cerebral atrophy given  the patient's age. Chronic appearing central white matter changes,  including what is probably a small old left-sided white matter infarct  in the posterior left frontal white matter, appears unchanged. There is  some probable streak artifact superimposed over the lower brainstem, and  similar artifact was present near this location on the prior study.  There is no clearly new infarct or edema, no evidence of hemorrhage,  hydrocephalus, mass or mass effect, or abnormal extra-axial collection.  The calvarium appears intact. The included paranasal sinuses and  mastoids appear clear.       Impression:         Chronic appearing changes of the aging brain stable from  05/19/2018. No new intracranial disease is seen.     NOTE:  The exam time is shown as 2:51 PM. The study was reviewed on the  CT scan monitor and discussed with Dr. Douglas by phone at 2:57 PM.     D:  03/05/2020  E:  03/05/2020                      Assessment/Plan   Assessment & Plan     Active Hospital Problems    Diagnosis POA   • **Acute CVA (cerebrovascular accident) (CMS/HCC) [I63.9] Yes   • Acute UTI (urinary tract infection) [N39.0] Yes   • Hypothyroidism (acquired) [E03.9] Yes   • Seizure (CMS/HCC) [R56.9] Yes   • Essential hypertension [I10] Yes   • Alzheimer's dementia, late onset, with behavioral disturbance (CMS/HCC) [G30.1, F02.81] Yes     89 y.o. female with history of progressively worsening dementia, hypertension, hypothyroidism, seizure disorder.  Patient presents with left-sided facial droop, weakness, she is admitted for suspected CVA.    Suspected CVA  -CT head, CT perfusion both are unrevealing for any acute process  -Patient will probably benefit from palliative/hospice evaluation, she is currently DNR but family would like us to pursue work-up for stroke, daughter mentions that they need more information before making any decisions on goals of care.  This will need to be readdressed in the a.m.  -We will start stroke work-up per protocol, neurology consulted by the ED.    UA concerning for UTI  -Could likely explain above symptoms, will start Rocephin, follow-up urine cultures    Alzheimer's dementia-progressively worsening,    History of seizure disorder-continue Keppra, will switch this to IV as patient is n.p.o.    Hypothyroidism-TSH is baseline continue home Synthroid    DVT prophylaxis: Mechanical    CODE STATUS:    There are no questions and answers to display.       Admission Status:  I believe this patient meets OBSERVATION status, however if further evaluation or treatment plans warrant, status may change.  Based upon  current information, I predict patient's care encounter to be less than or equal to 2 midnights.    Electronically signed by Medina Morgan MD, 03/05/20, 5:47 PM.

## 2020-03-05 NOTE — PROGRESS NOTES
Stroke Navigator CODE STROKE    [facility]    Silva Ruano is a 89 y.o.  female on whom I am evaluating as a Code Stroke for a possible acute stroke.   Ms. Ruano was in her usual state of health this morning; at 1000 her family noticed left sided facial droop, tongue deviation to the left and was completely aphasic.  Per EMS the patient has baseline dementia, is verbal, and her speech is without dysarthria. The patient was transported via EMS to [facility] for stroke evaluation.    No family present at bedside, and no history can be gathered from the patient.    Code Stroke location: ED    · Last known well: 1000    · GCS: 12  · Baseline level of function known: no. Modified Ryan: 0   · Current symptoms include; extremity weakness, facial droop and global aphasia, affecting primarily the bilateral face and lower extremity. Symptoms are currently unchanged.   · Patient is not a candidate for thrombolytic therapy due to LKW >4.5 hours  · Patient is not a candidate for Neuro Intervention due to no LVO (large vessel occlusion) present.    Stroke risk factors: hypertension.     Prior stroke history: no  Antiplatelet therapy: unknown  Anticoagulation: unknown     · Imaging performed: CT head and CT perfusion      NIHSS    Interval: baseline  1a. Level of Consciousness: 0-->Alert, keenly responsive  1b. LOC Questions: 2-->Answers neither question correctly  1c. LOC Commands: 2-->Performs neither task correctly  2. Best Gaze: 0-->Normal  3. Visual: 0-->No visual loss  4. Facial Palsy: 2-->Partial paralysis (total or near-total paralysis of lower face)  5a. Motor Arm, Left: 0-->No drift, limb holds 90 (or 45) degrees for full 10 secs  5b. Motor Arm, Right: 0-->No drift, limb holds 90 (or 45) degrees for full 10 secs  6a. Motor Leg, Left: 3-->No effort against gravity, leg falls to bed immediately  6b. Motor Leg, Right: 3-->No effort against gravity, leg falls to bed immediately  7. Limb Ataxia: 0-->Absent  8. Sensory:  0-->Normal, no sensory loss  9. Best Language: 3-->Mute, global aphasia, no usable speech or auditory comprehension  10. Dysarthria: 2-->Severe dysarthria, patients speech is so slurred as to be unintelligible in the absence of or out of proportion to any dysphasia, or is mute/anarthric  11. Extinction and Inattention (formerly Neglect): 0-->No abnormality    Total (NIH Stroke Scale): 17      PLAN    There is no evidence of a large vessel occlusion ischemic stroke on the CT perfusion scan.  As such, there is no role for acute neuro intervention.  We will defer to Neurology for further management of possible CVA.    ERINN Cooper/STROKE NAVIGATOR

## 2020-03-06 PROBLEM — I63.9 ACUTE CVA (CEREBROVASCULAR ACCIDENT) (HCC): Status: RESOLVED | Noted: 2020-01-01 | Resolved: 2020-01-01

## 2020-03-06 NOTE — PROGRESS NOTES
Discharge Planning Assessment  [facility]     Patient Name: Silva Ruano  MRN: [id number]  Today's Date: 3/6/2020    Admit Date: 3/5/2020    Discharge Needs Assessment     Row Name 03/06/20 0940       Living Environment    Lives With  other (see comments);sibling(s);child(immanuel), adult    Current Living Arrangements  home/apartment/condo    Primary Care Provided by  child(immanuel);other (see comments)    Provides Primary Care For  no one, unable/limited ability to care for self    Family Caregiver if Needed  child(immanuel), adult;sibling(s)    Family Caregiver Names  Shana- dtr. and Vandana - sister    Quality of Family Relationships  supportive;involved;helpful    Able to Return to Prior Arrangements  yes       Resource/Environmental Concerns    Resource/Environmental Concerns  none    Transportation Concerns  car, none       Transition Planning    Patient/Family Anticipates Transition to  home with family;long term care facility    Patient/Family Anticipated Services at Transition      Transportation Anticipated  health plan transportation       Discharge Needs Assessment    Concerns to be Addressed  discharge planning    Equipment Currently Used at Home  commode;walker, rolling;wheelchair    Anticipated Changes Related to Illness  none    Equipment Needed After Discharge  none    Outpatient/Agency/Support Group Needs  skilled nursing facility    Discharge Facility/Level of Care Needs  nursing facility, intermediate    Provided Post Acute Provider List?  N/A    N/A Provider List Comment  Family will discuss        Discharge Plan     Row Name 03/06/20 4160       Plan    Plan  Home with caregivers vs. LTC    Patient/Family in Agreement with Plan  yes    Plan Comments  Spoke with pt's sister at bedside. Lives with sister's and dtr. Who care for during the day and private caregiver at night. Sister states her baseline is bed or WC. She does not ambulate well.Sister states they will take her home but the  goal is LTC. Discussed going to LTC from here. She will be private pay for about a year, then apply for medicaid. She has a RW, WC and BSC. Family will discuss LTC and let CM know.    Final Discharge Disposition Code  30 - still a patient        Destination      Coordination has not been started for this encounter.      Durable Medical Equipment      Coordination has not been started for this encounter.      Dialysis/Infusion      Coordination has not been started for this encounter.      Home Medical Care      Coordination has not been started for this encounter.      Therapy      Coordination has not been started for this encounter.      Community Resources      Coordination has not been started for this encounter.          Demographic Summary    No documentation.       Functional Status     Row Name 03/06/20 0940       Functional Status    Usual Activity Tolerance  poor       Functional Status, IADL    Medications  completely dependent    Meal Preparation  completely dependent    Housekeeping  completely dependent    Laundry  completely dependent    Shopping  completely dependent        Psychosocial    No documentation.       Abuse/Neglect    No documentation.       Legal    No documentation.       Substance Abuse    No documentation.       Patient Forms    No documentation.           Elena Rodriguez, RN

## 2020-03-06 NOTE — PROGRESS NOTES
[facility] Medicine Services  PROGRESS NOTE    Patient Name: Silva Ruano  : 1925  MRN: [id number]    Date of Admission: 3/5/2020  Primary Care Physician: Chuy Galan MD    Subjective   Subjective     CC:  Altered mental status left-sided weakness  HPI:  Ms. Solano is a 89-year-old female with history of progressively worsening dementia hypertension seizure disorder on Keppra.  History obtained from sister at bedside who is 1 of her primary caregivers, they noted that the patient last night had had a facial droop and it was weaker than her baseline.  CT in ER was negative for any acute process.  She was also found to have a urinary tract infection and started on Rocephin. Her mental status has improved with antibiotics    Review of Systems  Unable to obtain review of systems secondary to mental status    Objective   Objective     Vital Signs:   Temp:  [95.6 °F (35.3 °C)-98.6 °F (37 °C)] 98.6 °F (37 °C)  Heart Rate:  [] 63  Resp:  [16-20] 18  BP: (113-140)/(56-89) 128/60  Total (NIH Stroke Scale): 16     Physical Exam:  Constitutional: No acute distress, not following commands  HENT: NCAT, mucous membranes moist  Respiratory: Clear to auscultation bilaterally, respiratory effort normal   Cardiovascular: RRR, no murmurs, rubs, or gallops, palpable pedal pulses bilaterally  Gastrointestinal: Positive bowel sounds, soft, nontender, nondistended  Musculoskeletal: No bilateral ankle edema  Psychiatric: sleeping, awakens to voice  Neurologic: Oriented to self  Skin: No rashes    Results Reviewed:  Results from last 7 days   Lab Units 20  1509 20  1507 20  1506   WBC 10*3/mm3 9.23  --   --    HEMOGLOBIN g/dL 12.0  --   --    HEMOGLOBIN, POC g/dL  --  11.6*  --    HEMATOCRIT % 36.9  --   --    HEMATOCRIT POC %  --  34*  --    PLATELETS 10*3/mm3 166  --   --    INR   --   --  1.0     Results from last 7 days   Lab Units 20  1509 20  1506      CREATININE mg/dL  --  0.80   ALT (SGPT) U/L 10  --    AST (SGOT) U/L 18  --    TROPONIN T ng/mL <0.010  --      Estimated Creatinine Clearance: 30.8 mL/min (by C-G formula based on SCr of 0.8 mg/dL).    Microbiology Results Abnormal     Procedure Component Value - Date/Time    Urine Culture - Urine, Urine, Catheter [[id number]]  (Abnormal) Collected:  03/05/20 1613    Lab Status:  Preliminary result Specimen:  Urine, Catheter Updated:  03/06/20 0944     Urine Culture >100,000 CFU/mL Proteus species          Imaging Results (Last 24 Hours)     Procedure Component Value Units Date/Time    XR Chest 1 View [[id number]] Collected:  03/05/20 1539     Updated:  03/06/20 1147    Narrative:       EXAMINATION: XR CHEST 1 VW-03/05/2020:      INDICATION: Acute Stroke Protocol (onset < 12 hrs).      COMPARISON: NONE.     FINDINGS: Portable chest reveals cardiac and mediastinal silhouettes  within normal limits. Underlying chronic and emphysematous changes seen  within the lung fields bilaterally with mild elevation seen of the right  hemidiaphragm. Scarring at the lung apices. Degenerative changes seen  within the spine. Vascular calcification seen within the thoracic aorta.  There are small bilateral pleural effusions or pleural thickening at the  lung bases. The findings are essentially stable and unchanged when  compared to the prior study.           Impression:       Underlying chronic and emphysematous changes seen within the  lung fields bilaterally with no evidence of acute parenchymal disease.     D:  03/05/2020  E:  03/05/2020     This report was finalized on 3/6/2020 11:44 AM by Dr. Windy Taylor MD.       CT Cerebral Perfusion With & Without Contrast [[id number]] Collected:  03/05/20 1532     Updated:  03/06/20 1147    Narrative:       EXAMINATION: CT CEREBRAL PERFUSION W WO CONTRAST-03/05/2020:      INDICATION: Stroke, left-sided facial droop.     TECHNIQUE: Cerebral perfusion analysis was performed using  computed  tomography with contrast administration, including post processing of  parametric maps with determination of cerebral blood flow, cerebral  blood volume, and mean transit time.      The radiation dose reduction device was turned on for each scan per the  ALARA (As Low as Reasonably Achievable) protocol.     COMPARISON: NONE.     FINDINGS: There is symmetry identified in the cerebral blood flow  perfusion maps. There is also symmetry identified within the cerebral  blood volume perfusion maps. No evidence of defect identified. There is  no increased mean transient time or time to drain identified. There is a  normal perfusion analysis identified.       Impression:       No large perfusion abnormality identified to suggest  evidence of reversible ischemia.     D:  03/05/2020  E:  03/05/2020              This report was finalized on 3/6/2020 11:44 AM by Dr. Windy Taylor MD.       CT Head Without Contrast Stroke Protocol [[id number]] Collected:  03/05/20 1456     Updated:  03/05/20 1507    Narrative:       EXAMINATION: CT HEAD WO CONTRAST, STROKE PROTOCOL-03/05/2020:      INDICATION: Stroke, left facial droop.     TECHNIQUE: 5 mm unenhanced images through the brain.     The radiation dose reduction device was turned on for each scan per the  ALARA (As Low as Reasonably Achievable) protocol.     COMPARISON: 05/19/2018 head CT scan.     FINDINGS: There is expected advanced generalized cerebral atrophy given  the patient's age. Chronic appearing central white matter changes,  including what is probably a small old left-sided white matter infarct  in the posterior left frontal white matter, appears unchanged. There is  some probable streak artifact superimposed over the lower brainstem, and  similar artifact was present near this location on the prior study.  There is no clearly new infarct or edema, no evidence of hemorrhage,  hydrocephalus, mass or mass effect, or abnormal extra-axial collection.  The  calvarium appears intact. The included paranasal sinuses and  mastoids appear clear.       Impression:       Chronic appearing changes of the aging brain stable from  05/19/2018. No new intracranial disease is seen.     NOTE:  The exam time is shown as 2:51 PM. The study was reviewed on the  CT scan monitor and discussed with Dr. Douglas by phone at 2:57 PM.     D:  03/05/2020  E:  03/05/2020                        I have reviewed the medications:  Scheduled Meds:    aspirin 81 mg Oral Daily   Or      aspirin 300 mg Rectal Daily   atorvastatin 20 mg Oral Nightly   [START ON 3/7/2020] cefdinir 250 mg Oral Q12H   cefTRIAXone 1 g Intravenous Once   levETIRAcetam 250 mg Oral Q12H   levothyroxine 50 mcg Oral Daily   sodium chloride 10 mL Intravenous Q12H     Continuous Infusions:   PRN Meds:.sodium chloride  •  sodium chloride    Assessment/Plan   Assessment & Plan     Active Hospital Problems    Diagnosis  POA   • Acute UTI (urinary tract infection) [N39.0]  Yes   • Hypothyroidism (acquired) [E03.9]  Yes   • Seizure (CMS/HCC) [R56.9]  Yes   • Essential hypertension [I10]  Yes   • Alzheimer's dementia, late onset, with behavioral disturbance (CMS/HCC) [G30.1, F02.81]  Yes      Resolved Hospital Problems    Diagnosis Date Resolved POA   • **Acute CVA (cerebrovascular accident) (CMS/HCC) [I63.9] 03/06/2020 Yes        Brief Hospital Course to date:  Silva Ruano is a 89 y.o. female with history of progressively worsening dementia hypertension seizure disorder on Keppra.  History obtained from sister at bedside who is 1 of her primary caregivers, they noted that the patient last night had had a facial droop and it was weaker than her baseline.  CT in ER was negative for any acute process.  She was also found to have a urinary tract infection and started on Rocephin. 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

## 2020-03-06 NOTE — THERAPY EVALUATION
Acute Care - Occupational Therapy Initial Evaluation  [facility]     Patient Name: Silva Ruano  : 1925  MRN: [id number]  Today's Date: 3/6/2020             Admit Date: 3/5/2020       ICD-10-CM ICD-9-CM   1. Acute CVA (cerebrovascular accident) (CMS/HCC) I63.9 434.91   2. Facial droop R29.810 781.94   3. Weakness of left upper extremity R29.898 729.89   4. Anemia, unspecified type D64.9 285.9   5. Impaired mobility and ADLs Z74.09 799.89     Patient Active Problem List   Diagnosis   • Non Hodgkin's lymphoma (CMS/HCC)   • Subcutaneous nodules   • Alzheimer's dementia, late onset, with behavioral disturbance (CMS/HCC)   • Essential hypertension   • Closed fracture of neck of right femur, Acute   • Generalized weakness   • Syncope and collapse   • Hyponatremia   • Seizure (CMS/HCC)   • Encephalopathy acute   • Acute cystitis without hematuria   • Pneumonia   • Acute UTI (urinary tract infection)   • Closed wedge compression fracture of eleventh thoracic vertebra with routine healing   • Acute lower UTI (urinary tract infection)   • Metabolic encephalopathy   • History of lymphoma   • Dehydration   • Hypomagnesemia   • Hypothyroidism (acquired)   • TIA (transient ischemic attack)   • Acute UTI (urinary tract infection)     Past Medical History:   Diagnosis Date   • Alzheimer disease (CMS/HCC)    • Arthritis    • Closed fracture of neck of right femur, Acute 2018   • Diverticulosis    • Hypertension    • Pathologic fracture of clavicle, left, initial encounter    • Skin cancer     lymphoma   • Thyroid disease      Past Surgical History:   Procedure Laterality Date   • CHOLECYSTECTOMY     • HIP HEMIARTHROPLASTY Right 2018    Procedure: HIP HEMIARTHROPLASTY;  Surgeon: Rohith Andersen MD;  Location: [facility];  Service:    • HYSTERECTOMY     • OVARIAN CYST REMOVAL            OT ASSESSMENT FLOWSHEET (last 12 hours)      Occupational Therapy Evaluation     Row Name 20 1046                    OT Evaluation Time/Intention    Subjective Information  no complaints  -AN        Document Type  evaluation  -AN        Mode of Treatment  occupational therapy  -AN        Patient Effort  good  -AN        Symptoms Noted During/After Treatment  none  -AN           General Information    Patient Profile Reviewed?  yes  -AN        Patient Observations  alert;cooperative;agree to therapy  -AN        Patient/Family Observations  sister present  -AN        General Observations of Patient  Pt supine in bed dozing, bed alarm, rails up  -AN        Prior Level of Function  min assist:;gait;mod assist:;transfer;feeding;max assist:;bed mobility;dependent:;dressing;bathing sometimes few steps to bathroom follow by w/c, w/c othe time  -AN        Equipment Currently Used at Home  commode, bedside;hospital bed;walker, rolling;wheelchair;power chair, (recliner lift)  -AN        Pertinent History of Current Functional Problem  L facial droop, AMS, decreased speech  -AN        Existing Precautions/Restrictions  fall  -AN        Limitations/Impairments  safety/cognitive dementia  -AN        Risks Reviewed  patient and family:;LOB;dizziness;increased discomfort;change in vital signs  -AN        Benefits Reviewed  patient and family:;improve function;increase strength;increase balance  -AN        Barriers to Rehab  medically complex;previous functional deficit;cognitive status  -AN           Relationship/Environment    Primary Source of Support/Comfort  child(immanuel);sibling(s)  -AN        Family Caregiver Names  Has 24 hr CG with sister and daughter assist  -AN           Resource/Environmental Concerns    Current Living Arrangements  home/apartment/condo  -AN           Cognitive Assessment/Interventions    Additional Documentation  Cognitive Assessment/Intervention (Group)  -AN           Cognitive Assessment/Intervention- PT/OT    Affect/Mental Status (Cognitive)  confused  -AN        Orientation Status (Cognition)  disoriented  to;place;situation;time  -AN        Follows Commands (Cognition)  follows one step commands;25-49% accuracy;physical/tactile prompts required;repetition of directions required;verbal cues/prompting required  -AN        Personal Safety Interventions  fall prevention program maintained  -AN           Safety Issues, Functional Mobility    Safety Issues Affecting Function (Mobility)  ability to follow commands  -AN        Impairments Affecting Function (Mobility)  balance;cognition;strength  -AN           Bed Mobility Assessment/Treatment    Bed Mobility Assessment/Treatment  supine-sit  -AN        [place] Level (Bed Mobility)  maximum assist (25% patient effort)  -AN        Assistive Device (Bed Mobility)  draw sheet;head of bed elevated  -AN           Functional Mobility    Functional Mobility- Ind. Level  minimum assist (75% patient effort);1 person + 1 person to manage equipment  -AN        Functional Mobility- Device  rolling walker  -AN        Functional Mobility-Distance (Feet)  6  -AN           Sit-Stand Transfer    Sit-Stand [place] (Transfers)  minimum assist (75% patient effort);verbal cues;2 person assist  -AN        Assistive Device (Sit-Stand Transfers)  walker, front-wheeled  -AN           ADL Assessment/Intervention    BADL Assessment/Intervention  lower body dressing;grooming  -AN           Lower Body Dressing Assessment/Training    Lower Body Dressing [place] Level  don;socks;dependent (less than 25% patient effort)  -AN           Grooming Assessment/Training    [place] Level (Grooming)  wash face, hands;maximum assist (25% patient effort)  -AN           General ROM    GENERAL ROM COMMENTS  difficulty to assess formally, able to utilize RW  -AN           MMT (Manual Muscle Testing)    General MMT Comments  grossly 3-/5  -AN           Static Sitting Balance    Level of [place] (Unsupported Sitting, Static Balance)  contact guard assist  -AN        Sitting Position (Unsupported  Sitting, Static Balance)  sitting on edge of bed  -AN           Static Standing Balance    Level of [place] (Supported Standing, Static Balance)  minimal assist, 75% patient effort;2 person assist  -AN        Assistive Device Utilized (Supported Standing, Static Balance)  walker, rolling  -AN           Positioning and Restraints    Pre-Treatment Position  in bed  -AN        Post Treatment Position  chair  -AN           Pain Scale: FACES Pre/Post-Treatment    Pain: FACES Scale, Pretreatment  0-->no hurt  -AN        Pain: FACES Scale, Post-Treatment  0-->no hurt  -AN           Plan of Care Review    Plan of Care Reviewed With  patient;sibling  -AN           Clinical Impression (OT)    OT Diagnosis  impaired ADLs  -AN        Patient/Family Goals Statement (OT Eval)  agreeable to OT  -AN        Criteria for Skilled Therapeutic Interventions Met (OT Eval)  yes;treatment indicated  -AN        Rehab Potential (OT Eval)  fair, will monitor progress closely  -AN        Therapy Frequency (OT Eval)  3 times/wk  -AN        Care Plan Review (OT)  evaluation/treatment results reviewed  -AN        Anticipated Discharge Disposition (OT)  skilled nursing facility to return to [facility] for CG's to be able to care for her  -AN           OT Goals    Transfer Goal Selection (OT)  transfer, OT goal 1  -AN        Grooming Goal Selection (OT)  grooming, OT goal 1  -AN        Self-Feeding Goal Selection (OT)  self feeding, OT goal 1  -AN        Additional Documentation  Self-Feeding Goal Selection (OT) (Row);Grooming Goal Selection (OT) (Row)  -AN           Transfer Goal 1 (OT)    Activity/Assistive Device (Transfer Goal 1, OT)  commode  -AN        [place] Level/Cues Needed (Transfer Goal 1, OT)  minimum assist (75% or more patient effort)  -AN        Time Frame (Transfer Goal 1, OT)  10 days  -AN        Progress/Outcome (Transfer Goal 1, OT)  goal ongoing  -AN           Grooming Goal 1 (OT)    Activity/Device (Grooming Goal 1, OT)   wash face, hands  -AN        [place] (Grooming Goal 1, OT)  minimum assist (75% or more patient effort)  -AN        Time Frame (Grooming Goal 1, OT)  10 days  -AN        Progress/Outcome (Grooming Goal 1, OT)  goal ongoing  -AN           Self-Feeding Goal 1 (OT)    Activity/Assistive Device (Self-Feeding Goal 1, OT)  finger foods  -AN        [place] Level/Cues Needed (Self-Feeding Goal 1, OT)  minimum assist (75% or more patient effort)  -AN        Time Frame (Self-Feeding Goal 1, OT)  10 days  -AN        Progress/Outcomes (Self-Feeding Goal 1, OT)  goal ongoing  -AN           Living Environment    Home Accessibility  wheelchair accessible  -AN          User Key  (r) = Recorded By, (t) = Taken By, (c) = Cosigned By    Initials Name Effective Dates    AN Luda Nicole, OT 06/22/15 -                OT Recommendation and Plan  Outcome Summary/Treatment Plan (OT)  Anticipated Discharge Disposition (OT): skilled nursing facility(to return to [facility] for CG's to be able to care for her)  Therapy Frequency (OT Eval): 3 times/wk  Plan of Care Review  Plan of Care Reviewed With: patient, sibling  Plan of Care Reviewed With: patient, sibling  Outcome Summary: Pt currently min x 2 for mobility, having difficulty following commands and overall weakness. Will continue to address grooming/feeding skills, txfrs. Pt may need SNF to return to level that caretakers can care for pt.    Outcome Measures     Row Name 03/06/20 1046             How much help from another is currently needed...    Putting on and taking off regular lower body clothing?  1  -AN      Bathing (including washing, rinsing, and drying)  1  -AN      Toileting (which includes using toilet bed pan or urinal)  1  -AN      Putting on and taking off regular upper body clothing  1  -AN      Taking care of personal grooming (such as brushing teeth)  1  -AN      Eating meals  1  -AN      AM-PAC 6 Clicks Score (OT)  6  -AN         Modified [place] Scale    Modified  [place] Scale  5 - Severe disability.  Bedridden, incontinent, and requiring constant nursing care and attention.  -AN         Functional Assessment    Outcome Measure Options  AM-PAC 6 Clicks Daily Activity (OT);Modified [place]  -AN        User Key  (r) = Recorded By, (t) = Taken By, (c) = Cosigned By    Initials Name Provider Type    Luda Ochoa OT Occupational Therapist          Time Calculation:   Time Calculation- OT     Row Name 03/06/20 1255             Time Calculation- OT    OT Start Time  1046  -AN      Total Timed Code Minutes- OT  0 minute(s)  -AN      OT Received On  03/06/20  -AN      OT Goal Re-Cert Due Date  03/16/20  -AN        User Key  (r) = Recorded By, (t) = Taken By, (c) = Cosigned By    Initials Name Provider Type    Luda Ochoa OT Occupational Therapist        Therapy Charges for Today     Code Description Service Date Service Provider Modifiers Qty    [id number] HC OT EVAL MOD COMPLEXITY 4 3/6/2020 Luda Nicole OT GO 1               Luda Nicole OT  3/6/2020

## 2020-03-06 NOTE — PROGRESS NOTES
Continued Stay Note  [facility]     Patient Name: Silva Ruano  MRN: [id number]  Today's Date: 3/6/2020    Admit Date: 3/5/2020    Discharge Plan     Row Name 03/06/20 1551       Plan    Plan  Home with caregivers/family    Patient/Family in Agreement with Plan  yes    Plan Comments  Had a long discussion with pt's family. They are very specific on where they want pt. to go for LTC and state they are not ready for her to go just yet. Their plan is for pt. to go home then be admitted to LTC in the next 4-6 weeks. They state they will contact the facility they have chosen and are capable of setting up arrangements. They have stated they will transport pt. to home.     Final Discharge Disposition Code  01 - home or self-care        Discharge Codes    No documentation.             Elena Rodriguez RN

## 2020-03-06 NOTE — NURSING NOTE
POA called with request to proceed with Carotid duplex.  Continues to decline MRI and any swallow study. Bilateral duplex reordered as originally written.

## 2020-03-06 NOTE — NURSING NOTE
POA requested to no longer continue with MRI, swallow study, nor carotid duplex for Mrs. Ruano.  Will notify hospitalist of request.  POA reports patient was previously on a puree/thickend liquid diet at home and would like to continue with that diet for comfort.  However they would like SLP to evaluate for safety of patient.

## 2020-03-06 NOTE — THERAPY EVALUATION
Acute Care - Speech Language Pathology   Swallow Initial Evaluation [facility]  Clinical Swallow Evaluation     Patient Name: Silva Ruano  : 1925  MRN: [id number]  Today's Date: 3/6/2020               Admit Date: 3/5/2020    Visit Dx:     ICD-10-CM ICD-9-CM   1. Acute CVA (cerebrovascular accident) (CMS/HCC) I63.9 434.91   2. Facial droop R29.810 781.94   3. Weakness of left upper extremity R29.898 729.89   4. Anemia, unspecified type D64.9 285.9     Patient Active Problem List   Diagnosis   • Non Hodgkin's lymphoma (CMS/HCC)   • Subcutaneous nodules   • Alzheimer's dementia, late onset, with behavioral disturbance (CMS/HCC)   • Essential hypertension   • Closed fracture of neck of right femur, Acute   • Generalized weakness   • Syncope and collapse   • Hyponatremia   • Seizure (CMS/HCC)   • Encephalopathy acute   • Acute cystitis without hematuria   • Pneumonia   • Acute UTI (urinary tract infection)   • Closed wedge compression fracture of eleventh thoracic vertebra with routine healing   • Acute lower UTI (urinary tract infection)   • Metabolic encephalopathy   • History of lymphoma   • Dehydration   • Hypomagnesemia   • Hypothyroidism (acquired)   • TIA (transient ischemic attack)   • Acute CVA (cerebrovascular accident) (CMS/HCC)   • Acute UTI (urinary tract infection)     Past Medical History:   Diagnosis Date   • Alzheimer disease (CMS/HCC)    • Arthritis    • Closed fracture of neck of right femur, Acute 2018   • Diverticulosis    • Hypertension    • Pathologic fracture of clavicle, left, initial encounter    • Skin cancer     lymphoma   • Thyroid disease      Past Surgical History:   Procedure Laterality Date   • CHOLECYSTECTOMY     • HIP HEMIARTHROPLASTY Right 2018    Procedure: HIP HEMIARTHROPLASTY;  Surgeon: Rohith Andersen MD;  Location: [facility];  Service:    • HYSTERECTOMY     • OVARIAN CYST REMOVAL          SWALLOW EVALUATION (last 72 hours)      [facility] Adult Swallow  Evaluation     Row Name 03/06/20 1000                   Rehab Evaluation    Document Type  evaluation  -MP        Subjective Information  no complaints  -MP        Patient Observations  alert;cooperative  -MP        Patient/Family Observations  Sister present  -MP        Patient Effort  good  -MP           General Information    Patient Profile Reviewed  yes  -MP        Pertinent History Of Current Problem  Adm for stroke w/u. Imaging unremarkable. Family opt'ed no MRI. Hx Alzheimer's, HTN, hypothyroid, seizure d/o. On pureeed, nectar-thick liquid diet @ home. Sister @ bedside, reported communication close to baseline - cog-comm eval not indicated.  -MP        Current Method of Nutrition  NPO  -MP        Precautions/Limitations, Vision  other (see comments) difficult to assess, eyes closed t/o eval  -MP        Precautions/Limitations, Hearing  WFL;for purposes of eval  -MP        Prior Level of Function-Communication  cognitive-linguistic impairment;other (see comments) dementia @ baseline per chart  -MP        Prior Level of Function-Swallowing  puree;nectar thick liquids  -MP        Plans/Goals Discussed with  patient;family  -MP        Barriers to Rehab  cognitive status;previous functional deficit  -MP        Patient's Goals for Discharge  patient did not state  -MP        Family Goals for Discharge  comfort diet  -MP           Pain Assessment    Additional Documentation  Pain Scale: FACES Pre/Post-Treatment (Group)  -MP           Pain Scale: FACES Pre/Post-Treatment    Pain: FACES Scale, Pretreatment  0-->no hurt  -MP        Pain: FACES Scale, Post-Treatment  0-->no hurt  -MP           Oral Motor and Function    Dentition Assessment  edentulous, does not have dentures  -MP        Secretion Management  WNL/WFL  -MP        Mucosal Quality  moist, healthy  -MP           Oral Musculature and Cranial Nerve Assessment    Oral Motor General Assessment  unable to assess;other (see comments) pt not able to follow  commands to complete  -MP           General Eating/Swallowing Observations    Respiratory Support Currently in Use  room air  -MP        Eating/Swallowing Skills  fed by SLP  -MP        Positioning During Eating  upright in bed  -MP        Utensils Used  spoon;straw;other (see comments) unable to wrap lips around cup - fam reported uses straw  -MP        Consistencies Trialed  thin liquids;nectar/syrup-thick liquids;pureed DNA solid 2' edentulous & on puree @ baseline  -MP           Clinical Swallow Eval    Pharyngeal Phase  suspected pharyngeal impairment  -MP        Clinical Swallow Evaluation Summary  Clinical swallow evaluation completed. Per chart, pt on comfort diet @ baseline and family wanting to continue comfort diet but wishing for SLP to eval for most appropriate. Pt accepted trials thin H2O via tsp/straw, NTL via tsp/straw, & puree. No clinically significant difference between thin & NTL. No overt s/sxs aspiration across any consistency. However, risk factors, particularly cognitive status, certainly present. Discussed w/ pt's sister @ bedside, who does not wish for further w/u of swallowing and to continue comfort diet. Notified RN. Will defer to RN/MD for diet entry. For comfort diet, consider puree & thin.  -MP           Clinical Impression    SLP Swallowing Diagnosis  suspected pharyngeal dysfunction  -MP        Functional Impact  risk of aspiration/pneumonia  -MP        Rehab Potential/Prognosis, Swallowing  re-evaluate goals as necessary  -MP        Swallow Criteria for Skilled Therapeutic Interventions Met  demonstrates skilled criteria  -MP           Recommendations    Therapy Frequency (Swallow)  PRN  -MP        Predicted Duration Therapy Intervention (Days)  1 week  -MP        SLP Diet Recommendation  other (see comments) comfort: puree & thin - defer to RN/MD for diet entry  -MP        Recommended Precautions and Strategies  upright posture during/after eating;small bites of food and sips of  liquid  -MP        SLP Rec. for Method of Medication Administration  meds crushed;with pudding or applesauce  -MP        Monitor for Signs of Aspiration  yes;notify SLP if any concerns  -MP        Anticipated Dischage Disposition  unknown;anticipate therapy at next level of care  -MP          User Key  (r) = Recorded By, (t) = Taken By, (c) = Cosigned By    Initials Name Effective Dates    Jason Lr MS CCC-SLP 06/19/19 -           EDUCATION  The patient has been educated in the following areas:   Dysphagia (Swallowing Impairment) Modified Diet Instruction.    SLP Recommendation and Plan  SLP Swallowing Diagnosis: suspected pharyngeal dysfunction  SLP Diet Recommendation: other (see comments)(comfort: puree & thin - defer to RN/MD for diet entry)  Recommended Precautions and Strategies: upright posture during/after eating, small bites of food and sips of liquid  SLP Rec. for Method of Medication Administration: meds crushed, with pudding or applesauce     Monitor for Signs of Aspiration: yes, notify SLP if any concerns     Swallow Criteria for Skilled Therapeutic Interventions Met: demonstrates skilled criteria  Anticipated Dischage Disposition: unknown, anticipate therapy at next level of care  Rehab Potential/Prognosis, Swallowing: re-evaluate goals as necessary  Therapy Frequency (Swallow): PRN  Predicted Duration Therapy Intervention (Days): 1 week       Plan of Care Reviewed With: patient, sibling    SLP GOALS     Row Name 03/06/20 1000             Oral Nutrition/Hydration Goal 1 (SLP)    Oral Nutrition/Hydration Goal 1, SLP  LTG: Pt will tolerate recommended comfort diet w/ no overt s/sxs distress/discomfort w/ 100% acc and no cues  -MP      Time Frame (Oral Nutrition/Hydration Goal 1, SLP)  by discharge  -MP        User Key  (r) = Recorded By, (t) = Taken By, (c) = Cosigned By    Initials Name Provider Type    Jason Lr, MS CCC-SLP Speech and Language Pathologist             Time  Calculation:   Time Calculation- SLP     Row Name 03/06/20 1036             Time Calculation- SLP    SLP Start Time  1000  -MP      SLP Received On  03/06/20  -MP        User Key  (r) = Recorded By, (t) = Taken By, (c) = Cosigned By    Initials Name Provider Type    Jason Lr MS CCC-SLP Speech and Language Pathologist          Therapy Charges for Today     Code Description Service Date Service Provider Modifiers Qty    01491360212 HC ST EVAL ORAL PHARYNG SWALLOW 4 3/6/2020 Jason Dominguez MS CCC-SLP GN 1               Jason Dominguez MS CCC-DORIE  3/6/2020

## 2020-03-06 NOTE — THERAPY EVALUATION
Patient Name: Silva Ruano  : 1925    MRN: [id number]                              Today's Date: 3/6/2020       Admit Date: 3/5/2020    Visit Dx:     ICD-10-CM ICD-9-CM   1. Acute CVA (cerebrovascular accident) (CMS/HCC) I63.9 434.91   2. Facial droop R29.810 781.94   3. Weakness of left upper extremity R29.898 729.89   4. Anemia, unspecified type D64.9 285.9     Patient Active Problem List   Diagnosis   • Non Hodgkin's lymphoma (CMS/HCC)   • Subcutaneous nodules   • Alzheimer's dementia, late onset, with behavioral disturbance (CMS/HCC)   • Essential hypertension   • Closed fracture of neck of right femur, Acute   • Generalized weakness   • Syncope and collapse   • Hyponatremia   • Seizure (CMS/HCC)   • Encephalopathy acute   • Acute cystitis without hematuria   • Pneumonia   • Acute UTI (urinary tract infection)   • Closed wedge compression fracture of eleventh thoracic vertebra with routine healing   • Acute lower UTI (urinary tract infection)   • Metabolic encephalopathy   • History of lymphoma   • Dehydration   • Hypomagnesemia   • Hypothyroidism (acquired)   • TIA (transient ischemic attack)   • Acute CVA (cerebrovascular accident) (CMS/HCC)   • Acute UTI (urinary tract infection)     Past Medical History:   Diagnosis Date   • Alzheimer disease (CMS/HCC)    • Arthritis    • Closed fracture of neck of right femur, Acute 2018   • Diverticulosis    • Hypertension    • Pathologic fracture of clavicle, left, initial encounter    • Skin cancer     lymphoma   • Thyroid disease      Past Surgical History:   Procedure Laterality Date   • CHOLECYSTECTOMY     • HIP HEMIARTHROPLASTY Right 2018    Procedure: HIP HEMIARTHROPLASTY;  Surgeon: Rohith Andersen MD;  Location: [facility];  Service:    • HYSTERECTOMY     • OVARIAN CYST REMOVAL       General Information     Row Name 20 1116          PT Evaluation Time/Intention    Document Type  evaluation  -CD     Mode of Treatment  physical  therapy  -CD     Row Name 03/06/20 1116          General Information    Patient Profile Reviewed?  yes  -CD     Prior Level of Function  gait;min assist:;transfer;mod assist:;bed mobility;feeding;dependent:;ADL's PER SISTER PT HAS ASSIST 24/7 FOR TRANSFERS, GAIT AND ADL'S   -CD     Existing Precautions/Restrictions  fall AZHEIMER'S DEMENTIA.   -CD     Barriers to Rehab  medically complex;previous functional deficit;cognitive status  -CD     Row Name 03/06/20 1116          Relationship/Environment    Lives With  child(immanuel), adult;sibling(s) 24/7 CAREGIVERS  -CD     Row Name 03/06/20 1116          Resource/Environmental Concerns    Current Living Arrangements  home/apartment/condo  -CD     Row Name 03/06/20 1116          Cognitive Assessment/Intervention- PT/OT    Orientation Status (Cognition)  disoriented to;person;place;situation  -CD     Cognitive Assessment/Intervention Comment  PER SISTER, PT IS AT BASELINE WITH COGNITION. IS NOT NORMALLY ORIENTED AND DOES NOT FOLLOW VERBAL COMMANDS WELL.   -CD     Row Name 03/06/20 1116          Safety Issues, Functional Mobility    Safety Issues Affecting Function (Mobility)  ability to follow commands;insight into deficits/self awareness;safety precaution awareness;safety precautions follow-through/compliance  -CD     Impairments Affecting Function (Mobility)  balance;endurance/activity tolerance;strength  -CD       User Key  (r) = Recorded By, (t) = Taken By, (c) = Cosigned By    Initials Name Provider Type    CD Anca Medina, PT Physical Therapist        Mobility     Row Name 03/06/20 1124          Bed Mobility Assessment/Treatment    Bed Mobility Assessment/Treatment  bed mobility (all) activities  -CD     [place] Level (Bed Mobility)  maximum assist (25% patient effort);verbal cues  -CD     Assistive Device (Bed Mobility)  draw sheet;head of bed elevated  -CD     Comment (Bed Mobility)  NO ACTIVE ASSIST FROM PT BUT DID NOT RESIST. UTILIZED DRAW SHEET TO UPRIGHT  TRUNK. ONCE SITTING EOB, HAD GOOD SITTING BALANCE.   -CD     Row Name 03/06/20 1124          Transfer Assessment/Treatment    Comment (Transfers)  MANUAL ASSIST FOR HAND PLACEMENT ON WALKER.   -CD     Row Name 03/06/20 1124          Sit-Stand Transfer    Sit-Stand Pillow (Transfers)  minimum assist (75% patient effort);2 person assist  -CD     Assistive Device (Sit-Stand Transfers)  walker, front-wheeled  -CD     Row Name 03/06/20 1124          Gait/Stairs Assessment/Training    Gait/Stairs Assessment/Training  gait/ambulation independence  -CD     Pillow Level (Gait)  minimum assist (75% patient effort);2 person assist  -CD     Assistive Device (Gait)  walker, front-wheeled  -CD     Distance in Feet (Gait)  10 FEET.   -CD     Pattern (Gait)  step-to  -CD     Deviations/Abnormal Patterns (Gait)  stride length decreased;gait speed decreased;base of support, narrow;laurence decreased  -CD     Bilateral Gait Deviations  forward flexed posture;heel strike decreased  -CD     Comment (Gait/Stairs)  RELCLINER BROUGHT UP BEHIND PT TO SIT.   -CD       User Key  (r) = Recorded By, (t) = Taken By, (c) = Cosigned By    Initials Name Provider Type    CD Anca Medina, PT Physical Therapist        Obj/Interventions     Row Name 03/06/20 1126          General ROM    GENERAL ROM COMMENTS  B LE GROSSLY WFL'S FOR SITTING EOB/GAIT. DOES NOT FOLLOW COMMANDS- AAROM PERFORMED.   -CD     Row Name 03/06/20 1126          MMT (Manual Muscle Testing)    General MMT Comments  GROSSLY 3-4/5 AND SYMMETRICAL. ABLE TO STAND AND WALK WITHOUT BUCKLING.   -CD     Row Name 03/06/20 1126          Static Sitting Balance    Level of Pillow (Unsupported Sitting, Static Balance)  contact guard assist  -CD     Sitting Position (Unsupported Sitting, Static Balance)  sitting on edge of bed  -CD     Row Name 03/06/20 1126          Static Standing Balance    Level of Pillow (Supported Standing, Static Balance)  minimal assist, 75%  patient effort;2 person assist  -CD     Assistive Device Utilized (Supported Standing, Static Balance)  walker, rolling  -CD     Row Name 03/06/20 1126          Dynamic Standing Balance    Level of [place], Reaches Outside Midline (Standing, Dynamic Balance)  minimal assist, 75% patient effort;2 person assist  -CD     Assistive Device Utilized (Supported Standing, Dynamic Balance)  walker, rolling  -CD       User Key  (r) = Recorded By, (t) = Taken By, (c) = Cosigned By    Initials Name Provider Type    CD Anca Medina, PT Physical Therapist        Goals/Plan     Row Name 03/06/20 1132          Bed Mobility Goal 1 (PT)    Activity/Assistive Device (Bed Mobility Goal 1, PT)  sit to supine/supine to sit  -CD     [place] Level/Cues Needed (Bed Mobility Goal 1, PT)  minimum assist (75% or more patient effort)  -CD     Time Frame (Bed Mobility Goal 1, PT)  long term goal (LTG);2 weeks  -CD     Row Name 03/06/20 1132          Transfer Goal 1 (PT)    Activity/Assistive Device (Transfer Goal 1, PT)  sit-to-stand/stand-to-sit;walker, rolling  -CD     [place] Level/Cues Needed (Transfer Goal 1, PT)  minimum assist (75% or more patient effort)  -CD     Time Frame (Transfer Goal 1, PT)  long term goal (LTG);2 weeks  -CD     Row Name 03/06/20 1132          Gait Training Goal 1 (PT)    Activity/Assistive Device (Gait Training Goal 1, PT)  gait (walking locomotion)  -CD     [place] Level (Gait Training Goal 1, PT)  minimum assist (75% or more patient effort)  -CD     Distance (Gait Goal 1, PT)  100 FEET.   -CD     Time Frame (Gait Training Goal 1, PT)  long term goal (LTG);2 weeks  -CD       User Key  (r) = Recorded By, (t) = Taken By, (c) = Cosigned By    Initials Name Provider Type    CD Anca Medina PT Physical Therapist        Clinical Impression     Row Name 03/06/20 1128          Pain Scale: FACES Pre/Post-Treatment    Pain: FACES Scale, Pretreatment  0-->no hurt  -CD     Pain: FACES Scale,  Post-Treatment  0-->no hurt  -CD     Row Name 03/06/20 1128          Plan of Care Review    Plan of Care Reviewed With  patient;sibling  -CD     Outcome Summary  PT PRESENTS LIKELY CLOSE TO BASELINE WITH FUNCTIONAL MOBILITY BUT WILL BENEFIT FROM INPT P.T. TO ASSURE PLOF AT D/C TO AIDE CAREGIVERS. PT CONFUSED AT BASELINE BUT AMBULATES SHORT DISTANCES WITH ASSIST OF CAREGIVERS.  SOME DISCUSSION RE: HOME WITH FAMILY CAREGIVERS 24/7 OR LTC PLACEMENT. PT AMBULATED 10 FEET WITH MIN ASSIST OF 2 AND R WALKER.   -CD     Row Name 03/06/20 1128          Physical Therapy Clinical Impression    Patient/Family Goals Statement (PT Clinical Impression)  TBD PER SISTER.   -CD     Criteria for Skilled Interventions Met (PT Clinical Impression)  yes  -CD     Rehab Potential (PT Clinical Summary)  good, to achieve stated therapy goals  -CD     Predicted Duration of Therapy (PT)  2 WEEKS   -CD     Row Name 03/06/20 1128          Vital Signs    Pre Systolic BP Rehab  -- VSS. NSG CLEARED FOR TREATMENT.   -CD     Pre Patient Position  Supine  -CD     Intra Patient Position  Standing  -CD     Post Patient Position  Sitting  -CD     Row Name 03/06/20 1128          Positioning and Restraints    Pre-Treatment Position  in bed  -CD     Post Treatment Position  chair  -CD     In Chair  reclined;call light within reach;encouraged to call for assist;exit alarm on;with family/caregiver;on mechanical lift sling;with nsg;legs elevated;waffle cushion HEELS UNWEIGHTED WITH PILLOW.   -CD       User Key  (r) = Recorded By, (t) = Taken By, (c) = Cosigned By    Initials Name Provider Type    CD Anca Medina, PT Physical Therapist        Outcome Measures     Row Name 03/06/20 1138          How much help from another person do you currently need...    Turning from your back to your side while in flat bed without using bedrails?  2  -CD     Moving from lying on back to sitting on the side of a flat bed without bedrails?  2  -CD     Moving to and from a bed  to a chair (including a wheelchair)?  2  -CD     Standing up from a chair using your arms (e.g., wheelchair, bedside chair)?  3  -CD     Climbing 3-5 steps with a railing?  1  -CD     To walk in hospital room?  2  -CD     AM-PAC 6 Clicks Score (PT)  12  -CD     Row Name 03/06/20 1133          Functional Assessment    Outcome Measure Options  AM-PAC 6 Clicks Basic Mobility (PT)  -CD       User Key  (r) = Recorded By, (t) = Taken By, (c) = Cosigned By    Initials Name Provider Type    Anca Milan PT Physical Therapist          PT Recommendation and Plan     Outcome Summary/Treatment Plan (PT)  Anticipated Discharge Disposition (PT): home with 24/7 care, skilled nursing facility, long term acute care facility(TBD)  Plan of Care Reviewed With: patient, sibling  Outcome Summary: PT PRESENTS LIKELY CLOSE TO BASELINE WITH FUNCTIONAL MOBILITY BUT WILL BENEFIT FROM INPT P.T. TO ASSURE PLOF AT D/C TO AIDE CAREGIVERS. PT CONFUSED AT BASELINE BUT AMBULATES SHORT DISTANCES WITH ASSIST OF CAREGIVERS.  SOME DISCUSSION RE: HOME WITH FAMILY CAREGIVERS 24/7 OR LTC PLACEMENT. PT AMBULATED 10 FEET WITH MIN ASSIST OF 2 AND R WALKER.      Time Calculation:   PT Charges     Row Name 03/06/20 1135             Time Calculation    Start Time  1037  -CD      PT Received On  03/06/20  -      PT Goal Re-Cert Due Date  03/16/20  -CD        User Key  (r) = Recorded By, (t) = Taken By, (c) = Cosigned By    Initials Name Provider Type    Anca Milan PT Physical Therapist        Therapy Charges for Today     Code Description Service Date Service Provider Modifiers Qty    15070924151 HC PT EVAL LOW COMPLEXITY 4 3/6/2020 Anca Medina PT GP 1          PT G-Codes  Outcome Measure Options: AM-PAC 6 Clicks Basic Mobility (PT)  AM-PAC 6 Clicks Score (PT): 12    Anca Medina PT  3/6/2020

## 2020-03-06 NOTE — PLAN OF CARE
Problem: Patient Care Overview  Goal: Plan of Care Review  Outcome: Ongoing (interventions implemented as appropriate)  Flowsheets (Taken 3/6/2020 104)  Outcome Summary: Pt currently min x 2 for mobility, having difficulty following commands and overall weakness. Will continue to address grooming/feeding skills, txfrs. Pt may need SNF to return to level that caretakers can care for pt.

## 2020-03-06 NOTE — PROGRESS NOTES
Change keppra from po to iv (patient is npo)  Switch from oral to iv keppra is approximately 1:1; will change keppra 250 mg po bid to keppra 250 mg iv q12h.  Donald Tellez [facility]

## 2020-03-06 NOTE — SIGNIFICANT NOTE
Called per nursing secondary to family wishes to have no further work-up for stroke.  Declined MRI as well as carotid duplex for the patient.  Orders canceled in epic.

## 2020-03-06 NOTE — CONSULTS
Diabetes Education    Patient Name:  Silva Ruano  YOB: 1925  MRN: [id number]  Admit Date:  3/5/2020        Order criteria not met for diabetes education consult. Current A1c is 4.8, noted during chart review no history of DM and no home meds for DM. Thank you.      Electronically signed by:  Thalia Loyd RN  03/06/20 9:15 AM

## 2020-03-06 NOTE — CONSULTS
Neurology    Referring provider:   Jack Mancini MD  [address]  [address]  [place] [address]    Reason for Consultation: Neurologic:    Chief complaint: Patient does not have complaints    History of present illness: 89-year-old woman with advanced Alzheimer's disease referred by Dr. Suarez for evaluation of possible stroke.    She developed difficulty speaking and developed left-sided facial droop.    She has advanced Alzheimer's disease and is at the point where she does not drink much but will still eat although she has to be fed.    She has 24-hour care at home.  16 hours with the family in 8 hours with a caregiver.    They are contemplating nursing home placement.    She was noted to have a urinary tract infection and has been improved significantly with the 2 doses of ceftriaxone.    Is a history of seizure disorder and is on Keppra 250 mg twice daily.        Review of Systems: Patient cannot participate    Home meds:   Medications Prior to Admission   Medication Sig Dispense Refill Last Dose   • acetaminophen (TYLENOL) 325 MG tablet Take 2 tablets by mouth Every 4 (Four) Hours As Needed for Mild Pain . (Patient taking differently: Take 650 mg by mouth Daily.) 1 bottle 0 3/5/2020 at Unknown time   • estrogens, conjugated, (PREMARIN) 0.625 MG tablet Take 0.625 mg by mouth Daily.   3/5/2020 at Unknown time   • levETIRAcetam (KEPPRA) 250 MG tablet Take 250 mg by mouth 2 (Two) Times a Day.  3 3/5/2020 at Unknown time   • levothyroxine (SYNTHROID, LEVOTHROID) 50 MCG tablet Take 50 mcg by mouth Daily.   3/5/2020 at Unknown time   • loratadine (CLARITIN) 10 MG tablet Take 10 mg by mouth 2 (Two) Times a Day.   Past Week at Unknown time   • losartan (COZAAR) 50 MG tablet Take 50 mg by mouth Daily.  3 3/5/2020 at Unknown time   • metoprolol succinate XL (TOPROL-XL) 25 MG 24 hr tablet Take 25 mg by mouth Daily.  1 3/5/2020 at Unknown time   • nitrofurantoin, macrocrystal-monohydrate, (MACROBID) 100 MG  "capsule Take 100 mg by mouth Daily. 90 day supply, started on 02-24-20   3/5/2020 at Unknown time   • omeprazole (priLOSEC) 20 MG capsule Take 20 mg by mouth Daily.   3/5/2020 at Unknown time   • tolterodine LA (DETROL LA) 4 MG 24 hr capsule Take 4 mg by mouth Daily.  1 3/5/2020 at Unknown time       History  Past Medical History:   Diagnosis Date   • Alzheimer disease (CMS/HCC)    • Arthritis    • Closed fracture of neck of right femur, Acute 2/12/2018   • Diverticulosis    • Hypertension    • Pathologic fracture of clavicle, left, initial encounter    • Skin cancer     lymphoma   • Thyroid disease    ,   Past Surgical History:   Procedure Laterality Date   • CHOLECYSTECTOMY     • HIP HEMIARTHROPLASTY Right 2/12/2018    Procedure: HIP HEMIARTHROPLASTY;  Surgeon: Rohith Andersen MD;  Location: [facility];  Service:    • HYSTERECTOMY     • OVARIAN CYST REMOVAL     ,   Family History   Problem Relation Age of Onset   • Heart disease Other    • Hypertension Other    • Cancer Other    ,   Social History     Tobacco Use   • Smoking status: Never Smoker   • Smokeless tobacco: Never Used   Substance Use Topics   • Alcohol use: No   • Drug use: No    and Allergies:  Sulfa antibiotics,    Vital Signs   Blood pressure 128/60, pulse 63, temperature 98.6 °F (37 °C), temperature source Axillary, resp. rate 18, height 172.7 cm (68\"), weight 45.4 kg (100 lb), SpO2 100 %.  Body mass index is 15.2 kg/m².    Physical Exam:   General: Elderly weak female minimally interactive              Head: No trauma              Neck: No bruit              Resp: Normal breath sounds              Cor: Regular rate              Extremities: No edema              Skin: Warm and dry              Neuro: Apathetic and not interactive.  She does not follow commands does not speak spontaneously or to request.    Coordination could not be tested.    Cranial nerves show obscure fundi, equal pupils, conjugate gaze.  Visual fields could not be " tested.    Face is symmetrical.    I can get her to say but could not evaluate palate.    Reflexes are plus minus throughout.    She is antigravity with all extremities with a questionable pronator drift on the right.        Results Review: CT of the head shows ischemic strokes in both hemispheres right occipital and left centrum semiovale.    LDL cholesterol is 84.    Hemoglobin A1c is 4.8.        Labs:  Lab Results (last 72 hours)     Procedure Component Value Units Date/Time    Urine Culture - Urine, Urine, Catheter [[id number]]  (Abnormal) Collected:  03/05/20 1613    Specimen:  Urine, Catheter Updated:  03/06/20 0944     Urine Culture >100,000 CFU/mL Proteus species    Lipid Panel [[id number]] Collected:  03/06/20 0510    Specimen:  Blood Updated:  03/06/20 0739     Total Cholesterol 150 mg/dL      Triglycerides 122 mg/dL      HDL Cholesterol 42 mg/dL      LDL Cholesterol  84 mg/dL      VLDL Cholesterol 24.4 mg/dL      LDL/HDL Ratio 1.99    Narrative:       Cholesterol Reference Ranges  (U.S. Department of Health and Human Services ATP III Classifications)    Desirable          <200 mg/dL  Borderline High    200-239 mg/dL  High Risk          >240 mg/dL      Triglyceride Reference Ranges  (U.S. Department of Health and Human Services ATP III Classifications)    Normal           <150 mg/dL  Borderline High  150-199 mg/dL  High             200-499 mg/dL  Very High        >500 mg/dL    HDL Reference Ranges  (U.S. Department of Health and Human Services ATP III Classifcations)    Low     <40 mg/dl (major risk factor for CHD)  High    >60 mg/dl ('negative' risk factor for CHD)        LDL Reference Ranges  (U.S. Department of Health and Human Services ATP III Classifcations)    Optimal          <100 mg/dL  Near Optimal     100-129 mg/dL  Borderline High  130-159 mg/dL  High             160-189 mg/dL  Very High        >189 mg/dL    Hemoglobin A1c [[id number]]  (Normal) Collected:  03/06/20 0510    Specimen:  Blood  Updated:  03/06/20 0717     Hemoglobin A1C 4.80 %     Narrative:       Hemoglobin A1C Ranges:    Increased Risk for Diabetes  5.7% to 6.4%  Diabetes                     >= 6.5%  Diabetic Goal                < 7.0%    POC Glucose Once [[id number]]  (Normal) Collected:  03/06/20 0132    Specimen:  Blood Updated:  03/06/20 0133     Glucose 98 mg/dL     POC Glucose Once [[id number]]  (Normal) Collected:  03/05/20 1907    Specimen:  Blood Updated:  03/05/20 1918     Glucose 93 mg/dL     Urinalysis, Microscopic Only - Urine, Catheter [[id number]]  (Abnormal) Collected:  03/05/20 1613    Specimen:  Urine, Catheter Updated:  03/05/20 1721     RBC, UA 0-2 /HPF      WBC, UA Too Numerous to Count /HPF      Bacteria, UA 4+ /HPF      Squamous Epithelial Cells, UA 0-2 /HPF      Yeast, UA Moderate/2+ Budding Yeast /HPF      Hyaline Casts, UA 13-20 /LPF      Methodology Manual Light Microscopy    Urinalysis With Culture If Indicated - Urine, Catheter [[id number]]  (Abnormal) Collected:  03/05/20 1613    Specimen:  Urine, Catheter Updated:  03/05/20 1720     Color, UA Dark Yellow     Appearance, UA Turbid     pH, UA >=9.0     Specific Gravity, UA 1.023     Glucose, UA Negative     Ketones, UA Negative     Bilirubin, UA Negative     Blood, UA Small (1+)     Protein, UA 30 mg/dL (1+)     Leuk Esterase, UA Large (3+)     Nitrite, UA Positive     Urobilinogen, UA 0.2 E.U./dL    [place] Draw [[id number]] Collected:  03/05/20 1509    Specimen:  Blood Updated:  03/05/20 1615    Narrative:       The following orders were created for panel order [place] Draw.  Procedure                               Abnormality         Status                     ---------                               -----------         ------                     Light Blue Top[[id number]]                                   Final result               Green Top (Gel)[[id number]]                                  Final result               Lavender Top[[id number]]                                      Final result               Gold Top - SST[[id number]]                                   Final result                 Please view results for these tests on the individual orders.    Light Blue Top [[id number]] Collected:  03/05/20 1509    Specimen:  Blood Updated:  03/05/20 1615     Extra Tube hold for add-on     Comment: Auto resulted       Green Top (Gel) [[id number]] Collected:  03/05/20 1509    Specimen:  Blood Updated:  03/05/20 1615     Extra Tube Hold for add-ons.     Comment: Auto resulted.       Lavender Top [[id number]] Collected:  03/05/20 1509    Specimen:  Blood Updated:  03/05/20 1615     Extra Tube hold for add-on     Comment: Auto resulted       Gold Top - SST [[id number]] Collected:  03/05/20 1509    Specimen:  Blood Updated:  03/05/20 1615     Extra Tube Hold for add-ons.     Comment: Auto resulted.       Troponin [[id number]]  (Normal) Collected:  03/05/20 1509    Specimen:  Blood Updated:  03/05/20 1550     Troponin T <0.010 ng/mL     Narrative:       Troponin T Reference Range:  <= 0.03 ng/mL-   Negative for AMI  >0.03 ng/mL-     Abnormal for myocardial necrosis.  Clinicians would have to utilize clinical acumen, EKG, Troponin and serial changes to determine if it is an Acute Myocardial Infarction or myocardial injury due to an underlying chronic condition.       Results may be falsely decreased if patient taking Biotin.      AST [[id number]]  (Normal) Collected:  03/05/20 1509    Specimen:  Blood Updated:  03/05/20 1550     AST (SGOT) 18 U/L     ALT [[id number]]  (Normal) Collected:  03/05/20 1509    Specimen:  Blood Updated:  03/05/20 1550     ALT (SGPT) 10 U/L     aPTT [[id number]]  (Normal) Collected:  03/05/20 1509    Specimen:  Blood Updated:  03/05/20 1542     PTT 31.8 seconds     Narrative:       PTT = The equivalent PTT values for the therapeutic range of heparin levels at 0.3 to 0.5 U/ml are 55 to 70 seconds.    CBC & Differential [[id number]] Collected:  03/05/20 1509    Specimen:   Blood Updated:  03/05/20 1527    Narrative:       The following orders were created for panel order CBC & Differential.  Procedure                               Abnormality         Status                     ---------                               -----------         ------                     CBC Auto Differential[[id number]]        Abnormal            Final result                 Please view results for these tests on the individual orders.    CBC Auto Differential [[id number]]  (Abnormal) Collected:  03/05/20 1509    Specimen:  Blood Updated:  03/05/20 1527     WBC 9.23 10*3/mm3      RBC 3.94 10*6/mm3      Hemoglobin 12.0 g/dL      Hematocrit 36.9 %      MCV 93.7 fL      MCH 30.5 pg      MCHC 32.5 g/dL      RDW 13.2 %      RDW-SD 45.1 fl      MPV 8.7 fL      Platelets 166 10*3/mm3      Neutrophil % 53.9 %      Lymphocyte % 38.2 %      Monocyte % 4.6 %      Eosinophil % 2.6 %      Basophil % 0.3 %      Immature Grans % 0.4 %      Neutrophils, Absolute 4.97 10*3/mm3      Lymphocytes, Absolute 3.53 10*3/mm3      Monocytes, Absolute 0.42 10*3/mm3      Eosinophils, Absolute 0.24 10*3/mm3      Basophils, Absolute 0.03 10*3/mm3      Immature Grans, Absolute 0.04 10*3/mm3      nRBC 0.0 /100 WBC     POC Creatinine [[id number]]  (Normal) Collected:  03/05/20 1506    Specimen:  Blood Updated:  03/05/20 1526     Creatinine 0.80 mg/dL      Comment: Serial Number: [id number]:  [id number]       POC Surgery Labs [[id number]]  (Abnormal) Collected:  03/05/20 1507    Specimen:  Blood Updated:  03/05/20 1512     Ionized Calcium 1.30 mmol/L      POC Potassium 4.6 mmol/L      Sodium 141 mmol/L      Total CO2 29 mmol/L      Hemoglobin 11.6 g/dL      Hematocrit 34 %      pCO2, Arterial 53.2 mm Hg      pO2, Arterial 14 mmHg      Comment: Serial Number: [id number]:  [id number]        Base Excess 2.0000 mmol/L      O2 Saturation, Arterial 14 %      pH, Arterial 7.33 pH units      HCO3, Arterial 27.7 mmol/L     POC Protime / INR [[id number]]   (Abnormal) Collected:  03/05/20 1506    Specimen:  Blood Updated:  03/05/20 1509     Protime 12.0 seconds      INR 1.0     Comment: Serial Number: [id number]:  [id number]             Rads:  Imaging Results (Last 72 Hours)     Procedure Component Value Units Date/Time    XR Chest 1 View [[id number]] Collected:  03/05/20 1539     Updated:  03/05/20 1544    Narrative:       EXAMINATION: XR CHEST 1 VW-03/05/2020:      INDICATION: Acute Stroke Protocol (onset < 12 hrs).      COMPARISON: NONE.     FINDINGS: Portable chest reveals cardiac and mediastinal silhouettes  within normal limits. Underlying chronic and emphysematous changes seen  within the lung fields bilaterally with mild elevation seen of the right  hemidiaphragm. Scarring at the lung apices. Degenerative changes seen  within the spine. Vascular calcification seen within the thoracic aorta.  There are small bilateral pleural effusions or pleural thickening at the  lung bases. The findings are essentially stable and unchanged when  compared to the prior study.           Impression:       Underlying chronic and emphysematous changes seen within the  lung fields bilaterally with no evidence of acute parenchymal disease.     D:  03/05/2020  E:  03/05/2020             CT Cerebral Perfusion With & Without Contrast [[id number]] Collected:  03/05/20 1532     Updated:  03/05/20 1542    Narrative:       EXAMINATION: CT CEREBRAL PERFUSION W WO CONTRAST-03/05/2020:      INDICATION: Stroke, left-sided facial droop.     TECHNIQUE: Cerebral perfusion analysis was performed using computed  tomography with contrast administration, including post processing of  parametric maps with determination of cerebral blood flow, cerebral  blood volume, and mean transit time.      The radiation dose reduction device was turned on for each scan per the  ALARA (As Low as Reasonably Achievable) protocol.     COMPARISON: NONE.     FINDINGS: There is symmetry identified in the cerebral blood  flow  perfusion maps. There is also symmetry identified within the cerebral  blood volume perfusion maps. No evidence of defect identified. There is  no increased mean transient time or time to drain identified. There is a  normal perfusion analysis identified.       Impression:       No large perfusion abnormality identified to suggest  evidence of reversible ischemia.     D:  03/05/2020  E:  03/05/2020                      CT Head Without Contrast Stroke Protocol [[id number]] Collected:  03/05/20 1456     Updated:  03/05/20 1507    Narrative:       EXAMINATION: CT HEAD WO CONTRAST, STROKE PROTOCOL-03/05/2020:      INDICATION: Stroke, left facial droop.     TECHNIQUE: 5 mm unenhanced images through the brain.     The radiation dose reduction device was turned on for each scan per the  ALARA (As Low as Reasonably Achievable) protocol.     COMPARISON: 05/19/2018 head CT scan.     FINDINGS: There is expected advanced generalized cerebral atrophy given  the patient's age. Chronic appearing central white matter changes,  including what is probably a small old left-sided white matter infarct  in the posterior left frontal white matter, appears unchanged. There is  some probable streak artifact superimposed over the lower brainstem, and  similar artifact was present near this location on the prior study.  There is no clearly new infarct or edema, no evidence of hemorrhage,  hydrocephalus, mass or mass effect, or abnormal extra-axial collection.  The calvarium appears intact. The included paranasal sinuses and  mastoids appear clear.       Impression:       Chronic appearing changes of the aging brain stable from  05/19/2018. No new intracranial disease is seen.     NOTE:  The exam time is shown as 2:51 PM. The study was reviewed on the  CT scan monitor and discussed with Dr. Douglas by phone at 2:57 PM.     D:  03/05/2020  E:  03/05/2020                     Assessment: Post stroke recrudescence secondary to urinary tract  "infection.    Advanced Alzheimer's disease    Seizure disorder       Plan:    Family does not wish to pursue stroke care and NIH stroke scale will be discontinued.    Given the slight elevation in her LDL and history of previous strokes will reduce Lipitor to 20 mg daily.    Social family is contemplating nursing home placement in the next 6 weeks it would probably be there to their advantage to pursue that during the course of this hospitalization.    Comment:   I suspect this woman is at the terminal stages of Alzheimer's disease and probably has 6 months to live or less.    That said she does not appear to be in any pain or discomfort and on \"would find that a hospice consult will be of questionable value.        I discussed the patients findings and my recommendations with family and nursing staff      Jim Salcedo MD  03/06/20  11:39 AM        "

## 2020-03-06 NOTE — PLAN OF CARE
Problem: Patient Care Overview  Goal: Plan of Care Review  Outcome: Ongoing (interventions implemented as appropriate)  Flowsheets (Taken 3/6/2020 1035)  Plan of Care Reviewed With: patient; sibling  Note:   SLP evaluation completed. Will f/u for assessment of comfort diet tolerance . Please see note for further details and recommendations.

## 2020-03-07 PROBLEM — R56.9 SEIZURE (HCC): Status: RESOLVED | Noted: 2018-05-29 | Resolved: 2020-01-01

## 2020-03-07 NOTE — PLAN OF CARE
Problem: Patient Care Overview  Goal: Plan of Care Review  Outcome: Ongoing (interventions implemented as appropriate)  Flowsheets (Taken 3/7/2020 0420)  Progress: no change  Plan of Care Reviewed With: patient; family; caregiver  Outcome Summary: Pt oriented to self only. VSS. NSR on tele. Non-verbal indicators of pain absent. Caregiver at bedside. Will continue to monitor.     Problem: Fall Risk (Adult)  Goal: Identify Related Risk Factors and Signs and Symptoms  Outcome: Ongoing (interventions implemented as appropriate)  Flowsheets (Taken 3/7/2020 0420)  Related Risk Factors (Fall Risk): age-related changes; confusion/agitation; fatigue/slow reaction; gait/mobility problems; fear of falling; history of falls; neuro disease/injury; slippery/uneven surfaces     Problem: Skin Injury Risk (Adult)  Goal: Identify Related Risk Factors and Signs and Symptoms  Outcome: Ongoing (interventions implemented as appropriate)  Flowsheets (Taken 3/7/2020 0420)  Related Risk Factors (Skin Injury Risk): advanced age; body weight extremes; cognitive impairment; mobility impaired; nutritional deficiencies

## 2020-03-07 NOTE — PROGRESS NOTES
"Nutrition Services    Patient Name:  Silva Ruano  YOB: 1925  MRN: [id number]  Admit Date:  3/5/2020                        Clinical Nutrition     Nutrition Assessment  Reason for Visit:   Identified at risk by screening criteria      Patient Name: Silva Ruano  YOB: 1925  MRN: [id number]  Date of Encounter: 03/06/20 7:48 PM  Admission date: 3/5/2020      Comments: Family rpt pt eats well when fed pureed food. Has resisted drinking at times. Rpt is lactose intolerant. Sent trial of [facility]      Nutrition Assessment     [facility] Problem List    Alzheimer's dementia, late onset, with behavioral disturbance (CMS/HCC)    Essential hypertension    Seizure (CMS/HCC) likely CVA    Hypothyroidism (acquired)    Acute UTI (urinary tract infection)    Applicable PMH/PSxH:     PMH: She  has a past medical history of Alzheimer disease (CMS/HCC), Arthritis, Closed fracture of neck of right femur, Acute (2/12/2018), Diverticulosis, Hypertension, Pathologic fracture of clavicle, left, initial encounter, Skin cancer, and Thyroid disease.   PSxH: She  has a past surgical history that includes Ovarian cyst removal; Hysterectomy; Cholecystectomy; and Hip hemiArthroplasty (Right, 2/12/2018).         Reported/Observed/Food/Nutrition Related History:     Rpt pt lactose intolerant Likes pureed apples, peas, gr beans, mac & cheese, oats w banana, sweet potato. Likes sweets.       Anthropometrics     Height: 172.7 cm (68\")  Last filed wt: Weight: 45.4 kg (100 lb) (03/05/20 1529)  Weight Method: Estimated    BMI: BMI (Calculated): 15.2  Underweight:<18.5kg/m2    Ideal Body Weight (IBW) (kg): 64.15      Weight Change   UBW: per family 126   Weight change:wt n EMR estimated; per family believe it is about 117 lbs   % wt change: 7%  Time frame of weight loss:9 mo     Labs reviewed     Results from last 7 days   Lab Units 03/05/20  1509 03/05/20  1506   CREATININE mg/dL  --  0.80   ALT (SGPT) U/L 10  " --      Results from last 7 days   Lab Units 03/06/20  0510   CHOLESTEROL mg/dL 150   TRIGLYCERIDES mg/dL 122         Results from last 7 days   Lab Units 03/06/20  0132 03/05/20  1907   GLUCOSE mg/dL 98 93     Lab Results   Lab Value Date/Time    HGBA1C 4.80 03/06/2020 0510         Medications reviewed   Pertinent: Keppra Abx      Current Nutrition Prescription     PO: Diet Pureed; Lactose Restricted  No active supplement orders      Intake: Insuffic data        Nutrition Diagnosis     3/6  Problem Predicted suboptimal intake   Etiology Late Alzheimer's dz   Signs/Symptoms Rpt has resisted drinking req total feed         Nutrition Intervention      Follow treatment progress, Care plan reviewed, Interview for preferences, diet order placed per SLP rec and trial of suppl sent      Goal:   General: Nutrition support treatment  PO: Establish PO      Monitoring/Evaluation:   PO intake, Supplement intake, Symptoms, Swallow function      Will Continue to follow per protocol      Neda Tong RD,   Time Spent: 25 min        Electronically signed by:  Neda Tong RD  03/06/20 7:48 PM

## 2020-03-07 NOTE — DISCHARGE SUMMARY
[facility] Medicine Services  DISCHARGE SUMMARY    Patient Name: Silva Ruano  : 1925  MRN: [id number]    Date of Admission: 3/5/2020  2:53 PM  Date of Discharge: 3/7/2020  Primary Care Physician: Chuy Galan MD    Consults     Date and Time Order Name Status Description    3/5/2020 1916 Inpatient Neurology Consult Stroke      3/5/2020 1451 Inpatient Neurology Consult Stroke            Hospital Course     Presenting Problem:   TIA (transient ischemic attack) [G45.9]  Acute CVA (cerebrovascular accident) (CMS/HCC) [I63.9]  Acute CVA (cerebrovascular accident) (CMS/HCC) [I63.9]    Active Hospital Problems    Diagnosis  POA   • Acute UTI (urinary tract infection) [N39.0]  Yes   • Hypothyroidism (acquired) [E03.9]  Yes   • Essential hypertension [I10]  Yes   • Alzheimer's dementia, late onset, with behavioral disturbance (CMS/[facility]) [G30.1, F02.81]  Yes      Resolved Hospital Problems    Diagnosis Date Resolved POA   • **Acute CVA (cerebrovascular accident) (CMS/HCC) [I63.9] 2020 Yes   • Seizure (CMS/[facility]) [R56.9] 2020 Yes          Hospital Course:  Silva Ruano is a 89 y.o. female with history of progressively worsening dementia hypertension seizure disorder on Keppra.  History obtained from sister at bedside who is 1 of her primary caregivers, they noted that the patient last night had had a facial droop and it was weaker than her baseline.  CT in ER was negative for any acute process.  She was also found to have a urinary tract infection and started on Rocephin. Her mental status has improved with antibiotics.  Neurology did not feel that her mental status was secondary to CVA or TIA.  Long discussion with family.  They wish to take her home and will evaluate her for further long-term care if they feel she needs it      Discharge Follow Up Recommendations for outpatient labs/diagnostics:  Patient will be discharged home she is to follow-up with her PCP   Angelia in 1 week.  Continue full course of cefdinir.  She will need recheck of UA after discontinuation of antibiotics    Day of Discharge     HPI:   Patient seen and examined resting comfortably.  Caregiver is at the bedside    Review of Systems  Not able to obtain review of systems secondary to dementia and mental status    Vital Signs:   Temp:  [96.5 °F (35.8 °C)-98.1 °F (36.7 °C)] 96.5 °F (35.8 °C)  Heart Rate:  [] 82  Resp:  [14-18] 16  BP: (128-166)/(60-81) 132/66     Physical Exam:  Constitutional: No acute distress, not following commands  HENT: NCAT, mucous membranes moist  Respiratory: Clear to auscultation bilaterally, respiratory effort normal   Cardiovascular: RRR, no murmurs, rubs, or gallops, palpable pedal pulses bilaterally  Gastrointestinal: Positive bowel sounds, soft, nontender, nondistended  Musculoskeletal: No bilateral ankle edema  Psychiatric: sleeping, awakens to voice  Neurologic: Does not follow commands  Skin: No rashes      Pertinent  and/or Most Recent Results     Results from last 7 days   Lab Units 03/07/20  0630 03/05/20  1509 03/05/20  1507 03/05/20  1506   WBC 10*3/mm3 6.47 9.23  --   --    HEMOGLOBIN g/dL 11.8* 12.0  --   --    HEMOGLOBIN, POC g/dL  --   --  11.6*  --    HEMATOCRIT % 36.0 36.9  --   --    HEMATOCRIT POC %  --   --  34*  --    PLATELETS 10*3/mm3 149 166  --   --    SODIUM mmol/L 136  --   --   --    POTASSIUM mmol/L 3.7  --   --   --    CHLORIDE mmol/L 102  --   --   --    CO2 mmol/L 20.0*  --   --   --    BUN mg/dL 15  --   --   --    CREATININE mg/dL 0.61  --   --  0.80   GLUCOSE mg/dL 82  --   --   --    CALCIUM mg/dL 8.7  --   --   --      Results from last 7 days   Lab Units 03/05/20  1509 03/05/20  1506   ALT (SGPT) U/L 10  --    AST (SGOT) U/L 18  --    PROTIME seconds  --  12.0*   INR   --  1.0   APTT seconds 31.8  --      Results from last 7 days   Lab Units 03/06/20  0510   CHOLESTEROL mg/dL 150   TRIGLYCERIDES mg/dL 122   HDL CHOL mg/dL 42          Results from last 7 days   Lab Units 03/07/20  0630 03/06/20  0510 03/05/20  1509   TSH uIU/mL 4.880*  --   --    HEMOGLOBIN A1C %  --  4.80  --    TROPONIN T ng/mL  --   --  <0.010       Brief Urine Lab Results  (Last result in the past 365 days)      Color   Clarity   Blood   Leuk Est   Nitrite   Protein   CREAT   Urine HCG        03/05/20 1613 Dark Yellow Turbid Small (1+) Large (3+) Positive 30 mg/dL (1+)               Microbiology Results Abnormal     Procedure Component Value - Date/Time    Urine Culture - Urine, Urine, Catheter [[id number]]  (Abnormal)  (Susceptibility) Collected:  03/05/20 1613    Lab Status:  Final result Specimen:  Urine, Catheter Updated:  03/07/20 0356     Urine Culture >100,000 CFU/mL Proteus mirabilis    Susceptibility      Proteus mirabilis     SHEREE     Ampicillin Susceptible     Ampicillin + Sulbactam Susceptible     Cefazolin Susceptible     Cefepime Susceptible     Ceftazidime Susceptible     Ceftriaxone Susceptible     Gentamicin Susceptible     Levofloxacin Resistant     Nitrofurantoin Resistant     Piperacillin + Tazobactam Susceptible     Tetracycline Resistant     Trimethoprim + Sulfamethoxazole Susceptible                          Imaging Results (All)     Procedure Component Value Units Date/Time    CT Head Without Contrast Stroke Protocol [[id number]] Collected:  03/05/20 1456     Updated:  03/06/20 2320    Narrative:       EXAMINATION: CT HEAD WO CONTRAST, STROKE PROTOCOL-03/05/2020:      INDICATION: Stroke, left facial droop.     TECHNIQUE: 5 mm unenhanced images through the brain.     The radiation dose reduction device was turned on for each scan per the  ALARA (As Low as Reasonably Achievable) protocol.     COMPARISON: 05/19/2018 head CT scan.     FINDINGS: There is expected advanced generalized cerebral atrophy given  the patient's age. Chronic appearing central white matter changes,  including what is probably a small old left-sided white matter infarct  in the  posterior left frontal white matter, appears unchanged. There is  some probable streak artifact superimposed over the lower brainstem, and  similar artifact was present near this location on the prior study.  There is no clearly new infarct or edema, no evidence of hemorrhage,  hydrocephalus, mass or mass effect, or abnormal extra-axial collection.  The calvarium appears intact. The included paranasal sinuses and  mastoids appear clear.       Impression:       Chronic appearing changes of the aging brain stable from  05/19/2018. No new intracranial disease is seen.     NOTE:  The exam time is shown as 2:51 PM. The study was reviewed on the  CT scan monitor and discussed with Dr. Douglas by phone at 2:57 PM.     D:  03/05/2020  E:  03/05/2020           This report was finalized on 3/6/2020 11:17 PM by Dr. Logan Dunaway MD.       XR Chest 1 View [[id number]] Collected:  03/05/20 1539     Updated:  03/06/20 1147    Narrative:       EXAMINATION: XR CHEST 1 VW-03/05/2020:      INDICATION: Acute Stroke Protocol (onset < 12 hrs).      COMPARISON: NONE.     FINDINGS: Portable chest reveals cardiac and mediastinal silhouettes  within normal limits. Underlying chronic and emphysematous changes seen  within the lung fields bilaterally with mild elevation seen of the right  hemidiaphragm. Scarring at the lung apices. Degenerative changes seen  within the spine. Vascular calcification seen within the thoracic aorta.  There are small bilateral pleural effusions or pleural thickening at the  lung bases. The findings are essentially stable and unchanged when  compared to the prior study.           Impression:       Underlying chronic and emphysematous changes seen within the  lung fields bilaterally with no evidence of acute parenchymal disease.     D:  03/05/2020  E:  03/05/2020     This report was finalized on 3/6/2020 11:44 AM by Dr. Windy Taylor MD.       CT Cerebral Perfusion With & Without Contrast [[id number]] Collected:   03/05/20 1532     Updated:  03/06/20 1147    Narrative:       EXAMINATION: CT CEREBRAL PERFUSION W WO CONTRAST-03/05/2020:      INDICATION: Stroke, left-sided facial droop.     TECHNIQUE: Cerebral perfusion analysis was performed using computed  tomography with contrast administration, including post processing of  parametric maps with determination of cerebral blood flow, cerebral  blood volume, and mean transit time.      The radiation dose reduction device was turned on for each scan per the  ALARA (As Low as Reasonably Achievable) protocol.     COMPARISON: NONE.     FINDINGS: There is symmetry identified in the cerebral blood flow  perfusion maps. There is also symmetry identified within the cerebral  blood volume perfusion maps. No evidence of defect identified. There is  no increased mean transient time or time to drain identified. There is a  normal perfusion analysis identified.       Impression:       No large perfusion abnormality identified to suggest  evidence of reversible ischemia.     D:  03/05/2020  E:  03/05/2020              This report was finalized on 3/6/2020 11:44 AM by Dr. Windy Taylor MD.                            Plan for Follow-up of Pending Labs/Results:   Repeat BMP and CBC in 1 week with primary care provider Dr. Galan    Discharge Details        Discharge Medications      New Medications      Instructions Start Date   aspirin 81 MG chewable tablet   81 mg, Oral, Daily   Start Date:  March 8, 2020     atorvastatin 20 MG tablet  Commonly known as:  LIPITOR   20 mg, Oral, Nightly      cefdinir 125 MG/5ML suspension  Commonly known as:  OMNICEF   250 mg, Oral, Every 12 Hours Scheduled         Changes to Medications      Instructions Start Date   acetaminophen 325 MG tablet  Commonly known as:  TYLENOL  What changed:  when to take this   650 mg, Oral, Every 4 Hours PRN         Continue These Medications      Instructions Start Date   CLARITIN 10 MG tablet  Generic drug:  loratadine    10 mg, Oral, 2 Times Daily      levETIRAcetam 250 MG tablet  Commonly known as:  KEPPRA   250 mg, Oral, 2 Times Daily      levothyroxine 50 MCG tablet  Commonly known as:  SYNTHROID, LEVOTHROID   50 mcg, Oral, Daily      PREMARIN 0.625 MG tablet  Generic drug:  estrogens (conjugated)   0.625 mg, Oral, Daily         Stop These Medications    losartan 50 MG tablet  Commonly known as:  COZAAR     metoprolol succinate XL 25 MG 24 hr tablet  Commonly known as:  TOPROL-XL     nitrofurantoin (macrocrystal-monohydrate) 100 MG capsule  Commonly known as:  MACROBID     omeprazole 20 MG capsule  Commonly known as:  priLOSEC     tolterodine LA 4 MG 24 hr capsule  Commonly known as:  DETROL LA            Allergies   Allergen Reactions   • Sulfa Antibiotics Hives         Discharge Disposition:  Home or Self Care    Diet:  Hospital:  Diet Order   Procedures   • Diet Pureed; Lactose Restricted       Activity:  As tolerated  Restrictions or Other Recommendations:    No restrictions       CODE STATUS:    Code Status and Medical Interventions:   Ordered at: 03/05/20 1827     Level Of Support Discussed With:    Health Care Surrogate     Code Status:    No CPR     Medical Interventions (Level of Support Prior to Arrest):    Full       Future Appointments   Date Time Provider Department Center   9/29/2020  3:30 PM Yasmine Wagoner APRN [facility]           Time Spent on Discharge: 45 minutes    Electronically signed by Sophia Candelario DO 03/07/20, 9:02 AM.

## 2020-03-08 NOTE — OUTREACH NOTE
Prep Survey      Responses   [facility] facility patient discharged from?  [place]   Is LACE score < 7 ?  No   Eligibility  Readm Mgmt   Discharge diagnosis  Acute CVA    Does the patient have one of the following disease processes/diagnoses(primary or secondary)?  Stroke (TIA)   Does the patient have Home health ordered?  No   Is there a DME ordered?  No   Comments regarding appointments  family are gettinhg pt to LTC after d/c   Prep survey completed?  Yes          Sondra Floyd RN

## 2020-03-09 NOTE — OUTREACH NOTE
Stroke Week 1 Survey      Responses   [facility] patient discharged from?  [facility]   Does the patient have one of the following disease processes/diagnoses(primary or secondary)?  Stroke (TIA)   Is there a successful TCM telephone encounter documented?  No   Week 1 attempt successful?  Yes   Call start time  1602   Call end time  1603   Discharge diagnosis  Acute CVA    Is patient permission given to speak with other caregiver?  Yes   List who call center can speak with  dtr   Person spoke with today (if not patient) and relationship  dtr   Meds reviewed with patient/caregiver?  Yes   Is the patient having any side effects they believe may be caused by any medication additions or changes?  No   Is the patient taking all medications as directed (includes completed medication regime)?  Yes   Does the patient have a primary care provider?   Yes   Does the patient have an appointment with their PCP within 7 days of discharge?  Greater than 7 days   What is preventing the patient from scheduling follow up appointments within 7 days of discharge?  Earlier appointment not available   Nursing Interventions  Verified appointment date/time/provider   Does the patient require any assistance with activities of daily living such as eating, bathing, dressing, walking, etc.?  Yes   ADL comments  that is her baseline   Does the patient have any residual symptoms from stroke/TIA?  No   Does the patient understand the diet ordered at discharge?  Yes   Did the patient receive a copy of their discharge instructions?  Yes   Nursing interventions  Reviewed instructions with patient   What is the patient's perception of their health status since discharge?  Returned to baseline/stable   Is the patient able to teach back FAST for Stroke?  Yes   Is the patient/caregiver able to teach back the risk factors for a stroke?  Smoking, High Cholesterol, High blood pressure-goal below 120/80   Is the patient/caregiver able to teach back the  hierarchy of who to call/visit for symptoms/problems? PCP, Specialist, Home health nurse, Urgent Care, ED, 911  Yes   Week 1 call completed?  Yes   Revoked  No further contact(revokes)-requires comment   Graduated/Revoked comments  baseline per dtr          Sofia Jose, RN

## 2020-05-27 NOTE — PROGRESS NOTES
"Subjective   Silva Ruano is a 89 y.o. female here today for injection therapy.    Chief Complaint   Patient presents with   • Right Knee - Injections     Supartz # 1   Patient presents with her daughter and would like to start the Visco supplementation injection for her right knee.    Past Medical History:   Diagnosis Date   • Alzheimer disease (CMS/HCC)    • Arthritis    • Closed fracture of neck of right femur, Acute 2/12/2018   • Diverticulosis    • Hypertension    • Pathologic fracture of clavicle, left, initial encounter    • Skin cancer     lymphoma   • Thyroid disease          Past Surgical History:   Procedure Laterality Date   • CHOLECYSTECTOMY     • HIP HEMIARTHROPLASTY Right 2/12/2018    Procedure: HIP HEMIARTHROPLASTY;  Surgeon: Rohith Andersen MD;  Location: [facility];  Service:    • HYSTERECTOMY     • OVARIAN CYST REMOVAL         Allergies   Allergen Reactions   • Sulfa Antibiotics Hives       Objective   Resp 18   Ht 172.7 cm (68\")   Wt 45.4 kg (100 lb)   BMI 15.20 kg/m²    Physical Exam    Skin exam stable with no erythema, ecchymosis or rash.  No new swelling.  No motor or sensory changes.  Distal pulse intact.    Large Joint Arthrocentesis  Date/Time: 5/27/2020 1:42 PM  Consent given by: patient  Site marked: site marked  Timeout: Immediately prior to procedure a time out was called to verify the correct patient, procedure, equipment, support staff and site/side marked as required   Supporting Documentation  Indications: pain   Procedure Details  Location: knee -   Preparation: Patient was prepped and draped in the usual sterile fashion  Needle size: 20 G (21g)  Approach: anterolateral  Medications administered: 25 mg sodium hyaluronate 25 MG/2.5ML  Patient tolerance: patient tolerated the procedure well with no immediate complications          Assessment/Plan      Diagnosis Plan   1. Primary localized osteoarthritis of left knee     2. Primary localized osteoarthritis of right " knee  Large Joint Arthrocentesis       Guided on proper techniques for mobility, strength, agility and/or conditioning exercises  Ice, heat, and/or modalities as beneficial  Watch for signs and symptoms of infection  Call or notify for any adverse effect from injection therapy    Recommendations:  Follow up in 1 week    Patient agreeable to call or return sooner for any concerns.

## 2020-06-02 NOTE — PROGRESS NOTES
"Maricarmen Ruano is a 89 y.o. female here today for injection therapy.    Chief Complaint   Patient presents with   • Right Knee - Injections     Supartz # 2   Patient presents for second Visco supplementation injection into the right knee.    Past Medical History:   Diagnosis Date   • Alzheimer disease (CMS/HCC)    • Arthritis    • Closed fracture of neck of right femur, Acute 2/12/2018   • Diverticulosis    • Hypertension    • Pathologic fracture of clavicle, left, initial encounter    • Skin cancer     lymphoma   • Thyroid disease          Past Surgical History:   Procedure Laterality Date   • CHOLECYSTECTOMY     • HIP HEMIARTHROPLASTY Right 2/12/2018    Procedure: HIP HEMIARTHROPLASTY;  Surgeon: Rohith Andersen MD;  Location: [facility];  Service:    • HYSTERECTOMY     • OVARIAN CYST REMOVAL         Allergies   Allergen Reactions   • Sulfa Antibiotics Hives       Objective   Resp 18   Ht 172.7 cm (68\")   Wt 45.4 kg (100 lb)   BMI 15.20 kg/m²    Physical Exam    Skin exam stable with no erythema, ecchymosis or rash.  No new swelling.  No motor or sensory changes.  Distal pulse intact.    Large Joint Arthrocentesis: R knee  Date/Time: 6/2/2020 1:29 PM  Consent given by: patient  Site marked: site marked  Timeout: Immediately prior to procedure a time out was called to verify the correct patient, procedure, equipment, support staff and site/side marked as required   Supporting Documentation  Indications: pain   Procedure Details  Location: knee - R knee  Preparation: Patient was prepped and draped in the usual sterile fashion  Needle size: 20 G (21g)  Approach: anterolateral  Medications administered: 25 mg sodium hyaluronate 25 MG/2.5ML  Patient tolerance: patient tolerated the procedure well with no immediate complications          Assessment/Plan      Diagnosis Plan   1. Primary localized osteoarthritis of right knee  Large Joint Arthrocentesis: R knee       Guided on proper techniques " for mobility, strength, agility and/or conditioning exercises  Ice, heat, and/or modalities as beneficial  Watch for signs and symptoms of infection  Call or notify for any adverse effect from injection therapy    Recommendations:  no strenuous activity.  Follow up in 1 week    Patient agreeable to call or return sooner for any concerns.

## 2020-06-09 NOTE — PROGRESS NOTES
"Maricarmen Ruano is a 89 y.o. female here today for injection therapy.    Chief Complaint   Patient presents with   • Right Knee - Injections     Patient here today for Supartz #3   patient presents for 3rd visco injection    Past Medical History:   Diagnosis Date   • Alzheimer disease (CMS/HCC)    • Arthritis    • Closed fracture of neck of right femur, Acute 2/12/2018   • Diverticulosis    • Hypertension    • Pathologic fracture of clavicle, left, initial encounter    • Skin cancer     lymphoma   • Thyroid disease         Past Surgical History:   Procedure Laterality Date   • CHOLECYSTECTOMY     • HIP HEMIARTHROPLASTY Right 2/12/2018    Procedure: HIP HEMIARTHROPLASTY;  Surgeon: Rohith Andersen MD;  Location: [facility];  Service:    • HYSTERECTOMY     • OVARIAN CYST REMOVAL         Allergies   Allergen Reactions   • Sulfa Antibiotics Hives       Objective   Resp 18   Ht 172.7 cm (68\")   Wt 45.4 kg (100 lb)   BMI 15.20 kg/m²    Physical Exam     Skin exam stable with no erythema, ecchymosis or rash.  No new swelling.  No motor or sensory changes.  Distal pulse intact.    Large Joint Arthrocentesis: R knee  Date/Time: 6/9/2020 3:33 PM  Consent given by: patient  Site marked: site marked  Timeout: Immediately prior to procedure a time out was called to verify the correct patient, procedure, equipment, support staff and site/side marked as required   Supporting Documentation  Indications: pain   Procedure Details  Location: knee - R knee  Preparation: Patient was prepped and draped in the usual sterile fashion  Needle size: 22 G (21 G)  Approach: anterolateral  Medications administered: 25 mg sodium hyaluronate 25 MG/2.5ML  Patient tolerance: patient tolerated the procedure well with no immediate complications          Assessment/Plan      Diagnosis Plan   1. Primary localized osteoarthritis of right knee  Large Joint Arthrocentesis: R knee       No complications of injection " noted.    Discussion of orthopaedic goals and activities and patient and/or guardian expressed appreciation. Call or notify for any adverse effect from injection therapy. Ice, heat, and/or modalities as beneficial. Watch for signs and symptoms of infection.    Recommendations:  Work/Activity Status: May perform usual activities as tolerated. May return to routine exercise and physical work as tolerated. No strenuous activity.  Patient and/or guardian is encouraged to call or return for any issues or concerns.    1 week follow up    Patient agreeable to call or return sooner for any concerns.

## 2020-06-16 NOTE — ED PROVIDER NOTES
Subjective   89-year-old female with Alzheimer's disease who presents from nursing home after falling out of her wheelchair and striking the left side of her head.  The nurse at the nursing home stated that she heard her fall.  Patient did not lose consciousness.  She does have a large hematoma on the left side of her forehead.  The patient is not on any blood thinners aside from a baby aspirin daily.  Patient can give no history and there is no other history available aside from a med list and paperwork stating that she is a DO NOT RESUSCITATE          Review of Systems   All other systems reviewed and are negative.      Past Medical History:   Diagnosis Date   • Alzheimer disease (CMS/[facility])    • Arthritis    • Closed fracture of neck of right femur, Acute 2/12/2018   • Diverticulosis    • Hypertension    • Pathologic fracture of clavicle, left, initial encounter    • Skin cancer     lymphoma   • Thyroid disease        Allergies   Allergen Reactions   • Sulfa Antibiotics Hives       Past Surgical History:   Procedure Laterality Date   • CHOLECYSTECTOMY     • HIP HEMIARTHROPLASTY Right 2/12/2018    Procedure: HIP HEMIARTHROPLASTY;  Surgeon: Rohith Anedrsen MD;  Location: [facility];  Service:    • HYSTERECTOMY     • OVARIAN CYST REMOVAL         Family History   Problem Relation Age of Onset   • Heart disease Other    • Hypertension Other    • Cancer Other        Social History     Socioeconomic History   • Marital status:      Spouse name: Not on file   • Number of children: Not on file   • Years of education: Not on file   • Highest education level: Not on file   Tobacco Use   • Smoking status: Never Smoker   • Smokeless tobacco: Never Used   Substance and Sexual Activity   • Alcohol use: No   • Drug use: No   • Sexual activity: Defer           Objective   Physical Exam   HENT:   Head:       Nursing note and vitals reviewed.    GEN: No acute distress  Head: See graphical documentation  Eyes: Pupils  equal round reactive to light  ENT: Posterior pharynx normal in appearance, oral mucosa is moist  Chest: Nontender to palpation  Cardiovascular: Regular rate and 80s lungs: Clear to auscultation bilaterally  Abdomen: Soft, nontender, nondistended, no peritoneal signs  Extremities: Lower extremities are slightly contracted, no gross deformity of all 4 extremities  Neuro: Patient seems alert, but does not speak or follow commands.  She will smile occasionally  Psych: Unable to assess        Procedures           ED Course  ED Course as of Jun 16 2004 Tue Jun 16, 2020 2003 CT Head Without Contrast   Order: [id number]   Status:  Final result   Visible to patient:  No (Not Released) Next appt:  09/29/2020 at 03:30 PM in Oncology (Yasmine Wagoner, APRN)   Details       Reading Physician Reading Date Result Priority  Awais Magana MD 6/16/2020     Narrative & Impression    FINAL REPORT     TECHNIQUE:  Multiple contiguous transaxial slices through the head were  obtained without the intravenous administration of contrast.     CLINICAL HISTORY:  fall, scalp hematoma     FINDINGS:  There is age-appropriate cortical atrophy with associated  ventricular enlargement. Patchy periventricular white matter low  attenuation is most consistent with chronic microvascular  ischemia. There is no acute infarct, hemorrhage or mass effect.  If concern persists, recommend MRI. There is no sinus air-fluid  level. The calvarium is intact.  There is left frontal scalp  soft tissue swelling.     IMPRESSION:  Scalp soft tissue swelling but no acute intracranial abnormality  Atrophy with chronic microvascular ischemia     Authenticated by Awais Magana MD on 06/16/2020 07:44:26 PM            [DT]      ED Course User Index  [DT] Cresencio Horner MD                                           MDM  Number of Diagnoses or Management Options  Diagnosis management comments: Differential diagnosis would include concussion, skull fracture,  subarachnoid hemorrhage, epidural hematoma, subdural hematoma, intraparenchymal hemorrhage, or other concerns.       Amount and/or Complexity of Data Reviewed  Decide to obtain previous medical records or to obtain history from someone other than the patient: yes  Obtain history from someone other than the patient: yes  Review and summarize past medical records: yes  Independent visualization of images, tracings, or specimens: yes        Final diagnoses:   Head trauma, initial encounter   Traumatic hematoma of forehead, initial encounter            Cresencio Horner MD  06/16/20 2005

## 2020-08-12 PROBLEM — E87.6 HYPOKALEMIA: Status: ACTIVE | Noted: 2020-01-01

## 2020-08-12 PROBLEM — I48.91 ATRIAL FIBRILLATION (HCC): Status: ACTIVE | Noted: 2020-01-01

## 2020-08-12 PROBLEM — E87.0 HYPERNATREMIA: Status: ACTIVE | Noted: 2020-01-01

## 2020-08-12 PROBLEM — R40.2430 GLASGOW COMA SCALE TOTAL SCORE 3-8 (HCC): Status: ACTIVE | Noted: 2020-01-01

## 2020-08-12 NOTE — ED NOTES
Called House Supervisor for bed assignment. Patient will be going to  [address]. CRUZITO Cifuentes notified.        Beryl Monroe  08/12/20 0633

## 2020-08-12 NOTE — PROGRESS NOTES
Adult Nutrition  Assessment/PES    Patient Name:  Silva Ruano  YOB: 1925  MRN: [id number]  Admit Date:  8/12/2020    Assessment Date:  8/12/2020    Comments:  Rec. #1: Consider speech evaluation if p.o. Intake to be resumed. Rec. #2: Consider adding appetite stimulant and MVI with minerals. Rec. #3: If pt./famiy agreeable consider use of nutrition support.Family states that pt. Was previously on Pureed solids and is lactose intolerant. RD to follow pt. And add supplements once diet advances. Consult RD PRN.     Reason for Assessment     Row Name 08/12/20 1152          Reason for Assessment    Reason For Assessment  diagnosis/disease state     Diagnosis  neurologic conditions;fluid status;metabolic state Alzheimer's, Hypernatremia, Dehydration     Identified At Risk by Screening Criteria  BMI             Labs/Tests/Procedures/Meds     Row Name 08/12/20 1234          Labs/Procedures/Meds    Lab Results Reviewed  reviewed, pertinent     Lab Results Comments  HIgh: Na, Cl, Gluc, BUN, Cr  Low: Plt. Count, Albumin        Medications    Pertinent Medications Reviewed  reviewed, pertinent         Physical Findings     Row Name 08/12/20 1234          Physical Findings    Overall Physical Appearance  underweight         Estimated/Assessed Needs     Row Name 08/12/20 1234          Calculation Measurements    Weight Used For Calculations  52.7 kg (116 lb 3.6 oz)        Estimated/Assessed Needs    Additional Documentation  [place]-St. Jeor Equation (Group);Calorie Requirements (Group);Fluid Requirements (Group);Protein Requirements (Group)        Calorie Requirements    Estimated Calorie Requirement Comment  0539-0726        [place]-St. Jeor Equation    RMR ([place]-St. Jeor Equation)  891.325        Protein Requirements    Weight Used For Protein Calculations  52.7 kg (116 lb 3.6 oz)     Est Protein Requirement Amount (gms/kg)  1.2 gm protein 53-63 gm/day     Estimated Protein Requirements (gms/day)   63.26        Fluid Requirements    Estimated Fluid Requirement Method  Pati-Segar Formula     Pati-Segar Method (over 20 kg)  2554.4         Nutrition Prescription Ordered     Row Name 08/12/20 1235          Nutrition Prescription PO    Current PO Diet  NPO x 1 day         Evaluation of Received Nutrient/Fluid Intake     Row Name 08/12/20 1235          PO Evaluation    Number of Days PO Intake Evaluated  Insufficient Data               Problem/Interventions:  Problem 1     Row Name 08/12/20 1235          Nutrition Diagnoses Problem 1    Problem 1  Inadequate Intake/Infusion     Inadequate Intake Type  Oral     Macronutrient  Kcal;Protein;Fluid;Carbohydrate;Fiber;Fat     Micronutrient  Vitamin;Mineral     Etiology (related to)  MNT for Treatment/Condition     Signs/Symptoms (evidenced by)  NPO         Problem 2     Row Name 08/12/20 1236          Nutrition Diagnoses Problem 2    Problem 2  Underweight     Etiology (related to)  Medical Diagnosis     Neurological  Dementia     Signs/Symptoms (evidenced by)  BMI     BMI  17 - 17.9             Intervention Goal     Row Name 08/12/20 1237          Intervention Goal    General  Meet nutritional needs for age/condition     PO  Advance diet     Weight  Appropriate weight gain         Nutrition Intervention     Row Name 08/12/20 1237          Nutrition Intervention    RD/Tech Action  Follow Tx progress;Await begin PO         Nutrition Prescription     Row Name 08/12/20 1237          Nutrition Prescription PO    PO Prescription  Begin/change diet;Begin/change supplement     Begin/Change Diet to  Clear Liquid     Supplement  Boost Breeze     Supplement Frequency  3 times a day     New PO Prescription Ordered?  No, recommended        Other Orders    Obtain Weight  Daily     Obtain Weight Ordered?  No, recommended     Supplement  Vitamin mineral supplement     Supplement Ordered?  No, recommended         Education/Evaluation     Row Name 08/12/20 1238          Education     Education  Education not appropriate at this time     Please explain  Patient confusion        Monitor/Evaluation    Monitor  Per protocol;I&O;Pertinent labs;Weight;Skin status           Electronically signed by:  Judy Martinez RD  08/12/20 12:38

## 2020-08-12 NOTE — PLAN OF CARE
Problem: Patient Care Overview  Goal: Plan of Care Review  Flowsheets (Taken 8/12/2020 5509)  Plan of Care Reviewed With: family; sibling  Note:   Spoke with pt's sister and family at her bedside.  I expressed sympathy to the family, and expressed the concern of the care team for them as well as the pt.  Pt's sister stated that they know the pt was actively dying and that a member of the family would be spending the night at her bedside.  I will continue to follow to provide support to the family.

## 2020-08-12 NOTE — H&P
[facility]   HISTORY AND PHYSICAL      Name:  Silva Ruano   Age:  89 y.o.  Sex:  female  :  1925  MRN:  [id number]   Visit Number:  [id number]  Admission Date:  2020  Date Of Service:  20  Primary Care Physician:  Chuy Galan MD     Admitting diagnosis:      [place] coma scale total score 3-8 (CMS/HCC)    Atrial fibrillation (CMS/HCC)    Hypernatremia    Hypokalemia    Alzheimer's dementia, late onset, with behavioral disturbance (CMS/HCC)    Metabolic encephalopathy    Dehydration    Hypothyroidism (acquired)        History Of Presenting Illness:      89-year-old patient with past medical history of advanced dementia who was bedbound and dependent for all ADLs at the nursing home, hypothyroidism, who was found to be hypoxic and nonresponsive and so sent to the ER for further evaluation.  Apparently patient has been unresponsive so history could not be obtained and mainly obtained from the ER records and chart and the family.  Family had seen her last week where she was having not much respiratory distress and she has has been not able to recognize family and has advanced dementia.       In the ER actually it was documented that she was very hypoxic with a nonrebreather and was hypotensive and there was also documented bradycardic at one point on the way to the hospital.  On the EKG she was found to be having A. fib with rate of 113 and so amiodarone was started.  By that time the family who is the daughter and the power of  arrived and wanted not to get any aggressive treatment and comfort measures.  Said no further cardiac medication was given.  Labs were done initially which showed that she had hyponatremia with sodium of 166 and potassium of 2.7.  Patient still has been not responsive except to painful stimuli and has been on oxygen.  Her blood pressure systolic in the 90s and her saturation is.  Her heart rate has been around 100- 120  range    Review Of Systems:     The following systems were reviewed and negative;  constitution, eyes, ENT, respiratory, cardiovascular, gastrointestinal, genitourinary, musculoskeletal, neurological and behavioral/psych,  Skin except as above.     Past Medical History:    Past Medical History:   Diagnosis Date   • Alzheimer disease (CMS/HCC)    • Arthritis    • Closed fracture of neck of right femur, Acute 2/12/2018   • Diverticulosis    • Hypertension    • Pathologic fracture of clavicle, left, initial encounter    • Skin cancer     lymphoma   • Thyroid disease        Past Surgical history:    Past Surgical History:   Procedure Laterality Date   • CHOLECYSTECTOMY     • HIP HEMIARTHROPLASTY Right 2/12/2018    Procedure: HIP HEMIARTHROPLASTY;  Surgeon: Rohith Andersen MD;  Location: [facility];  Service:    • HYSTERECTOMY     • OVARIAN CYST REMOVAL         Social History:    Social History     Socioeconomic History   • Marital status:      Spouse name: Not on file   • Number of children: Not on file   • Years of education: Not on file   • Highest education level: Not on file   Tobacco Use   • Smoking status: Never Smoker   • Smokeless tobacco: Never Used   Substance and Sexual Activity   • Alcohol use: No   • Drug use: No   • Sexual activity: Defer       Family History:    Family History   Problem Relation Age of Onset   • Heart disease Other    • Hypertension Other    • Cancer Other          Allergies:      Sulfa antibiotics    Home Medications:    Prior to Admission Medications     Prescriptions Last Dose Informant Patient Reported? Taking?    acetaminophen (TYLENOL) 325 MG tablet  Self No No    Take 2 tablets by mouth Every 4 (Four) Hours As Needed for Mild Pain .    Patient taking differently:  Take 650 mg by mouth Daily.    aspirin 81 MG chewable tablet   No No    Chew 1 tablet Daily.    atorvastatin (LIPITOR) 20 MG tablet   No No    Take 1 tablet by mouth Every Night.    estrogens, conjugated,  (PREMARIN) 0.625 MG tablet  Pharmacy Yes No    Take 0.625 mg by mouth Daily.    levETIRAcetam (KEPPRA) 250 MG tablet   Yes No    Take 250 mg by mouth 2 (Two) Times a Day.    levothyroxine (SYNTHROID, LEVOTHROID) 50 MCG tablet  Pharmacy Yes No    Take 50 mcg by mouth Daily.    loratadine (CLARITIN) 10 MG tablet   Yes No    Take 10 mg by mouth 2 (Two) Times a Day.    losartan (COZAAR) 50 MG tablet   Yes No    metoprolol succinate XL (TOPROL-XL) 25 MG 24 hr tablet   Yes No    omeprazole (priLOSEC) 20 MG capsule   Yes No    tolterodine LA (DETROL LA) 4 MG 24 hr capsule   Yes No                 Vital Signs:    Temp:  [98.4 °F (36.9 °C)-98.5 °F (36.9 °C)] 98.4 °F (36.9 °C)  Heart Rate:  [] 137  Resp:  [14-17] 17  BP: ()/(40-94) 127/94        08/12/20  0313   Weight: 45.4 kg (100 lb)       Body mass index is 17.71 kg/m².    Physical Exam:      General Appearance:   She is not responsive on oxygen,  And lying comfortably in bed   Head:    Atraumatic and normocephalic, without obvious abnormality.   Eyes:          Pupils are reacting to light and accommodation   Ears:    Ears appear intact with no abnormalities noted.   Throat:   No oral lesions, no thrush, oral mucosa moist.   Neck:   Supple, trachea midline, no thyromegaly, no carotid bruit, no lymphadenopathy  Lungs-bilateral air entry with no obvious wheezing or crepitations    Heart:    Normal S1 and S2, no murmur, no gallop, no rub. No JVD, she is having irregularly irregular heart rate rate in the 120s   Abdomen:     Normal bowel sounds, no masses, no organomegaly. Soft   nontender, nondistended, no guarding, no rebound    tenderness   Extremities:   , no edema, no cyanosis, no          clubbing   Skin:   No  bruising or rash   Neurologic:  Patient is not responsive except to painful stimuli       EKG:      A. fib with rate of 113    Labs:    Lab Results (last 24 hours)     Procedure Component Value Units Date/Time    Comprehensive Metabolic Panel  [[id number]]  (Abnormal) Collected:  08/12/20 0756    Specimen:  Blood Updated:  08/12/20 0838     Glucose 132 mg/dL      BUN 32 mg/dL      Creatinine 1.40 mg/dL      Sodium 166 mmol/L      Potassium 2.7 mmol/L      Chloride 127 mmol/L      CO2 25.5 mmol/L      Calcium 8.1 mg/dL      Total Protein 5.8 g/dL      Albumin 2.90 g/dL      ALT (SGPT) 20 U/L      AST (SGOT) 56 U/L      Alkaline Phosphatase 118 U/L      Total Bilirubin 0.4 mg/dL      eGFR Non African Amer 35 mL/min/1.73      Globulin 2.9 gm/dL      A/G Ratio 1.0 g/dL      BUN/Creatinine Ratio 22.9     Anion Gap 13.5 mmol/L     Narrative:       GFR Normal >60  Chronic Kidney Disease <60  Kidney Failure <15      CBC & Differential [[id number]] Collected:  08/12/20 0756    Specimen:  Blood Updated:  08/12/20 0804    Narrative:       The following orders were created for panel order CBC & Differential.  Procedure                               Abnormality         Status                     ---------                               -----------         ------                     CBC Auto Differential[[id number]]        Abnormal            Final result                 Please view results for these tests on the individual orders.    CBC Auto Differential [[id number]]  (Abnormal) Collected:  08/12/20 0756    Specimen:  Blood Updated:  08/12/20 0804     WBC 8.81 10*3/mm3      RBC 4.19 10*6/mm3      Hemoglobin 12.8 g/dL      Hematocrit 39.7 %      MCV 94.7 fL      MCH 30.5 pg      MCHC 32.2 g/dL      RDW 14.2 %      RDW-SD 48.7 fl      MPV 9.9 fL      Platelets 136 10*3/mm3      Neutrophil % 80.6 %      Lymphocyte % 13.3 %      Monocyte % 5.2 %      Eosinophil % 0.0 %      Basophil % 0.2 %      Immature Grans % 0.7 %      Neutrophils, Absolute 7.10 10*3/mm3      Lymphocytes, Absolute 1.17 10*3/mm3      Monocytes, Absolute 0.46 10*3/mm3      Eosinophils, Absolute 0.00 10*3/mm3      Basophils, Absolute 0.02 10*3/mm3      Immature Grans, Absolute 0.06 10*3/mm3      nRBC 0.0  /100 WBC     COVID-19,[facility] IN-HOUSE,Nasal Swab No Transport Media 3-4 HR TAT - Swab, Nasal Cavity [[id number]]  (Normal) Collected:  08/12/20 0507    Specimen:  Swab from Nasal Cavity Updated:  08/12/20 0549     COVID19 Not Detected    Narrative:       Fact sheet for providers: https://www.fda.gov/media/005570/download     Fact sheet for patients: https://www.fda.gov/media/331896/download    Troponin [[id number]]  (Abnormal) Collected:  08/12/20 0353    Specimen:  Blood Updated:  08/12/20 0447     Troponin T 0.109 ng/mL     Narrative:       Troponin T Reference Range:  <= 0.03 ng/mL-   Negative for AMI  >0.03 ng/mL-     Abnormal for myocardial necrosis.  Clinicians would have to utilize clinical acumen, EKG, Troponin and serial changes to determine if it is an Acute Myocardial Infarction or myocardial injury due to an underlying chronic condition.       Results may be falsely decreased if patient taking Biotin.      BNP [[id number]]  (Normal) Collected:  08/12/20 0353    Specimen:  Blood Updated:  08/12/20 0421     proBNP 1,405.0 pg/mL     Narrative:       Among patients with dyspnea, NT-proBNP is highly sensitive for the detection of acute congestive heart failure. In addition NT-proBNP of <300 pg/ml effectively rules out acute congestive heart failure with 99% negative predictive value.    Results may be falsely decreased if patient taking Biotin.            Radiology:    Imaging Results (Last 72 Hours)     Procedure Component Value Units Date/Time    XR Chest 1 View [[id number]] Collected:  08/12/20 0846     Updated:  08/12/20 0849    Narrative:       PROCEDURE: XR CHEST 1 VW-     HISTORY: ams, hypoxia     COMPARISON: 10/29/2019.     FINDINGS: The heart is normal in size. The mediastinum is unremarkable.  There are chronic interstitial lung changes. There is no pneumothorax.   There are no acute osseous abnormalities.       Impression:       No acute cardiopulmonary process.     Continued followup is  recommended.     This report was finalized on 8/12/2020 8:47 AM by Kaylen Stephens M.D..          Assessment:    Assessment/Plan       [place] coma scale total score 3-8 (CMS/HCC)    Atrial fibrillation (CMS/HCC)    Hypernatremia    Hypokalemia    Alzheimer's dementia, late onset, with behavioral disturbance (CMS/HCC)    Metabolic encephalopathy    Dehydration    Hypothyroidism (acquired)  Hypoxic respiratory failure      Plan:     89-year-old patient with advanced dementia who is DNR has come in because of hypoxic respiratory failure with atrial fibrillation uncontrolled ventricular response with new onset and having severe hypernatremia and hypokalemia with dehydration.  Patient is DNR and I had a lengthy discussion with the next of kin and family and they want mainly a trial of short-term fluids for electrolyte imbalance to see if that would help with comfort and prevent delirium worsening with any stabilization and of her heart rhythm.  They do not want any other aggressive treatment and if there is no further response just comfort care.  Patient's family are aware that she has a very poor prognosis and would agree for hospice if she is hemodynamically stable in the next 24-48 is to be transferred to the [facility] care [place].  If she is unstable they do not want her to be transferred.  Goals of care has been addressed with the family and they are agreeable and see rest as per orders.  Total time spent with more than 50% time for counseling 45 minutes    Chuy Galan MD  08/12/20  09:18    Please note that portions of this note may have been completed with a voice recognition program. Efforts were made to edit the dictations, but occasionally words are mistranscribed.

## 2020-08-12 NOTE — ED NOTES
Pt's BP 72/46. Dr. Robles notified. Received verbal order to start 1000 mL of NS at this time.      Vane Sanchez, RN  08/12/20 0427

## 2020-08-12 NOTE — ED NOTES
Contacted Pt's Daughter (Shana) per Dr. Robles and requested she come to ED.     Vane Sanchez, RN  08/12/20 0678

## 2020-08-12 NOTE — PLAN OF CARE
Problem: Dying Patient, Actively (Adult)  Goal: Identify Related Risk Factors and Signs and Symptoms  Outcome: Ongoing (interventions implemented as appropriate)  Flowsheets (Taken 8/12/2020 6107)  Related Risk Factors (Actively Dying Patient): age/developmental level  Signs and Symptoms (Actively Dying Patient): changes in bowel elimination pattern; total dependency; food/fluid disinterest/withdrawal; mottling; vital sign changes; awareness reduced     Problem: Dying Patient, Actively (Adult)  Goal: Comfort/Pain Control  Outcome: Ongoing (interventions implemented as appropriate)     Problem: Dying Patient, Actively (Adult)  Goal: Peace/Preservation of Dignity During the Dying Process  Outcome: Ongoing (interventions implemented as appropriate)     Problem: Fluid Volume Deficit (Adult)  Goal: Identify Related Risk Factors and Signs and Symptoms  Outcome: Ongoing (interventions implemented as appropriate)     Problem: Fluid Volume Deficit (Adult)  Goal: Optimal Fluid Balance  Outcome: Ongoing (interventions implemented as appropriate)

## 2020-08-12 NOTE — PROGRESS NOTES
Pt known to me from previous admissions.  Palliative Care referral was initiated in April 2019.  Called HCP to confirm pt still current with PC.   Informed pt was discharged from  when pt admitted to [facility].   PC banner deactivated.    PC will continue to follow for DC need---possibly Inpt Hospice.

## 2020-08-12 NOTE — ED PROVIDER NOTES
Subjective   89-year-old female presenting with unresponsiveness, hypoxia.  Patient has a history of dementia and is unresponsive so cannot provide any history.  Per report from the nursing home patient was found to be hypoxic just prior to arrival, 50% on room air.  She was placed on nasal cannula.  Patient is DNR.  Patient was bradycardic, hypotensive and hypoxic in route.  No other history able to be obtained.          Review of Systems   Unable to perform ROS: Patient unresponsive       Past Medical History:   Diagnosis Date   • Alzheimer disease (CMS/HCC)    • Arthritis    • Closed fracture of neck of right femur, Acute 2/12/2018   • Diverticulosis    • Hypertension    • Pathologic fracture of clavicle, left, initial encounter    • Skin cancer     lymphoma   • Thyroid disease        Allergies   Allergen Reactions   • Sulfa Antibiotics Hives       Past Surgical History:   Procedure Laterality Date   • CHOLECYSTECTOMY     • HIP HEMIARTHROPLASTY Right 2/12/2018    Procedure: HIP HEMIARTHROPLASTY;  Surgeon: Rohith Anedrsen MD;  Location: [facility];  Service:    • HYSTERECTOMY     • OVARIAN CYST REMOVAL         Family History   Problem Relation Age of Onset   • Heart disease Other    • Hypertension Other    • Cancer Other        Social History     Socioeconomic History   • Marital status:      Spouse name: Not on file   • Number of children: Not on file   • Years of education: Not on file   • Highest education level: Not on file   Tobacco Use   • Smoking status: Never Smoker   • Smokeless tobacco: Never Used   Substance and Sexual Activity   • Alcohol use: No   • Drug use: No   • Sexual activity: Defer           Objective   Physical Exam   Constitutional:   Critically ill-appearing, frail   HENT:   Head: Normocephalic and atraumatic.   Right Ear: External ear normal.   Left Ear: External ear normal.   Nose: Nose normal.   Mouth/Throat: Oropharynx is clear and moist.   Eyes: Pupils are equal, round,  and reactive to light. Conjunctivae and EOM are normal.   Cardiovascular: Normal heart sounds and intact distal pulses.   Irregularly irregular, rapid rate   Pulmonary/Chest:   Diminished throughout   Abdominal: Soft. Bowel sounds are normal. She exhibits no distension. There is no tenderness. There is no rebound and no guarding.   Musculoskeletal: Normal range of motion. She exhibits no edema, tenderness or deformity.   Neurological: She is alert.   E4M1V1 = 6   Skin:   Cool, cyanotic    Psychiatric:   Cannot assess   Nursing note and vitals reviewed.      Procedures           ED Course                                           MDM  Number of Diagnoses or Management Options  Acute respiratory failure with hypoxia (CMS/HCC):   Atrial fibrillation with rapid ventricular response (CMS/HCC):   [place] coma scale total score 3-8, in the field (EMT or ambulance) (CMS/HCC):   Diagnosis management comments: 89-year-old female with unresponsiveness, hypoxia.  Critically ill appearing frail elderly lady with exam as above.  Will start IV fluids, check chest x-ray, continue supplemental oxygen.  She does have a DNR.  Disposition pending we will anticipate admission.    DDX: Pneumonia, dehydration, electrolyte abnormality, shock    EKG interpreted by me: Atrial fibrillation, rapid ventricular rate, nonspecific ST/T changes, this is an abnormal EKG    04:05 Patient has had several runs of V. tach.  Will initiate amiodarone.  Discussed plan of care and prognosis with patient's daughter and POA who is at bedside.    06:20 The remainder of patient's family has arrived, the patient would not want any invasive or significant measures performed.  We discussed medication to treat heart rate and blood pressure and they all agree that she would not want this.  They state that her goal would to be comfortable.  I think this all sounds very reasonable.  We did perform a COVID swab to guide admission placement and it was negative.   Discussed with Dr. Galan who graciously accepts for admission.    45 minutes of critical care provided. This time excludes other billable procedures. Time does include preparation of documents, medical consultations, review of old records, and direct bedside care. Patient was at high risk for life-threatening deterioration due to altered mental status, hypoxic respiratory failure, hypotension, arrhythmia.       Critical Care  Total time providing critical care: 30-74 minutes      Final diagnoses:   [place] coma scale total score 3-8, in the field (EMT or ambulance) (CMS/[facility])   Acute respiratory failure with hypoxia (CMS/[facility])   Atrial fibrillation with rapid ventricular response (CMS/[facility])            Oni Robles MD  08/12/20 0671

## 2020-08-12 NOTE — PROGRESS NOTES
Malnutrition Severity Assessment    Patient Name:  Silva Ruano  YOB: 1925  MRN: [id number]  Admit Date:  8/12/2020    Patient meets criteria for : Moderate (non-severe) Malnutrition    Comments: Rec. #1: Consider speech evaluation if p.o. Intake to be resumed. Rec. #2: Consider adding appetite stimulant and MVI with minerals. Rec. #3: If pt./famiy agreeable consider use of nutrition support. RD to follow pt. And add supplements once diet advances. Family states that pt. Was previously on Pureed solids and is lactose intolerant. Consult RD PRN     Malnutrition Severity Assessment  Malnutrition Type: Chronic Disease - Related Malnutrition     Malnutrition Type (last 8 hours)      Malnutrition Severity Assessment     Row Name 08/12/20 1436       Malnutrition Severity Assessment    Malnutrition Type  Chronic Disease - Related Malnutrition    Row Name 08/12/20 1436       Muscle Loss    Loss of Muscle Mass Findings  Moderate    [place] Region  Moderate - slight depression    Clavicle Bone Region  Moderate - some protrusion in females, visible in males    Dorsal Hand Region  Moderate - slight depression    Row Name 08/12/20 1436       Fat Loss    Subcutaneous Fat Loss Findings  Moderate    Orbital Region   Moderate -  somewhat hollowness, slightly dark circles    Row Name 08/12/20 1436       Criteria Met (Must meet criteria for severity in at least 2 of these categories: M Wasting, Fat Loss, Fluid, Secondary Signs, Wt. Status, Intake)    Patient meets criteria for   Moderate (non-severe) Malnutrition          Electronically signed by:  Judy Martinez RD  08/12/20 14:38

## 2020-08-13 NOTE — PLAN OF CARE
Problem: Patient Care Overview  Goal: Plan of Care Review  Flowsheets (Taken 8/13/2020 8118)  Plan of Care Reviewed With: family  Note:   Spoke with family members at pt's bedside.  I listened and provided support and information as family talked about the possibility of the pt going to the [facility].  I also provided a scripture card and the booklet, God Gave Us Butterflies, that contains information for families about end-of-life transitions. I will continue to follow.

## 2020-08-13 NOTE — PLAN OF CARE
Problem: Dying Patient, Actively (Adult)  Goal: Identify Related Risk Factors and Signs and Symptoms  Outcome: Ongoing (interventions implemented as appropriate)  Note:   Pt is on comfort care measures family at bedside Dr Kaci ROYAL telemetry this am pt BP normal had fever early in shift returned to normal pt is Q2 turns will continue to monitor.

## 2020-08-13 NOTE — PLAN OF CARE
Problem: Dying Patient, Actively (Adult)  Goal: Identify Related Risk Factors and Signs and Symptoms  Outcome: Ongoing (interventions implemented as appropriate)  Flowsheets (Taken 8/13/2020 1190)  Related Risk Factors (Actively Dying Patient): age/developmental level  Signs and Symptoms (Actively Dying Patient): awareness reduced; total dependency; changes in bladder elimination pattern; vital sign changes; other (see comments); changes in bowel elimination pattern; food/fluid disinterest/withdrawal  Note:   GCS=6     Problem: Dying Patient, Actively (Adult)  Goal: Comfort/Pain Control  Outcome: Ongoing (interventions implemented as appropriate)     Problem: Dying Patient, Actively (Adult)  Goal: Peace/Preservation of Dignity During the Dying Process  Outcome: Ongoing (interventions implemented as appropriate)     Problem: Fluid Volume Deficit (Adult)  Goal: Identify Related Risk Factors and Signs and Symptoms  Outcome: Ongoing (interventions implemented as appropriate)     Problem: Skin Injury Risk (Adult)  Goal: Identify Related Risk Factors and Signs and Symptoms  Outcome: Ongoing (interventions implemented as appropriate)

## 2020-08-13 NOTE — PROGRESS NOTES
Continued Stay Note  [facility]     Patient Name: Silva Ruano  MRN: [id number]  Today's Date: 8/13/2020    Admit Date: 8/12/2020    Discharge Plan     Row Name 08/13/20 1120       Plan    Plan Pt is a resident of [facility].  Consult from Dr Galan to  transfer to [facility] tomorrow.  Palliative Care Nurse is following and is aware.          Discharge Codes    No documentation.             Molly Gr RN

## 2020-08-13 NOTE — PROGRESS NOTES
[facility]    Name:  Silva Ruano   Age:  89 y.o.  Sex:  female  :  1925  MRN:  [id number]   Visit Number:  [id number]  Admission Date:  2020  Date Of Service:  20  Primary Care Physician:  Chuy Galan MD     LOS: 1 day :  Patient Care Team:  Chuy Galan MD as PCP - General (Internal Medicine)  Chuy Galan MD as PCP - Claims Attributed:      Subjective / Interval History:     89-year-old patient with past medical history of advanced dementia, hypothyroidism, was admitted because of new onset A. fib.  With hyponatremia hyperkalemia and metabolic encephalopathy.  She has had IV fluids with hypotonic fluid with low potassium and sodium has improved but potassium is still low.  Patient has not been much responsive.  Currently she is stable with her systolic blood pressure around 100.  She is not opening her eyes and unable to communicate      Vital Signs:    Temp:  [97.8 °F (36.6 °C)-101 °F (38.3 °C)] 97.8 °F (36.6 °C)  Heart Rate:  [100-137] 106  Resp:  [16-19] 18  BP: (102-149)/(43-99) 102/43    Intake and output:    I/O last 3 completed shifts:  In: 2736 [I.V.:736; IV Piggyback:]  Out: -   No intake/output data recorded.    Physical Examination:    General Appearance:   Unresponsive, not in any acute distress.   Head:    Atraumatic and normocephalic, without obvious abnormality.   Eyes:            PERRLA,  No pallor.    Neck:   Supple,  No lymph glands, no bruit   Lungs:     Chest shape is normal. Breath sounds heard bilaterally equally.  No crackles or wheezing.     Heart:    Normal S1 and S2, no murmur,  No JVD   Abdomen:     Normal bowel sounds, no masses, no organomegaly. Soft     non-tender, no guarding, no rebound tenderness   Extremities:   l, no edema, no cyanosis,    Skin:   No  bruising or rash.   Neurologic:  Not responsive except to painful stimuli.      Laboratory results:  Results from last 7 days   Lab Units  08/13/20 0633 08/12/20  0756   SODIUM mmol/L 155* 166*   POTASSIUM mmol/L 2.6* 2.7*   CHLORIDE mmol/L 121* 127*   CO2 mmol/L 22.2 25.5   BUN mg/dL 35* 32*   CREATININE mg/dL 1.70* 1.40*   CALCIUM mg/dL 7.8* 8.1*   BILIRUBIN mg/dL 0.4 0.4   ALK PHOS U/L 98 118*   ALT (SGPT) U/L 23 20   AST (SGOT) U/L 113* 56*   GLUCOSE mg/dL 141* 132*     Results from last 7 days   Lab Units 08/13/20  0633 08/12/20 0756 08/12/20  0353   WBC 10*3/mm3 8.99 8.81 13.72*   HEMOGLOBIN g/dL 11.6* 12.8 13.8   HEMATOCRIT % 35.6 39.7 44.2   PLATELETS 10*3/mm3 113* 136* 117*         Results from last 7 days   Lab Units 08/12/20  0353   TROPONIN T ng/mL 0.109*           Radiology results:    Imaging Results (Last 24 Hours)     Procedure Component Value Units Date/Time    XR Chest 1 View [[id number]] Collected:  08/12/20 0846     Updated:  08/12/20 0849    Narrative:       PROCEDURE: XR CHEST 1 VW-     HISTORY: ams, hypoxia     COMPARISON: 10/29/2019.     FINDINGS: The heart is normal in size. The mediastinum is unremarkable.  There are chronic interstitial lung changes. There is no pneumothorax.   There are no acute osseous abnormalities.       Impression:       No acute cardiopulmonary process.     Continued followup is recommended.     This report was finalized on 8/12/2020 8:47 AM by Kaylen Stephens M.D..          I have reviewed the patient's radiology reports.    Medication Review:     I have reviewed the patients active and prn medications.     Assessment:      [place] coma scale total score 3-8 (CMS/HCC)    Atrial fibrillation (CMS/HCC)    Hypernatremia    Hypokalemia    Alzheimer's dementia, late onset, with behavioral disturbance (CMS/HCC)    Metabolic encephalopathy    Dehydration    Hypothyroidism (acquired)  Acute kidney injury        Plan:    89-year-old patient with advanced dementia, metabolic encephalopathy, hyponatremia, hypokalemia, acute kidney injury, hydration, who has not been responsive having new onset A. fib with  rate control.  Patient did get hypotonic fluid with potassium with minimal response.  Patient has been had a lengthy discussion with the daughter and the POA who agrees to stop the IV fluids and see if she is hemodynamically stable she will need to be transferred to the [facility] tomorrow morning if in the meantime she becomes hemodynamically unstable she is DNR and she may and family is aware of April agreeable with just comfort care.  See orders as per comfort care only patient has a poor prognosis with anticipated life expectancy of few days to weeks and would be appropriate for comfort measures with hospice care and accordingly will make arrangements for transfer in a.m.    Total time spent counseling with more than 50% time spent in counseling is 40 minutes    Chuy Galan MD  08/13/20  08:40      Please note that portions of this note may have been completed with a voice recognition program. Efforts were made to edit the dictations, but occasionally words are mistranscribed.

## 2020-08-13 NOTE — PROGRESS NOTES
Reviewed Dr Galan's note from this am.   Anticipate possible DC to [facility] ([facility] tomorrow.  Visited pt this am.   Family at bedside.  They reported no response from pt.  Pt appears to be sleeping with NRM in place.  She appears comfortable.   When I picked her hand up to check her nailbeds, she moved her fingers.  Discussed with family plan of care.  They reported Dr Galan mentioned pt going to the [facility] tomorrow.  I informed them I was aware and had contacted HCP to updated them on the plan.    Family denied needs at this time.

## 2020-08-14 NOTE — PROGRESS NOTES
Case Management Discharge Note                Destination - Selection Complete      Service Provider Request Status Selected Services Address Phone Number Fax Number    [facility] Selected Inpatient Hospice [address] [address] [phone]              Transportation Services  Ambulance: [facility]    Final Discharge Disposition Code: 51 - hospice medical facility

## 2020-08-14 NOTE — DISCHARGE SUMMARY
[facility]      Name:  Silva Ruano   Age:  89 y.o.  Sex:  female  :  1925  MRN:  [id number]   Visit Number:  [id number]  Primary Care Physician:  Chuy Galan MD  Date of Discharge:  2020  Admission Date:  2020      Discharge Diagnosis:         [place] coma scale total score 3-8 (CMS/HCC)    Alzheimer's dementia, late onset, with behavioral disturbance (CMS/HCC)    Metabolic encephalopathy    Atrial fibrillation (CMS/HCC)    Hypernatremia    Hypokalemia    Dehydration    Hypothyroidism (acquired)  Acute hypoxic respiratory failure        Consults:     Consults     No orders found from 2020 to 2020.          Procedures Performed:                 Hospital Course:   The patient was admitted on 2020  Please see H&P for details on admission HPI and ROS.  89-year-old patient with past medical history of advanced dementia who was bedbound and dependent for all ADLs at the nursing home, hypothyroidism, who was found to be hypoxic and nonresponsive and so sent to the ER for further evaluation.  Apparently patient has been unresponsive so history could not be obtained and mainly obtained from the ER records and chart and the family.  Family had seen her last week where she was having not much respiratory distress and she has has been not able to recognize family and has advanced dementia.        In the ER actually it was documented that she was very hypoxic with a nonrebreather and was hypotensive and there was also documented bradycardic at one point on the way to the hospital.  On the EKG she was found to be having A. fib with rate of 113 and so amiodarone was started.  By that time the family who is the daughter and the power of  arrived and wanted not to get any aggressive treatment and comfort measures.  Said no further cardiac medication was given.  Labs were done initially which showed that she had hyponatremia with  sodium of 166 and potassium of 2.7.  Patient still has been not responsive except to painful stimuli and has been on oxygen.  Her blood pressure systolic in the 90s and her saturation is.  Her heart rate has been around 100- 120 range    After admission she was started on comfort measures and after lengthy discussion with the family it was decided to give IV fluids and hypotonic fluids for her hyponatremia and hypokalemia.  This was given in order to prevent further delirium and restlessness and patient was stabilized with her hemodynamically and was given oxygen for comfort measures.  After 24 hours there was some improvement with her hypernatremia until she was hypokalemic and it was decided to stop everything and to only comfort measures if she is hemodynamically stable plan was to transfer to hospice [facility] for end-of-life care.  Patient has remained hemodynamically stable maintaining her blood pressure though unresponsive semicomatose responding only to painful stimuli and is DNR.  Family is agreeable and the POA about only comfort measures and aware she has a poor prognosis of the last 2 days and agreeable to be transferred to the compassionate care center today for symptomatic management and end-of-life care today.    Vital Signs:     Temp:  [99 °F (37.2 °C)-101.7 °F (38.7 °C)] 99 °F (37.2 °C)  Heart Rate:  [100-122] 100  Resp:  [16] 16  BP: ()/(42-59) 141/53    Physical Exam:     General Appearance:   She is not responsive except to painful stimuli, not in any acute distress.   Head:    Atraumatic and normocephalic,   Eyes:            PERRLA,  No pallor.    Neck:   Supple,  No lymph glands, no bruit   Lungs:     Chest shape is normal. Breath sounds heard bilaterally equally.  No crackles or wheezing.     Heart:    Normal S1 and S2, no murmur,  No JVD, irregularly irregular with heart rate in the around 100 range in A. fib   Abdomen:     Normal bowel sounds, no masses, no  organomegaly. Soft     nontender, no guarding, no rebound tenderness   Extremities:  , no edema, no cyanosis,    Skin:   No  bruising or rash.   Neurologic:  Patient is comatose responding only to painful stimuli and barely grimaces .        Pertinent Lab Results:     Results from last 7 days   Lab Units 08/13/20  0633 08/12/20  0756   SODIUM mmol/L 155* 166*   POTASSIUM mmol/L 2.6* 2.7*   CHLORIDE mmol/L 121* 127*   CO2 mmol/L 22.2 25.5   BUN mg/dL 35* 32*   CREATININE mg/dL 1.70* 1.40*   CALCIUM mg/dL 7.8* 8.1*   BILIRUBIN mg/dL 0.4 0.4   ALK PHOS U/L 98 118*   ALT (SGPT) U/L 23 20   AST (SGOT) U/L 113* 56*   GLUCOSE mg/dL 141* 132*     Results from last 7 days   Lab Units 08/13/20  0633 08/12/20  0756 08/12/20  0353   WBC 10*3/mm3 8.99 8.81 13.72*   HEMOGLOBIN g/dL 11.6* 12.8 13.8   HEMATOCRIT % 35.6 39.7 44.2   PLATELETS 10*3/mm3 113* 136* 117*         No results found for: BLOODCX, URINECX, WOUNDCX, MRSACX    Pertinent Radiology Results:    Imaging Results (Most Recent)     Procedure Component Value Units Date/Time    XR Chest 1 View [[id number]] Collected:  08/12/20 0846     Updated:  08/12/20 0849    Narrative:       PROCEDURE: XR CHEST 1 VW-     HISTORY: ams, hypoxia     COMPARISON: 10/29/2019.     FINDINGS: The heart is normal in size. The mediastinum is unremarkable.  There are chronic interstitial lung changes. There is no pneumothorax.   There are no acute osseous abnormalities.       Impression:       No acute cardiopulmonary process.     Continued followup is recommended.     This report was finalized on 8/12/2020 8:47 AM by Kaylen Stephens M.D..                  Discharge Disposition:      Hospice/Medical Facility (DC - External)    Discharge Medication:         Discharge Medications      New Medications      Instructions Start Date   acetaminophen 650 MG suppository  Commonly known as:  TYLENOL  Replaces:  acetaminophen 325 MG tablet   650 mg, Rectal, Every 6 Hours PRN      LORazepam 2 MG/ML  injection  Commonly known as:  ATIVAN   0.25 mg, Intravenous, Every 6 Hours PRN      Morphine sulfate (PF) 2 MG/ML solution injection   1 mg, Intravenous, Every 4 Hours PRN         Stop These Medications    acetaminophen 325 MG tablet  Commonly known as:  TYLENOL  Replaced by:  acetaminophen 650 MG suppository     Aspirin Low Dose 81 MG chewable tablet  Generic drug:  aspirin     atorvastatin 20 MG tablet  Commonly known as:  LIPITOR     chlorhexidine 0.12 % solution  Commonly known as:  PERIDEX     Claritin 10 MG tablet  Generic drug:  loratadine     levETIRAcetam 100 MG/ML solution  Commonly known as:  KEPPRA     levETIRAcetam 250 MG tablet  Commonly known as:  KEPPRA     levothyroxine 50 MCG tablet  Commonly known as:  SYNTHROID, LEVOTHROID     losartan 50 MG tablet  Commonly known as:  COZAAR     metoprolol succinate XL 25 MG 24 hr tablet  Commonly known as:  TOPROL-XL     metoprolol tartrate 25 MG tablet  Commonly known as:  LOPRESSOR     nitrofurantoin (macrocrystal-monohydrate) 100 MG capsule  Commonly known as:  MACROBID     omeprazole 20 MG capsule  Commonly known as:  priLOSEC     Premarin 0.625 MG tablet  Generic drug:  estrogens (conjugated)     senna 8.6 MG tablet  Commonly known as:  SENOKOT     tolterodine LA 4 MG 24 hr capsule  Commonly known as:  DETROL LA            Discharge Diet:       N.p.o.                         Chuy Galan MD  08/14/20  08:59    Time:  Discharge 40 min    Please note that portions of this note may have been completed with a voice recognition program. Efforts were made to edit the dictations, but occasionally words are mistranscribed.

## 2020-08-14 NOTE — PLAN OF CARE
Problem: Dying Patient, Actively (Adult)  Goal: Identify Related Risk Factors and Signs and Symptoms  Outcome: Ongoing (interventions implemented as appropriate)  Note:   Pt seems to be resting comfort no restless noted family at bedside pt turned Q2 will continue to monitor.

## 2020-08-14 NOTE — PROGRESS NOTES
Reviewed Dr Galan's note this am.   Planned DC to [facility] for symptom management.  Determined pt met criteria for [facility].    [facility] notified of plan.  Informed they were aware of referral to [facility].   Discharge summary available and faxed to HCP and to [facility].    Visited pt.  Daughter at bedside.  Pt appeared to be sleeping with O2 per NRM on.  Pt appeared comfortable.  Pt did move her foot when I touched it.  Daughter reported pt has not responded to her this morning but pt's granddaughter reported pt moving around a little last night.  I informed daughter there was no EMS/DNR in the system or on her chart.  She was agreeable to signing another EMS/DNR.  Form was signed and witnessed then faxed to [facility] to be scanned into chart.  Original and copy placed on pt's chart.  Daughter was given a copy of the EMS/DNR.  Pt's primary nurse, Allison EAST, updated on plan and progress.   [facility] report number provided:  REPORT:  [phone]   FAX:  [phone]   Informed Allison the DC summary has been faxed to the [facility].  I explained the process for pt going to the [facility] including the DC summary being faxed (which it has been), the nurse calling report and then the ambulance for transport.  I explained to pt's daughter the need for her to also go to the [facility] to sign admission papers.    I informed her of the COVID restrictions for visitors.  She verbalized understanding.      1121  Pt discharged to [facility] ([facility]) for symptom management via [facility].
